# Patient Record
Sex: MALE | Race: WHITE | Employment: UNEMPLOYED | URBAN - METROPOLITAN AREA
[De-identification: names, ages, dates, MRNs, and addresses within clinical notes are randomized per-mention and may not be internally consistent; named-entity substitution may affect disease eponyms.]

---

## 2017-04-24 ENCOUNTER — HOSPITAL ENCOUNTER (EMERGENCY)
Age: 42
Discharge: HOME | End: 2017-04-24
Attending: EMERGENCY MEDICINE
Payer: SELF-PAY

## 2017-04-24 ENCOUNTER — APPOINTMENT (OUTPATIENT)
Dept: CT IMAGING | Age: 42
End: 2017-04-24
Attending: EMERGENCY MEDICINE
Payer: SELF-PAY

## 2017-04-24 VITALS
SYSTOLIC BLOOD PRESSURE: 173 MMHG | OXYGEN SATURATION: 97 % | TEMPERATURE: 97.3 F | HEART RATE: 93 BPM | DIASTOLIC BLOOD PRESSURE: 97 MMHG | RESPIRATION RATE: 22 BRPM | WEIGHT: 315 LBS | HEIGHT: 75 IN | BODY MASS INDEX: 39.17 KG/M2

## 2017-04-24 DIAGNOSIS — N13.2 HYDRONEPHROSIS WITH URINARY OBSTRUCTION DUE TO URETERAL CALCULUS: ICD-10-CM

## 2017-04-24 DIAGNOSIS — N20.1 URETEROLITHIASIS: Primary | ICD-10-CM

## 2017-04-24 LAB
ANION GAP BLD CALC-SCNC: 13 MMOL/L (ref 5–15)
APPEARANCE UR: CLEAR
BACTERIA URNS QL MICRO: NEGATIVE /HPF
BASOPHILS # BLD AUTO: 0 K/UL (ref 0–0.1)
BASOPHILS # BLD: 0 % (ref 0–1)
BILIRUB UR QL: NEGATIVE
BUN SERPL-MCNC: 15 MG/DL (ref 6–20)
BUN/CREAT SERPL: 14 (ref 12–20)
CALCIUM SERPL-MCNC: 8.9 MG/DL (ref 8.5–10.1)
CHLORIDE SERPL-SCNC: 102 MMOL/L (ref 97–108)
CO2 SERPL-SCNC: 24 MMOL/L (ref 21–32)
COLOR UR: ABNORMAL
CREAT SERPL-MCNC: 1.07 MG/DL (ref 0.7–1.3)
EOSINOPHIL # BLD: 0.1 K/UL (ref 0–0.4)
EOSINOPHIL NFR BLD: 1 % (ref 0–7)
EPITH CASTS URNS QL MICRO: ABNORMAL /LPF
ERYTHROCYTE [DISTWIDTH] IN BLOOD BY AUTOMATED COUNT: 13.4 % (ref 11.5–14.5)
GLUCOSE SERPL-MCNC: 224 MG/DL (ref 65–100)
GLUCOSE UR STRIP.AUTO-MCNC: >1000 MG/DL
HCT VFR BLD AUTO: 41.5 % (ref 36.6–50.3)
HGB BLD-MCNC: 13.5 G/DL (ref 12.1–17)
HGB UR QL STRIP: ABNORMAL
HYALINE CASTS URNS QL MICRO: ABNORMAL /LPF (ref 0–5)
KETONES UR QL STRIP.AUTO: NEGATIVE MG/DL
LEUKOCYTE ESTERASE UR QL STRIP.AUTO: NEGATIVE
LYMPHOCYTES # BLD AUTO: 38 % (ref 12–49)
LYMPHOCYTES # BLD: 3.7 K/UL (ref 0.8–3.5)
MCH RBC QN AUTO: 28.9 PG (ref 26–34)
MCHC RBC AUTO-ENTMCNC: 32.5 G/DL (ref 30–36.5)
MCV RBC AUTO: 88.9 FL (ref 80–99)
MONOCYTES # BLD: 0.9 K/UL (ref 0–1)
MONOCYTES NFR BLD AUTO: 9 % (ref 5–13)
NEUTS SEG # BLD: 5 K/UL (ref 1.8–8)
NEUTS SEG NFR BLD AUTO: 52 % (ref 32–75)
NITRITE UR QL STRIP.AUTO: NEGATIVE
PH UR STRIP: 6 [PH] (ref 5–8)
PLATELET # BLD AUTO: 150 K/UL (ref 150–400)
POTASSIUM SERPL-SCNC: 4 MMOL/L (ref 3.5–5.1)
PROT UR STRIP-MCNC: ABNORMAL MG/DL
RBC # BLD AUTO: 4.67 M/UL (ref 4.1–5.7)
RBC #/AREA URNS HPF: ABNORMAL /HPF (ref 0–5)
SODIUM SERPL-SCNC: 139 MMOL/L (ref 136–145)
SP GR UR REFRACTOMETRY: 1.03 (ref 1–1.03)
UA: UC IF INDICATED,UAUC: ABNORMAL
UROBILINOGEN UR QL STRIP.AUTO: 0.2 EU/DL (ref 0.2–1)
WBC # BLD AUTO: 9.7 K/UL (ref 4.1–11.1)
WBC URNS QL MICRO: ABNORMAL /HPF (ref 0–4)

## 2017-04-24 PROCEDURE — 80048 BASIC METABOLIC PNL TOTAL CA: CPT | Performed by: EMERGENCY MEDICINE

## 2017-04-24 PROCEDURE — 36415 COLL VENOUS BLD VENIPUNCTURE: CPT | Performed by: EMERGENCY MEDICINE

## 2017-04-24 PROCEDURE — 85025 COMPLETE CBC W/AUTO DIFF WBC: CPT | Performed by: EMERGENCY MEDICINE

## 2017-04-24 PROCEDURE — 96361 HYDRATE IV INFUSION ADD-ON: CPT

## 2017-04-24 PROCEDURE — 74011250636 HC RX REV CODE- 250/636: Performed by: EMERGENCY MEDICINE

## 2017-04-24 PROCEDURE — 96375 TX/PRO/DX INJ NEW DRUG ADDON: CPT

## 2017-04-24 PROCEDURE — 81001 URINALYSIS AUTO W/SCOPE: CPT | Performed by: EMERGENCY MEDICINE

## 2017-04-24 PROCEDURE — 96376 TX/PRO/DX INJ SAME DRUG ADON: CPT

## 2017-04-24 PROCEDURE — 96374 THER/PROPH/DIAG INJ IV PUSH: CPT

## 2017-04-24 PROCEDURE — 74176 CT ABD & PELVIS W/O CONTRAST: CPT

## 2017-04-24 PROCEDURE — 99284 EMERGENCY DEPT VISIT MOD MDM: CPT

## 2017-04-24 RX ORDER — ONDANSETRON 2 MG/ML
4 INJECTION INTRAMUSCULAR; INTRAVENOUS
Status: COMPLETED | OUTPATIENT
Start: 2017-04-24 | End: 2017-04-24

## 2017-04-24 RX ORDER — ONDANSETRON 4 MG/1
4 TABLET, ORALLY DISINTEGRATING ORAL
Qty: 10 TAB | Refills: 0 | Status: ON HOLD | OUTPATIENT
Start: 2017-04-24 | End: 2017-04-27

## 2017-04-24 RX ORDER — SODIUM CHLORIDE 0.9 % (FLUSH) 0.9 %
5-10 SYRINGE (ML) INJECTION AS NEEDED
Status: DISCONTINUED | OUTPATIENT
Start: 2017-04-24 | End: 2017-04-24 | Stop reason: HOSPADM

## 2017-04-24 RX ORDER — KETOROLAC TROMETHAMINE 30 MG/ML
30 INJECTION, SOLUTION INTRAMUSCULAR; INTRAVENOUS
Status: COMPLETED | OUTPATIENT
Start: 2017-04-24 | End: 2017-04-24

## 2017-04-24 RX ORDER — HYDROMORPHONE HYDROCHLORIDE 1 MG/ML
1 INJECTION, SOLUTION INTRAMUSCULAR; INTRAVENOUS; SUBCUTANEOUS ONCE
Status: COMPLETED | OUTPATIENT
Start: 2017-04-24 | End: 2017-04-24

## 2017-04-24 RX ORDER — SODIUM CHLORIDE 0.9 % (FLUSH) 0.9 %
5-10 SYRINGE (ML) INJECTION EVERY 8 HOURS
Status: DISCONTINUED | OUTPATIENT
Start: 2017-04-24 | End: 2017-04-24 | Stop reason: HOSPADM

## 2017-04-24 RX ORDER — OXYCODONE AND ACETAMINOPHEN 5; 325 MG/1; MG/1
1 TABLET ORAL
Qty: 20 TAB | Refills: 0 | Status: ON HOLD | OUTPATIENT
Start: 2017-04-24 | End: 2017-04-27

## 2017-04-24 RX ADMIN — SODIUM CHLORIDE 1000 ML: 900 INJECTION, SOLUTION INTRAVENOUS at 04:27

## 2017-04-24 RX ADMIN — ONDANSETRON 4 MG: 2 INJECTION INTRAMUSCULAR; INTRAVENOUS at 04:27

## 2017-04-24 RX ADMIN — HYDROMORPHONE HYDROCHLORIDE 1 MG: 1 INJECTION, SOLUTION INTRAMUSCULAR; INTRAVENOUS; SUBCUTANEOUS at 08:52

## 2017-04-24 RX ADMIN — HYDROMORPHONE HYDROCHLORIDE 1 MG: 1 INJECTION, SOLUTION INTRAMUSCULAR; INTRAVENOUS; SUBCUTANEOUS at 04:29

## 2017-04-24 RX ADMIN — KETOROLAC TROMETHAMINE 30 MG: 30 INJECTION, SOLUTION INTRAMUSCULAR at 04:28

## 2017-04-24 NOTE — ED PROVIDER NOTES
HPI Comments: Ambar Villarreal is a 35 y.o. male who presents to the ED with a c/o left flank pain. Patient states that the pain started approximately 35 minutes prior to coming to the emergency room. Patient states the pain is in the left side of his back it radiates to his left groin. Patient's is sharp and stabbing in nature. Patient states she's had some nausea but no emesis. Patient denies any fever. Patient states he has a history of kidney stones and this feels similar to previous episodes. PCP: Reyes Conrad  PMHx significant for: DM, HTN, kidney stones  PSHx significant for: ankle surgery  Social Hx: Tobacco: no EtOH: rarely Illicit drug use: no    There are no further complaints or symptoms at this time. Patient is a 35 y.o. male presenting with flank pain. The history is provided by the patient. No  was used. Flank Pain    This is a new problem. The current episode started less than 1 hour ago. The problem has been rapidly worsening. Patient reports not work related injury. The pain is associated with no known injury. The pain is present in the middle back and left side. The quality of the pain is described as stabbing, shooting, similar to previous episodes and sharp. The pain radiates to the left groin. The pain is at a severity of 10/10. The pain is severe. Pertinent negatives include no chest pain, no fever, no numbness, no abdominal pain, no bowel incontinence, no perianal numbness, no bladder incontinence, no dysuria, no paresthesias, no paresis and no weakness. He has tried nothing for the symptoms. Risk factors include history of kidney stones. The patient's surgical history non-contributory        Past Medical History:   Diagnosis Date    Diabetes ([facility] [address]     Hypertension        Past Surgical History:   Procedure Laterality Date    HX ORTHOPAEDIC           No family history on file.     Social History     Social History    Marital status: LEGALLY  Spouse name: N/A    Number of children: N/A    Years of education: N/A     Occupational History    Not on file. Social History Main Topics    Smoking status: Never Smoker    Smokeless tobacco: Not on file    Alcohol use Yes      Comment: 1 x year    Drug use: Not on file    Sexual activity: Not on file     Other Topics Concern    Not on file     Social History Narrative     ALLERGIES: Other food and Cucumber fruit extract    Review of Systems   Constitutional: Negative for appetite change, chills, fever and unexpected weight change. HENT: Negative for ear pain, hearing loss, rhinorrhea and trouble swallowing. Eyes: Negative for pain and visual disturbance. Respiratory: Negative for cough, chest tightness and shortness of breath. Cardiovascular: Negative for chest pain and palpitations. Gastrointestinal: Negative for abdominal distention, abdominal pain, blood in stool, bowel incontinence and vomiting. Genitourinary: Positive for flank pain. Negative for bladder incontinence, dysuria, hematuria and urgency. Musculoskeletal: Negative for back pain and myalgias. Skin: Negative for rash. Neurological: Negative for dizziness, syncope, weakness, numbness and paresthesias. Psychiatric/Behavioral: Negative for confusion and suicidal ideas. All other systems reviewed and are negative. Vitals:    04/24/17 0356   BP: (!) 203/105   Pulse: 93   Resp: 22   Temp: 97.3 °F (36.3 °C)   SpO2: 97%   Weight: (!) 188 kg (414 lb 7.4 oz)   Height: 6' 3\" (1.905 m)            Physical Exam   Constitutional: He is oriented to person, place, and time. He appears well-developed and well-nourished. He appears distressed (secondary to pain). HENT:   Head: Normocephalic and atraumatic. Right Ear: External ear normal.   Left Ear: External ear normal.   Nose: Nose normal.   Mouth/Throat: Oropharynx is clear and moist. No oropharyngeal exudate.    Eyes: Conjunctivae and EOM are normal. Pupils are equal, round, and reactive to light. Right eye exhibits no discharge. Left eye exhibits no discharge. No scleral icterus. Neck: Normal range of motion. Neck supple. No JVD present. No tracheal deviation present. Cardiovascular: Normal rate, regular rhythm, normal heart sounds and intact distal pulses. Exam reveals no gallop and no friction rub. No murmur heard. Pulmonary/Chest: Effort normal and breath sounds normal. No stridor. No respiratory distress. He has no decreased breath sounds. He has no wheezes. He has no rhonchi. He has no rales. He exhibits no tenderness. Abdominal: Soft. Bowel sounds are normal. He exhibits no distension. There is no tenderness. There is no rebound and no guarding. Musculoskeletal: Normal range of motion. He exhibits no edema or tenderness. Neurological: He is alert and oriented to person, place, and time. He has normal strength and normal reflexes. No cranial nerve deficit or sensory deficit. He exhibits normal muscle tone. Coordination normal. GCS eye subscore is 4. GCS verbal subscore is 5. GCS motor subscore is 6. Skin: Skin is warm and dry. No rash noted. He is not diaphoretic. No erythema. No pallor. Psychiatric: He has a normal mood and affect. His behavior is normal. Judgment and thought content normal.   Nursing note and vitals reviewed.        MDM  Number of Diagnoses or Management Options  Hydronephrosis with urinary obstruction due to ureteral calculus:   Ureterolithiasis:      Amount and/or Complexity of Data Reviewed  Clinical lab tests: ordered and reviewed  Tests in the radiology section of CPT®: ordered and reviewed  Discuss the patient with other providers: yes (Urology)    Risk of Complications, Morbidity, and/or Mortality  Presenting problems: moderate  Diagnostic procedures: moderate  Management options: moderate    Patient Progress  Patient progress: improved    ED Course       Procedures  Chief Complaint   Patient presents with    Flank Pain 6:50 AM  The patients presenting problems have been discussed, and they are in agreement with the care plan formulated and outlined with them. I have encouraged them to ask questions as they arise throughout their visit. MEDICATIONS GIVEN:  Medications   sodium chloride (NS) flush 5-10 mL (not administered)   sodium chloride (NS) flush 5-10 mL (not administered)   HYDROmorphone (PF) (DILAUDID) injection 1 mg (1 mg IntraVENous Given 4/24/17 0429)   sodium chloride 0.9 % bolus infusion 1,000 mL (1,000 mL IntraVENous New Bag 4/24/17 0427)   ketorolac (TORADOL) injection 30 mg (30 mg IntraVENous Given 4/24/17 0428)   ondansetron (ZOFRAN) injection 4 mg (4 mg IntraVENous Given 4/24/17 0427)       LABS REVIEWED:  Recent Results (from the past 24 hour(s))   METABOLIC PANEL, BASIC    Collection Time: 04/24/17  4:31 AM   Result Value Ref Range    Sodium 139 136 - 145 mmol/L    Potassium 4.0 3.5 - 5.1 mmol/L    Chloride 102 97 - 108 mmol/L    CO2 24 21 - 32 mmol/L    Anion gap 13 5 - 15 mmol/L    Glucose 224 (H) 65 - 100 mg/dL    BUN 15 6 - 20 MG/DL    Creatinine 1.07 0.70 - 1.30 MG/DL    BUN/Creatinine ratio 14 12 - 20      GFR est AA >60 >60 ml/min/1.73m2    GFR est non-AA >60 >60 ml/min/1.73m2    Calcium 8.9 8.5 - 10.1 MG/DL   CBC WITH AUTOMATED DIFF    Collection Time: 04/24/17  4:31 AM   Result Value Ref Range    WBC 9.7 4.1 - 11.1 K/uL    RBC 4.67 4.10 - 5.70 M/uL    HGB 13.5 12.1 - 17.0 g/dL    HCT 41.5 36.6 - 50.3 %    MCV 88.9 80.0 - 99.0 FL    MCH 28.9 26.0 - 34.0 PG    MCHC 32.5 30.0 - 36.5 g/dL    RDW 13.4 11.5 - 14.5 %    PLATELET 073 485 - 911 K/uL    NEUTROPHILS 52 32 - 75 %    LYMPHOCYTES 38 12 - 49 %    MONOCYTES 9 5 - 13 %    EOSINOPHILS 1 0 - 7 %    BASOPHILS 0 0 - 1 %    ABS. NEUTROPHILS 5.0 1.8 - 8.0 K/UL    ABS. LYMPHOCYTES 3.7 (H) 0.8 - 3.5 K/UL    ABS. MONOCYTES 0.9 0.0 - 1.0 K/UL    ABS. EOSINOPHILS 0.1 0.0 - 0.4 K/UL    ABS.  BASOPHILS 0.0 0.0 - 0.1 K/UL   URINALYSIS W/ REFLEX CULTURE Collection Time: 04/24/17  6:00 AM   Result Value Ref Range    Color YELLOW/STRAW      Appearance CLEAR CLEAR      Specific gravity 1.030 1.003 - 1.030      pH (UA) 6.0 5.0 - 8.0      Protein TRACE (A) NEG mg/dL    Glucose >1000 (A) NEG mg/dL    Ketone NEGATIVE  NEG mg/dL    Bilirubin NEGATIVE  NEG      Blood LARGE (A) NEG      Urobilinogen 0.2 0.2 - 1.0 EU/dL    Nitrites NEGATIVE  NEG      Leukocyte Esterase NEGATIVE  NEG      UA:UC IF INDICATED CULTURE NOT INDICATED BY UA RESULT CNI      WBC 0-4 0 - 4 /hpf    RBC  0 - 5 /hpf    Epithelial cells FEW FEW /lpf    Bacteria NEGATIVE  NEG /hpf    Hyaline cast 0-2 0 - 5 /lpf       VITAL SIGNS:  Patient Vitals for the past 12 hrs:   Temp Pulse Resp BP SpO2   04/24/17 0356 97.3 °F (36.3 °C) 93 22 (!) 203/105 97 %       RADIOLOGY RESULTS:  The following have been ordered and reviewed:  CT ABD PELV WO CONT   Final Result        Study Result      INDICATION: flank pain left flank pain     COMPARISON: None     TECHNIQUE:   Thin axial images were obtained through the abdomen and pelvis. Coronal and  sagittal reconstructions were generated. Oral contrast was not administered. CT  dose reduction was achieved through use of a standardized protocol tailored for  this examination and automatic exposure control for dose modulation.      The absence of intravenous contrast material reduces the sensitivity for  evaluation of the solid parenchymal organs of the abdomen.      FINDINGS:   LUNG BASES: Clear. INCIDENTALLY IMAGED HEART AND MEDIASTINUM: Unremarkable. LIVER: Hepatic steatosis. GALLBLADDER: Unremarkable. SPLEEN: No mass. PANCREAS: No mass or ductal dilatation. ADRENALS: Unremarkable. KIDNEYS/URETERS: Left hydroureteronephrosis with a 10 mm calculus in the distal  left ureter. STOMACH: Unremarkable. SMALL BOWEL: No dilatation or wall thickening. COLON: No dilatation or wall thickening. APPENDIX: Unremarkable.   PERITONEUM: No ascites or pneumoperitoneum. RETROPERITONEUM: No lymphadenopathy or aortic aneurysm. REPRODUCTIVE ORGANS: None  URINARY BLADDER: No mass or calculus. BONES: No destructive bone lesion. ADDITIONAL COMMENTS: N/A     IMPRESSION  IMPRESSION:     1. Left hydroureteronephrosis with a 10 mm calculus in the distal left ureter. 2. Hepatic steatosis. CONSULTATIONS:   Urology     PROGRESS NOTES:  Due to pt being DM, spoke with urology. Pt is to follow up with urology today. Discussed results and plan with patient. Patient will be discharged home with urology and PCP followup. Patient instructed to return to the emergency room for any worsening symptoms or any other concerns. DIAGNOSIS:    1. Ureterolithiasis    2. Hydronephrosis with urinary obstruction due to ureteral calculus        PLAN:  Follow-up Information     Follow up With Details Comments Contact Info    Benson Essex, MD Schedule an appointment as soon as possible for a visit  [address] jose Hoyt MD In 1 week As needed [address] [facility]    [address] [id number] [phone]      [facility] EMERGENCY DEPT  If symptoms worsen [address] [facility]  [phone]        Current Discharge Medication List      START taking these medications    Details   oxyCODONE-acetaminophen (PERCOCET) 5-325 mg per tablet Take 1 Tab by mouth every four (4) hours as needed for Pain. Max Daily Amount: 6 Tabs. Qty: 20 Tab, Refills: 0      ondansetron (ZOFRAN ODT) 4 mg disintegrating tablet Take 1 Tab by mouth every eight (8) hours as needed for Nausea. Qty: 10 Tab, Refills: 0         CONTINUE these medications which have NOT CHANGED    Details   lisinopril (PRINIVIL, ZESTRIL) 10 mg tablet Take 10 mg by mouth daily. OTHER statin      cyclobenzaprine (FLEXERIL) 10 mg tablet Take 1 Tab by mouth three (3) times daily as needed for Muscle Spasm(s) for up to 12 doses.   Qty: 12 Tab, Refills: 0      esomeprazole (NEXIUM) 40 mg capsule Take 40 mg by mouth daily. metFORMIN (GLUCOPHAGE) 500 mg tablet Take 500 mg by mouth two (2) times daily (with meals). ED COURSE: The patients hospital course has been uncomplicated.

## 2017-04-24 NOTE — DISCHARGE INSTRUCTIONS
We hope that we have addressed all of your medical concerns. The examination and treatment you received in the Emergency Department were for an emergent problem and were not intended as complete care. It is important that you follow up with your healthcare provider(s) for ongoing care. If your symptoms worsen or do not improve as expected, and you are unable to reach your usual health care provider(s), you should return to the Emergency Department. Today's healthcare is undergoing tremendous change, and patient satisfaction surveys are one of the many tools to assess the quality of medical care. You may receive a survey from the [employer] organization regarding your experience in the Emergency Department. I hope that your experience has been completely positive, particularly the medical care that I provided. As such, please participate in the survey; anything less than excellent does not meet my expectations or intentions. [address] [facility] and [facility] participate in nationally recognized quality of care measures. If your blood pressure is greater than 120/80, as reported below, we urge that you seek medical care to address the potential of high blood pressure, commonly known as hypertension. Hypertension can be hereditary or can be caused by certain medical conditions, pain, stress, or \"white coat syndrome. \"       Please make an appointment with your health care provider(s) for follow up of your Emergency Department visit. VITALS:   Patient Vitals for the past 8 hrs:   Temp Pulse Resp BP SpO2   04/24/17 0356 97.3 °F (36.3 °C) 93 22 (!) 203/105 97 %          Thank you for allowing us to provide you with medical care today. We realize that you have many choices for your emergency care needs. Please choose us in the future for any continued health care needs. Aury Barragan, [address] [place] [address] [address] Office: [phone]            Recent Results (from the past 24 hour(s))   METABOLIC PANEL, BASIC    Collection Time: 04/24/17  4:31 AM   Result Value Ref Range    Sodium 139 136 - 145 mmol/L    Potassium 4.0 3.5 - 5.1 mmol/L    Chloride 102 97 - 108 mmol/L    CO2 24 21 - 32 mmol/L    Anion gap 13 5 - 15 mmol/L    Glucose 224 (H) 65 - 100 mg/dL    BUN 15 6 - 20 MG/DL    Creatinine 1.07 0.70 - 1.30 MG/DL    BUN/Creatinine ratio 14 12 - 20      GFR est AA >60 >60 ml/min/1.73m2    GFR est non-AA >60 >60 ml/min/1.73m2    Calcium 8.9 8.5 - 10.1 MG/DL   CBC WITH AUTOMATED DIFF    Collection Time: 04/24/17  4:31 AM   Result Value Ref Range    WBC 9.7 4.1 - 11.1 K/uL    RBC 4.67 4.10 - 5.70 M/uL    HGB 13.5 12.1 - 17.0 g/dL    HCT 41.5 36.6 - 50.3 %    MCV 88.9 80.0 - 99.0 FL    MCH 28.9 26.0 - 34.0 PG    MCHC 32.5 30.0 - 36.5 g/dL    RDW 13.4 11.5 - 14.5 %    PLATELET 858 124 - 261 K/uL    NEUTROPHILS 52 32 - 75 %    LYMPHOCYTES 38 12 - 49 %    MONOCYTES 9 5 - 13 %    EOSINOPHILS 1 0 - 7 %    BASOPHILS 0 0 - 1 %    ABS. NEUTROPHILS 5.0 1.8 - 8.0 K/UL    ABS. LYMPHOCYTES 3.7 (H) 0.8 - 3.5 K/UL    ABS. MONOCYTES 0.9 0.0 - 1.0 K/UL    ABS. EOSINOPHILS 0.1 0.0 - 0.4 K/UL    ABS.  BASOPHILS 0.0 0.0 - 0.1 K/UL   URINALYSIS W/ REFLEX CULTURE    Collection Time: 04/24/17  6:00 AM   Result Value Ref Range    Color YELLOW/STRAW      Appearance CLEAR CLEAR      Specific gravity 1.030 1.003 - 1.030      pH (UA) 6.0 5.0 - 8.0      Protein TRACE (A) NEG mg/dL    Glucose >1000 (A) NEG mg/dL    Ketone NEGATIVE  NEG mg/dL    Bilirubin NEGATIVE  NEG      Blood LARGE (A) NEG      Urobilinogen 0.2 0.2 - 1.0 EU/dL    Nitrites NEGATIVE  NEG      Leukocyte Esterase NEGATIVE  NEG      UA:UC IF INDICATED CULTURE NOT INDICATED BY UA RESULT CNI      WBC 0-4 0 - 4 /hpf    RBC  0 - 5 /hpf    Epithelial cells FEW FEW /lpf    Bacteria NEGATIVE  NEG /hpf    Hyaline cast 0-2 0 - 5 /lpf       Ct Abd Pelv Wo Cont    Result Date: 4/24/2017  INDICATION: flank pain  left flank pain COMPARISON: None TECHNIQUE: Thin axial images were obtained through the abdomen and pelvis. Coronal and sagittal reconstructions were generated. Oral contrast was not administered. CT dose reduction was achieved through use of a standardized protocol tailored for this examination and automatic exposure control for dose modulation. The absence of intravenous contrast material reduces the sensitivity for evaluation of the solid parenchymal organs of the abdomen. FINDINGS: LUNG BASES: Clear. INCIDENTALLY IMAGED HEART AND MEDIASTINUM: Unremarkable. LIVER: Hepatic steatosis. GALLBLADDER: Unremarkable. SPLEEN: No mass. PANCREAS: No mass or ductal dilatation. ADRENALS: Unremarkable. KIDNEYS/URETERS: Left hydroureteronephrosis with a 10 mm calculus in the distal left ureter. STOMACH: Unremarkable. SMALL BOWEL: No dilatation or wall thickening. COLON: No dilatation or wall thickening. APPENDIX: Unremarkable. PERITONEUM: No ascites or pneumoperitoneum. RETROPERITONEUM: No lymphadenopathy or aortic aneurysm. REPRODUCTIVE ORGANS: None URINARY BLADDER: No mass or calculus. BONES: No destructive bone lesion. ADDITIONAL COMMENTS: N/A     IMPRESSION: 1. Left hydroureteronephrosis with a 10 mm calculus in the distal left ureter. 2. Hepatic steatosis. Kidney Stone: Care Instructions  Your Care Instructions    Kidney stones are formed when salts, minerals, and other substances normally found in the urine clump together. They can be as small as grains of sand or, rarely, as large as golf balls. While the stone is traveling through the ureter, which is the tube that carries urine from the kidney to the bladder, you will probably feel pain. The pain may be mild or very severe. You may also have some blood in your urine. As soon as the stone reaches the bladder, any intense pain should go away.   If a stone is too large to pass on its own, you may need a medical procedure to help you pass the stone. The doctor has checked you carefully, but problems can develop later. If you notice any problems or new symptoms, get medical treatment right away. Follow-up care is a key part of your treatment and safety. Be sure to make and go to all appointments, and call your doctor if you are having problems. It's also a good idea to know your test results and keep a list of the medicines you take. How can you care for yourself at home? · Drink plenty of fluids, enough so that your urine is light yellow or clear like water. If you have kidney, heart, or liver disease and have to limit fluids, talk with your doctor before you increase the amount of fluids you drink. · Take pain medicines exactly as directed. Call your doctor if you think you are having a problem with your medicine. ¨ If the doctor gave you a prescription medicine for pain, take it as prescribed. ¨ If you are not taking a prescription pain medicine, ask your doctor if you can take an over-the-counter medicine. Read and follow all instructions on the label. · Your doctor may ask you to strain your urine so that you can collect your kidney stone when it passes. You can use a kitchen strainer or a tea strainer to catch the stone. Store it in a plastic bag until you see your doctor again. Preventing future kidney stones  Some changes in your diet may help prevent kidney stones. Depending on the cause of your stones, your doctor may recommend that you:  · Drink plenty of fluids, enough so that your urine is light yellow or clear like water. If you have kidney, heart, or liver disease and have to limit fluids, talk with your doctor before you increase the amount of fluids you drink. · Limit coffee, tea, and alcohol. Also avoid grapefruit juice. · Do not take more than the recommended daily dose of vitamins C and D.  · Avoid antacids such as Gaviscon, Maalox, Mylanta, or Tums. · Limit the amount of salt (sodium) in your diet.   · Eat a balanced diet that is not too high in protein. · Limit foods that are high in a substance called oxalate, which can cause kidney stones. These foods include dark green vegetables, rhubarb, chocolate, wheat bran, nuts, cranberries, and beans. When should you call for help? Call your doctor now or seek immediate medical care if:  · You cannot keep down fluids. · Your pain gets worse. · You have a fever or chills. · You have new or worse pain in your back just below your rib cage (the flank area). · You have new or more blood in your urine. Watch closely for changes in your health, and be sure to contact your doctor if:  · You do not get better as expected. Where can you learn more? Go to http://leobardo-lan.info/. Enter I326 in the search box to learn more about \"Kidney Stone: Care Instructions. \"  Current as of: November 20, 2015  Content Version: 11.2  © 2573-3056 [employer]. Care instructions adapted under license by [employer] (which disclaims liability or warranty for this information). If you have questions about a medical condition or this instruction, always ask your healthcare professional. [place] [address] any warranty or liability for your use of this information. Learning About Diet for Kidney Stone Prevention  What are kidney stones? Kidney stones are made of salts and minerals in the urine that form small \"jairo. \" Stones can form in the kidneys and the ureters (the tubes that lead from the kidneys to the bladder). They can also form in the bladder. Stones may not cause a problem as long as they stay in the kidneys. But they can cause sudden, severe pain. Pain is most likely when the stones travel from the kidneys to the bladder. Kidney stones can cause bloody urine. Kidney stones often run in families. You are more likely to get them if you don't drink enough fluids, mainly water.  Certain foods and drinks and some dietary supplements may also increase your risk for kidney stones if you consume too much of them. What can you do to prevent kidney stones? Changing what you eat may not prevent all types of kidney stones. But for people who have a history of certain kinds of kidney stones, some changes in diet may help. A dietitian can help you set up a meal plan that includes healthy, low-oxalate choices. Here are some general guidelines to get you started. Plan your meals and snacks around foods that are low in oxalate. These foods include:  · Corn, kale, parsnips, and squash,. · Beef, chicken, pork, turkey, and fish. · Milk, butter, cheese, and yogurt. You can eat certain foods that are medium-high in oxalate, but eat them only once in a while. These foods include:  · Bread. · Brown rice. · English muffins. · Figs. · Popcorn. · String beans. · Tomatoes. Limit very high-oxalate foods, including:  · Black tea. · Coffee. · Chocolate. · Dark green vegetables. · Nuts. Here are some other things you can do to help prevent kidney stones. · Drink plenty of fluids. If you have kidney, heart, or liver disease and have to limit fluids, talk with your doctor before you increase the amount of fluids you drink. · Do not take more than the recommended daily dose of vitamins C and D.  · Limit the salt in your diet. · Eat a balanced diet that is not too high in protein. Follow-up care is a key part of your treatment and safety. Be sure to make and go to all appointments, and call your doctor if you are having problems. It's also a good idea to know your test results and keep a list of the medicines you take. Where can you learn more? Go to http://leobardo-lan.info/. Enter C138 in the search box to learn more about \"Learning About Diet for Kidney Stone Prevention. \"  Current as of: July 26, 2016  Content Version: 11.2  © 5718-6479 [employer].  Care instructions adapted under license by [employer] (which disclaims liability or warranty for this information). If you have questions about a medical condition or this instruction, always ask your healthcare professional. [address] any warranty or liability for your use of this information.

## 2017-04-24 NOTE — ED NOTES
Bedside and Verbal shift change report given to [address] [place] [address] [address] (oncoming nurse) by Anton Goodwin RN (offgoing nurse). Report included the following information SBAR, ED Summary, MAR and Recent Results.

## 2017-04-26 ENCOUNTER — ANESTHESIA EVENT (OUTPATIENT)
Dept: SURGERY | Age: 42
End: 2017-04-26
Payer: SELF-PAY

## 2017-04-27 ENCOUNTER — ANESTHESIA (OUTPATIENT)
Dept: SURGERY | Age: 42
End: 2017-04-27
Payer: SELF-PAY

## 2017-04-27 ENCOUNTER — HOSPITAL ENCOUNTER (OUTPATIENT)
Age: 42
Setting detail: OUTPATIENT SURGERY
Discharge: HOME | End: 2017-04-27
Attending: UROLOGY | Admitting: UROLOGY
Payer: SELF-PAY

## 2017-04-27 ENCOUNTER — APPOINTMENT (OUTPATIENT)
Dept: GENERAL RADIOLOGY | Age: 42
End: 2017-04-27
Attending: UROLOGY
Payer: SELF-PAY

## 2017-04-27 VITALS
HEART RATE: 76 BPM | RESPIRATION RATE: 12 BRPM | OXYGEN SATURATION: 96 % | DIASTOLIC BLOOD PRESSURE: 65 MMHG | WEIGHT: 315 LBS | TEMPERATURE: 97.7 F | BODY MASS INDEX: 39.17 KG/M2 | SYSTOLIC BLOOD PRESSURE: 139 MMHG | HEIGHT: 75 IN

## 2017-04-27 LAB
ATRIAL RATE: 75 BPM
CALCULATED P AXIS, ECG09: 15 DEGREES
CALCULATED R AXIS, ECG10: -15 DEGREES
CALCULATED T AXIS, ECG11: 15 DEGREES
DIAGNOSIS, 93000: NORMAL
GLUCOSE BLD STRIP.AUTO-MCNC: 138 MG/DL (ref 65–100)
GLUCOSE BLD STRIP.AUTO-MCNC: 164 MG/DL (ref 65–100)
P-R INTERVAL, ECG05: 214 MS
Q-T INTERVAL, ECG07: 364 MS
QRS DURATION, ECG06: 120 MS
QTC CALCULATION (BEZET), ECG08: 406 MS
SERVICE CMNT-IMP: ABNORMAL
SERVICE CMNT-IMP: ABNORMAL
VENTRICULAR RATE, ECG03: 75 BPM

## 2017-04-27 PROCEDURE — 77030018836 HC SOL IRR NACL ICUM -A: Performed by: UROLOGY

## 2017-04-27 PROCEDURE — 76030000020 HC AMB SURG 1.5 TO 2 HR INTENSV-TIER 1: Performed by: UROLOGY

## 2017-04-27 PROCEDURE — 74011250636 HC RX REV CODE- 250/636

## 2017-04-27 PROCEDURE — 76001 XR FLUOROSCOPY OVER 60 MINUTES: CPT

## 2017-04-27 PROCEDURE — C2617 STENT, NON-COR, TEM W/O DEL: HCPCS | Performed by: UROLOGY

## 2017-04-27 PROCEDURE — 76210000050 HC AMBSU PH II REC 0.5 TO 1 HR: Performed by: UROLOGY

## 2017-04-27 PROCEDURE — 74011250637 HC RX REV CODE- 250/637: Performed by: UROLOGY

## 2017-04-27 PROCEDURE — 76210000034 HC AMBSU PH I REC 0.5 TO 1 HR: Performed by: UROLOGY

## 2017-04-27 PROCEDURE — 77030033269 HC SLV COMPR SCD KNE2 CARD -B: Performed by: UROLOGY

## 2017-04-27 PROCEDURE — 74011636320 HC RX REV CODE- 636/320: Performed by: UROLOGY

## 2017-04-27 PROCEDURE — 74011250636 HC RX REV CODE- 250/636: Performed by: ANESTHESIOLOGY

## 2017-04-27 PROCEDURE — C1758 CATHETER, URETERAL: HCPCS | Performed by: UROLOGY

## 2017-04-27 PROCEDURE — 74011000250 HC RX REV CODE- 250: Performed by: UROLOGY

## 2017-04-27 PROCEDURE — 77030019927 HC TBNG IRR CYSTO BAXT -A: Performed by: UROLOGY

## 2017-04-27 PROCEDURE — 93005 ELECTROCARDIOGRAM TRACING: CPT

## 2017-04-27 PROCEDURE — 77030006974 HC BSKT URET RTVR BSC -C: Performed by: UROLOGY

## 2017-04-27 PROCEDURE — 76060000063 HC AMB SURG ANES 1.5 TO 2 HR: Performed by: UROLOGY

## 2017-04-27 PROCEDURE — 77030018832 HC SOL IRR H20 ICUM -A: Performed by: UROLOGY

## 2017-04-27 PROCEDURE — 77030020143 HC AIRWY LARYN INTUB CGAS -A: Performed by: ANESTHESIOLOGY

## 2017-04-27 PROCEDURE — 74011250636 HC RX REV CODE- 250/636: Performed by: UROLOGY

## 2017-04-27 PROCEDURE — 77030037193 HC FBR LSR HOLM 365 MIC DISP LENI -C: Performed by: UROLOGY

## 2017-04-27 PROCEDURE — 82962 GLUCOSE BLOOD TEST: CPT

## 2017-04-27 PROCEDURE — C1769 GUIDE WIRE: HCPCS | Performed by: UROLOGY

## 2017-04-27 DEVICE — URETERAL STENT
Type: IMPLANTABLE DEVICE | Site: URETER | Status: FUNCTIONAL
Brand: POLARIS™ ULTRA

## 2017-04-27 RX ORDER — LIDOCAINE HYDROCHLORIDE 10 MG/ML
0.1 INJECTION, SOLUTION EPIDURAL; INFILTRATION; INTRACAUDAL; PERINEURAL AS NEEDED
Status: DISCONTINUED | OUTPATIENT
Start: 2017-04-27 | End: 2017-04-27 | Stop reason: HOSPADM

## 2017-04-27 RX ORDER — ONDANSETRON 2 MG/ML
4 INJECTION INTRAMUSCULAR; INTRAVENOUS AS NEEDED
Status: DISCONTINUED | OUTPATIENT
Start: 2017-04-27 | End: 2017-04-27 | Stop reason: HOSPADM

## 2017-04-27 RX ORDER — OXYCODONE AND ACETAMINOPHEN 5; 325 MG/1; MG/1
1-2 TABLET ORAL
Qty: 20 TAB | Refills: 0 | Status: SHIPPED | OUTPATIENT
Start: 2017-04-27 | End: 2017-09-17

## 2017-04-27 RX ORDER — LIDOCAINE HYDROCHLORIDE 20 MG/ML
JELLY TOPICAL AS NEEDED
Status: DISCONTINUED | OUTPATIENT
Start: 2017-04-27 | End: 2017-04-27 | Stop reason: HOSPADM

## 2017-04-27 RX ORDER — SODIUM CHLORIDE, SODIUM LACTATE, POTASSIUM CHLORIDE, CALCIUM CHLORIDE 600; 310; 30; 20 MG/100ML; MG/100ML; MG/100ML; MG/100ML
125 INJECTION, SOLUTION INTRAVENOUS CONTINUOUS
Status: DISCONTINUED | OUTPATIENT
Start: 2017-04-27 | End: 2017-04-27 | Stop reason: HOSPADM

## 2017-04-27 RX ORDER — PHENAZOPYRIDINE HYDROCHLORIDE 100 MG/1
100 TABLET, FILM COATED ORAL
Qty: 9 TAB | Refills: 3 | Status: SHIPPED | OUTPATIENT
Start: 2017-04-27 | End: 2017-04-30

## 2017-04-27 RX ORDER — MIDAZOLAM HYDROCHLORIDE 1 MG/ML
INJECTION, SOLUTION INTRAMUSCULAR; INTRAVENOUS AS NEEDED
Status: DISCONTINUED | OUTPATIENT
Start: 2017-04-27 | End: 2017-04-27 | Stop reason: HOSPADM

## 2017-04-27 RX ORDER — CEPHALEXIN 500 MG/1
500 CAPSULE ORAL 4 TIMES DAILY
Qty: 12 CAP | Refills: 0 | Status: SHIPPED | OUTPATIENT
Start: 2017-04-27 | End: 2017-04-30

## 2017-04-27 RX ORDER — LIDOCAINE HYDROCHLORIDE 20 MG/ML
INJECTION, SOLUTION EPIDURAL; INFILTRATION; INTRACAUDAL; PERINEURAL AS NEEDED
Status: DISCONTINUED | OUTPATIENT
Start: 2017-04-27 | End: 2017-04-27 | Stop reason: HOSPADM

## 2017-04-27 RX ORDER — TAMSULOSIN HYDROCHLORIDE 0.4 MG/1
0.4 CAPSULE ORAL DAILY
Qty: 30 CAP | Refills: 1 | Status: SHIPPED | OUTPATIENT
Start: 2017-04-27 | End: 2017-09-17

## 2017-04-27 RX ORDER — AMLODIPINE BESYLATE 10 MG/1
10 TABLET ORAL DAILY
COMMUNITY
End: 2020-12-15

## 2017-04-27 RX ORDER — SODIUM CHLORIDE 0.9 % (FLUSH) 0.9 %
5-10 SYRINGE (ML) INJECTION EVERY 8 HOURS
Status: DISCONTINUED | OUTPATIENT
Start: 2017-04-27 | End: 2017-04-27 | Stop reason: HOSPADM

## 2017-04-27 RX ORDER — KETOROLAC TROMETHAMINE 30 MG/ML
INJECTION, SOLUTION INTRAMUSCULAR; INTRAVENOUS AS NEEDED
Status: DISCONTINUED | OUTPATIENT
Start: 2017-04-27 | End: 2017-04-27 | Stop reason: HOSPADM

## 2017-04-27 RX ORDER — PROPOFOL 10 MG/ML
INJECTION, EMULSION INTRAVENOUS AS NEEDED
Status: DISCONTINUED | OUTPATIENT
Start: 2017-04-27 | End: 2017-04-27 | Stop reason: HOSPADM

## 2017-04-27 RX ORDER — SODIUM CHLORIDE 0.9 % (FLUSH) 0.9 %
5-10 SYRINGE (ML) INJECTION AS NEEDED
Status: DISCONTINUED | OUTPATIENT
Start: 2017-04-27 | End: 2017-04-27 | Stop reason: HOSPADM

## 2017-04-27 RX ORDER — PHENAZOPYRIDINE HYDROCHLORIDE 100 MG/1
100 TABLET, FILM COATED ORAL ONCE
Status: COMPLETED | OUTPATIENT
Start: 2017-04-27 | End: 2017-04-27

## 2017-04-27 RX ORDER — HYDROMORPHONE HYDROCHLORIDE 1 MG/ML
.5-1 INJECTION, SOLUTION INTRAMUSCULAR; INTRAVENOUS; SUBCUTANEOUS
Status: DISCONTINUED | OUTPATIENT
Start: 2017-04-27 | End: 2017-04-27 | Stop reason: HOSPADM

## 2017-04-27 RX ORDER — FENTANYL CITRATE 50 UG/ML
INJECTION, SOLUTION INTRAMUSCULAR; INTRAVENOUS AS NEEDED
Status: DISCONTINUED | OUTPATIENT
Start: 2017-04-27 | End: 2017-04-27 | Stop reason: HOSPADM

## 2017-04-27 RX ADMIN — SODIUM CHLORIDE, SODIUM LACTATE, POTASSIUM CHLORIDE, AND CALCIUM CHLORIDE 125 ML/HR: 600; 310; 30; 20 INJECTION, SOLUTION INTRAVENOUS at 07:28

## 2017-04-27 RX ADMIN — MIDAZOLAM HYDROCHLORIDE 1 MG: 1 INJECTION, SOLUTION INTRAMUSCULAR; INTRAVENOUS at 08:30

## 2017-04-27 RX ADMIN — MIDAZOLAM HYDROCHLORIDE 1 MG: 1 INJECTION, SOLUTION INTRAMUSCULAR; INTRAVENOUS at 09:02

## 2017-04-27 RX ADMIN — PROPOFOL 200 MG: 10 INJECTION, EMULSION INTRAVENOUS at 08:15

## 2017-04-27 RX ADMIN — CEFAZOLIN 3 G: 1 INJECTION, POWDER, FOR SOLUTION INTRAMUSCULAR; INTRAVENOUS; PARENTERAL at 08:06

## 2017-04-27 RX ADMIN — LIDOCAINE HYDROCHLORIDE 60 MG: 20 INJECTION, SOLUTION EPIDURAL; INFILTRATION; INTRACAUDAL; PERINEURAL at 08:15

## 2017-04-27 RX ADMIN — FENTANYL CITRATE 50 MCG: 50 INJECTION, SOLUTION INTRAMUSCULAR; INTRAVENOUS at 09:30

## 2017-04-27 RX ADMIN — FENTANYL CITRATE 50 MCG: 50 INJECTION, SOLUTION INTRAMUSCULAR; INTRAVENOUS at 09:00

## 2017-04-27 RX ADMIN — FENTANYL CITRATE 50 MCG: 50 INJECTION, SOLUTION INTRAMUSCULAR; INTRAVENOUS at 09:19

## 2017-04-27 RX ADMIN — PHENAZOPYRIDINE HYDROCHLORIDE 100 MG: 100 TABLET ORAL at 11:02

## 2017-04-27 RX ADMIN — FENTANYL CITRATE 50 MCG: 50 INJECTION, SOLUTION INTRAMUSCULAR; INTRAVENOUS at 08:06

## 2017-04-27 RX ADMIN — FENTANYL CITRATE 50 MCG: 50 INJECTION, SOLUTION INTRAMUSCULAR; INTRAVENOUS at 08:18

## 2017-04-27 RX ADMIN — KETOROLAC TROMETHAMINE 15 MG: 30 INJECTION, SOLUTION INTRAMUSCULAR; INTRAVENOUS at 09:15

## 2017-04-27 RX ADMIN — MIDAZOLAM HYDROCHLORIDE 3 MG: 1 INJECTION, SOLUTION INTRAMUSCULAR; INTRAVENOUS at 08:05

## 2017-04-27 RX ADMIN — SODIUM CHLORIDE, SODIUM LACTATE, POTASSIUM CHLORIDE, AND CALCIUM CHLORIDE: 600; 310; 30; 20 INJECTION, SOLUTION INTRAVENOUS at 09:15

## 2017-04-27 NOTE — IP AVS SNAPSHOT
Ellie Esposito 
 
 
 [address] [place] [address] 
[phone] Patient: Petra Sarkar MRN: [id number] QCN:[id number] You are allergic to the following Allergen Reactions Other Food Angioedema  
 pickle Cucumber Fruit Extract Angioedema Recent Documentation Height Weight BMI Smoking Status 1.905 m (!) 187 kg 51.53 kg/m2 Never Smoker Emergency Contacts Name Discharge Info Relation Home Work Mobile Lyndsay Kruse  Friend [5] [phone] About your hospitalization You were admitted on:  April 27, 2017 You last received care in the:  [facility] You were discharged on:  April 27, 2017 Unit phone number:  [phone] Why you were hospitalized Your primary diagnosis was:  Not on File Providers Seen During Your Hospitalizations Provider Role Specialty Primary office phone Nate Bhandari MD Attending Provider Urology [phone] Your Primary Care Physician (PCP) Primary Care Physician Office Phone Office Fax Chrissy Section [phone] Follow-up Information Follow up With Details Comments Contact Info Yarelis Haskins MD   [address] [facility] [address] [facility] 
[phone] Current Discharge Medication List  
  
START taking these medications Dose & Instructions Dispensing Information Comments Morning Noon Evening Bedtime  
 cephALEXin 500 mg capsule Commonly known as:  Jennifer Patel Your last dose was: Your next dose is:    
   
   
 Dose:  500 mg Take 1 Cap by mouth four (4) times daily for 3 days. Indications: PREVENTION OF BACTERIAL URINARY TRACT INFECTION Quantity:  12 Cap Refills:  0  
     
   
   
   
  
 oxyCODONE-acetaminophen 5-325 mg per tablet Commonly known as:  PERCOCET Your last dose was: Your next dose is: Dose:  1-2 Tab Take 1-2 Tabs by mouth every four (4) hours as needed for Pain. Max Daily Amount: 12 Tabs. Quantity:  20 Tab Refills:  0 phenazopyridine 100 mg tablet Commonly known as:  PYRIDIUM Your last dose was: Your next dose is:    
   
   
 Dose:  100 mg Take 1 Tab by mouth three (3) times daily as needed for Pain (dysuria) for up to 3 days. Quantity:  9 Tab Refills:  3  
     
   
   
   
  
 tamsulosin 0.4 mg capsule Commonly known as:  FLOMAX Your last dose was: Your next dose is:    
   
   
 Dose:  0.4 mg Take 1 Cap by mouth daily. Quantity:  30 Cap Refills:  1 CONTINUE these medications which have NOT CHANGED Dose & Instructions Dispensing Information Comments Morning Noon Evening Bedtime  
 amLODIPine 10 mg tablet Commonly known as:  Sina Wendover Your last dose was: Your next dose is:    
   
   
 Dose:  10 mg Take 10 mg by mouth daily. Refills:  0  
     
   
   
   
  
 metFORMIN 500 mg tablet Commonly known as:  GLUCOPHAGE Your last dose was: Your next dose is:    
   
   
 Dose:  500 mg Take 500 mg by mouth two (2) times daily (with meals). Refills:  0 NexIUM 40 mg capsule Generic drug:  esomeprazole Your last dose was: Your next dose is:    
   
   
 Dose:  40 mg Take 40 mg by mouth daily. Refills:  0  
     
   
   
   
  
 OTHER Your last dose was: Your next dose is: Take  by mouth. Indications: \"cholesterol medication\" Refills:  0 Where to Get Your Medications Information on where to get these meds will be given to you by the nurse or doctor. ! Ask your nurse or doctor about these medications  
  cephALEXin 500 mg capsule  
 oxyCODONE-acetaminophen 5-325 mg per tablet  
 phenazopyridine 100 mg tablet  
 tamsulosin 0.4 mg capsule Discharge Instructions Instructions per Dr. Keith Burton. DISCHARGE SUMMARY from your Nurse The following personal items collected during your admission are returned to you:  
Dental Appliance: Dental Appliances: None Vision: Visual Aid: Glasses Hearing Aid:   
Jewelry: Jewelry: None Clothing: Clothing: Footwear, Undergarments, Shirt Other Valuables: Other Valuables: None Valuables sent to safe:   
 
PATIENT INSTRUCTIONS: 
 
After general anesthesia or intravenous sedation, for 24 hours or while taking prescription Narcotics: · Limit your activities · Do not drive and operate hazardous machinery · Do not make important personal or business decisions · Do  not drink alcoholic beverages · If you have not urinated within 8 hours after discharge, please contact your surgeon on call. Report the following to your surgeon: 
· Excessive pain, swelling, redness or odor of or around the surgical area · Temperature over 100.5 · Nausea and vomiting lasting longer than 4 hours or if unable to take medications · Any signs of decreased circulation or nerve impairment to extremity: change in color, persistent  numbness, tingling, coldness or increase pain · Any questions [address] Breathing deep and coughing are very important exercises to do after surgery. Deep breathing and coughing open the little air tubes and air sacks in your lungs. You take deep breaths every day. You may not even notice - it is just something you do when you sigh or yawn. It is a natural exercise you do to keep these air passages open. After surgery, take deep breaths and cough, on purpose. Coughing and deep breathing help prevent bronchitis and pneumonia after surgery. If you had chest or belly surgery, use a pillow as a \"hug buddy\" and hold it tightly to your chest or belly when you cough. DIRECTIONS: 
6. Take 10 to 15 slow deep breaths every hour while awake. 7. Breathe in deeply, and hold it for 2 seconds. 8. Exhale slowly through puckered lips, like blowing up a balloon. 9. After every 4th or 5th deep breath, hug your pillow to your chest or belly and give a hard, deep cough. Yes, it will probably hurt. But doing this exercise is very important part of healing after surgery. Take your pain medicine to help you do this exercise without too much pain. IF YOU HAVE BEEN DIAGNOSED WITH SLEEP APNEA, PLEASE USE YOUR SLEEIFP APNEA DEVICE OR CPAP MACHINE WHEN YOU INTEND TO NAP AFTER TAKING PAIN MEDICATION. Ankle Pumps Ankle pumps increase the circulation of oxygenated blood to your lower extremities and decrease your risk for circulation problems such as blood clots. They also stretch the muscles, tendons and ligaments in your foot and ankle, and prevent joint contracture in the ankle and foot, especially after surgeries on the legs. It is important to do ankle pump exercises regularly after surgery because immobility increases your risk for developing a blood clot. Your doctor may also have you take an Aspirin for the next few days as well. If your doctor did not ask you to take an Aspirin, consult with him before starting Aspirin therapy on your own. Slowly point your foot forward, feeling the muscles on the top of your lower leg stretch, and hold this position for 5 seconds. Next, pull your foot back toward you as far as possible, stretching the calf muscles, and hold that position for 5 seconds. Repeat with the other foot. Perform 10 repetitions every hour while awake for both ankles if possible (down and then up with the foot once is one repetition). You should feel gentle stretching of the muscles in your lower leg when doing this exercise. If you feel pain, or your range of motion is limited, don't  Push too hard.   Only go the limit your joint and muscles will let you go. If you have increasing pain, progressively worsening leg warmth or swelling, STOP the exercise and call your doctor. Below is information about the medications your doctor is prescribing after your visit: 
 
Other information in your discharge envelope: 
[]     PRESCRIPTIONS []     PHYSICAL THERAPY PRESCRIPTION 
[]     APPOINTMENT CARDS []     Regional Anesthesia Pamphlet for block or block with On-Q Catheter from Anesthesia Service []     Medical device information sheets/pamphlets from their   
[]     School/work excuse note. []     /parent work excuse note. These are general instructions for a healthy lifestyle: *  Please give a list of your current medications to your Primary Care Provider. *  Please update this list whenever your medications are discontinued, doses are 
    changed, or new medications (including over-the-counter products) are added. *  Please carry medication information at all times in case of emergency situations. About Smoking No smoking / No tobacco products / Avoid exposure to second hand smoke Surgeon General's Warning:  Quitting smoking now greatly reduces serious risk to your health. Obesity, smoking, and sedentary lifestyle greatly increases your risk for illness and disease. A healthy diet, regular physical exercise & weight monitoring are important for maintaining a healthy lifestyle. Congestive Heart Failure You may be retaining fluid if you have a history of heart failure or if you experience any of the following symptoms:  Weight gain of 3 pounds or more overnight or 5 pounds in a week, increased swelling in our hands or feet or shortness of breath while lying flat in bed. Please call your doctor as soon as you notice any of these symptoms; do not wait until your next office visit. Recognize signs and symptoms of STROKE: 
F - face looks uneven A - arms unable to move or move even S - speech slurred or non-existent T - time-call 911 as soon as signs and symptoms begin-DO NOT go Back to bed or wait to see if you get better-TIME IS BRAIN. Warning signs of HEART ATTACK Call 911 if you have these symptoms · Chest discomfort. Most heart attacks involve discomfort in the center of the chest that lasts more than a few minutes, or that goes away and comes back. It can feel like uncomfortable pressure, squeezing, fullness, or pain. · Discomfort in other areas of the upper body. Symptoms can include pain or discomfort in one or both Arms, the back, neck, jaw, or stomach. ·  Shortness of breath with or without chest discomfort. · Other signs may include breaking out in a cold sweat, nausea, or lightheadedness Don't wait more than five minutes to call 911 - MINUTES MATTER! Fast action can save your life. Calling 911 is almost always the fastest way to get lifesaving treatment. Emergency Medical Services staff can begin treatment when they arrive - up to an hour sooner than if someone gets to the hospital by car. KRISTEN HEARD MEDICATION AND SIDE EFFECT GUIDE The [address] [facility] EFFECT GUIDE was provided to the PATIENT AND CARE PROVIDER. Information provided includes instruction about drug purpose and common side effects for the following medications: 
 
· Percocet · Keflex · Pyridium · flomax Discharge Instructions Attachments/References URETERAL STENT PLACEMENT : POST-OP (ENGLISH) URETEROSCOPY : POST-OP (ENGLISH) Discharge Orders None Introducing [facility] SERVICES! Marcio Ivan introduces [employer] patient portal. Now you can access parts of your medical record, email your doctor's office, and request medication refills online. 1. In your internet browser, go to [email or web address] 2. Click on the First Time User? Click Here link in the Sign In box.  You will see the New Member Sign Up page. 3. Enter your [employer] Access Code exactly as it appears below. You will not need to use this code after youve completed the sign-up process. If you do not sign up before the expiration date, you must request a new code. · [employer] Access Code: [email or web address] Expires: 7/23/2017  6:49 AM 
 
4. Enter the last four digits of your Social Security Number (xxxx) and Date of Birth (mm/dd/yyyy) as indicated and click Submit. You will be taken to the next sign-up page. 5. Create a [employer] ID. This will be your [employer] login ID and cannot be changed, so think of one that is secure and easy to remember. 6. Create a [employer] password. You can change your password at any time. 7. Enter your Password Reset Question and Answer. This can be used at a later time if you forget your password. 8. Enter your e-mail address. You will receive e-mail notification when new information is available in [address] 9. Click Sign Up. You can now view and download portions of your medical record. 10. Click the Download Summary menu link to download a portable copy of your medical information. If you have questions, please visit the Frequently Asked Questions section of the [employer] website. Remember, [employer] is NOT to be used for urgent needs. For medical emergencies, dial 911. Now available from your iPhone and Android! General Information Please provide this summary of care documentation to your next provider. Patient Signature:  ____________________________________________________________ Date:  ____________________________________________________________  
  
Layla Cruz Provider Signature:  ____________________________________________________________ Date:  ____________________________________________________________ More Information Ureteral Stent Placement: What to Expect at Home Your Recovery A ureteral (say \"you-REE-ter-ul\") stent is a thin, hollow tube that is placed in the ureter to help urine pass from the kidney into the bladder. Ureters are the tubes that connect the kidneys to the bladder. You may have a small amount of blood in your urine for 1 to 3 days after the procedure. While the stent is in place, you may have to urinate more often, feel a sudden need to urinate, or feel like you cannot completely empty your bladder. You may feel some pain when you urinate or do strenuous activity. You also may notice a small amount of blood in your urine after strenuous activities. These side effects usually do not prevent people from doing their normal daily activities. You may have a thin string coming out of your urethra. Your urethra is the tube that carries urine from your bladder to outside your body. This string is attached to the stent. Try not to pull on the string. The doctor will use the string to pull out the stent when you no longer need it. After the procedure, urine may flow better from your kidneys to your bladder. A ureteral stent may be left in place for several days or for as long as several months. Your doctor will take it out when you no longer need it. This care sheet gives you a general idea about how long it will take for you to recover. But each person recovers at a different pace. Follow the steps below to get better as quickly as possible. How can you care for yourself at home? Activity · Rest when you feel tired. Getting enough sleep will help you recover. · Avoid strenuous activities, such as bicycle riding, jogging, weight lifting, or aerobic exercise, until your doctor says it is okay. · Ask your doctor when you can drive again. · Most people are able to return to work the day after the procedure. If your work requires intense activity, you may feel pain in your kidney area or get tired easily.  If this happens, you may need to do less strenuous activities while the stent is in. · Ask your doctor when it is okay for you to have sex. Diet · You can eat your normal diet. If your stomach is upset, try bland, low-fat foods like plain rice, broiled chicken, toast, and yogurt. · Drink plenty of fluids (unless your doctor tells you not to). Medicines · Your doctor will tell you if and when you can restart your medicines. He or she will also give you instructions about taking any new medicines. · If you take blood thinners, such as warfarin (Coumadin), clopidogrel (Plavix), or aspirin, be sure to talk to your doctor. He or she will tell you if and when to start taking those medicines again. Make sure that you understand exactly what your doctor wants you to do. · Be safe with medicines. Take pain medicines exactly as directed. ¨ If the doctor gave you a prescription medicine for pain, take it as prescribed. ¨ If you are not taking a prescription pain medicine, ask your doctor if you can take an over-the-counter medicine. · If you think your pain medicine is making you sick to your stomach: 
¨ Take your medicine after meals (unless your doctor has told you not to). ¨ Ask your doctor for a different pain medicine. · If your doctor prescribed antibiotics, take them as directed. Do not stop taking them just because you feel better. You need to take the full course of antibiotics. Follow-up care is a key part of your treatment and safety. Be sure to make and go to all appointments, and call your doctor if you are having problems. It's also a good idea to know your test results and keep a list of the medicines you take. When should you call for help? Call 911 anytime you think you may need emergency care. For example, call if: 
· You passed out (lost consciousness). · You have severe trouble breathing. · You have sudden chest pain and shortness of breath, or you cough up blood. · You have severe belly pain. Call your doctor now or seek immediate medical care if: · Part or all of the stent comes out of your urethra. · You have pain that does not get better after you take pain medicine. · You have symptoms of a urinary infection. For example: ¨ You have blood or pus in your urine. ¨ You have pain in your back just below your rib cage. This is called flank pain. ¨ You have a fever, chills, or body aches. ¨ It hurts to urinate. ¨ You have groin or belly pain. · You cannot control when you urinate, or you leak urine. Watch closely for changes in your health, and be sure to contact your doctor if you have any problems. Where can you learn more? Go to http://leobardo-lan.info/. Enter N518 in the search box to learn more about \"Ureteral Stent Placement: What to Expect at Home. \" Current as of: August 12, 2016 Content Version: 11.2 © 8188-9865 [employer] Care instructions adapted under license by [employer] (which disclaims liability or warranty for this information). If you have questions about a medical condition or this instruction, always ask your healthcare professional. [address] any warranty or liability for your use of this information. Ureteroscopy: What to Expect at [facility] Your Recovery For several hours after the procedure you may have a burning feeling when you urinate. This feeling should go away within a day. Drinking a lot of water can help. Your doctor also may advise you to take medicine that numbs the burning. This medicine is called phenazopyridine. It is available by prescription and over the counter. Brand names include Pyridium and Uristat. You may have some blood in your urine for 2 or 3 days. Your doctor may prescribe an antibiotic for a day or two. This will help prevent an infection.  
This care sheet gives you a general idea about how long it will take for you to recover. But each person recovers at a different pace. Follow the steps below to feel better as quickly as possible. How can you care for yourself at home? Activity · You can go back to work and other activities the next day. Diet · Try to drink two 8-ounce glasses of water each hour for 2 hours after the procedure. This may help ease the burning when you urinate. Medicines · Your doctor will tell you if and when you can restart your medicines. He or she will also give you instructions about taking any new medicines. · If you take blood thinners, such as warfarin (Coumadin), clopidogrel (Plavix), or aspirin, be sure to talk to your doctor. He or she will tell you if and when to start taking those medicines again. Make sure that you understand exactly what your doctor wants you to do. · If you take medicine to stop the burning when you urinate, take it exactly as recommended. Be safe with medicines. Call your doctor if you think you are having a problem with your medicine. You will get more details on the specific medicine your doctor recommends. · If your doctor prescribed antibiotics, take them as directed. Do not stop taking them just because you feel better. You need to take the full course of antibiotics. · Ask your doctor if you can take an over-the-counter pain medicine, such as acetaminophen (Tylenol), ibuprofen (Advil, Motrin), or naproxen (Aleve). Read and follow all instructions on the label. Do not take two or more pain medicines at the same time unless the doctor told you to. Many pain medicines have acetaminophen, which is Tylenol. Too much acetaminophen (Tylenol) can be harmful. Heat · Take a warm bath. This may soothe the burning. · You also can hold a warm washcloth over your urethra for comfort. (The urethra is where your urine comes out.) Follow-up care is a key part of your treatment and safety.  Be sure to make and go to all appointments, and call your doctor if you are having problems. It's also a good idea to know your test results and keep a list of the medicines you take. When should you call for help? Call your doctor now or seek immediate medical care if: 
· Your urine is still red or you see blood clots after you have urinated several times. · You can't pass urine 8 hours after the test. 
· You get a fever or chills. · You have pain in your belly or your back just below your rib cage. This is also called flank pain. Watch closely for changes in your health, and be sure to contact your doctor if: 
· You have pain or burning when you urinate. A burning feeling is normal for a day or two after the test, but call if it does not get better. · You have a frequent urge to urinate but can pass only small amounts of urine. · Your urine is pink, cloudy or smells bad. It is normal for the urine to have a pinkish color for 2 or 3 days after the test. But call if it does not get better. · You do not get better as expected. Where can you learn more? Go to http://leobardo-lan.info/. Enter J572 in the search box to learn more about \"Ureteroscopy: What to Expect at Home. \" Current as of: August 12, 2016 Content Version: 11.2 © 3676-4047 [employer]. Care instructions adapted under license by [employer] (which disclaims liability or warranty for this information). If you have questions about a medical condition or this instruction, always ask your healthcare professional. [place] [address] any warranty or liability for your use of this information.

## 2017-04-27 NOTE — BRIEF OP NOTE
BRIEF OPERATIVE NOTE    Date of Procedure: 4/27/2017   Preoperative Diagnosis: KIDNEY STONE   Postoperative Diagnosis: KIDNEY STONE     Procedure(s):  CYSTOSCOPY LEFT URETEROSCOPY W/ HOLMIUM LASER STENT PLACEMENT   Surgeon(s) and Role:     * Jenifer Vargas MD - Primary         Assistant Staff:       Surgical Staff:  Circ-1: Shawna Contreras RN  Circ-Intern: Macey Henderson RN  Scrub Tech-1: Aliza Salcido  Event Time In   Incision Start 0830   Incision Close 0933     Anesthesia: General   Estimated Blood Loss: 5  Specimens: * No specimens in log *   Findings: impacted stone at vessels  Complications: 0  Implants:   Implant Name Type Inv.  Item Serial No.  Lot No. LRB No. Used Action   STENT URET POLARIS ULTRA [id number] -- PERCUFLEX - SN/A   STENT URET POLARIS ULTRA [id number] -- PERCUFLEX N/A [employer] [facility] [id number] Left 1 Implanted

## 2017-04-27 NOTE — ANESTHESIA POSTPROCEDURE EVALUATION
Post-Anesthesia Evaluation and Assessment    Patient: Maira Hidalgo MRN: [id number]  SSN: [id number]    YOB: 1975  Age: 35 y.o. Sex: male       Cardiovascular Function/Vital Signs  Visit Vitals    /65    Pulse 77    Temp 36.5 °C (97.7 °F)    Resp 14    Ht 6' 3\" (1.905 m)    Wt (!) 187 kg (412 lb 4.2 oz)    SpO2 94%    BMI 51.53 kg/m2       Patient is status post general anesthesia for Procedure(s):  CYSTOSCOPY LEFT URETEROSCOPY W/ HOLMIUM LASER STENT PLACEMENT . Nausea/Vomiting: None    Postoperative hydration reviewed and adequate. Pain:  Pain Scale 1: Numeric (0 - 10) (04/27/17 1033)  Pain Intensity 1: 0 (04/27/17 1033)   Managed    Neurological Status:   Neuro (WDL): Within Defined Limits (04/27/17 1015)  Neuro  Neurologic State: Alert (04/27/17 1015)  LUE Motor Response: Purposeful (04/27/17 1015)  LLE Motor Response: Purposeful (04/27/17 1015)  RUE Motor Response: Purposeful (04/27/17 1015)  RLE Motor Response: Purposeful (04/27/17 1015)   At baseline    Mental Status and Level of Consciousness: Arousable    Pulmonary Status:   O2 Device: Room air (04/27/17 1053)   Adequate oxygenation and airway patent    Complications related to anesthesia: None    Post-anesthesia assessment completed.  No concerns    Signed By: Femi Hernadez MD     April 27, 2017

## 2017-04-27 NOTE — PERIOP NOTES
1100  Pt awake, VSS. \"I am ready to get dressed. \"  First dose pyridium given as ordered. See MAR for documentation. Pt dressed. IV discontinued. All belongings returned. Written discharge instructions and prescriptions given to Catrina Alegria. Pt/Lyndsay informed redness to skin abdominal fold, OTC powder, apply as directed. Pt transfer to wheelchair. To BR with assistance. 1122  Pt discharge to car accompanied by friend.

## 2017-04-27 NOTE — ANESTHESIA PREPROCEDURE EVALUATION
Anesthetic History   No history of anesthetic complications            Review of Systems / Medical History  Patient summary reviewed and pertinent labs reviewed    Pulmonary        Sleep apnea: BiPAP           Neuro/Psych   Within defined limits           Cardiovascular    Hypertension              Exercise tolerance: >4 METS     GI/Hepatic/Renal     GERD: well controlled           Endo/Other    Diabetes: well controlled, type 2    Morbid obesity     Other Findings              Physical Exam    Airway  Mallampati: I  TM Distance: 4 - 6 cm  Neck ROM: normal range of motion   Mouth opening: Normal     Cardiovascular    Rhythm: regular  Rate: normal         Dental  No notable dental hx       Pulmonary  Breath sounds clear to auscultation               Abdominal         Other Findings            Anesthetic Plan    ASA: 3  Anesthesia type: general            Anesthetic plan and risks discussed with: Patient

## 2017-04-27 NOTE — DISCHARGE INSTRUCTIONS
Instructions per Dr. Rita Browning. DISCHARGE SUMMARY from your Nurse    The following personal items collected during your admission are returned to you:   Dental Appliance: Dental Appliances: None  Vision: Visual Aid: Glasses  Hearing Aid:    Jewelry: Jewelry: None  Clothing: Clothing: Footwear, Undergarments, Shirt  Other Valuables: Other Valuables: None  Valuables sent to safe:      PATIENT INSTRUCTIONS:    After general anesthesia or intravenous sedation, for 24 hours or while taking prescription Narcotics:  · Limit your activities  · Do not drive and operate hazardous machinery  · Do not make important personal or business decisions  · Do  not drink alcoholic beverages  · If you have not urinated within 8 hours after discharge, please contact your surgeon on call. Report the following to your surgeon:  · Excessive pain, swelling, redness or odor of or around the surgical area  · Temperature over 100.5  · Nausea and vomiting lasting longer than 4 hours or if unable to take medications  · Any signs of decreased circulation or nerve impairment to extremity: change in color, persistent  numbness, tingling, coldness or increase pain  · Any questions    COUGH AND DEEP BREATHE    Breathing deep and coughing are very important exercises to do after surgery. Deep breathing and coughing open the little air tubes and air sacks in your lungs. You take deep breaths every day. You may not even notice - it is just something you do when you sigh or yawn. It is a natural exercise you do to keep these air passages open. After surgery, take deep breaths and cough, on purpose. Coughing and deep breathing help prevent bronchitis and pneumonia after surgery. If you had chest or belly surgery, use a pillow as a \"hug buddy\" and hold it tightly to your chest or belly when you cough. DIRECTIONS:  6. Take 10 to 15 slow deep breaths every hour while awake.   7. Breathe in deeply, and hold it for 2 seconds. 8. Exhale slowly through puckered lips, like blowing up a balloon. 9. After every 4th or 5th deep breath, hug your pillow to your chest or belly and give a hard, deep cough. Yes, it will probably hurt. But doing this exercise is very important part of healing after surgery. Take your pain medicine to help you do this exercise without too much pain. IF YOU HAVE BEEN DIAGNOSED WITH SLEEP APNEA, PLEASE USE YOUR SLEEIFP APNEA DEVICE OR CPAP MACHINE WHEN YOU INTEND TO NAP AFTER TAKING PAIN MEDICATION. Ankle Pumps    Ankle pumps increase the circulation of oxygenated blood to your lower extremities and decrease your risk for circulation problems such as blood clots. They also stretch the muscles, tendons and ligaments in your foot and ankle, and prevent joint contracture in the ankle and foot, especially after surgeries on the legs. It is important to do ankle pump exercises regularly after surgery because immobility increases your risk for developing a blood clot. Your doctor may also have you take an Aspirin for the next few days as well. If your doctor did not ask you to take an Aspirin, consult with him before starting Aspirin therapy on your own. Slowly point your foot forward, feeling the muscles on the top of your lower leg stretch, and hold this position for 5 seconds. Next, pull your foot back toward you as far as possible, stretching the calf muscles, and hold that position for 5 seconds. Repeat with the other foot. Perform 10 repetitions every hour while awake for both ankles if possible (down and then up with the foot once is one repetition). You should feel gentle stretching of the muscles in your lower leg when doing this exercise. If you feel pain, or your range of motion is limited, don't  Push too hard. Only go the limit your joint and muscles will let you go.   If you have increasing pain, progressively worsening leg warmth or swelling, STOP the exercise and call your doctor. Below is information about the medications your doctor is prescribing after your visit:    Other information in your discharge envelope:  []     PRESCRIPTIONS  []     PHYSICAL THERAPY PRESCRIPTION  []     APPOINTMENT CARDS  []     Regional Anesthesia Pamphlet for block or block with On-Q Catheter from Anesthesia Service  []     Medical device information sheets/pamphlets from their    []     School/work excuse note. []     /parent work excuse note. These are general instructions for a healthy lifestyle:    *  Please give a list of your current medications to your Primary Care Provider. *  Please update this list whenever your medications are discontinued, doses are      changed, or new medications (including over-the-counter products) are added. *  Please carry medication information at all times in case of emergency situations. About Smoking  No smoking / No tobacco products / Avoid exposure to second hand smoke    Surgeon General's Warning:  Quitting smoking now greatly reduces serious risk to your health. Obesity, smoking, and sedentary lifestyle greatly increases your risk for illness and disease. A healthy diet, regular physical exercise & weight monitoring are important for maintaining a healthy lifestyle. Congestive Heart Failure  You may be retaining fluid if you have a history of heart failure or if you experience any of the following symptoms:  Weight gain of 3 pounds or more overnight or 5 pounds in a week, increased swelling in our hands or feet or shortness of breath while lying flat in bed. Please call your doctor as soon as you notice any of these symptoms; do not wait until your next office visit.     Recognize signs and symptoms of STROKE:  F - face looks uneven  A - arms unable to move or move even  S - speech slurred or non-existent  T - time-call 911 as soon as signs and symptoms begin-DO NOT go         Back to bed or wait to see if you get better-TIME IS BRAIN. Warning signs of HEART ATTACK  Call 911 if you have these symptoms    · Chest discomfort. Most heart attacks involve discomfort in the center of the chest that lasts more than a few minutes, or that goes away and comes back. It can feel like uncomfortable pressure, squeezing, fullness, or pain. · Discomfort in other areas of the upper body. Symptoms can include pain or discomfort in one or both        Arms, the back, neck, jaw, or stomach. ·  Shortness of breath with or without chest discomfort. · Other signs may include breaking out in a cold sweat, nausea, or lightheadedness    Don't wait more than five minutes to call 911 - MINUTES MATTER! Fast action can save your life. Calling 911 is almost always the fastest way to get lifesaving treatment. Emergency Medical Services staff can begin treatment when they arrive - up to an hour sooner than if someone gets to the hospital by car. KRISTEN HEARD MEDICATION AND SIDE EFFECT GUIDE    The [facility] MEDICATION AND SIDE EFFECT GUIDE was provided to the PATIENT AND CARE PROVIDER.   Information provided includes instruction about drug purpose and common side effects for the following medications:    · Percocet  · Keflex  · Pyridium  · flomax

## 2017-04-27 NOTE — OP NOTES
[facility]   [address] [facility], [place], operated by [facility], [address]   OP NOTE       Name:  Collette Murray   MR#:  [id number]   :  1975   Account #:  [de-identified]    Surgery Date:  2017   Date of Adm:  2017       PREOPERATIVE DIAGNOSIS: Left ureteral stone, 8-10 mm. POSTOPERATIVE DIAGNOSIS: Left ureteral stone, 8-10 mm. PROCEDURES PERFORMED    1. Cystoscopy. 2. Left retrograde pyelogram.   3. Left ureteroscopy. 4. Laser lithotripsy of stone. 5. Basket of stone fragments. 6. Left double-J stent placement with a 5 x 28 stent. SURGEON: Chadwick Bishop MD    ANESTHESIA: General.    ESTIMATED BLOOD LOSS: 5    SPECIMENS REMOVED: stone    INDICATIONS: This gentleman had a stone 8 years ago, he developed   flank pain on the left and a CT scan showed an 8-10 mm stone in the   region of the left distal ureter. I reviewed his CT scan. It seems to be   stuck at the level of the vessels. He has no other stones. Risks and   benefits of the procedure were discussed and accepted. He presents   for attempted ureteroscopy. FINDINGS AT TIME OF PROCEDURE: Impacted stone at the level of   vessels, fragmented with the laser and all pieces larger than a   millimeter were removed with the basket. Adequate stent placement. DESCRIPTION OF PROCEDURE: After consent was obtained, the   patient was taken to the operating room. After adequate anesthesia   was obtained, he was prepped and draped in lithotomy position. The   rigid cystoscope was inserted in the bladder. His prostate was not   obstructing at all. The bladder was viewed in its entirety with 30- and   70-degree lenses and no lesions noted. I then engaged the left ureter   with an opening catheter and injected contrast retrograde. There was a   filling defect and some dilation at the level of the vessels, but the stone   was not obvious.  I then passed a Bentson wire up and got some   resistance at the level of the stone. I then passed the opening catheter   up over the Bentson wire and then was able to manipulate the wire up   into the kidney under fluoroscopic guidance. The bladder was then   drained and the scope withdrawn, leaving the wire in place as a safety   wire. I then used the mini rigid ureteroscope alongside the wire. I had   to use an additional Bentson wire and ride up over that to get beyond   the tight ureteral orifice. I was able to get up to the level of the stone. There were some significant angulation difficulties, but I was able to   eventually engage the stone with, first, a 500 laser fiber, which did not   do a good job at fragmenting. We then switched to a 365 fiber which   did a better job at fragmenting these both high-power and low power   dusting settings to fragment the stone. Care was taken to avoid injury   to the ureteral mucosa. Once the stone had been fragmented about   80%, I tried to basket the remaining large fragment as it was being   pushed up into the dilated upper ureter. I was able to bring that   fragment down to the distal ureter, but the ureteral orifice region, again,   was tight. I could not get that fragment to come out. I disengaged the   basket and with the stone loose was able to pull it up against the scope   a couple of times and fragmented a little bit more. It then became   completely disengaged from the basket. I used the laser fiber, again, to   break it up into smaller pieces. I then went back in with the basket and   retrieved all sizable pieces. Only dust remained. I then shot a gentle   retrograde. There was no extravasation noted. I outlined the collecting   system, which was not very dilated. I then withdrew the ureteroscope. I   reinserted the cystoscope over the wire. I passed a 5 x 28 stent,   proximal end coiled at the UPJ and distal end coiled within the bladder. String was left attached within the bladder.  The stone fragments were   then irrigated out the bladder. The bladder was reinspected and no   injury noted. The scope was then withdrawn. Lidocaine gel instilled   within the urethra. He was awakened from anesthesia and taken to the   recovery room in stable condition. He will be discharged later   today should he recover well. We will keep him on antibiotics with   Keflex for a few days. Will keep him on Flomax to aid with stent   symptoms and Pyridium and refilled with a 20 Percocet should he have   discomfort. I have asked him also  to take anti-inflammatories. He was   given a dose of Toradol today.         Luis Manuel Guerra MD MM / RAYO   D:  04/27/2017   09:46   T:  04/27/2017   13:04   Job #:  [id number]

## 2017-09-17 ENCOUNTER — HOSPITAL ENCOUNTER (EMERGENCY)
Age: 42
Discharge: HOME | End: 2017-09-17
Attending: EMERGENCY MEDICINE
Payer: SELF-PAY

## 2017-09-17 ENCOUNTER — APPOINTMENT (OUTPATIENT)
Dept: CT IMAGING | Age: 42
End: 2017-09-17
Attending: EMERGENCY MEDICINE
Payer: SELF-PAY

## 2017-09-17 VITALS
HEIGHT: 74 IN | RESPIRATION RATE: 16 BRPM | HEART RATE: 80 BPM | DIASTOLIC BLOOD PRESSURE: 88 MMHG | SYSTOLIC BLOOD PRESSURE: 157 MMHG | TEMPERATURE: 97.8 F | OXYGEN SATURATION: 96 % | BODY MASS INDEX: 40.43 KG/M2 | WEIGHT: 315 LBS

## 2017-09-17 DIAGNOSIS — R07.9 LEFT SIDED CHEST PAIN: Primary | ICD-10-CM

## 2017-09-17 LAB
ALBUMIN SERPL-MCNC: 4 G/DL (ref 3.5–5)
ALBUMIN/GLOB SERPL: 0.9 {RATIO} (ref 1.1–2.2)
ALP SERPL-CCNC: 102 U/L (ref 45–117)
ALT SERPL-CCNC: 46 U/L (ref 12–78)
ANION GAP SERPL CALC-SCNC: 9 MMOL/L (ref 5–15)
AST SERPL-CCNC: 23 U/L (ref 15–37)
BASOPHILS # BLD: 0 K/UL (ref 0–0.1)
BASOPHILS NFR BLD: 0 % (ref 0–1)
BILIRUB SERPL-MCNC: 0.6 MG/DL (ref 0.2–1)
BUN SERPL-MCNC: 15 MG/DL (ref 6–20)
BUN/CREAT SERPL: 11 (ref 12–20)
CALCIUM SERPL-MCNC: 9.4 MG/DL (ref 8.5–10.1)
CHLORIDE SERPL-SCNC: 104 MMOL/L (ref 97–108)
CO2 SERPL-SCNC: 28 MMOL/L (ref 21–32)
CREAT SERPL-MCNC: 1.34 MG/DL (ref 0.7–1.3)
EOSINOPHIL # BLD: 0.1 K/UL (ref 0–0.4)
EOSINOPHIL NFR BLD: 1 % (ref 0–7)
ERYTHROCYTE [DISTWIDTH] IN BLOOD BY AUTOMATED COUNT: 13.4 % (ref 11.5–14.5)
GLOBULIN SER CALC-MCNC: 4.4 G/DL (ref 2–4)
GLUCOSE SERPL-MCNC: 108 MG/DL (ref 65–100)
HCT VFR BLD AUTO: 43.8 % (ref 36.6–50.3)
HGB BLD-MCNC: 14.1 G/DL (ref 12.1–17)
LIPASE SERPL-CCNC: 88 U/L (ref 73–393)
LYMPHOCYTES # BLD: 3.3 K/UL (ref 0.8–3.5)
LYMPHOCYTES NFR BLD: 29 % (ref 12–49)
MCH RBC QN AUTO: 28.5 PG (ref 26–34)
MCHC RBC AUTO-ENTMCNC: 32.2 G/DL (ref 30–36.5)
MCV RBC AUTO: 88.5 FL (ref 80–99)
MONOCYTES # BLD: 1.1 K/UL (ref 0–1)
MONOCYTES NFR BLD: 10 % (ref 5–13)
NEUTS SEG # BLD: 6.8 K/UL (ref 1.8–8)
NEUTS SEG NFR BLD: 60 % (ref 32–75)
PLATELET # BLD AUTO: 177 K/UL (ref 150–400)
POTASSIUM SERPL-SCNC: 3.8 MMOL/L (ref 3.5–5.1)
PROT SERPL-MCNC: 8.4 G/DL (ref 6.4–8.2)
RBC # BLD AUTO: 4.95 M/UL (ref 4.1–5.7)
SODIUM SERPL-SCNC: 141 MMOL/L (ref 136–145)
TROPONIN I SERPL-MCNC: <0.04 NG/ML
TROPONIN I SERPL-MCNC: <0.04 NG/ML
WBC # BLD AUTO: 11.3 K/UL (ref 4.1–11.1)

## 2017-09-17 PROCEDURE — 36415 COLL VENOUS BLD VENIPUNCTURE: CPT | Performed by: EMERGENCY MEDICINE

## 2017-09-17 PROCEDURE — 96361 HYDRATE IV INFUSION ADD-ON: CPT

## 2017-09-17 PROCEDURE — 74011250636 HC RX REV CODE- 250/636: Performed by: EMERGENCY MEDICINE

## 2017-09-17 PROCEDURE — 96374 THER/PROPH/DIAG INJ IV PUSH: CPT

## 2017-09-17 PROCEDURE — 85025 COMPLETE CBC W/AUTO DIFF WBC: CPT | Performed by: EMERGENCY MEDICINE

## 2017-09-17 PROCEDURE — 99285 EMERGENCY DEPT VISIT HI MDM: CPT

## 2017-09-17 PROCEDURE — 93005 ELECTROCARDIOGRAM TRACING: CPT

## 2017-09-17 PROCEDURE — 74011636320 HC RX REV CODE- 636/320: Performed by: RADIOLOGY

## 2017-09-17 PROCEDURE — 84484 ASSAY OF TROPONIN QUANT: CPT | Performed by: EMERGENCY MEDICINE

## 2017-09-17 PROCEDURE — 74011636320 HC RX REV CODE- 636/320: Performed by: EMERGENCY MEDICINE

## 2017-09-17 PROCEDURE — 71275 CT ANGIOGRAPHY CHEST: CPT

## 2017-09-17 PROCEDURE — 83690 ASSAY OF LIPASE: CPT | Performed by: EMERGENCY MEDICINE

## 2017-09-17 PROCEDURE — 80053 COMPREHEN METABOLIC PANEL: CPT | Performed by: EMERGENCY MEDICINE

## 2017-09-17 RX ORDER — SODIUM CHLORIDE 0.9 % (FLUSH) 0.9 %
5-10 SYRINGE (ML) INJECTION AS NEEDED
Status: DISCONTINUED | OUTPATIENT
Start: 2017-09-17 | End: 2017-09-18 | Stop reason: HOSPADM

## 2017-09-17 RX ORDER — DICLOFENAC SODIUM 75 MG/1
75 TABLET, DELAYED RELEASE ORAL 2 TIMES DAILY
Qty: 30 TAB | Refills: 0 | Status: SHIPPED | OUTPATIENT
Start: 2017-09-17 | End: 2018-05-20

## 2017-09-17 RX ORDER — KETOROLAC TROMETHAMINE 30 MG/ML
30 INJECTION, SOLUTION INTRAMUSCULAR; INTRAVENOUS
Status: COMPLETED | OUTPATIENT
Start: 2017-09-17 | End: 2017-09-17

## 2017-09-17 RX ORDER — SODIUM CHLORIDE 0.9 % (FLUSH) 0.9 %
5-10 SYRINGE (ML) INJECTION EVERY 8 HOURS
Status: DISCONTINUED | OUTPATIENT
Start: 2017-09-17 | End: 2017-09-18 | Stop reason: HOSPADM

## 2017-09-17 RX ADMIN — IOPAMIDOL 94 ML: 755 INJECTION, SOLUTION INTRAVENOUS at 21:46

## 2017-09-17 RX ADMIN — KETOROLAC TROMETHAMINE 30 MG: 30 INJECTION, SOLUTION INTRAMUSCULAR at 21:00

## 2017-09-17 RX ADMIN — IOPAMIDOL 96 ML: 755 INJECTION, SOLUTION INTRAVENOUS at 21:37

## 2017-09-17 RX ADMIN — SODIUM CHLORIDE 1000 ML: 900 INJECTION, SOLUTION INTRAVENOUS at 21:01

## 2017-09-17 RX ADMIN — Medication 10 ML: at 21:01

## 2017-09-18 LAB
ATRIAL RATE: 92 BPM
CALCULATED P AXIS, ECG09: 31 DEGREES
CALCULATED R AXIS, ECG10: -28 DEGREES
CALCULATED T AXIS, ECG11: 29 DEGREES
DIAGNOSIS, 93000: NORMAL
P-R INTERVAL, ECG05: 216 MS
Q-T INTERVAL, ECG07: 342 MS
QRS DURATION, ECG06: 120 MS
QTC CALCULATION (BEZET), ECG08: 422 MS
VENTRICULAR RATE, ECG03: 92 BPM

## 2017-09-18 NOTE — ED TRIAGE NOTES
Pt arrives with c/o of chest pain that has been going on for the last week, pt states pain is located underneath the right breast area and radiates to the back. Pt also has high blood pressure.

## 2017-09-18 NOTE — DISCHARGE INSTRUCTIONS
We hope that we have addressed all of your medical concerns. The examination and treatment you received in the Emergency Department were for an emergent problem and were not intended as complete care. It is important that you follow up with your healthcare provider(s) for ongoing care. If your symptoms worsen or do not improve as expected, and you are unable to reach your usual health care provider(s), you should return to the Emergency Department. Today's healthcare is undergoing tremendous change, and patient satisfaction surveys are one of the many tools to assess the quality of medical care. You may receive a survey from the [employer] regarding your experience in the Emergency Department. I hope that your experience has been completely positive, particularly the medical care that I provided. As such, please participate in the survey; anything less than excellent does not meet my expectations or intentions. [facility] and 8 [facility] participate in nationally recognized quality of care measures. If your blood pressure is greater than 120/80, as reported below, we urge that you seek medical care to address the potential of high blood pressure, commonly known as hypertension. Hypertension can be hereditary or can be caused by certain medical conditions, pain, stress, or \"white coat syndrome. \"       Please make an appointment with your health care provider(s) for follow up of your Emergency Department visit. VITALS:   Patient Vitals for the past 8 hrs:   Temp Pulse Resp BP SpO2   09/17/17 2315 - 79 20 (!) 163/91 95 %   09/17/17 2230 - 78 12 157/74 -   09/17/17 2215 - 77 12 155/76 -   09/17/17 2200 - 79 11 157/86 93 %   09/17/17 2045 - 91 17 163/90 96 %   09/17/17 2034 97.8 °F (36.6 °C) 89 20 (!) 182/103 94 %          Thank you for allowing us to provide you with medical care today.   We realize that you have many choices for your emergency care needs. Please choose us in the future for any continued health care needs. Lenard Moffett [address]   Office: [phone]            Recent Results (from the past 24 hour(s))   EKG, 12 LEAD, INITIAL    Collection Time: 09/17/17  8:32 PM   Result Value Ref Range    Ventricular Rate 92 BPM    Atrial Rate 92 BPM    P-R Interval 216 ms    QRS Duration 120 ms    Q-T Interval 342 ms    QTC Calculation (Bezet) 422 ms    Calculated P Axis 31 degrees    Calculated R Axis -28 degrees    Calculated T Axis 29 degrees    Diagnosis       ** Poor data quality, interpretation may be adversely affected  Sinus rhythm with 1st degree AV block  Nonspecific intraventricular conduction delay  Borderline ECG  When compared with ECG of 27-APR-2017 07:02,  No significant change was found     CBC WITH AUTOMATED DIFF    Collection Time: 09/17/17  8:42 PM   Result Value Ref Range    WBC 11.3 (H) 4.1 - 11.1 K/uL    RBC 4.95 4.10 - 5.70 M/uL    HGB 14.1 12.1 - 17.0 g/dL    HCT 43.8 36.6 - 50.3 %    MCV 88.5 80.0 - 99.0 FL    MCH 28.5 26.0 - 34.0 PG    MCHC 32.2 30.0 - 36.5 g/dL    RDW 13.4 11.5 - 14.5 %    PLATELET 251 838 - 257 K/uL    NEUTROPHILS 60 32 - 75 %    LYMPHOCYTES 29 12 - 49 %    MONOCYTES 10 5 - 13 %    EOSINOPHILS 1 0 - 7 %    BASOPHILS 0 0 - 1 %    ABS. NEUTROPHILS 6.8 1.8 - 8.0 K/UL    ABS. LYMPHOCYTES 3.3 0.8 - 3.5 K/UL    ABS. MONOCYTES 1.1 (H) 0.0 - 1.0 K/UL    ABS. EOSINOPHILS 0.1 0.0 - 0.4 K/UL    ABS.  BASOPHILS 0.0 0.0 - 0.1 K/UL   METABOLIC PANEL, COMPREHENSIVE    Collection Time: 09/17/17  8:42 PM   Result Value Ref Range    Sodium 141 136 - 145 mmol/L    Potassium 3.8 3.5 - 5.1 mmol/L    Chloride 104 97 - 108 mmol/L    CO2 28 21 - 32 mmol/L    Anion gap 9 5 - 15 mmol/L    Glucose 108 (H) 65 - 100 mg/dL    BUN 15 6 - 20 MG/DL    Creatinine 1.34 (H) 0.70 - 1.30 MG/DL    BUN/Creatinine ratio 11 (L) 12 - 20      GFR est AA >60 >60 ml/min/1.73m2 GFR est non-AA 58 (L) >60 ml/min/1.73m2    Calcium 9.4 8.5 - 10.1 MG/DL    Bilirubin, total 0.6 0.2 - 1.0 MG/DL    ALT (SGPT) 46 12 - 78 U/L    AST (SGOT) 23 15 - 37 U/L    Alk. phosphatase 102 45 - 117 U/L    Protein, total 8.4 (H) 6.4 - 8.2 g/dL    Albumin 4.0 3.5 - 5.0 g/dL    Globulin 4.4 (H) 2.0 - 4.0 g/dL    A-G Ratio 0.9 (L) 1.1 - 2.2     LIPASE    Collection Time: 09/17/17  8:42 PM   Result Value Ref Range    Lipase 88 73 - 393 U/L   TROPONIN I    Collection Time: 09/17/17  8:42 PM   Result Value Ref Range    Troponin-I, Qt. <0.04 <0.05 ng/mL   TROPONIN I    Collection Time: 09/17/17 10:53 PM   Result Value Ref Range    Troponin-I, Qt. <0.04 <0.05 ng/mL       Cta Chest W Or W Wo Cont    Result Date: 9/17/2017  EXAM: CT ANGIOGRAPHY CHEST INDICATION: Left-sided chest pain. COMPARISON: None. CONTRAST: 100 mL of Isovue-370. The patient was instructed to discontinue metformin for 48 hours and check with his physician before resuming the medication. TECHNIQUE: Precontrast  images were obtained to localize the volume for acquisition. Multislice helical CT arteriography was performed from the diaphragm to the thoracic inlet during uneventful rapid bolus intravenous contrast administration. Lung and soft tissue windows were generated. Coronal and sagittal  reformatted images were also generated and 3D post processing consisting of coronal maximum intensity projection images was performed. CT dose reduction was achieved through use of a standardized protocol tailored for this examination and automatic exposure control for dose modulation. The examination was repeated because of suboptimal opacification of the pulmonary arteries on the initial acquisition to timing. The opacification on the second acquisition is also less than optimal due to the patient's large size. Dori Loose FINDINGS: The patient is very large.  LOWER NECK: The visualized portions of the thyroid and structures of the lower neck are within normal limits. LUNGS: The lungs are clear of mass, nodule, airspace disease or edema. PLEURA: There is no pleural effusion or pneumothorax. PULMONARY ARTERIES: The pulmonary arteries are well enhanced and no pulmonary emboli are identified. AORTA: The aorta enhances normally without evidence of aneurysm or dissection. MEDIASTINUM: There is no mediastinal or hilar adenopathy or mass. BONES AND SOFT TISSUES: The bones and soft tissues of the chest wall are within normal limits. UPPER ABDOMEN: The visualized portions of the upper abdominal organs are normal.     IMPRESSION: Normal CT arteriogram of the chest. No pulmonary embolus. Chest Pain: Care Instructions  Your Care Instructions  There are many things that can cause chest pain. Some are not serious and will get better on their own in a few days. But some kinds of chest pain need more testing and treatment. Your doctor may have recommended a follow-up visit in the next 8 to 12 hours. If you are not getting better, you may need more tests or treatment. Even though your doctor has released you, you still need to watch for any problems. The doctor carefully checked you, but sometimes problems can develop later. If you have new symptoms or if your symptoms do not get better, get medical care right away. If you have worse or different chest pain or pressure that lasts more than 5 minutes or you passed out (lost consciousness), call 911 or seek other emergency help right away. A medical visit is only one step in your treatment. Even if you feel better, you still need to do what your doctor recommends, such as going to all suggested follow-up appointments and taking medicines exactly as directed. This will help you recover and help prevent future problems. How can you care for yourself at home? · Rest until you feel better. · Take your medicine exactly as prescribed. Call your doctor if you think you are having a problem with your medicine.   · Do not drive after taking a prescription pain medicine. When should you call for help? Call 911 if:  · You passed out (lost consciousness). · You have severe difficulty breathing. · You have symptoms of a heart attack. These may include:  ¨ Chest pain or pressure, or a strange feeling in your chest.  ¨ Sweating. ¨ Shortness of breath. ¨ Nausea or vomiting. ¨ Pain, pressure, or a strange feeling in your back, neck, jaw, or upper belly or in one or both shoulders or arms. ¨ Lightheadedness or sudden weakness. ¨ A fast or irregular heartbeat. After you call 911, the  may tell you to chew 1 adult-strength or 2 to 4 low-dose aspirin. Wait for an ambulance. Do not try to drive yourself. Call your doctor today if:  · You have any trouble breathing. · Your chest pain gets worse. · You are dizzy or lightheaded, or you feel like you may faint. · You are not getting better as expected. · You are having new or different chest pain. Where can you learn more? Go to http://leobardo-lan.info/. Enter A120 in the search box to learn more about \"Chest Pain: Care Instructions. \"  Current as of: March 20, 2017  Content Version: 11.3  © 3311-8063 [employer]. Care instructions adapted under license by [employer] (which disclaims liability or warranty for this information). If you have questions about a medical condition or this instruction, always ask your healthcare professional. [place] [address] any warranty or liability for your use of this information.

## 2017-09-18 NOTE — ED PROVIDER NOTES
HPI Comments: 38 y.o. male with past medical history significant for DM, HTN, sleep apnea, GERD, heart murmur, who presents from home with chief complaint of progressively worsening left sided CP that radiates into the left upper back x \"a couple of days\". Pt notes that the pain is constant, but it is exacerbated when moving a certain way, and also somewhat exacerbated when taking a deep breath. He denies any fall, injury, or trauma associated with the onset of his pain. Pt ranks his current level of discomfort as a 10/10 in severity, and \"sharp and stabbing\" in quality. Mother in law notes that she gave the patient 2x  mg without significant pain relief. Pt denies any h/o MI, but states that his last stress test was performed \"a long time ago\" (in the mid-1990s). He denies any recent long travel, leg swelling, leg pain, cough, fevers, SOB, nausea, vomiting, and diaphoresis. There are no other acute medical concerns at this time. Social hx: - Tobacco, - EtOH, - Illicit Drug Abuse   Significant FMHx: Pt is unsure of his family h/o because he was adopted. PCP: Edgar Torres MD     Note written by Tenny Lout Alphia Leyden, as dictated by Sander Rodas, DO 8:43 PM     The history is provided by the patient and a relative (mother in law). No  was used. Past Medical History:   Diagnosis Date    Arrhythmia     heart murmur    Diabetes ([address]     GERD (gastroesophageal reflux disease)     Hypertension     Sleep apnea        Past Surgical History:   Procedure Laterality Date    HX HEENT      tonsils    HX ORTHOPAEDIC      Plant[facility] L foot         History reviewed. No pertinent family history. Social History     Social History    Marital status: LEGALLY      Spouse name: N/A    Number of children: N/A    Years of education: N/A     Occupational History    Not on file.      Social History Main Topics    Smoking status: Never Smoker    Smokeless tobacco: Never Used    Alcohol use No      Comment:      Drug use: No    Sexual activity: Not on file     Other Topics Concern    Not on file     Social History Narrative     ALLERGIES: Other food and Cucumber fruit extract    Review of Systems   Constitutional: Negative for appetite change, chills, diaphoresis, fever and unexpected weight change. HENT: Negative for ear pain, hearing loss, rhinorrhea and trouble swallowing. Eyes: Negative for pain and visual disturbance. Respiratory: Negative for cough, chest tightness and shortness of breath. Cardiovascular: Positive for chest pain. Negative for palpitations and leg swelling. Gastrointestinal: Negative for abdominal distention, abdominal pain, blood in stool, nausea and vomiting. Genitourinary: Negative for dysuria, hematuria and urgency. Musculoskeletal: Positive for back pain. Negative for myalgias. Skin: Negative for rash. Neurological: Negative for dizziness, syncope, weakness and numbness. Psychiatric/Behavioral: Negative for confusion and suicidal ideas. All other systems reviewed and are negative. Vitals:    09/17/17 2034   BP: (!) 182/103   Pulse: 89   Resp: 20   Temp: 97.8 °F (36.6 °C)   SpO2: 94%   Weight: (!) 181.4 kg (400 lb)   Height: 6' 2\" (1.88 m)            Physical Exam   Constitutional: He is oriented to person, place, and time. He appears well-developed. No distress. Patient is overweight   HENT:   Head: Normocephalic and atraumatic. Right Ear: External ear normal.   Left Ear: External ear normal.   Nose: Nose normal.   Mouth/Throat: Oropharynx is clear and moist. No oropharyngeal exudate. Eyes: Conjunctivae and EOM are normal. Pupils are equal, round, and reactive to light. Right eye exhibits no discharge. Left eye exhibits no discharge. No scleral icterus. Neck: Normal range of motion. Neck supple. No JVD present. No tracheal deviation present.    Cardiovascular: Normal rate, regular rhythm, normal heart sounds and intact distal pulses. Exam reveals no gallop and no friction rub. No murmur heard. Pulmonary/Chest: Effort normal and breath sounds normal. No stridor. No respiratory distress. He has no decreased breath sounds. He has no wheezes. He has no rhonchi. He has no rales. He exhibits tenderness (left lateral chest wall tenderness). Abdominal: Soft. Bowel sounds are normal. He exhibits no distension. There is no tenderness. There is no rebound and no guarding. Musculoskeletal: Normal range of motion. He exhibits no edema or tenderness. Neurological: He is alert and oriented to person, place, and time. He has normal strength and normal reflexes. No cranial nerve deficit or sensory deficit. He exhibits normal muscle tone. Coordination normal. GCS eye subscore is 4. GCS verbal subscore is 5. GCS motor subscore is 6. Skin: Skin is warm and dry. No rash noted. He is not diaphoretic. No erythema. No pallor. Psychiatric: He has a normal mood and affect. His behavior is normal. Judgment and thought content normal.   Nursing note and vitals reviewed. Note written by Tessy Mott. Tahmina Caicedo, as dictated by Craig Dee, DO 8:43 PM      MDM  Number of Diagnoses or Management Options  Left sided chest pain:      Amount and/or Complexity of Data Reviewed  Clinical lab tests: ordered and reviewed  Tests in the radiology section of CPT®: ordered and reviewed  Tests in the medicine section of CPT®: ordered and reviewed  Review and summarize past medical records: yes  Independent visualization of images, tracings, or specimens: yes (ekg)    Risk of Complications, Morbidity, and/or Mortality  Presenting problems: moderate  Diagnostic procedures: moderate  Management options: moderate    Patient Progress  Patient progress: stable    ED Course       Procedures  Chief Complaint   Patient presents with    Chest Pain       3:09 AM  The patients presenting problems have been discussed, and they are in agreement with the care plan formulated and outlined with them. I have encouraged them to ask questions as they arise throughout their visit. MEDICATIONS GIVEN:  Medications   ketorolac (TORADOL) injection 30 mg (30 mg IntraVENous Given 9/17/17 2100)   sodium chloride 0.9 % bolus infusion 1,000 mL (0 mL IntraVENous IV Completed 9/17/17 2256)   iopamidol (ISOVUE-370) 76 % injection 100 mL (96 mL IntraVENous Given 9/17/17 2137)   iopamidol (ISOVUE-370) 76 % injection 100 mL (94 mL IntraVENous Given 9/17/17 2146)       LABS REVIEWED:  Recent Results (from the past 24 hour(s))   EKG, 12 LEAD, INITIAL    Collection Time: 09/17/17  8:32 PM   Result Value Ref Range    Ventricular Rate 92 BPM    Atrial Rate 92 BPM    P-R Interval 216 ms    QRS Duration 120 ms    Q-T Interval 342 ms    QTC Calculation (Bezet) 422 ms    Calculated P Axis 31 degrees    Calculated R Axis -28 degrees    Calculated T Axis 29 degrees    Diagnosis       ** Poor data quality, interpretation may be adversely affected  Sinus rhythm with 1st degree AV block  Nonspecific intraventricular conduction delay  Borderline ECG  When compared with ECG of 27-APR-2017 07:02,  No significant change was found     CBC WITH AUTOMATED DIFF    Collection Time: 09/17/17  8:42 PM   Result Value Ref Range    WBC 11.3 (H) 4.1 - 11.1 K/uL    RBC 4.95 4.10 - 5.70 M/uL    HGB 14.1 12.1 - 17.0 g/dL    HCT 43.8 36.6 - 50.3 %    MCV 88.5 80.0 - 99.0 FL    MCH 28.5 26.0 - 34.0 PG    MCHC 32.2 30.0 - 36.5 g/dL    RDW 13.4 11.5 - 14.5 %    PLATELET 264 307 - 822 K/uL    NEUTROPHILS 60 32 - 75 %    LYMPHOCYTES 29 12 - 49 %    MONOCYTES 10 5 - 13 %    EOSINOPHILS 1 0 - 7 %    BASOPHILS 0 0 - 1 %    ABS. NEUTROPHILS 6.8 1.8 - 8.0 K/UL    ABS. LYMPHOCYTES 3.3 0.8 - 3.5 K/UL    ABS. MONOCYTES 1.1 (H) 0.0 - 1.0 K/UL    ABS. EOSINOPHILS 0.1 0.0 - 0.4 K/UL    ABS.  BASOPHILS 0.0 0.0 - 0.1 K/UL   METABOLIC PANEL, COMPREHENSIVE    Collection Time: 09/17/17  8:42 PM   Result Value Ref Range    Sodium 141 136 - 145 mmol/L    Potassium 3.8 3.5 - 5.1 mmol/L    Chloride 104 97 - 108 mmol/L    CO2 28 21 - 32 mmol/L    Anion gap 9 5 - 15 mmol/L    Glucose 108 (H) 65 - 100 mg/dL    BUN 15 6 - 20 MG/DL    Creatinine 1.34 (H) 0.70 - 1.30 MG/DL    BUN/Creatinine ratio 11 (L) 12 - 20      GFR est AA >60 >60 ml/min/1.73m2    GFR est non-AA 58 (L) >60 ml/min/1.73m2    Calcium 9.4 8.5 - 10.1 MG/DL    Bilirubin, total 0.6 0.2 - 1.0 MG/DL    ALT (SGPT) 46 12 - 78 U/L    AST (SGOT) 23 15 - 37 U/L    Alk. phosphatase 102 45 - 117 U/L    Protein, total 8.4 (H) 6.4 - 8.2 g/dL    Albumin 4.0 3.5 - 5.0 g/dL    Globulin 4.4 (H) 2.0 - 4.0 g/dL    A-G Ratio 0.9 (L) 1.1 - 2.2     LIPASE    Collection Time: 09/17/17  8:42 PM   Result Value Ref Range    Lipase 88 73 - 393 U/L   TROPONIN I    Collection Time: 09/17/17  8:42 PM   Result Value Ref Range    Troponin-I, Qt. <0.04 <0.05 ng/mL   TROPONIN I    Collection Time: 09/17/17 10:53 PM   Result Value Ref Range    Troponin-I, Qt. <0.04 <0.05 ng/mL       VITAL SIGNS:  Patient Vitals for the past 12 hrs:   Temp Pulse Resp BP SpO2   09/17/17 2346 - 80 16 157/88 96 %   09/17/17 2315 - 79 20 (!) 163/91 95 %   09/17/17 2230 - 78 12 157/74 -   09/17/17 2215 - 77 12 155/76 -   09/17/17 2200 - 79 11 157/86 93 %   09/17/17 2045 - 91 17 163/90 96 %   09/17/17 2034 97.8 °F (36.6 °C) 89 20 (!) 182/103 94 %       RADIOLOGY RESULTS:  The following have been ordered and reviewed:  CTA CHEST W OR W WO CONT   Final Result        Study Result      EXAM: CT ANGIOGRAPHY CHEST   INDICATION: Left-sided chest pain. COMPARISON: None. CONTRAST: 100 mL of Isovue-370. The patient was instructed to discontinue  metformin for 48 hours and check with his physician before resuming the  medication.     TECHNIQUE:   Precontrast  images were obtained to localize the volume for acquisition.   Multislice helical CT arteriography was performed from the diaphragm to the  thoracic inlet during uneventful rapid bolus intravenous contrast  administration. Lung and soft tissue windows were generated. Coronal and  sagittal  reformatted images were also generated and 3D post processing  consisting of coronal maximum intensity projection images was performed. CT dose  reduction was achieved through use of a standardized protocol tailored for this  examination and automatic exposure control for dose modulation. The examination  was repeated because of suboptimal opacification of the pulmonary arteries on  the initial acquisition to timing. The opacification on the second acquisition  is also less than optimal due to the patient's large size. .     FINDINGS:  The patient is very large. LOWER NECK: The visualized portions of the thyroid and structures of the lower  neck are within normal limits. LUNGS: The lungs are clear of mass, nodule, airspace disease or edema. PLEURA: There is no pleural effusion or pneumothorax. PULMONARY ARTERIES: The pulmonary arteries are well enhanced and no pulmonary  emboli are identified. AORTA: The aorta enhances normally without evidence of aneurysm or dissection. MEDIASTINUM: There is no mediastinal or hilar adenopathy or mass. BONES AND SOFT TISSUES: The bones and soft tissues of the chest wall are within  normal limits. UPPER ABDOMEN: The visualized portions of the upper abdominal organs are normal.     IMPRESSION  IMPRESSION: Normal CT arteriogram of the chest. No pulmonary embolus. ED EKG interpretation:  Rhythm: normal sinus rhythm and 1st degree block; and regular . Rate (approx.): 92; Axis: normal; P wave: normal; QRS interval: nonspecific intraventricular conduction delay; ST/T wave: normal; Other findings: unchanged from previous ekg, borderline ekg. This EKG was interpreted by Osvaldo Ribeiro DO,ED Provider. PROGRESS NOTES:  Pt feeling better. Discussed results and plan with patient.  Patient will be discharged home with PCP and cardiology followup. Patient instructed to return to the emergency room for any worsening symptoms or any other concerns. DIAGNOSIS:    1. Left sided chest pain        PLAN:  Follow-up Information     Follow up With Details Comments [address] [facility] Schedule an appointment as soon as possible for a visit  Howard Chan MD Schedule an appointment as soon as possible for a visit  [address] [place] [address] [phone]      [facility] EMERGENCY DEPT  If symptoms worsen [address] [facility]  [phone]        Discharge Medication List as of 9/17/2017 11:43 PM      START taking these medications    Details   diclofenac EC (VOLTAREN) 75 mg EC tablet Take 1 Tab by mouth two (2) times a day., Print, Disp-30 Tab, R-0         CONTINUE these medications which have NOT CHANGED    Details   esomeprazole (NEXIUM) 40 mg capsule Take 40 mg by mouth daily. , Historical Med      OTHER Take  by mouth. Indications: \"cholesterol medication\", Historical Med      amLODIPine (NORVASC) 10 mg tablet Take 10 mg by mouth daily. , Historical Med      metFORMIN (GLUCOPHAGE) 500 mg tablet Take 500 mg by mouth two (2) times daily (with meals). , Historical Med               ED COURSE: The patients hospital course has been uncomplicated.

## 2017-11-02 ENCOUNTER — HOSPITAL ENCOUNTER (EMERGENCY)
Age: 42
Discharge: HOME | End: 2017-11-02
Attending: EMERGENCY MEDICINE
Payer: SELF-PAY

## 2017-11-02 VITALS
WEIGHT: 315 LBS | SYSTOLIC BLOOD PRESSURE: 165 MMHG | TEMPERATURE: 98.5 F | DIASTOLIC BLOOD PRESSURE: 93 MMHG | BODY MASS INDEX: 39.17 KG/M2 | OXYGEN SATURATION: 97 % | HEART RATE: 87 BPM | RESPIRATION RATE: 18 BRPM | HEIGHT: 75 IN

## 2017-11-02 DIAGNOSIS — L03.116 CELLULITIS OF LEFT LOWER EXTREMITY: Primary | ICD-10-CM

## 2017-11-02 PROCEDURE — 99282 EMERGENCY DEPT VISIT SF MDM: CPT

## 2017-11-02 RX ORDER — CEPHALEXIN 500 MG/1
500 CAPSULE ORAL 4 TIMES DAILY
Qty: 28 CAP | Refills: 0 | Status: SHIPPED | OUTPATIENT
Start: 2017-11-02 | End: 2017-11-09

## 2017-11-03 NOTE — ED PROVIDER NOTES
HPI Comments: 38 y.o. male with past medical history significant for DM and HTN presents with complaints of redness and pain over his left inner thigh x 3 days. 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 \" He denies any fever, chills, nausea, or vomiting. Pt denies pleuritic chest pain, hemoptysis, unilateral leg swelling, hx of blood clot, recent trauma/surgery, or hx of CA. There are no other acute medical complaints at this time. PCP: Alfredo Ahumada, MD Haig Colonel, PA-C    Patient is a 38 y.o. male presenting with skin problem. Skin Problem           Past Medical History:   Diagnosis Date    Arrhythmia     heart murmur    Diabetes ([address]     GERD (gastroesophageal reflux disease)     Hypertension     Sleep apnea        Past Surgical History:   Procedure Laterality Date    HX HEENT      tonsils    HX ORTHOPAEDIC      Plantar Facitits L foot         No family history on file. Social History     Social History    Marital status: LEGALLY      Spouse name: N/A    Number of children: N/A    Years of education: N/A     Occupational History    Not on file. Social History Main Topics    Smoking status: Never Smoker    Smokeless tobacco: Never Used    Alcohol use No      Comment:      Drug use: No    Sexual activity: Not on file     Other Topics Concern    Not on file     Social History Narrative         ALLERGIES: Other food and Cucumber fruit extract    Review of Systems   Constitutional: Negative for activity change, appetite change, diaphoresis and fever. HENT: Negative for ear discharge, ear pain, facial swelling, rhinorrhea, sore throat, tinnitus, trouble swallowing and voice change. Eyes: Negative for photophobia, pain, discharge, redness and visual disturbance. Respiratory: Negative for cough, chest tightness, shortness of breath, wheezing and stridor. Cardiovascular: Negative for chest pain and palpitations. Gastrointestinal: Negative for abdominal pain, constipation, diarrhea, nausea and vomiting. Endocrine: Negative for polydipsia and polyuria. Genitourinary: Negative for dysuria, flank pain and hematuria. Musculoskeletal: Negative for arthralgias, back pain and myalgias. Skin: Positive for rash. Negative for color change. Neurological: Negative for dizziness, syncope, speech difficulty, light-headedness and numbness. Psychiatric/Behavioral: Negative for behavioral problems. Vitals:    11/02/17 2103   BP: (!) 165/93   Pulse: 87   Resp: 18   Temp: 98.5 °F (36.9 °C)   SpO2: 97%   Weight: (!) 181.4 kg (400 lb)   Height: 6' 3\" (1.905 m)            Physical Exam   Constitutional: He is oriented to person, place, and time. He appears well-developed and well-nourished. No distress. HENT:   Head: Normocephalic and atraumatic. Eyes: Conjunctivae are normal. Pupils are equal, round, and reactive to light. Neck: Normal range of motion. Neck supple. Cardiovascular: Normal rate, regular rhythm and normal heart sounds. Pulmonary/Chest: Effort normal and breath sounds normal. No respiratory distress. He has no wheezes. Abdominal: Soft. Bowel sounds are normal. He exhibits no distension. There is no tenderness. Musculoskeletal: Normal range of motion. Neurological: He is alert and oriented to person, place, and time. Skin: Skin is warm. Rash noted. He is not diaphoretic. 2x3 annular rash with minimal warmth to palpation. Please see caption below for further description. MDM  Number of Diagnoses or Management Options  Cellulitis of left lower extremity:   Diagnosis management comments: No bullseye lesion or hx of tic bites according to pt. Area is consistent with cellulitis. Started abx and advise close follow up with family doctor for further evaluation of symptoms. Reviewed treatment plan with attending and they agree. Nubia Santillan PA-C     ED Course       Procedures

## 2017-11-03 NOTE — DISCHARGE INSTRUCTIONS
Cellulitis: Care Instructions  Your Care Instructions    Cellulitis is a skin infection. It often occurs after a break in the skin from a scrape, cut, bite, or puncture, or after a rash. The doctor has checked you carefully, but problems can develop later. If you notice any problems or new symptoms, get medical treatment right away. Follow-up care is a key part of your treatment and safety. Be sure to make and go to all appointments, and call your doctor if you are having problems. It's also a good idea to know your test results and keep a list of the medicines you take. How can you care for yourself at home? · Take your antibiotics as directed. Do not stop taking them just because you feel better. You need to take the full course of antibiotics. · Prop up the infected area on pillows to reduce pain and swelling. Try to keep the area above the level of your heart as often as you can. · If your doctor told you how to care for your wound, follow your doctor's instructions. If you did not get instructions, follow this general advice:  ¨ Wash the wound with clean water 2 times a day. Don't use hydrogen peroxide or alcohol, which can slow healing. ¨ You may cover the wound with a thin layer of petroleum jelly, such as Vaseline, and a nonstick bandage. ¨ Apply more petroleum jelly and replace the bandage as needed. · Be safe with medicines. Take pain medicines exactly as directed. ¨ If the doctor gave you a prescription medicine for pain, take it as prescribed. ¨ If you are not taking a prescription pain medicine, ask your doctor if you can take an over-the-counter medicine. To prevent cellulitis in the future  · Try to prevent cuts, scrapes, or other injuries to your skin. Cellulitis most often occurs where there is a break in the skin. · If you get a scrape, cut, mild burn, or bite, wash the wound with clean water as soon as you can to help avoid infection.  Don't use hydrogen peroxide or alcohol, which can slow healing. · If you have swelling in your legs (edema), support stockings and good skin care may help prevent leg sores and cellulitis. · Take care of your feet, especially if you have diabetes or other conditions that increase the risk of infection. Wear shoes and socks. Do not go barefoot. If you have athlete's foot or other skin problems on your feet, talk to your doctor about how to treat them. When should you call for help? Call your doctor now or seek immediate medical care if:  ? · You have signs that your infection is getting worse, such as:  ¨ Increased pain, swelling, warmth, or redness. ¨ Red streaks leading from the area. ¨ Pus draining from the area. ¨ A fever. ? · You get a rash. ? Watch closely for changes in your health, and be sure to contact your doctor if:  ? · You are not getting better after 1 day (24 hours). ? · You do not get better as expected. Where can you learn more? Go to http://leobardo-lan.info/. Roni Padilla in the search box to learn more about \"Cellulitis: Care Instructions. \"  Current as of: October 13, 2016  Content Version: 11.4  © 8929-0615 [employer]. Care instructions adapted under license by [employer] (which disclaims liability or warranty for this information). If you have questions about a medical condition or this instruction, always ask your healthcare professional. [place] [address] any warranty or liability for your use of this information. We hope that we have addressed all of your medical concerns. The examination and treatment you received in the Emergency Department were for an emergent problem and were not intended as complete care. It is important that you follow up with your healthcare provider(s) for ongoing care.  If your symptoms worsen or do not improve as expected, and you are unable to reach your usual health care provider(s), you should return to the Emergency Department. Today's healthcare is undergoing tremendous change, and patient satisfaction surveys are one of the many tools to assess the quality of medical care. You may receive a survey from the [employer]) regarding your experience in the Emergency Department. I hope that your experience has been completely positive, particularly the medical care that I provided. As such, please participate in the survey; anything less than excellent does not meet my expectations or intentions. [facility] and [facility] participate in nationally recognized quality of care measures. If your blood pressure is greater than 120/80, as reported below, we urge that you seek medical care to address the potential of high blood pressure, commonly known as hypertension. Hypertension can be hereditary or can be caused by certain medical conditions, pain, stress, or \"white coat syndrome. \"       Please make an appointment with your health care provider(s) for follow up of your Emergency Department visit. VITALS:   Patient Vitals for the past 8 hrs:   Temp Pulse Resp BP SpO2   11/02/17 2103 98.5 °F (36.9 °C) 87 18 (!) 165/93 97 %          Thank you for allowing us to provide you with medical care today. We realize that you have many choices for your emergency care needs. Please choose us in the future for any continued health care needs. Kasey Taveras, [address]: [phone]            No results found for this or any previous visit (from the past 24 hour(s)). No results found.

## 2017-11-03 NOTE — ED TRIAGE NOTES
Patient with what he thinks is a bug bite on his inner left leg. Appeared on Monday. Has gotten bigger since then.

## 2018-05-20 ENCOUNTER — APPOINTMENT (OUTPATIENT)
Dept: GENERAL RADIOLOGY | Age: 43
End: 2018-05-20
Attending: EMERGENCY MEDICINE
Payer: SELF-PAY

## 2018-05-20 ENCOUNTER — HOSPITAL ENCOUNTER (EMERGENCY)
Age: 43
Discharge: HOME | End: 2018-05-20
Attending: EMERGENCY MEDICINE
Payer: SELF-PAY

## 2018-05-20 ENCOUNTER — APPOINTMENT (OUTPATIENT)
Dept: CT IMAGING | Age: 43
End: 2018-05-20
Attending: EMERGENCY MEDICINE
Payer: SELF-PAY

## 2018-05-20 VITALS
HEIGHT: 75 IN | TEMPERATURE: 97.8 F | HEART RATE: 78 BPM | WEIGHT: 315 LBS | SYSTOLIC BLOOD PRESSURE: 194 MMHG | DIASTOLIC BLOOD PRESSURE: 114 MMHG | OXYGEN SATURATION: 97 % | BODY MASS INDEX: 39.17 KG/M2 | RESPIRATION RATE: 15 BRPM

## 2018-05-20 DIAGNOSIS — S46.912A LEFT SHOULDER STRAIN, INITIAL ENCOUNTER: ICD-10-CM

## 2018-05-20 DIAGNOSIS — M47.812 DJD (DEGENERATIVE JOINT DISEASE), CERVICAL: Primary | ICD-10-CM

## 2018-05-20 LAB
ATRIAL RATE: 75 BPM
CALCULATED P AXIS, ECG09: 53 DEGREES
CALCULATED R AXIS, ECG10: -20 DEGREES
CALCULATED T AXIS, ECG11: 19 DEGREES
DIAGNOSIS, 93000: NORMAL
GLUCOSE BLD STRIP.AUTO-MCNC: 142 MG/DL (ref 65–100)
P-R INTERVAL, ECG05: 238 MS
Q-T INTERVAL, ECG07: 374 MS
QRS DURATION, ECG06: 90 MS
QTC CALCULATION (BEZET), ECG08: 417 MS
SERVICE CMNT-IMP: ABNORMAL
VENTRICULAR RATE, ECG03: 75 BPM

## 2018-05-20 PROCEDURE — 72125 CT NECK SPINE W/O DYE: CPT

## 2018-05-20 PROCEDURE — 93005 ELECTROCARDIOGRAM TRACING: CPT

## 2018-05-20 PROCEDURE — A4565 SLINGS: HCPCS

## 2018-05-20 PROCEDURE — 73030 X-RAY EXAM OF SHOULDER: CPT

## 2018-05-20 PROCEDURE — 74011250637 HC RX REV CODE- 250/637: Performed by: EMERGENCY MEDICINE

## 2018-05-20 PROCEDURE — 96372 THER/PROPH/DIAG INJ SC/IM: CPT

## 2018-05-20 PROCEDURE — 82962 GLUCOSE BLOOD TEST: CPT

## 2018-05-20 PROCEDURE — 74011250636 HC RX REV CODE- 250/636: Performed by: EMERGENCY MEDICINE

## 2018-05-20 PROCEDURE — 99284 EMERGENCY DEPT VISIT MOD MDM: CPT

## 2018-05-20 RX ORDER — HYDROCODONE BITARTRATE AND ACETAMINOPHEN 5; 325 MG/1; MG/1
1 TABLET ORAL
Qty: 10 TAB | Refills: 0 | Status: SHIPPED | OUTPATIENT
Start: 2018-05-20 | End: 2020-12-15

## 2018-05-20 RX ORDER — CYCLOBENZAPRINE HCL 10 MG
10 TABLET ORAL
Qty: 12 TAB | Refills: 0 | Status: SHIPPED | OUTPATIENT
Start: 2018-05-20 | End: 2020-12-15

## 2018-05-20 RX ORDER — CYCLOBENZAPRINE HCL 10 MG
10 TABLET ORAL
Status: COMPLETED | OUTPATIENT
Start: 2018-05-20 | End: 2018-05-20

## 2018-05-20 RX ORDER — DEXAMETHASONE SODIUM PHOSPHATE 10 MG/ML
10 INJECTION INTRAMUSCULAR; INTRAVENOUS
Status: COMPLETED | OUTPATIENT
Start: 2018-05-20 | End: 2018-05-20

## 2018-05-20 RX ORDER — METHYLPREDNISOLONE 4 MG/1
TABLET ORAL
Qty: 1 DOSE PACK | Refills: 0 | Status: SHIPPED | OUTPATIENT
Start: 2018-05-20 | End: 2020-12-15

## 2018-05-20 RX ADMIN — DEXAMETHASONE SODIUM PHOSPHATE 10 MG: 10 INJECTION, SOLUTION INTRAMUSCULAR; INTRAVENOUS at 08:44

## 2018-05-20 RX ADMIN — CYCLOBENZAPRINE HYDROCHLORIDE 10 MG: 10 TABLET, FILM COATED ORAL at 07:46

## 2018-05-20 NOTE — ED TRIAGE NOTES
Patient started 1-2 months ago with right shoulder pain, now with left shoulder and arm pain. Denies known injury.

## 2018-05-20 NOTE — ED PROVIDER NOTES
HPI Comments: 37 y.o. male with past medical history significant for diabetes, hypertension, sleep apnea, GERD, and arrythmia who presents ambulatory from home with chief complaint of shoulder pain. Patient reports onset of right shoulder pain 2 months ago that resolved approximately 3 weeks ago. Patient now reports onset of left shoulder pain 3 weeks ago that starts in his shoulder blade and radiates down to his elbow. He notes exacerbation of shoulder pain when driving on bumps in his truck and when moving his neck and states that \"his shoulder jerks out before coming back in.\" Patient reports that he does heavy lifting and pulling for his work. Patient notes he takes Naproxen sometimes for pain. Of note, patient notes his hypertension is controlled with Amlodipine and that his diabetes is controlled with Metformin. Patient does not know family cardiac history because he was adopted. Pertinent negatives include numbness, chest pressure, and chest pain. There are no other acute medical concerns at this time. Social hx: Denies tobacco use; denies alcohol use; denies illicit drug use  PCP: Ronnald Merlin, MD    Note written by Blanca Paris, as dictated by Flako Peacock MD 7:30 AM      The history is provided by the patient. No  was used. Past Medical History:   Diagnosis Date    Arrhythmia     heart murmur    Diabetes ([address]     GERD (gastroesophageal reflux disease)     Hypertension     Sleep apnea        Past Surgical History:   Procedure Laterality Date    HX HEENT      tonsils    HX ORTHOPAEDIC      [facility] L foot         No family history on file. Social History     Social History    Marital status: LEGALLY      Spouse name: N/A    Number of children: N/A    Years of education: N/A     Occupational History    Not on file.      Social History Main Topics    Smoking status: Never Smoker    Smokeless tobacco: Never Used    Alcohol use No      Comment:      Drug use: No    Sexual activity: Not on file     Other Topics Concern    Not on file     Social History Narrative         ALLERGIES: Other food and Cucumber fruit extract    Review of Systems   Cardiovascular: Negative for chest pain. Musculoskeletal: Positive for arthralgias (left shoulder) and myalgias (left arm). Neurological: Negative for numbness. All other systems reviewed and are negative. Vitals:    05/20/18 0726 05/20/18 0727   BP:  (!) 194/114   Pulse:  78   Resp:  15   Temp:  97.8 °F (36.6 °C)   SpO2:  97%   Weight: (!) 191 kg (421 lb 1.3 oz)    Height:  6' 3\" (1.905 m)            Physical Exam   Constitutional: He is oriented to person, place, and time. He appears well-developed and well-nourished. No distress. HENT:   Head: Normocephalic and atraumatic. Eyes: Conjunctivae and EOM are normal. Pupils are equal, round, and reactive to light. Neck: Normal range of motion. No midline ttp or step off; pain reproduced with turning head to left   Cardiovascular: Normal rate, regular rhythm, normal heart sounds and intact distal pulses. Exam reveals no friction rub. No murmur heard. Pulmonary/Chest: Effort normal and breath sounds normal. No respiratory distress. He has no wheezes. He has no rales. He exhibits no tenderness. Abdominal: Soft. Bowel sounds are normal. He exhibits no distension. There is no tenderness. There is no rebound and no guarding. Musculoskeletal: Normal range of motion. He exhibits no edema or tenderness. Left shoulder with FROm but pain- no warmth or erythema; from left elbow and wrist - nvi   Neurological: He is alert and oriented to person, place, and time. Coordination normal.   Skin: Skin is warm and dry. He is not diaphoretic. No pallor. Psychiatric: He has a normal mood and affect. His behavior is normal.   Nursing note and vitals reviewed.        MDM  Number of Diagnoses or Management Options  DJD (degenerative joint disease), cervical:   Left shoulder strain, initial encounter:   Diagnosis management comments: ? pain from shoulder joint itself- ligament issue vs neck       Amount and/or Complexity of Data Reviewed  Tests in the radiology section of CPT®: ordered and reviewed  Obtain history from someone other than the patient: yes (wife)  Independent visualization of images, tracings, or specimens: yes (ekg)    Patient Progress  Patient progress: stable        ED Course       Procedures  ED EKG interpretation:  Rhythm: normal sinus rhythm; Rate (approx.): 75; Axis: normal; Intervals: normal; ST/T wave: non-specific changes  Note written by Blanca Robin, as dictated by Brian Posada MD 7:42 AM    Spoke with pt and wife about likelihood this is cervical radiculopathy however could be shoulder as well. Follow up ortho. Steroids as nsaids arent helping. Muscle relaxer as well. Discussed not taking with the narcotic.  Take one or the other

## 2018-05-20 NOTE — DISCHARGE INSTRUCTIONS
We hope that we have addressed all of your medical concerns. The examination and treatment you received in the Emergency Department were for an emergent problem and were not intended as complete care. It is important that you follow up with your healthcare provider(s) for ongoing care. If your symptoms worsen or do not improve as expected, and you are unable to reach your usual health care provider(s), you should return to the Emergency Department. Today's healthcare is undergoing tremendous change, and patient satisfaction surveys are one of the many tools to assess the quality of medical care. You may receive a survey from the [employer] regarding your experience in the Emergency Department. I hope that your experience has been completely positive, particularly the medical care that I provided. As such, please participate in the survey; anything less than excellent does not meet my expectations or intentions. Thank you for allowing us to provide you with medical care today. We realize that you have many choices for your emergency care needs. Please choose us in the future for any continued health care needs.       Regards,           Fritz Palafox MD    [facility], [facility].   Office: [phone]            Recent Results (from the past 24 hour(s))   EKG, 12 LEAD, INITIAL    Collection Time: 05/20/18  7:40 AM   Result Value Ref Range    Ventricular Rate 75 BPM    Atrial Rate 75 BPM    P-R Interval 238 ms    QRS Duration 90 ms    Q-T Interval 374 ms    QTC Calculation (Bezet) 417 ms    Calculated P Axis 53 degrees    Calculated R Axis -20 degrees    Calculated T Axis 19 degrees    Diagnosis       Sinus rhythm with 1st degree AV block  Otherwise normal ECG  When compared with ECG of 17-SEP-2017 20:32,  QRS duration has decreased     GLUCOSE, POC    Collection Time: 05/20/18  7:49 AM   Result Value Ref Range    Glucose (POC) 142 (H) 65 - 100 mg/dL    Performed by Negro Bolaños        Xr Shoulder Lt Ap/lat Min 2 V    Result Date: 5/20/2018  EXAM:  XR SHOULDER LT AP/LAT MIN 2 V INDICATION:   pain with movement. Left shoulder pain COMPARISON: None. FINDINGS: Three views of the left shoulder demonstrate no fracture, dislocation or other acute abnormality. IMPRESSION:  No acute abnormality. Ct Spine Cerv Wo Cont    Result Date: 5/20/2018  EXAM:  CT CERVICAL SPINE WITHOUT CONTRAST INDICATION:   pain radiating into both arms. COMPARISON: March 31, 2016 CONTRAST:  None. TECHNIQUE: Multislice helical CT of the cervical spine was performed without intravenous contrast administration. Sagittal and coronal reconstructions were generated. CT dose reduction was achieved through use of a standardized protocol tailored for this examination and automatic exposure control for dose modulation. FINDINGS: Alignment is within normal limits. There are endplate degenerative changes. There is no fracture or bone destruction. Paraspinous soft tissues are within normal limits. C2-C3:  No stenosis. C3-C4:  Minimal calcification of the posterior longitudinal ligament without disc bulge or stenosis. C4-C5:  Small left paracentral disc protrusion. This results in mild to moderate narrowing of the canal without significant foraminal narrowing. C5-C6:  Irregular disc osteophytic bulge resulting in mild to moderate narrowing of the canal. No significant foraminal narrowing. C6-C7:  Diffuse disc bulge asymmetric to the right with at least moderate narrowing of the canal. Due to streak artifact evaluation is difficult. Mild narrowing of the foramen. C7-T1:  There are facet degenerative changes with at least mild narrowing of the canal. Due to streak artifact evaluation of the canal is difficult. IMPRESSION: Degenerative spine C6-C7.  This has not significantly progressed in the interval          Cervical Disc Disease: Care Instructions  Your Care Instructions    Cervical disc disease results from damage, disease, or wear and tear to the discs between the bones (vertebra) in your neck. The discs act as shock absorbers for the spine and keep the spine flexible. When a disc is damaged, it can bulge out and press against the nerve roots or spinal cord. This is sometimes called a herniated or \"slipped disc. \" This pressure can cause pain and numbness or tingling in your arms and hands. It can also cause weakness in your legs. An accident can damage a disc and cause it to break open (rupture). Aging and hard physical work can also cause damage to cervical discs. The first treatments for cervical disc disease include physical therapy, special neck exercises, heat, and pain medicine. If these fail, your doctor may inject steroids and pain medicine into your neck. Surgery is usually done only if other treatments have not worked. Follow-up care is a key part of your treatment and safety. Be sure to make and go to all appointments, and call your doctor if you are having problems. It's also a good idea to know your test results and keep a list of the medicines you take. How can you care for yourself at home? · Take pain medicines exactly as directed. ¨ If the doctor gave you a prescription medicine for pain, take it as prescribed. ¨ If you are not taking a prescription pain medicine, ask your doctor if you can take an over-the-counter medicine. · Don't spend too long in one position. Take short breaks to move around and change positions. · Wear a seat belt and shoulder harness when you are in a car. · Sleep with a pillow under your head and neck that keeps your neck straight. · Follow your doctor's instructions for gentle neck-stretching exercises. · Do not smoke. Smoking can slow healing of your discs. If you need help quitting, talk to your doctor about stop-smoking programs and medicines. These can increase your chances of quitting for good.   · Avoid strenuous work or exercise until your doctor says it is okay. When should you call for help? Call 911 anytime you think you may need emergency care. For example, call if:  ? · You are unable to move an arm or a leg at all. ?Call your doctor now or seek immediate medical care if:  ? · You have new or worse symptoms in your arms, legs, belly, or buttocks. Symptoms may include:  ¨ Numbness or tingling. ¨ Weakness. ¨ Pain. ? · You lose bladder or bowel control. ? Watch closely for changes in your health, and be sure to contact your doctor if:  ? · You do not get better as expected. Where can you learn more? Go to http://leobardo-lan.info/. Enter N118 in the search box to learn more about \"Cervical Disc Disease: Care Instructions. \"  Current as of: March 21, 2017  Content Version: 11.4  © 0102-6369 [employer]. Care instructions adapted under license by [employer] (which disclaims liability or warranty for this information). If you have questions about a medical condition or this instruction, always ask your healthcare professional. [address] any warranty or liability for your use of this information.

## 2019-07-15 ENCOUNTER — APPOINTMENT (OUTPATIENT)
Dept: GENERAL RADIOLOGY | Age: 44
End: 2019-07-15
Attending: STUDENT IN AN ORGANIZED HEALTH CARE EDUCATION/TRAINING PROGRAM
Payer: MEDICAID

## 2019-07-15 ENCOUNTER — HOSPITAL ENCOUNTER (EMERGENCY)
Age: 44
Discharge: HOME | End: 2019-07-15
Attending: STUDENT IN AN ORGANIZED HEALTH CARE EDUCATION/TRAINING PROGRAM
Payer: MEDICAID

## 2019-07-15 VITALS
DIASTOLIC BLOOD PRESSURE: 82 MMHG | WEIGHT: 315 LBS | HEART RATE: 81 BPM | SYSTOLIC BLOOD PRESSURE: 123 MMHG | HEIGHT: 75 IN | BODY MASS INDEX: 39.17 KG/M2 | TEMPERATURE: 98.2 F | RESPIRATION RATE: 19 BRPM | OXYGEN SATURATION: 95 %

## 2019-07-15 DIAGNOSIS — J41.0 SIMPLE CHRONIC BRONCHITIS (HCC): Primary | ICD-10-CM

## 2019-07-15 LAB
ALBUMIN SERPL-MCNC: 3.6 G/DL (ref 3.5–5)
ALBUMIN/GLOB SERPL: 0.8 {RATIO} (ref 1.1–2.2)
ALP SERPL-CCNC: 98 U/L (ref 45–117)
ALT SERPL-CCNC: 34 U/L (ref 12–78)
ANION GAP SERPL CALC-SCNC: 6 MMOL/L (ref 5–15)
AST SERPL-CCNC: 22 U/L (ref 15–37)
BASOPHILS # BLD: 0.1 K/UL (ref 0–0.1)
BASOPHILS NFR BLD: 1 % (ref 0–1)
BILIRUB SERPL-MCNC: 1 MG/DL (ref 0.2–1)
BUN SERPL-MCNC: 12 MG/DL (ref 6–20)
BUN/CREAT SERPL: 11 (ref 12–20)
CALCIUM SERPL-MCNC: 8.6 MG/DL (ref 8.5–10.1)
CHLORIDE SERPL-SCNC: 107 MMOL/L (ref 97–108)
CO2 SERPL-SCNC: 28 MMOL/L (ref 21–32)
COMMENT, HOLDF: NORMAL
CREAT SERPL-MCNC: 1.05 MG/DL (ref 0.7–1.3)
DIFFERENTIAL METHOD BLD: NORMAL
EOSINOPHIL # BLD: 0.1 K/UL (ref 0–0.4)
EOSINOPHIL NFR BLD: 1 % (ref 0–7)
ERYTHROCYTE [DISTWIDTH] IN BLOOD BY AUTOMATED COUNT: 13.5 % (ref 11.5–14.5)
GLOBULIN SER CALC-MCNC: 4.4 G/DL (ref 2–4)
GLUCOSE SERPL-MCNC: 89 MG/DL (ref 65–100)
HCT VFR BLD AUTO: 40.5 % (ref 36.6–50.3)
HGB BLD-MCNC: 13.2 G/DL (ref 12.1–17)
IMM GRANULOCYTES # BLD AUTO: 0 K/UL (ref 0–0.04)
IMM GRANULOCYTES NFR BLD AUTO: 0 % (ref 0–0.5)
LYMPHOCYTES # BLD: 3 K/UL (ref 0.8–3.5)
LYMPHOCYTES NFR BLD: 29 % (ref 12–49)
MCH RBC QN AUTO: 28.9 PG (ref 26–34)
MCHC RBC AUTO-ENTMCNC: 32.6 G/DL (ref 30–36.5)
MCV RBC AUTO: 88.8 FL (ref 80–99)
MONOCYTES # BLD: 1 K/UL (ref 0–1)
MONOCYTES NFR BLD: 10 % (ref 5–13)
NEUTS SEG # BLD: 6 K/UL (ref 1.8–8)
NEUTS SEG NFR BLD: 59 % (ref 32–75)
NRBC # BLD: 0 K/UL (ref 0–0.01)
NRBC BLD-RTO: 0 PER 100 WBC
PLATELET # BLD AUTO: 156 K/UL (ref 150–400)
PMV BLD AUTO: 12.5 FL (ref 8.9–12.9)
POTASSIUM SERPL-SCNC: 3.9 MMOL/L (ref 3.5–5.1)
PROT SERPL-MCNC: 8 G/DL (ref 6.4–8.2)
RBC # BLD AUTO: 4.56 M/UL (ref 4.1–5.7)
SAMPLES BEING HELD,HOLD: NORMAL
SODIUM SERPL-SCNC: 141 MMOL/L (ref 136–145)
TROPONIN I SERPL-MCNC: <0.05 NG/ML
WBC # BLD AUTO: 10.2 K/UL (ref 4.1–11.1)

## 2019-07-15 PROCEDURE — 99283 EMERGENCY DEPT VISIT LOW MDM: CPT

## 2019-07-15 PROCEDURE — 93005 ELECTROCARDIOGRAM TRACING: CPT

## 2019-07-15 PROCEDURE — 71046 X-RAY EXAM CHEST 2 VIEWS: CPT

## 2019-07-15 PROCEDURE — 36415 COLL VENOUS BLD VENIPUNCTURE: CPT

## 2019-07-15 PROCEDURE — 80053 COMPREHEN METABOLIC PANEL: CPT

## 2019-07-15 PROCEDURE — 84484 ASSAY OF TROPONIN QUANT: CPT

## 2019-07-15 PROCEDURE — 85025 COMPLETE CBC W/AUTO DIFF WBC: CPT

## 2019-07-15 RX ORDER — LOSARTAN POTASSIUM 25 MG/1
TABLET ORAL
Refills: 0 | COMMUNITY
Start: 2019-04-16 | End: 2020-11-25 | Stop reason: SDUPTHER

## 2019-07-15 RX ORDER — DOXAZOSIN 1 MG/1
TABLET ORAL
Refills: 3 | COMMUNITY
Start: 2019-06-10 | End: 2020-11-25 | Stop reason: SDUPTHER

## 2019-07-15 RX ORDER — PREDNISONE 50 MG/1
50 TABLET ORAL DAILY
Qty: 3 TAB | Refills: 0 | Status: SHIPPED | OUTPATIENT
Start: 2019-07-15 | End: 2019-07-18

## 2019-07-15 NOTE — ED PROVIDER NOTES
I have evaluated the patient as the Provider in Triage. I have reviewed His vital signs and the triage nurse assessment. I have talked with the patient and any available family and advised that I am the provider in triage and have ordered the appropriate study to initiate their work up based on the clinical presentation during my assessment. I have advised that the patient will be accommodated in the Main ED as soon as possible. I have also requested to contact the triage nurse or myself immediately if the patient experiences any changes in their condition during this brief waiting period. 53 y.o. male with past medical history significant for DM, HTN, sleep apnea, GERD, and heart murmur who presents from home with chief complaint of cough. Pt complains of a cough with associated sore throat,and shortness of breath for 3-4 days. Pt states he feels like \"an elephant is sitting on his chest\". Pt states symptoms are worse with laying down. Pt states he has a hx of bronchitis. Pt notes he is non-complaint with CPAP for \"years\". Pt denies fever. There are no other acute medical concerns at this time. Social hx: Never Smoker. Denies EtOH Use. PCP: Margo Petit MD    Note written by Jl Casillas, as dictated by Heydi Quijano MD 4:11 PM      The history is provided by the patient. Past Medical History:   Diagnosis Date    Arrhythmia     heart murmur    Diabetes ([address]     GERD (gastroesophageal reflux disease)     Hypertension     Sleep apnea        Past Surgical History:   Procedure Laterality Date    HX HEENT      tonsils    HX ORTHOPAEDIC      Pl[facility] L foot         No family history on file.     Social History     Socioeconomic History    Marital status: LEGALLY      Spouse name: Not on file    Number of children: Not on file    Years of education: Not on file    Highest education level: Not on file   Occupational History    Not on file   Social Needs    Financial resource strain: Not on file    Food insecurity:     Worry: Not on file     Inability: Not on file    Transportation needs:     Medical: Not on file     Non-medical: Not on file   Tobacco Use    Smoking status: Never Smoker    Smokeless tobacco: Never Used   Substance and Sexual Activity    Alcohol use: No     Comment:      Drug use: No    Sexual activity: Not on file   Lifestyle    Physical activity:     Days per week: Not on file     Minutes per session: Not on file    Stress: Not on file   Relationships    Social connections:     Talks on phone: Not on file     Gets together: Not on file     Attends [employer] service: Not on file     Active member of club or organization: Not on file     Attends meetings of clubs or organizations: Not on file     Relationship status: Not on file    Intimate partner violence:     Fear of current or ex partner: Not on file     Emotionally abused: Not on file     Physically abused: Not on file     Forced sexual activity: Not on file   Other Topics Concern    Not on file   Social History Narrative    Not on file         ALLERGIES: Other food and Cucumber fruit extract    Review of Systems   Constitutional: Negative for chills, diaphoresis, fatigue and fever. HENT: Positive for sore throat. Negative for congestion, rhinorrhea, sinus pressure, trouble swallowing and voice change. Eyes: Negative for photophobia and visual disturbance. Respiratory: Positive for cough and chest tightness. Negative for shortness of breath. Cardiovascular: Negative for chest pain, palpitations and leg swelling. Gastrointestinal: Negative for abdominal pain, blood in stool, constipation, diarrhea, nausea and vomiting. Musculoskeletal: Negative for arthralgias, myalgias and neck pain. Neurological: Negative for dizziness, weakness, light-headedness, numbness and headaches. All other systems reviewed and are negative.       Vitals:    07/15/19 1610   BP: 143/86 Pulse: 80   Resp: 24   Temp: 98.2 °F (36.8 °C)   SpO2: 95%   Weight: (!) 186 kg (410 lb)   Height: 6' 3\" (1.905 m)            Physical Exam   Constitutional: He is oriented to person, place, and time. He appears well-developed and well-nourished. No distress. Morbidly obese. HENT:   Head: Normocephalic and atraumatic. Nose: Nose normal.   Mouth/Throat: Oropharynx is clear and moist. No oropharyngeal exudate. Eyes: Conjunctivae and EOM are normal. Right eye exhibits no discharge. Left eye exhibits no discharge. No scleral icterus. Neck: Normal range of motion. Neck supple. No JVD present. No tracheal deviation present. No thyromegaly present. Cardiovascular: Normal rate, regular rhythm, normal heart sounds and intact distal pulses. Exam reveals no gallop and no friction rub. No murmur heard. Pulmonary/Chest: Effort normal and breath sounds normal. No stridor. No respiratory distress. He has no wheezes. He has no rales. He exhibits no tenderness. Dry non-productive cough. Abdominal: Bowel sounds are normal. He exhibits no distension and no mass. There is no tenderness. There is no rebound. Musculoskeletal: Normal range of motion. He exhibits no edema or tenderness. Lymphadenopathy:     He has no cervical adenopathy. Neurological: He is alert and oriented to person, place, and time. No cranial nerve deficit. Coordination normal.   Skin: Skin is warm and dry. No rash noted. He is not diaphoretic. No erythema. No pallor. Psychiatric: He has a normal mood and affect. His behavior is normal. Judgment and thought content normal.   Nursing note and vitals reviewed. Note written by Joel Ulloa.  Hanny Maalve, as dictated by Janes Olemdo MD 4:13 PM      MDM  Number of Diagnoses or Management Options  Simple chronic bronchitis [facility]):      Amount and/or Complexity of Data Reviewed  Clinical lab tests: reviewed and ordered  Tests in the radiology section of CPT®: reviewed and ordered  Independent visualization of images, tracings, or specimens: yes    Risk of Complications, Morbidity, and/or Mortality  Presenting problems: moderate  Diagnostic procedures: moderate  Management options: moderate    Patient Progress  Patient progress: stable         Procedures    ED EKG interpretation:  Rhythm: normal sinus rhythm; and regular . Rate (approx.): 75; Axis: normal; ST/T wave: normal.  Note written by Margaret De La Fuente, as dictated by Simeon Brewster MD 4:57 PM    5:42 PM  Patient's results have been reviewed with them. Patient and/or family have verbally conveyed their understanding and agreement of the patient's signs, symptoms, diagnosis, treatment and prognosis and additionally agree to follow up as recommended or return to the Emergency Room should their condition change prior to follow-up. Discharge instructions have also been provided to the patient with some educational information regarding their diagnosis as well a list of reasons why they would want to return to the ER prior to their follow-up appointment should their condition change.

## 2019-07-15 NOTE — DISCHARGE INSTRUCTIONS
Patient Education        Learning About Chronic Bronchitis  What is chronic bronchitis? Chronic bronchitis is long-term swelling and the buildup of mucus in the airways of your lungs. The airways (bronchial tubes) get inflamed and make a lot of mucus. This can narrow or block the airways, making it hard for you to breathe. It is a form of COPD (chronic obstructive pulmonary disease). Chronic bronchitis is usually caused by smoking. But chemical fumes, dust, or air pollution also can cause it over time. What can you expect when you have chronic bronchitis? Chronic bronchitis gets worse over time. You cannot undo the damage to your lungs. Over time, you may find that:  · You get short of breath even when you do simple things like get dressed or fix a meal.  · It is hard to eat or exercise. · You lose weight and feel weaker. Over many years, the swelling and mucus from chronic bronchitis make it more likely that you will get lung infections. But there are things you can do to prevent more damage and feel better. What are the symptoms? The main symptoms of chronic bronchitis are:  · A cough that will not go away. · Mucus that comes up when you cough. · Shortness of breath that gets worse when you exercise. At times, your symptoms may suddenly flare up and get much worse. This is a called an exacerbation (say \"egg-SUSANNE-er-BAY-denis\"). When this happens, your usual symptoms quickly get worse and stay bad. This can be dangerous. You may have to go to the hospital.  How can you keep chronic bronchitis from getting worse? Don't smoke. That is the best way to keep chronic bronchitis from getting worse. If you already smoke, it is never too late to stop. If you need help quitting, talk to your doctor about stop-smoking programs and medicines. These can increase your chances of quitting for good. You can do other things to keep chronic bronchitis from getting worse:  · Avoid bad air.  Air pollution, chemical fumes, and dust also can make chronic bronchitis worse. · Get a flu shot every year. A shot may keep the flu from turning into something more serious, like pneumonia. A flu shot also may lower your chances of having a flare-up. · Get a pneumococcal shot. A shot can prevent some of the serious complications of pneumonia. Ask your doctor how often you should get this shot. How is chronic bronchitis treated? Chronic bronchitis is treated with medicines and oxygen. You also can take steps at home to stay healthy and keep your condition from getting worse. Medicines and oxygen therapy  · You may be taking medicines such as:  ? Bronchodilators. These help open your airways and make breathing easier. Bronchodilators are either short-acting (work for 6 to 9 hours) or long-acting (work for 24 hours). You inhale most bronchodilators, so they start to act quickly. Always carry your quick-relief inhaler with you in case you need it while you are away from home. ? Corticosteroids. These reduce airway inflammation. They come in pill or inhaled form. You must take these medicines every day for them to work well. ? Antibiotics. These medicines are used when you have a bacterial lung infection. · Take your medicines exactly as prescribed. Call your doctor if you think you are having a problem with your medicine. · Oxygen therapy boosts the amount of oxygen in your blood and helps you breathe easier. Use the flow rate your doctor has recommended, and do not change it without talking to your doctor first.  Other care at home  · If your doctor recommends it, get more exercise. Walking is a good choice. Bit by bit, increase the amount you walk every day. Try for at least 30 minutes on most days of the week. · Learn breathing methods--such as breathing through pursed lips--to help you become less short of breath.   · If your doctor has not set you up with a pulmonary rehabilitation program, talk to him or her about whether rehab is right for you. Rehab includes exercise programs, education about your disease and how to manage it, help with diet and other changes, and emotional support. · Eat regular, healthy meals. Use bronchodilators about 1 hour before you eat to make it easier to eat. Eat several small meals instead of three large ones. Drink beverages at the end of the meal. Avoid foods that are hard to chew. Follow-up care is a key part of your treatment and safety. Be sure to make and go to all appointments, and call your doctor if you are having problems. It's also a good idea to know your test results and keep a list of the medicines you take. Where can you learn more? Go to http://leobardo-lan.info/. Enter Q550 in the search box to learn more about \"Learning About Chronic Bronchitis. \"  Current as of: September 5, 2018  Content Version: 11.9  © 5083-8291 [employer]. Care instructions adapted under license by [employer] (which disclaims liability or warranty for this information). If you have questions about a medical condition or this instruction, always ask your healthcare professional. [address] any warranty or liability for your use of this information.

## 2019-07-15 NOTE — ED TRIAGE NOTES
\"I'm prone to bronchitis and I think I have it again. I have sore throat and I'm coughing. When I lay down to bed it gets worse. It's been going on for 3 or 4 days. \" Patient denies fever.

## 2019-07-16 ENCOUNTER — HOSPITAL ENCOUNTER (EMERGENCY)
Age: 44
Discharge: HOME | End: 2019-07-16
Attending: EMERGENCY MEDICINE
Payer: MEDICAID

## 2019-07-16 VITALS
RESPIRATION RATE: 16 BRPM | DIASTOLIC BLOOD PRESSURE: 90 MMHG | HEART RATE: 99 BPM | SYSTOLIC BLOOD PRESSURE: 134 MMHG | OXYGEN SATURATION: 96 % | TEMPERATURE: 98.5 F

## 2019-07-16 DIAGNOSIS — H57.89 IRRITATION OF BOTH EYES: Primary | ICD-10-CM

## 2019-07-16 LAB
ATRIAL RATE: 75 BPM
CALCULATED P AXIS, ECG09: 62 DEGREES
CALCULATED R AXIS, ECG10: -22 DEGREES
CALCULATED T AXIS, ECG11: 14 DEGREES
DIAGNOSIS, 93000: NORMAL
GLUCOSE BLD STRIP.AUTO-MCNC: 116 MG/DL (ref 65–100)
P-R INTERVAL, ECG05: 208 MS
Q-T INTERVAL, ECG07: 380 MS
QRS DURATION, ECG06: 116 MS
QTC CALCULATION (BEZET), ECG08: 424 MS
SERVICE CMNT-IMP: ABNORMAL
VENTRICULAR RATE, ECG03: 75 BPM

## 2019-07-16 PROCEDURE — 74011250637 HC RX REV CODE- 250/637: Performed by: EMERGENCY MEDICINE

## 2019-07-16 PROCEDURE — 74011000250 HC RX REV CODE- 250: Performed by: EMERGENCY MEDICINE

## 2019-07-16 PROCEDURE — 82962 GLUCOSE BLOOD TEST: CPT

## 2019-07-16 PROCEDURE — 99283 EMERGENCY DEPT VISIT LOW MDM: CPT

## 2019-07-16 RX ORDER — ERYTHROMYCIN 5 MG/G
OINTMENT OPHTHALMIC
Status: COMPLETED | OUTPATIENT
Start: 2019-07-16 | End: 2019-07-16

## 2019-07-16 RX ORDER — TETRACAINE HYDROCHLORIDE 5 MG/ML
1 SOLUTION OPHTHALMIC
Status: COMPLETED | OUTPATIENT
Start: 2019-07-16 | End: 2019-07-16

## 2019-07-16 RX ORDER — ERYTHROMYCIN 5 MG/G
OINTMENT OPHTHALMIC
Qty: 1 TUBE | Refills: 0 | Status: SHIPPED | OUTPATIENT
Start: 2019-07-16 | End: 2020-12-15

## 2019-07-16 RX ADMIN — ERYTHROMYCIN: 5 OINTMENT OPHTHALMIC at 03:33

## 2019-07-16 RX ADMIN — TETRACAINE HYDROCHLORIDE 1 DROP: 5 SOLUTION OPHTHALMIC at 03:20

## 2019-07-16 RX ADMIN — FLUORESCEIN SODIUM 2 STRIP: 1 STRIP OPHTHALMIC at 03:21

## 2019-07-16 NOTE — ED TRIAGE NOTES
States at about 430/5pm today he woke up today for dinner and his eyes were cloudy and burning. States \"when I woke up I had to pry my eyes open\".

## 2019-07-16 NOTE — ED PROVIDER NOTES
53 y.o. male with past medical history significant for DM, HTN, sleep apnea, GERD who presents from home with chief complaint of blurred vision. Pt states at [id number] yesterday afternoon he noticed the onset of blurred vision from both eyes. He describes it as \"cloudy\" vision, noting he has a difficult time focusing. He also reports associated itching and burning of both his eyes. He was able to fall asleep last night, but awoke this morning and had to \"pry his eyes open. \" Of note, the pt was seen in the Ed earlier yesterday prior to the onset of his blurred vision for a cough. Pt specifically denies any fever, chills, headache, cough, congestion, shortness of breath, chest pain, abdominal pain, nausea, vomiting, diarrhea, dysuria, or urinary frequency. There are no other acute medical concerns at this time. Social Hx: negative tobacco use, negative EtOH use, negative Illicit Drug use    PCP: None    Note written by Blanca Amezquita, as dictated by Kiana Brewer MD 3:00 AM    The history is provided by the patient. No  was used. Past Medical History:   Diagnosis Date    Arrhythmia     heart murmur    Diabetes ([address]     GERD (gastroesophageal reflux disease)     Hypertension     Sleep apnea        Past Surgical History:   Procedure Laterality Date    HX HEENT      tonsils    HX ORTHOPAEDIC      Plantar Facitits L foot         No family history on file.     Social History     Socioeconomic History    Marital status: LEGALLY      Spouse name: Not on file    Number of children: Not on file    Years of education: Not on file    Highest education level: Not on file   Occupational History    Not on file   Social Needs    Financial resource strain: Not on file    Food insecurity:     Worry: Not on file     Inability: Not on file    Transportation needs:     Medical: Not on file     Non-medical: Not on file   Tobacco Use    Smoking status: Never Smoker    Smokeless tobacco: Never Used   Substance and Sexual Activity    Alcohol use: No     Comment:      Drug use: No    Sexual activity: Not on file   Lifestyle    Physical activity:     Days per week: Not on file     Minutes per session: Not on file    Stress: Not on file   Relationships    Social connections:     Talks on phone: Not on file     Gets together: Not on file     Attends Ep[employer] service: Not on file     Active member of club or organization: Not on file     Attends meetings of clubs or organizations: Not on file     Relationship status: Not on file    Intimate partner violence:     Fear of current or ex partner: Not on file     Emotionally abused: Not on file     Physically abused: Not on file     Forced sexual activity: Not on file   Other Topics Concern    Not on file   Social History Narrative    Not on file         ALLERGIES: Other food and Cucumber fruit extract    Review of Systems   Constitutional: Negative for chills and fever. Eyes: Positive for pain, itching and visual disturbance. Respiratory: Negative for cough and shortness of breath. Cardiovascular: Negative for chest pain and leg swelling. Gastrointestinal: Negative for abdominal pain, diarrhea, nausea and vomiting. Genitourinary: Negative for dysuria and frequency. Neurological: Negative for headaches. All other systems reviewed and are negative. Vitals:    07/16/19 0303   BP: 134/90   Pulse: 99   Resp: 16   Temp: 98.5 °F (36.9 °C)   SpO2: 96%            Physical Exam   Nursing note and vitals reviewed. CONSTITUTIONAL: Well-appearing; well-nourished; in no apparent distress  HEAD: Normocephalic; atraumatic  EYES: PERRL; EOM intact; pale and injected conjunctiva and sclera bilaterally. Cornea stained with fluorescein dye and there was no uptake bilaterally. ENT: No rhinorrhea; normal pharynx with no tonsillar hypertrophy; mucous membranes pink/moist, no erythema, no exudate.   NECK: Supple; non-tender; no cervical lymphadenopathy  CARD: Normal S1, S2; no murmurs, rubs, or gallops. Regular rate and rhythm. RESP: Normal respiratory effort; breath sounds clear and equal bilaterally; no wheezes, rhonchi, or rales. ABD: Normal bowel sounds; non-distended; non-tender; no palpable organomegaly, no masses, no bruits. Back Exam: Normal inspection; no vertebral point tenderness, no CVA tenderness. Normal range of motion. EXT: Normal ROM in all four extremities; non-tender to palpation; no swelling or deformity; distal pulses are normal, no edema. SKIN: Warm; dry; no rash. NEURO:Alert and oriented x 3, coherent, ALBINO-XII grossly intact, sensory and motor are non-focal.        MDM  Number of Diagnoses or Management Options  Irritation of both eyes:   Diagnosis management comments: Assessment: Eyes irritation bilaterally without an foreign body/allergic reaction; visual acuity noted bilaterally. Plan: Education, reassurance, symptomatic treatment, erythromycin ointment/ Monitor and Reevaluate. Amount and/or Complexity of Data Reviewed  Clinical lab tests: ordered and reviewed  Tests in the radiology section of CPT®: ordered and reviewed  Tests in the medicine section of CPT®: reviewed and ordered  Discussion of test results with the performing providers: yes  Decide to obtain previous medical records or to obtain history from someone other than the patient: yes  Obtain history from someone other than the patient: yes  Review and summarize past medical records: yes  Discuss the patient with other providers: yes  Independent visualization of images, tracings, or specimens: yes    Risk of Complications, Morbidity, and/or Mortality  Presenting problems: low  Diagnostic procedures: low  Management options: low           Procedures    Progress Note:   Pt has been reexamined by Jean-Claude Murphy MD. Pt is feeling much better. Symptoms have improved. All available results have been reviewed with pt and any available family. Pt understands sx, dx, and tx in ED. Care plan has been outlined and questions have been answered. Pt is ready to go home. Will send home on eye irritation instruction. Prescription erythromycin ointment. outpatient referral with PCP/ophthalmology as needed. Written by Lou Gutierrez MD,3:38 AM    .   .

## 2019-07-16 NOTE — DISCHARGE INSTRUCTIONS
Patient Education        Eye Irritation: Care Instructions  Your Care Instructions    Many children have minor eye problems. For example, your child's eyes may itch or feel irritated. Or your child's eyes may get tired from working too hard. This is called eyestrain. From time to time, irritated eyes may cause your child to have blurry vision. But they do not usually cause lasting problems with vision. Many things can cause these kinds of eye problems. If your child watches TV, plays video games, or uses the computer a lot, he or she may blink less than normal. This can cause dry, red, irritated eyes. Sometimes dry weather, smoke, or pollution can bother the eyes. Other times, allergies or contact lenses irritate the eyes. You can work with your doctor to find ways to help your child's eyes feel better. Home treatment often helps. Follow-up care is a key part of your child's treatment and safety. Be sure to make and go to all appointments, and call your doctor if your child is having problems. It's also a good idea to know your child's test results and keep a list of the medicines your child takes. How can you care for you at home? · Have your child take breaks often when he or she reads, watches TV, or uses a computer. Tell your child to close his or her eyes and not to rub them. You may want to try artificial tears when your child does these activities. You can buy these without a prescription. · Avoid smoke and other things that irritate the eyes. · Have your child wear sunglasses that wrap around the sides of the head. These can protect the eyes from sun, wind, dust, and dirt. · Place a humidifier by your child's bed or close to your child. Follow the directions for cleaning the machine. · Do not use fans while your child sleeps. · If your child usually wears contact lenses, have him or her use rewetting drops or wear glasses until the eyes feel better. · Be safe with medicines.  Have your child take medicines exactly as prescribed. Call your doctor if you think your child is having a problem with his or her medicine. · Have your child use artificial tears at least 4 times a day. · If your child needs drops more than 4 times a day, use artificial tears without preservatives. They may irritate the eyes less. · Have your child use a lubricating eye ointment or eye gel at bedtime. These are thicker and last longer, so your child may have less burning, dryness, and itching when he or she wakes up. Be aware that they may blur vision for a short time. · To put in eyedrops or ointment:  ? Tilt your child's head back, and pull the lower eyelid down with one finger. ? Drop or squirt the medicine inside the lower lid. ? Close your child's eye for 30 to 60 seconds to let the drops or ointment move around. ? Do not touch the ointment or dropper tip to your eyelashes or any other surface. · Put a warm, moist cloth on your child's eyelids every morning for about 5 minutes. Then massage the eyelids lightly. This helps increase the natural wetness of the eyes. When should you call for help? Call your doctor now or seek immediate medical care if:    · Your child has eye pain.     · Your child has new blurred vision.    Watch closely for changes in your child's health, and be sure to contact your doctor if:    · Your child's eye has new redness.     · Your child's eye has a new discharge.     · Your child does not get better as expected. Where can you learn more? Go to http://leobardo-lan.info/. Enter [phone] in the search box to learn more about \"Eye Irritation in Children: Care Instructions. \"  Current as of: September 23, 2018  Content Version: 11.9  © 1144-4646 [employer]. Care instructions adapted under license by [employer] (which disclaims liability or warranty for this information).  If you have questions about a medical condition or this instruction, always ask your healthcare professional. [address] any warranty or liability for your use of this information.

## 2019-07-16 NOTE — ED NOTES
Patient discharged from ED by provider. Discharge instructions reviewed with patient. Patient ambulatory from ED in [facility].

## 2019-08-27 ENCOUNTER — OFFICE VISIT (OUTPATIENT)
Dept: SLEEP MEDICINE | Age: 44
End: 2019-08-27

## 2019-08-27 VITALS
SYSTOLIC BLOOD PRESSURE: 135 MMHG | HEIGHT: 75 IN | DIASTOLIC BLOOD PRESSURE: 90 MMHG | BODY MASS INDEX: 39.17 KG/M2 | OXYGEN SATURATION: 97 % | WEIGHT: 315 LBS | HEART RATE: 84 BPM

## 2019-08-27 DIAGNOSIS — G47.33 OSA (OBSTRUCTIVE SLEEP APNEA): Primary | ICD-10-CM

## 2019-08-27 DIAGNOSIS — I10 ESSENTIAL HYPERTENSION: ICD-10-CM

## 2019-08-27 RX ORDER — AMLODIPINE BESYLATE AND ATORVASTATIN CALCIUM 10; 20 MG/1; MG/1
1 TABLET, FILM COATED ORAL DAILY
COMMUNITY
End: 2020-11-25 | Stop reason: SDUPTHER

## 2019-08-27 NOTE — PATIENT INSTRUCTIONS
[address] [facility], [address]  Tel.  [phone]  Fax. [address]  [place], [address] [facility]  Tel.  [phone]  Fax. [phone] [address]  Tel.  [phone]  Fax. [phone]     Sleep Apnea: After Your Visit  Your Care Instructions  Sleep apnea occurs when you frequently stop breathing for 10 seconds or longer during sleep. It can be mild to severe, based on the number of times per hour that you stop breathing or have slowed breathing. Blocked or narrowed airways in your nose, mouth, or throat can cause sleep apnea. Your airway can become blocked when your throat muscles and tongue relax during sleep. Sleep apnea is common, occurring in 1 out of 20 individuals. Individuals having any of the following characteristics should be evaluated and treated right away due to high risk and detrimental consequences from untreated sleep apnea:  1. Obesity  2. Congestive Heart failure  3. Atrial Fibrillation  4. Uncontrolled Hypertension  5. Type II Diabetes  6. Night-time Arrhythmias  7. Stroke  8. Pulmonary Hypertension  9. High-risk Driving Populations (pilots, truck drivers, etc.)  10. Patients Considering Weight-loss Surgery    How do you know you have sleep apnea? You probably have sleep apnea if you answer 'yes' to 3 or more of the following questions:  S - Have you been told that you Snore? T - Are you often Tired during the day? O - Has anyone Observed you stop breathing while sleeping? P- Do you have (or are being treated for) high blood Pressure? B - Are you obese (Body Mass Index > 35)? A - Is your Age 48years old or older? N - Is your Neck size greater than 16 inches? G - Are you male Gender? A sleep physician can prescribe a breathing device that prevents tissues in the throat from blocking your airway.  Or your doctor may recommend using a dental device (oral breathing device) to help keep your airway open. In some cases, surgery may be needed to remove enlarged tissues in the throat. Follow-up care is a key part of your treatment and safety. Be sure to make and go to all appointments, and call your doctor if you are having problems. It's also a good idea to know your test results and keep a list of the medicines you take. How can you care for yourself at home? · Lose weight, if needed. It may reduce the number of times you stop breathing or have slowed breathing. · Go to bed at the same time every night. · Sleep on your side. It may stop mild apnea. If you tend to roll onto your back, sew a pocket in the back of your pajama top. Put a tennis ball into the pocket, and stitch the pocket shut. This will help keep you from sleeping on your back. · Avoid alcohol and medicines such as sleeping pills and sedatives before bed. · Do not smoke. Smoking can make sleep apnea worse. If you need help quitting, talk to your doctor about stop-smoking programs and medicines. These can increase your chances of quitting for good. · Prop up the head of your bed 4 to 6 inches by putting bricks under the legs of the bed. · Treat breathing problems, such as a stuffy nose, caused by a cold or allergies. · Use a continuous positive airway pressure (CPAP) breathing machine if lifestyle changes do not help your apnea and your doctor recommends it. The machine keeps your airway from closing when you sleep. · If CPAP does not help you, ask your doctor whether you should try other breathing machines. A bilevel positive airway pressure machine has two types of air pressureâone for breathing in and one for breathing out. Another device raises or lowers air pressure as needed while you breathe. · If your nose feels dry or bleeds when using one of these machines, talk with your doctor about increasing moisture in the air. A humidifier may help.   · If your nose is runny or stuffy from using a breathing machine, talk with your doctor about using decongestants or a corticosteroid nasal spray. When should you call for help? Watch closely for changes in your health, and be sure to contact your doctor if:  · You still have sleep apnea even though you have made lifestyle changes. · You are thinking of trying a device such as CPAP. · You are having problems using a CPAP or similar machine. Where can you learn more? Go to [employer]. Enter U359 in the search box to learn more about \"Sleep Apnea: After Your Visit. \"   © 8334-8984 Healthwise[employer]. Care instructions adapted under license by [employer] (which disclaims liability or warranty for this information). This care instruction is for use with your licensed healthcare professional. If you have questions about a medical condition or this instruction, always ask your healthcare professional. Ivan Hamy any warranty or liability for your use of this information. PROPER SLEEP HYGIENE    What to avoid  · Do not have drinks with caffeine, such as coffee or black tea, for 8 hours before bed. · Do not smoke or use other types of tobacco near bedtime. Nicotine is a stimulant and can keep you awake. · Avoid drinking alcohol late in the evening, because it can cause you to wake in the middle of the night. · Do not eat a big meal close to bedtime. If you are hungry, eat a light snack. · Do not drink a lot of water close to bedtime, because the need to urinate may wake you up during the night. · Do not read or watch TV in bed. Use the bed only for sleeping and sexual activity. What to try  · Go to bed at the same time every night, and wake up at the same time every morning. Do not take naps during the day. · Keep your bedroom quiet, dark, and cool. · Get regular exercise, but not within 3 to 4 hours of your bedtime. .  · Sleep on a comfortable pillow and mattress.   · If watching the clock makes you anxious, turn it facing away from you so you cannot see the time. · If you worry when you lie down, start a worry book. Well before bedtime, write down your worries, and then set the book and your concerns aside. · Try meditation or other relaxation techniques before you go to bed. · If you cannot fall asleep, get up and go to another room until you feel sleepy. Do something relaxing. Repeat your bedtime routine before you go to bed again. · Make your house quiet and calm about an hour before bedtime. Turn down the lights, turn off the TV, log off the computer, and turn down the volume on music. This can help you relax after a busy day. Drowsy Driving  The [address] [place] [address] Road Traffic Safety Administration cites drowsiness as a causing factor in more than 344,597 police reported crashes annually, resulting in 76,000 injuries and 1,500 deaths. Other surveys suggest 55% of people polled have driven while drowsy in the past year, 23% had fallen asleep but not crashed, 3% crashed, and 2% had and accident due to drowsy driving. Who is at risk? Young Drivers: One study of drowsy driving accidents states that 55% of the drivers were under 25 years. Of those, 75% were male. Shift Workers and Travelers: People who work overnight or travel across time zones frequently are at higher risk of experiencing Circadian Rhythm Disorders. They are trying to work and function when their body is programed to sleep. Sleep Deprived: Lack of sleep has a serious impact on your ability to pay attention or focus on a task. Consistently getting less than the average of 8 hours your body needs creates partial or cumulative sleep deprivation. Untreated Sleep Disorders: Sleep Apnea, Narcolepsy, R.L.S., and other sleep disorders (untreated) prevent a person from getting enough restful sleep. This leads to excessive daytime sleepiness and increases the risk for drowsy driving accidents by up to 7 times.   Medications / Alcohol: Even over the counter medications can cause drowsiness. Medications that impair a drivers attention should have a warning label. Alcohol naturally makes you sleepy and on its own can cause accidents. Combined with excessive drowsiness its effects are amplified. Signs of Drowsy Driving:   * You don't remember driving the last few miles   * You may drift out of your sergio   * You are unable to focus and your thoughts wander   * You may yawn more often than normal   * You have difficulty keeping your eyes open / nodding off   * Missing traffic signs, speeding, or tailgating  Prevention-   Good sleep hygiene, lifestyle and behavioral choices have the most impact on drowsy driving. There is no substitute for sleep and the average person requires 8 hours nightly. If you find yourself driving drowsy, stop and sleep. Consider the sleep hygiene tips provided during your visit as well. Medication Refill Policy: Refills for all medications require 1 week advance notice. Please have your pharmacy fax a refill request. We are unable to fax, or call in \"controled substance\" medications and you will need to pick these prescriptions up from our office. ReTargeter Activation    Thank you for requesting access to ReTargeter. Please follow the instructions below to securely access and download your online medical record. ReTargeter allows you to send messages to your doctor, view your test results, renew your prescriptions, schedule appointments, and more. How Do I Sign Up? 1. In your internet browser, go to [email or web address]. 2. Click on the First Time User? Click Here link in the Sign In box. You will see the New Member Sign Up page. 3. Enter your Re[employer]er Access Code exactly as it appears below. You will not need to use this code after youve completed the sign-up process. If you do not sign up before the expiration date, you must request a new code.     ReTargeter Access Code: [email or web address]  Expires: 8/29/2019  4:12 PM (This is the date your [employer] access code will )    4. Enter the last four digits of your Social Security Number (xxxx) and Date of Birth (mm/dd/yyyy) as indicated and click Submit. You will be taken to the next sign-up page. 5. Create a [employer] Fairy Talest ID. This will be your [employer] login ID and cannot be changed, so think of one that is secure and easy to remember. 6. Create a [employer] password. You can change your password at any time. 7. Enter your Password Reset Question and Answer. This can be used at a later time if you forget your password. 8. Enter your e-mail address. You will receive e-mail notification when new information is available in [address] 9. Click Sign Up. You can now view and download portions of your medical record. 10. Click the Download Summary menu link to download a portable copy of your medical information. Additional Information    If you have questions, please call [phone]. Remember, [employer] is NOT to be used for urgent needs. For medical emergencies, dial 911.

## 2019-08-27 NOTE — PROGRESS NOTES
[address] [facility] [place]. [place], [address]  Tel.  [phone]  Fax. 100 [facility] 60  [place], [address] [facility]  Tel.  [phone]  Fax. [phone] [address]  Tel.  [phone]  Fax. [phone]         Subjective:      Delia Nageotte is an 53 y.o. male referred for evaluation for a sleep disorder. He complains of snoring associated with snorting, periods of not breathing, excessive daytime sleepiness. Symptoms began several years ago, he was diagnosed with ROBERTO and treated with Bi-Level until about 3 years previously when therapy was discontinued due to device malfunction. He has not been on therapy since that time. He is here for evaluation due to poor sleep and daytime evaluation. He is have open heart surgery on 9-9-19. He usually can fall asleep in 5 - 30 minutes. Family or house members note snoring, snorting, periods of not breathing. He denies completely or partially paralyzed while falling asleep or waking up. Delia Nageotte does wake up frequently at night. He   bothered by waking up too early and left unable to get back to sleep. He actually sleeps about 6 hours at night and wakes up about 6 times during the night. He does not work shifts:  .   Roaslva Atkins indicates he does get too little sleep at night. His bedtime is 0000. He awakens at 0600. He does take naps. He takes 7 naps a week lasting 15 to 30. He has the following observed behaviors: Loud snoring, Sleep talking, Pauses in breathing; No other observations. Other remarks: Waking with gasp or snort    [place] Sleepiness Score: 14 which reflect moderate daytime drowsiness. Allergies   Allergen Reactions    Other Food Angioedema     pickle    Cucumber Fruit Extract Angioedema         Current Outpatient Medications:     dulaglutide (TRULICITY) 1.5 AS/7.5 mL sub-q pen, 1.5 mg by SubCUTAneous route every seven (7) days. , Disp: , Rfl:     amLODIPine-atorvastatin (CADUET) 10-20 mg per tablet, Take 1 Tab by mouth daily. , Disp: , Rfl:     doxazosin (CARDURA) 1 mg tablet, TAKE ONE TABLET BY MOUTH ONE TIME DAILY, Disp: , Rfl: 3    losartan (COZAAR) 25 mg tablet, TAKE ONE TABLET BY MOUTH ONE TIME DAILY, Disp: , Rfl: 0    esomeprazole (NEXIUM) 40 mg capsule, Take 40 mg by mouth daily. , Disp: , Rfl:     metFORMIN (GLUCOPHAGE) 500 mg tablet, Take 500 mg by mouth two (2) times daily (with meals). , Disp: , Rfl:     erythromycin (ILOTYCIN) ophthalmic ointment, Apply 1 inch to both eyes every 4 hours for a week, Disp: 1 Tube, Rfl: 0    methylPREDNISolone (MEDROL, NIEVES,) 4 mg tablet, As directed, Disp: 1 Dose Pack, Rfl: 0    cyclobenzaprine (FLEXERIL) 10 mg tablet, Take 1 Tab by mouth three (3) times daily as needed for Muscle Spasm(s). , Disp: 12 Tab, Rfl: 0    HYDROcodone-acetaminophen (NORCO) 5-325 mg per tablet, Take 1 Tab by mouth every four (4) hours as needed for Pain. Max Daily Amount: 6 Tabs., Disp: 10 Tab, Rfl: 0    amLODIPine (NORVASC) 10 mg tablet, Take 10 mg by mouth daily. , Disp: , Rfl:      He  has a past medical history of Arrhythmia, Diabetes ([address] GERD (gastroesophageal reflux disease), Hypertension, and Sleep apnea. He  has a past surgical history that includes hx orthopaedic and hx heent. He family history is not on file. He  reports that he has never smoked. He has never used smokeless tobacco. He reports that he does not drink alcohol or use drugs.      Review of Systems:  Constitutional:  No significant weight loss or weight gain  Eyes:  No blurred vision  CVS: significant chest pain  Pulm: significant shortness of breath  GI:  No significant nausea or vomiting  :  significant nocturia  Musculoskeletal:  No significant joint pain at night  Skin:  No significant rashes  Neuro:  significant dizziness   Psych:  No active mood issues    Sleep Review of Systems: notable for difficulty falling asleep; frequent awakenings at night;  regular dreaming noted; no nightmares ; no early morning headaches; no memory problems; no concentration issues; no history of any automobile or occupational accidents due to daytime drowsiness. Objective:     Visit Vitals  /90 (BP 1 Location: Left arm, BP Patient Position: Sitting)   Pulse 84   Ht 6' 3\" (1.905 m)   Wt (!) 415 lb (188.2 kg)   SpO2 97%   BMI 51.87 kg/m²         General:   Not in acute distress   Eyes:  Anicteric sclerae, no obvious strabismus   Nose:  No obvious nasal septum deviation    Oropharynx:   Class 4 oropharyngeal outlet, thick tongue base, uvula could not be seen due to low-lying soft palate, narrow tonsilo-pharyngeal pilars   Tonsils:   tonsils are not seen due to low-lying soft palate   Neck:   Neck circ. in \"inches\": 19.25; midline trachea   Chest/Lungs:  Equal lung expansion, clear on auscultation    CVS:  Normal rate, regular rhythm; no JVD   Skin:  Warm to touch; no obvious rashes   Neuro:  No focal deficits ; no obvious tremor    Psych:  Normal affect,  normal countenance;          Assessment:       ICD-10-CM ICD-9-CM    1. ROBERTO (obstructive sleep apnea) G47.33 327.23 POLYSOMNOGRAPHY 1 NIGHT   2. Sleep-related hypoventilation G47.36 327.29 POLYSOMNOGRAPHY 1 NIGHT   3. BMI 50.0-59.9, adult ([facility] Z68.43 V85.43    4. Essential hypertension I10 401.9          Plan:     * The patient currently has a High Risk for having sleep apnea. STOP-BANG score 7.  * Sleep testing was ordered for initial evaluation. Orders Placed This Encounter    POLYSOMNOGRAPHY 1 NIGHT     Standing Status:   Future     Standing Expiration Date:   2/27/2020     Scheduling Instructions:      Perform ETCO2 monitoring during Polysomnography     Order Specific Question:   Reason for Exam     Answer:   ROBERTO / OHS     * He was provided information on sleep apnea including coresponding risk factors and the importance of proper treatment. * Treatment options if indicated were reviewed today.  Patient agrees to a trial of PAP therapy if indicated. * Counseling was provided regarding proper sleep hygiene (including effect of light on sleep), paradoxical intention, stimulus control, sleep environment safety and safe driving. * Effect of sleep disturbance on weight was reviewed. We have recommended a dedicated weight loss through appropriate diet and an exercise regiment as significant weight reduction has been shown to reduce severity of obstructive sleep apnea. * Patient agrees to telephone [phone]  follow-up by myself or lead sleep technologist shortly after sleep study to review results and plan final management.     (patient has given permission for a message to be left regarding test results and further management if patient cannot be cannot be reached directly). Thank you for allowing us to participate in your patient's medical care. We'll keep you updated on these investigations. Kia Mark MD, FAASM  Electronically signed.  08/27/19

## 2020-11-25 ENCOUNTER — TELEPHONE (OUTPATIENT)
Dept: PRIMARY CARE CLINIC | Age: 45
End: 2020-11-25

## 2020-11-25 DIAGNOSIS — E11.9 CONTROLLED TYPE 2 DIABETES MELLITUS WITHOUT COMPLICATION, WITHOUT LONG-TERM CURRENT USE OF INSULIN (HCC): ICD-10-CM

## 2020-11-25 DIAGNOSIS — I10 ESSENTIAL HYPERTENSION: Primary | ICD-10-CM

## 2020-11-25 RX ORDER — METFORMIN HYDROCHLORIDE 500 MG/1
500 TABLET ORAL 2 TIMES DAILY WITH MEALS
Qty: 30 TAB | Refills: 0 | Status: ON HOLD | OUTPATIENT
Start: 2020-11-25 | End: 2021-08-22

## 2020-11-25 RX ORDER — LOSARTAN POTASSIUM 25 MG/1
TABLET ORAL
Qty: 30 TAB | Refills: 0 | Status: SHIPPED | OUTPATIENT
Start: 2020-11-25 | End: 2020-12-15 | Stop reason: SDUPTHER

## 2020-11-25 RX ORDER — AMLODIPINE BESYLATE AND ATORVASTATIN CALCIUM 10; 20 MG/1; MG/1
1 TABLET, FILM COATED ORAL DAILY
Qty: 30 TAB | Refills: 0 | Status: SHIPPED | OUTPATIENT
Start: 2020-11-25 | End: 2020-12-15 | Stop reason: SDUPTHER

## 2020-11-25 RX ORDER — DOXAZOSIN 1 MG/1
TABLET ORAL
Qty: 30 TAB | Refills: 3 | Status: SHIPPED | OUTPATIENT
Start: 2020-11-25 | End: 2020-12-15 | Stop reason: SDUPTHER

## 2020-12-15 ENCOUNTER — VIRTUAL VISIT (OUTPATIENT)
Dept: PRIMARY CARE CLINIC | Age: 45
End: 2020-12-15
Payer: COMMERCIAL

## 2020-12-15 DIAGNOSIS — E11.9 CONTROLLED TYPE 2 DIABETES MELLITUS WITHOUT COMPLICATION, WITHOUT LONG-TERM CURRENT USE OF INSULIN (HCC): Primary | ICD-10-CM

## 2020-12-15 DIAGNOSIS — R39.15 URINARY URGENCY: ICD-10-CM

## 2020-12-15 DIAGNOSIS — I10 ESSENTIAL HYPERTENSION: ICD-10-CM

## 2020-12-15 DIAGNOSIS — K21.9 GASTROESOPHAGEAL REFLUX DISEASE WITHOUT ESOPHAGITIS: ICD-10-CM

## 2020-12-15 PROCEDURE — 99214 OFFICE O/P EST MOD 30 MIN: CPT | Performed by: NURSE PRACTITIONER

## 2020-12-15 RX ORDER — ACETAMINOPHEN 500 MG
1 TABLET ORAL
Qty: 1 KIT | Refills: 0 | Status: ON HOLD | OUTPATIENT
Start: 2020-12-18 | End: 2021-08-22

## 2020-12-15 RX ORDER — AMLODIPINE BESYLATE AND ATORVASTATIN CALCIUM 10; 20 MG/1; MG/1
1 TABLET, FILM COATED ORAL DAILY
Qty: 90 TAB | Refills: 1 | Status: SHIPPED | OUTPATIENT
Start: 2020-12-15 | End: 2021-08-30

## 2020-12-15 RX ORDER — DULAGLUTIDE 0.75 MG/.5ML
0.75 INJECTION, SOLUTION SUBCUTANEOUS
Qty: 13 PEN | Refills: 1 | Status: ON HOLD | OUTPATIENT
Start: 2020-12-15 | End: 2021-08-22

## 2020-12-15 RX ORDER — DOXAZOSIN 1 MG/1
TABLET ORAL
Qty: 90 TAB | Refills: 1 | Status: SHIPPED | OUTPATIENT
Start: 2020-12-15 | End: 2021-05-05 | Stop reason: SDUPTHER

## 2020-12-15 RX ORDER — LOSARTAN POTASSIUM 25 MG/1
TABLET ORAL
Qty: 90 TAB | Refills: 1 | Status: SHIPPED | OUTPATIENT
Start: 2020-12-15 | End: 2021-05-05 | Stop reason: SDUPTHER

## 2020-12-15 RX ORDER — ESOMEPRAZOLE MAGNESIUM 40 MG/1
40 CAPSULE, DELAYED RELEASE ORAL DAILY
Qty: 90 CAP | Refills: 1 | Status: SHIPPED | OUTPATIENT
Start: 2020-12-15 | End: 2021-05-05 | Stop reason: SDUPTHER

## 2020-12-15 RX ORDER — METFORMIN HYDROCHLORIDE 500 MG/1
1000 TABLET, EXTENDED RELEASE ORAL 2 TIMES DAILY
Qty: 360 TAB | Refills: 1 | Status: SHIPPED | OUTPATIENT
Start: 2020-12-15 | End: 2021-05-05 | Stop reason: SDUPTHER

## 2020-12-15 NOTE — PROGRESS NOTES
HISTORY OF PRESENT ILLNESS  Errol Chakraborty is a 50 y.o. male presents via telemedicine for  Refill and follow up on hypertension  When he doesn't take his pill he gets headaches  Has no way to check his BP at home. Feeling better after getting bp controlled     Follow up on diabetes last time seen by dr Glo Walker in march, was supposed to get trulicity put through but it has not been. Last a1c was good (6.0 in January)    Is out of work due to [employer]" aortic stenosis per pt. And seeing cardiologist but awaiting dental work prior to surgery. Follow up on urinary urgency, usually happened when urinating and thinks he is finished but then is not (incontinence at times) well treated with doxazosin)    There were no vitals filed for this visit. There is no problem list on file for this patient. There are no active problems to display for this patient. Current Outpatient Medications   Medication Sig Dispense Refill    amLODIPine-atorvastatin (Caduet) 10-20 mg per tablet Take 1 Tab by mouth daily. 30 Tab 0    losartan (COZAAR) 25 mg tablet TAKE ONE TABLET BY MOUTH ONE TIME DAILY 30 Tab 0    metFORMIN (GLUCOPHAGE) 500 mg tablet Take 1 Tab by mouth two (2) times daily (with meals). 30 Tab 0    doxazosin (CARDURA) 1 mg tablet TAKE ONE TABLET BY MOUTH ONE TIME DAILY 30 Tab 3    esomeprazole (NEXIUM) 40 mg capsule Take 40 mg by mouth daily.        Allergies   Allergen Reactions    Other Food Angioedema     pickle    Cucumber Fruit Extract Angioedema     Past Medical History:   Diagnosis Date    Arrhythmia     heart murmur    Diabetes ([address]     GERD (gastroesophageal reflux disease)     Hypertension     Sleep apnea      Past Surgical History:   Procedure Laterality Date    HX HEENT      tonsils    HX ORTHOPAEDIC      Plantar Facitits L foot     Family History   Adopted: Yes     Social History     Tobacco Use    Smoking status: Never Smoker    Smokeless tobacco: Never Used   Substance Use Topics  Alcohol use: No     Comment:             Review of Systems   Constitutional: Negative for fever. Respiratory: Negative for cough and shortness of breath. NICHOLAS (states due to Aortic Stenosis)   Cardiovascular: Negative for chest pain and palpitations. Gastrointestinal: Negative for constipation and diarrhea. Neurological: Negative for dizziness. Psychiatric/Behavioral: Negative for depression. The patient is not nervous/anxious and does not have insomnia. Physical Exam  Constitutional:       Appearance: Normal appearance. He is obese. HENT:      Head: Normocephalic. Eyes:      Extraocular Movements: Extraocular movements intact. Neck:      Comments: Normal neck movement as seen on video chat  Pulmonary:      Effort: Pulmonary effort is normal.   Neurological:      General: No focal deficit present. Mental Status: He is alert and oriented to person, place, and time. Psychiatric:         Mood and Affect: Mood normal.         Behavior: Behavior normal.         Thought Content: Thought content normal.         Judgment: Judgment normal.           ASSESSMENT and PLAN  Diagnoses and all orders for this visit:    1. Controlled type 2 diabetes mellitus without complication, without long-term current use of insulin ([facility])  -     Blood Pressure Monitor (Blood Pressure Kit) kit; 1 Kit by Does Not Apply route every Monday and Friday. -     losartan (COZAAR) 25 mg tablet; TAKE ONE TABLET BY MOUTH ONE TIME DAILY  -     metFORMIN ER (GLUCOPHAGE XR) 500 mg tablet; Take 2 Tabs by mouth two (2) times a day. -     CBC WITH AUTOMATED DIFF  -     METABOLIC PANEL, COMPREHENSIVE  -     HEMOGLOBIN A1C WITH EAG  -     MICROALBUMIN, UR, RAND W/ MICROALB/CREAT RATIO  -     LIPID PANEL AND CHOL/HDL RATIO  -     dulaglutide (Trulicity) 8.73 HR/7.4 mL sub-q pen; 0.5 mL by SubCUTAneous route every seven (7) days. Adding back on trulicity  2.  Essential hypertension  -     amLODIPine-atorvastatin (Caduet) 10-20 mg per tablet; Take 1 Tab by mouth daily. -     doxazosin (CARDURA) 1 mg tablet; TAKE ONE TABLET BY MOUTH ONE TIME DAILY  -     losartan (COZAAR) 25 mg tablet; TAKE ONE TABLET BY MOUTH ONE TIME DAILY  Labs and meds/ ordered BP kit  3. Urinary urgency  -     doxazosin (CARDURA) 1 mg tablet; TAKE ONE TABLET BY MOUTH ONE TIME DAILY  Labs and meds  4. Gastroesophageal reflux disease without esophagitis  -     esomeprazole (NexIUM) 40 mg capsule; Take 1 Cap by mouth daily. Labs and meds       Brigido Chris who was evaluated through a synchronous (real-time) audio-video encounter, and/or his healthcare decision maker, is aware that it is a billable service, with coverage as determined by his insurance carrier. He provided verbal consent to proceed: Yes, and patient identification was verified. It was conducted pursuant to the emergency declaration under the [address] [place] [address], [address] [place] [address] authority and the [employer] and [employer] Act. A caregiver was present when appropriate. Ability to conduct physical exam was limited. I was at home. The patient was at home.           Vivian Quintana NP

## 2020-12-15 NOTE — PATIENT INSTRUCTIONS
Learning About Tests When You Have Diabetes Why do you need regular tests? Diabetes can lead to other health problems if it's not well controlled. You'll need tests to monitor how well your diabetes is controlled and to check for other things like high cholesterol or kidney problems. Having tests on a regular schedule can help your doctor find problems early, when it's easier to manage them. What tests do you need? These are the tests you may need and how often you should have them. A1c blood test.  
This test shows the average level of blood sugar over the past 2 to 3 months. It helps your doctor see whether blood sugar levels have been staying within your target range. How often: Every 3 to 6 months Goal: A blood sugar level in your target range Blood pressure test.  
This test measures the pressure of blood flow in the arteries. Controlling blood pressure can help prevent damage to nerves and blood vessels. How often: Every 3 to 6 months Goal: A blood pressure level in your target range Cholesterol test.  
This test measures the amount of a type of fat in the blood. It is common for people with diabetes to also have high cholesterol. Too much cholesterol in the blood can build up inside the blood vessels and raise the risk for heart attack and stroke. How often: At the time of your diabetes diagnosis, and as often as your doctor recommends after that Goal: A cholesterol level in your target range Albumin-creatinine ratio test.  
This test checks for kidney damage by looking for the protein albumin (say \"al-BYOO-kalia\") in the urine. Albumin is normally found in the blood. Kidney damage can let small amounts of it (microalbumin) leak into the urine. How often: Once a year Goal: No protein in the urine Blood creatinine test/estimated glomerular filtration (eGFR).   
The blood creatinine (say \"vont-VW-ct-neen\") level shows how well your kidneys are working. Creatinine is a waste product that muscles release into the blood. Blood creatinine is used to estimate the glomerular filtration rate. A high level of creatinine and/or a low eGFR may mean your kidneys are not working as well as they should. How often: Once a year Goal: Normal level of creatinine in the blood. The eGFR goal is greater than 60 mL/min/1.73 m². Complete foot exam.  
The doctor checks for foot sores and whether any sensation has been lost. 
How often: Once a year Goal: Healthy feet with no foot ulcers or loss of feeling Dental exam and cleaning. The dentist checks for gum disease and tooth decay. People with high blood sugar are more likely to have these problems. How often: Every 6 months Goal: Healthy teeth and gums Complete eye exam.  
High blood sugar levels can damage the eyes. This exam is done by an ophthalmologist or optometrist. It includes a dilated eye exam. The exam shows whether there's damage to the back of the eye (diabetic retinopathy). How often: Once a year. If you don't have any signs of diabetic retinopathy, your doctor may recommend an exam every 2 years. Goal: No damage to the back of the eye Thyroid-stimulating hormone (TSH) blood test.  
This test checks for thyroid disease. Too little thyroid hormone can cause some medicines (like insulin) to stay in the body longer. This can cause low blood sugar. You may be tested if you have high cholesterol or are a woman over 48years old. How often: As part of your diabetes diagnosis, and as often as your doctor recommends after that Goal: Normal level of TSH in the blood Follow-up care is a key part of your treatment and safety. Be sure to make and go to all appointments, and call your doctor if you are having problems. It's also a good idea to know your test results and keep a list of the medicines you take. Where can you learn more? Go to http://www.Tuniu.com/ Enter [id number] in the search box to learn more about \"Learning About Tests When You Have Diabetes. \" Current as of: December 20, 2019               Content Version: 12.6 © 4128-0578 [email or web address]. Care instructions adapted under license by [employer] (which disclaims liability or warranty for this information). If you have questions about a medical condition or this instruction, always ask your healthcare professional. [address] any warranty or liability for your use of this information. Diabetes Foot Health: Care Instructions Your Care Instructions When you have diabetes, your feet need extra care and attention. Diabetes can damage the nerve endings and blood vessels in your feet, making you less likely to notice when your feet are injured. Diabetes also limits your body's ability to fight infection and get blood to areas that need it. If you get a minor foot injury, it could become an ulcer or a serious infection. With good foot care, you can prevent most of these problems. Caring for your feet can be quick and easy. Most of the care can be done when you are bathing or getting ready for bed. Follow-up care is a key part of your treatment and safety. Be sure to make and go to all appointments, and call your doctor if you are having problems. It's also a good idea to know your test results and keep a list of the medicines you take. How can you care for yourself at home? · Keep your blood sugar close to normal by watching what and how much you eat, monitoring blood sugar, taking medicines if prescribed, and getting regular exercise. · Do not smoke. Smoking affects blood flow and can make foot problems worse. If you need help quitting, talk to your doctor about stop-smoking programs and medicines. These can increase your chances of quitting for good. · Eat a diet that is low in fats.  High fat intake can cause fat to build up in your blood vessels and decrease blood flow. · Inspect your feet daily for blisters, cuts, cracks, or sores. If you cannot see well, use a mirror or have someone help you. · Take care of your feet: 
? Wash your feet every day. Use warm (not hot) water. Check the water temperature with your wrists or other part of your body, not your feet. ? Dry your feet well. Pat them dry. Do not rub the skin on your feet too hard. Dry well between your toes. If the skin on your feet stays moist, bacteria or a fungus can grow, which can lead to infection. ? Keep your skin soft. Use moisturizing skin cream to keep the skin on your feet soft and prevent calluses and cracks. But do not put the cream between your toes, and stop using any cream that causes a rash. ? Clean underneath your toenails carefully. Do not use a sharp object to clean underneath your toenails. Use the blunt end of a nail file or other rounded tool. ? Trim and file your toenails straight across to prevent ingrown toenails. Use a nail clipper, not scissors. Use an emery board to smooth the edges. · Change socks daily. Socks without seams are best, because seams often rub the feet. You can find socks for people with diabetes from specialty catalogs. · Look inside your shoes every day for things like gravel or torn linings, which could cause blisters or sores. · Buy shoes that fit well: 
? Look for shoes that have plenty of space around the toes. This helps prevent bunions and blisters. ? Try on shoes while wearing the kind of socks you will usually wear with the shoes. ? Avoid plastic shoes. They may rub your feet and cause blisters. Good shoes should be made of materials that are flexible and breathable, such as leather or cloth. ? Break in new shoes slowly by wearing them for no more than an hour a day for several days. Take extra time to check your feet for red areas, blisters, or other problems after you wear new shoes. · Do not go barefoot. Do not wear sandals, and do not wear shoes with very thin soles. Thin soles are easy to puncture. They also do not protect your feet from hot pavement or cold weather. · Have your doctor check your feet during each visit. If you have a foot problem, see your doctor. Do not try to treat an early foot problem at home. Home remedies or treatments that you can buy without a prescription (such as corn removers) can be harmful. · Always get early treatment for foot problems. A minor irritation can lead to a major problem if not properly cared for early. When should you call for help? Call your doctor now or seek immediate medical care if: 
  · You have a foot sore, an ulcer or break in the skin that is not healing after 4 days, bleeding corns or calluses, or an ingrown toenail.  
  · You have blue or black areas, which can mean bruising or blood flow problems.  
  · You have peeling skin or tiny blisters between your toes or cracking or oozing of the skin.  
  · You have a fever for more than 24 hours and a foot sore.  
  · You have new numbness or tingling in your feet that does not go away after you move your feet or change positions.  
  · You have unexplained or unusual swelling of the foot or ankle. Watch closely for changes in your health, and be sure to contact your doctor if: 
  · You cannot do proper foot care. Where can you learn more? Go to http://www.gray.com/ Enter A739 in the search box to learn more about \"Diabetes Foot Health: Care Instructions. \" Current as of: December 20, 2019               Content Version: 12.6 © 5855-7547 [employer]. Care instructions adapted under license by [employer] (which disclaims liability or warranty for this information).  If you have questions about a medical condition or this instruction, always ask your healthcare professional. Kandi Carmichael disclaims any warranty or liability for your use of this information. High Blood Pressure: Care Instructions Overview It's normal for blood pressure to go up and down throughout the day. But if it stays up, you have high blood pressure. Another name for high blood pressure is hypertension. Despite what a lot of people think, high blood pressure usually doesn't cause headaches or make you feel dizzy or lightheaded. It usually has no symptoms. But it does increase your risk of stroke, heart attack, and other problems. You and your doctor will talk about your risks of these problems based on your blood pressure. Your doctor will give you a goal for your blood pressure. Your goal will be based on your health and your age. Lifestyle changes, such as eating healthy and being active, are always important to help lower blood pressure. You might also take medicine to reach your blood pressure goal. 
Follow-up care is a key part of your treatment and safety. Be sure to make and go to all appointments, and call your doctor if you are having problems. It's also a good idea to know your test results and keep a list of the medicines you take. How can you care for yourself at home? Medical treatment · If you stop taking your medicine, your blood pressure will go back up. You may take one or more types of medicine to lower your blood pressure. Be safe with medicines. Take your medicine exactly as prescribed. Call your doctor if you think you are having a problem with your medicine. · Talk to your doctor before you start taking aspirin every day. Aspirin can help certain people lower their risk of a heart attack or stroke. But taking aspirin isn't right for everyone, because it can cause serious bleeding. · See your doctor regularly. You may need to see the doctor more often at first or until your blood pressure comes down.  
· If you are taking blood pressure medicine, talk to your doctor before you take decongestants or anti-inflammatory medicine, such as ibuprofen. Some of these medicines can raise blood pressure. · Learn how to check your blood pressure at home. Lifestyle changes · Stay at a healthy weight. This is especially important if you put on weight around the waist. Losing even 10 pounds can help you lower your blood pressure. · If your doctor recommends it, get more exercise. Walking is a good choice. Bit by bit, increase the amount you walk every day. Try for at least 30 minutes on most days of the week. You also may want to swim, bike, or do other activities. · Avoid or limit alcohol. Talk to your doctor about whether you can drink any alcohol. · Try to limit how much sodium you eat to less than 2,300 milligrams (mg) a day. Your doctor may ask you to try to eat less than 1,500 mg a day. · Eat plenty of fruits (such as bananas and oranges), vegetables, legumes, whole grains, and low-fat dairy products. · Lower the amount of saturated fat in your diet. Saturated fat is found in animal products such as milk, cheese, and meat. Limiting these foods may help you lose weight and also lower your risk for heart disease. · Do not smoke. Smoking increases your risk for heart attack and stroke. If you need help quitting, talk to your doctor about stop-smoking programs and medicines. These can increase your chances of quitting for good. When should you call for help? Call  911 anytime you think you may need emergency care. This may mean having symptoms that suggest that your blood pressure is causing a serious heart or blood vessel problem. Your blood pressure may be over 180/120. For example, call 911 if: 
  · You have symptoms of a heart attack. These may include: 
? Chest pain or pressure, or a strange feeling in the chest. 
? Sweating. ? Shortness of breath. ? Nausea or vomiting.  
? Pain, pressure, or a strange feeling in the back, neck, jaw, or upper belly or in one or both shoulders or arms. ? Lightheadedness or sudden weakness. ? A fast or irregular heartbeat.  
  · You have symptoms of a stroke. These may include: 
? Sudden numbness, tingling, weakness, or loss of movement in your face, arm, or leg, especially on only one side of your body. ? Sudden vision changes. ? Sudden trouble speaking. ? Sudden confusion or trouble understanding simple statements. ? Sudden problems with walking or balance. ? A sudden, severe headache that is different from past headaches.  
  · You have severe back or belly pain. Do not wait until your blood pressure comes down on its own. Get help right away. Call your doctor now or seek immediate care if: 
  · Your blood pressure is much higher than normal (such as 180/120 or higher), but you don't have symptoms.  
  · You think high blood pressure is causing symptoms, such as: 
? Severe headache. 
? Blurry vision. Watch closely for changes in your health, and be sure to contact your doctor if: 
  · Your blood pressure measures higher than your doctor recommends at least 2 times. That means the top number is higher or the bottom number is higher, or both.  
  · You think you may be having side effects from your blood pressure medicine. Where can you learn more? Go to http://leobardo-lan.info/ Enter A297 in the search box to learn more about \"High Blood Pressure: Care Instructions. \" Current as of: December 16, 2019               Content Version: 12.6 © 8583-4909 [employer]. Care instructions adapted under license by [employer] (which disclaims liability or warranty for this information). If you have questions about a medical condition or this instruction, always ask your healthcare professional. [place] [address] any warranty or liability for your use of this information.

## 2020-12-24 LAB
PSA SERPL-MCNC: 0.3 NG/ML (ref 0–4)
REFLEX CRITERIA: NORMAL

## 2021-02-16 LAB
ALBUMIN SERPL-MCNC: 4.2 G/DL (ref 4–5)
ALBUMIN/CREAT UR: 10 MG/G CREAT (ref 0–29)
ALBUMIN/GLOB SERPL: 1.2 {RATIO} (ref 1.2–2.2)
ALP SERPL-CCNC: 99 IU/L (ref 39–117)
ALT SERPL-CCNC: 38 IU/L (ref 0–44)
AST SERPL-CCNC: 26 IU/L (ref 0–40)
BASOPHILS # BLD AUTO: 0 X10E3/UL (ref 0–0.2)
BASOPHILS NFR BLD AUTO: 1 %
BILIRUB SERPL-MCNC: 0.6 MG/DL (ref 0–1.2)
BUN SERPL-MCNC: 12 MG/DL (ref 6–24)
BUN/CREAT SERPL: 13 (ref 9–20)
CALCIUM SERPL-MCNC: 9.3 MG/DL (ref 8.7–10.2)
CHLORIDE SERPL-SCNC: 102 MMOL/L (ref 96–106)
CHOLEST SERPL-MCNC: 144 MG/DL (ref 100–199)
CHOLEST/HDLC SERPL: 3.2 RATIO (ref 0–5)
CO2 SERPL-SCNC: 26 MMOL/L (ref 20–29)
CREAT SERPL-MCNC: 0.9 MG/DL (ref 0.76–1.27)
CREAT UR-MCNC: 192.2 MG/DL
EOSINOPHIL # BLD AUTO: 0.1 X10E3/UL (ref 0–0.4)
EOSINOPHIL NFR BLD AUTO: 1 %
ERYTHROCYTE [DISTWIDTH] IN BLOOD BY AUTOMATED COUNT: 13.8 % (ref 11.6–15.4)
EST. AVERAGE GLUCOSE BLD GHB EST-MCNC: 194 MG/DL
GLOBULIN SER CALC-MCNC: 3.5 G/DL (ref 1.5–4.5)
GLUCOSE SERPL-MCNC: 179 MG/DL (ref 65–99)
HBA1C MFR BLD: 8.4 % (ref 4.8–5.6)
HCT VFR BLD AUTO: 42.1 % (ref 37.5–51)
HDLC SERPL-MCNC: 45 MG/DL
HGB BLD-MCNC: 13.6 G/DL (ref 13–17.7)
IMM GRANULOCYTES # BLD AUTO: 0 X10E3/UL (ref 0–0.1)
IMM GRANULOCYTES NFR BLD AUTO: 0 %
LDLC SERPL CALC-MCNC: 83 MG/DL (ref 0–99)
LYMPHOCYTES # BLD AUTO: 2.8 X10E3/UL (ref 0.7–3.1)
LYMPHOCYTES NFR BLD AUTO: 33 %
MCH RBC QN AUTO: 27.5 PG (ref 26.6–33)
MCHC RBC AUTO-ENTMCNC: 32.3 G/DL (ref 31.5–35.7)
MCV RBC AUTO: 85 FL (ref 79–97)
MICROALBUMIN UR-MCNC: 18.7 UG/ML
MONOCYTES # BLD AUTO: 0.7 X10E3/UL (ref 0.1–0.9)
MONOCYTES NFR BLD AUTO: 9 %
NEUTROPHILS # BLD AUTO: 4.7 X10E3/UL (ref 1.4–7)
NEUTROPHILS NFR BLD AUTO: 56 %
PLATELET # BLD AUTO: 138 X10E3/UL (ref 150–450)
POTASSIUM SERPL-SCNC: 4 MMOL/L (ref 3.5–5.2)
PROT SERPL-MCNC: 7.7 G/DL (ref 6–8.5)
RBC # BLD AUTO: 4.94 X10E6/UL (ref 4.14–5.8)
SODIUM SERPL-SCNC: 140 MMOL/L (ref 134–144)
TRIGL SERPL-MCNC: 82 MG/DL (ref 0–149)
VLDLC SERPL CALC-MCNC: 16 MG/DL (ref 5–40)
WBC # BLD AUTO: 8.4 X10E3/UL (ref 3.4–10.8)

## 2021-02-16 NOTE — PROGRESS NOTES
Please call patient about labs. Besides the A1c the labs were not half bad, but the A1c was not good.  At 8.4

## 2021-04-01 ENCOUNTER — TELEPHONE (OUTPATIENT)
Dept: PRIMARY CARE CLINIC | Age: 46
End: 2021-04-01

## 2021-04-01 DIAGNOSIS — I10 ESSENTIAL HYPERTENSION: ICD-10-CM

## 2021-04-01 NOTE — TELEPHONE ENCOUNTER
Patient called and stated that he needs a refill on his amlodipine sent into the pharmacy because he is out.

## 2021-04-02 ENCOUNTER — TELEPHONE (OUTPATIENT)
Dept: PRIMARY CARE CLINIC | Age: 46
End: 2021-04-02

## 2021-04-02 NOTE — TELEPHONE ENCOUNTER
Patient called in reference to needing a prior auth for his medication and wants to talk to Tomas at [phone].

## 2021-04-20 ENCOUNTER — TELEPHONE (OUTPATIENT)
Dept: PRIMARY CARE CLINIC | Age: 46
End: 2021-04-20

## 2021-05-05 ENCOUNTER — OFFICE VISIT (OUTPATIENT)
Dept: PRIMARY CARE CLINIC | Age: 46
End: 2021-05-05
Payer: COMMERCIAL

## 2021-05-05 VITALS
TEMPERATURE: 98.4 F | SYSTOLIC BLOOD PRESSURE: 113 MMHG | WEIGHT: 315 LBS | DIASTOLIC BLOOD PRESSURE: 66 MMHG | HEIGHT: 75 IN | RESPIRATION RATE: 16 BRPM | OXYGEN SATURATION: 99 % | HEART RATE: 104 BPM | BODY MASS INDEX: 39.17 KG/M2

## 2021-05-05 DIAGNOSIS — Z91.89 AT RISK FOR DENTAL PROBLEMS: ICD-10-CM

## 2021-05-05 DIAGNOSIS — E11.9 CONTROLLED TYPE 2 DIABETES MELLITUS WITHOUT COMPLICATION, WITHOUT LONG-TERM CURRENT USE OF INSULIN (HCC): ICD-10-CM

## 2021-05-05 DIAGNOSIS — R39.15 URINARY URGENCY: Chronic | ICD-10-CM

## 2021-05-05 DIAGNOSIS — I10 ESSENTIAL HYPERTENSION: Primary | ICD-10-CM

## 2021-05-05 DIAGNOSIS — E66.01 MORBIDLY OBESE (HCC): ICD-10-CM

## 2021-05-05 DIAGNOSIS — K21.9 GASTROESOPHAGEAL REFLUX DISEASE WITHOUT ESOPHAGITIS: ICD-10-CM

## 2021-05-05 DIAGNOSIS — Z11.59 NEED FOR HEPATITIS C SCREENING TEST: ICD-10-CM

## 2021-05-05 PROCEDURE — 3052F HG A1C>EQUAL 8.0%<EQUAL 9.0%: CPT | Performed by: NURSE PRACTITIONER

## 2021-05-05 PROCEDURE — 99214 OFFICE O/P EST MOD 30 MIN: CPT | Performed by: NURSE PRACTITIONER

## 2021-05-05 RX ORDER — ESOMEPRAZOLE MAGNESIUM 40 MG/1
40 CAPSULE, DELAYED RELEASE ORAL DAILY
Qty: 90 CAP | Refills: 1 | Status: ON HOLD | OUTPATIENT
Start: 2021-05-05 | End: 2021-08-22

## 2021-05-05 RX ORDER — METFORMIN HYDROCHLORIDE 500 MG/1
1000 TABLET, EXTENDED RELEASE ORAL 2 TIMES DAILY
Qty: 360 TAB | Refills: 1 | Status: SHIPPED | OUTPATIENT
Start: 2021-05-05 | End: 2021-08-31 | Stop reason: SDUPTHER

## 2021-05-05 RX ORDER — LOSARTAN POTASSIUM 25 MG/1
TABLET ORAL
Qty: 90 TAB | Refills: 1 | Status: SHIPPED | OUTPATIENT
Start: 2021-05-05 | End: 2021-08-26

## 2021-05-05 RX ORDER — DOXAZOSIN 1 MG/1
TABLET ORAL
Qty: 90 TAB | Refills: 1 | Status: SHIPPED | OUTPATIENT
Start: 2021-05-05 | End: 2021-08-26

## 2021-05-05 NOTE — PROGRESS NOTES
HISTORY OF PRESENT ILLNESS  Josie Robbins is a 50 y.o. male presents for   Chief Complaint   Patient presents with    Follow-up    Diabetes      Patient is here post hospital for follow up. Was in the hospital in [place] for infection in leg. Received IV antibiotics and ointment. Patient was supposed to have aortic stenosis surgery a year ago. Wasn't able to do because of dental clearance. Patient is saving money for it. Having issues with getting up in stairs. Patient reports of sweating profusely and short of breath with activity. Wants to loose some weight too. States that he lost 16 lbs recently. Patient isn't checking blood sugar or blood pressure at home. Denies any symptoms of hypo or hyperglycemia. Denies blurred vision, dizziness or any headaches. Haven't received covid vaccine nor pneumonia vaccine. Received tetanus vaccine but it has been a while. Vitals:    05/05/21 0939   BP: 113/66   BP 1 Location: Right arm   BP Patient Position: Sitting   Pulse: (!) 104   Resp: 16   Temp: 98.4 °F (36.9 °C)   TempSrc: Oral   SpO2: 99%   Weight: (!) 426 lb (193.2 kg)   Height: 6' 3\" (1.905 m)      Patient Active Problem List   Diagnosis Code    Controlled type 2 diabetes mellitus without complication, without long-term current use of insulin ([facility]) E11.9    Essential hypertension I10    Urinary urgency R39.15    Gastroesophageal reflux disease without esophagitis K21.9     Patient Active Problem List    Diagnosis Date Noted    Controlled type 2 diabetes mellitus without complication, without long-term current use of insulin ([facility] [address] 12/15/2020    Essential hypertension 12/15/2020    Urinary urgency 12/15/2020    Gastroesophageal reflux disease without esophagitis 12/15/2020     Current Outpatient Medications   Medication Sig Dispense Refill    amLODIPine-atorvastatin (Caduet) 10-20 mg per tablet Take 1 Tab by mouth daily.  90 Tab 1    doxazosin (CARDURA) 1 mg tablet TAKE ONE TABLET BY MOUTH ONE TIME DAILY 90 Tab 1    esomeprazole (NexIUM) 40 mg capsule Take 1 Cap by mouth daily. 90 Cap 1    losartan (COZAAR) 25 mg tablet TAKE ONE TABLET BY MOUTH ONE TIME DAILY 90 Tab 1    metFORMIN ER (GLUCOPHAGE XR) 500 mg tablet Take 2 Tabs by mouth two (2) times a day. 360 Tab 1    Blood Pressure Monitor (Blood Pressure Kit) kit 1 Kit by Does Not Apply route every Monday and Friday. 1 Kit 0    dulaglutide (Trulicity) 1.09 EQ/9.6 mL sub-q pen 0.5 mL by SubCUTAneous route every seven (7) days. 13 Pen 1    metFORMIN (GLUCOPHAGE) 500 mg tablet Take 1 Tab by mouth two (2) times daily (with meals). 30 Tab 0     Allergies   Allergen Reactions    Other Food Angioedema     pickle    Cucumber Fruit Extract Angioedema     Past Medical History:   Diagnosis Date    Arrhythmia     heart murmur    CAD (coronary artery disease)     Diabetes (HCC)     GERD (gastroesophageal reflux disease)     Hypertension     Sleep apnea      Past Surgical History:   Procedure Laterality Date    HX HEENT      tonsils    HX ORTHOPAEDIC      Plantar Facitits L foot     Family History   Adopted: Yes     Social History     Tobacco Use    Smoking status: Never Smoker    Smokeless tobacco: Never Used   Substance Use Topics    Alcohol use: No     Comment:             Review of Systems   Constitutional: Negative for chills, diaphoresis and fever. HENT: Negative for congestion, hearing loss, sinus pain, sore throat and tinnitus. Eyes: Negative for blurred vision, double vision and pain. Respiratory: Positive for shortness of breath (on exertion). Negative for cough and wheezing. Cardiovascular: Positive for palpitations and leg swelling. Negative for chest pain. Gastrointestinal: Negative for abdominal pain, constipation, diarrhea, heartburn and nausea. Genitourinary: Negative for dysuria, frequency and urgency. Musculoskeletal: Negative for joint pain and myalgias. Skin: Negative for itching and rash. Neurological: Negative for dizziness, loss of consciousness and weakness. Endo/Heme/Allergies: Negative for environmental allergies and polydipsia. Psychiatric/Behavioral: Negative for depression. The patient is not nervous/anxious and does not have insomnia (snores ). Physical Exam  Constitutional:       Appearance: Normal appearance. He is obese. HENT:      Head: Normocephalic and atraumatic. Neck:      Musculoskeletal: Normal range of motion. Cardiovascular:      Rate and Rhythm: Regular rhythm. Tachycardia present. Pulses: Normal pulses. Heart sounds: Murmur present. Pulmonary:      Effort: Pulmonary effort is normal.      Breath sounds: Normal breath sounds. Abdominal:      General: Bowel sounds are normal. There is distension. Palpations: Abdomen is soft. Musculoskeletal: Normal range of motion. Right lower leg: Edema present. Left lower leg: Edema present. Feet:      Right foot:      Protective Sensation: 10 sites tested. 9 sites sensed. Skin integrity: Callus (heels) and dry skin present. Toenail Condition: Right toenails are abnormally thick. Left foot:      Protective Sensation: 10 sites tested. 9 sites sensed. Skin integrity: Callus (heels) and dry skin present. Toenail Condition: Left toenails are abnormally thick. Skin:     General: Skin is warm. Capillary Refill: Capillary refill takes less than 2 seconds. Findings: Rash present. No erythema. Neurological:      General: No focal deficit present. Mental Status: He is alert and oriented to person, place, and time. Mental status is at baseline. Psychiatric:         Mood and Affect: Mood normal.         Behavior: Behavior normal.         Thought Content: Thought content normal.         Judgment: Judgment normal.           ASSESSMENT and PLAN  Diagnoses and all orders for this visit:    1. Essential hypertension  Comments:   Well controlled in current regimen. Continue taking meds the same way as before. Orders:  -     CBC WITH AUTOMATED DIFF  -     METABOLIC PANEL, COMPREHENSIVE  -     doxazosin (CARDURA) 1 mg tablet; TAKE ONE TABLET BY MOUTH ONE TIME DAILY  -     losartan (COZAAR) 25 mg tablet; TAKE ONE TABLET BY MOUTH ONE TIME DAILY    2. Need for hepatitis C screening test  Comments:  Per USPSTF guidelines. Orders:  -     HEPATITIS C AB    3. Urinary urgency  Comments: Well controlled with current regimen. Orders:  -     doxazosin (CARDURA) 1 mg tablet; TAKE ONE TABLET BY MOUTH ONE TIME DAILY    4. Gastroesophageal reflux disease without esophagitis  Comments: Well controlled with current regimen. Orders:  -     esomeprazole (NexIUM) 40 mg capsule; Take 1 Cap by mouth daily. 5. Controlled type 2 diabetes mellitus without complication, without long-term current use of insulin ([facility])  Comments:  Not checking blood glucose at home, labs ordered and advised to come after May 20th fasting for lab draws. Adding Victoza for A1C control as well as for weight loss. Orders:  -     CBC WITH AUTOMATED DIFF  -     METABOLIC PANEL, COMPREHENSIVE  -     LIPID PANEL  -      DIABETES FOOT EXAM  -     HEMOGLOBIN A1C WITH EAG  -     losartan (COZAAR) 25 mg tablet; TAKE ONE TABLET BY MOUTH ONE TIME DAILY  -     metFORMIN ER (GLUCOPHAGE XR) 500 mg tablet; Take 2 Tabs by mouth two (2) times a day. -     liraglutide (VICTOZA) 0.6 mg/0.1 mL (18 mg/3 mL) pnij; 0.6 mg by SubCUTAneous route daily. 6. Morbidly obese (HCC)  Comments:  Starting on Victoza 0.6 mg daily injection as covered by injection. Educated on side effects. Follow up in 3 months. Orders:  -     liraglutide (VICTOZA) 0.6 mg/0.1 mL (18 mg/3 mL) pnij; 0.6 mg by SubCUTAneous route daily. 7. At risk for dental problems  Comments:  Referral at [facility] dental for surgery clearance. Phone number provided if patient do not receive call.    Orders:  -     REFERRAL TO DENTISTRY          Gokul Lynn Signed By: Anup Ro, SANJIV     May 5, 2021

## 2021-05-05 NOTE — PROGRESS NOTES
1. Have you been to the ER, urgent care clinic since your last visit? Hospitalized since your last visit? Yes When: In [facility] for sepsis in right leg    2. Have you seen or consulted any other health care providers outside of the [address] [place] [address] since your last visit? Include any pap smears or colon screening.  Yes When: [place]

## 2021-05-12 ENCOUNTER — TELEPHONE (OUTPATIENT)
Dept: PRIMARY CARE CLINIC | Age: 46
End: 2021-05-12

## 2021-05-12 DIAGNOSIS — E11.9 CONTROLLED TYPE 2 DIABETES MELLITUS WITHOUT COMPLICATION, WITHOUT LONG-TERM CURRENT USE OF INSULIN (HCC): Primary | ICD-10-CM

## 2021-05-12 NOTE — TELEPHONE ENCOUNTER
Pt needs the needles to go on the syringes for the once a day diabetes injection he says the pharmacy did not receive them

## 2021-05-13 RX ORDER — PEN NEEDLE, DIABETIC 32 GX 1/6"
1 NEEDLE, DISPOSABLE MISCELLANEOUS DAILY
Qty: 90 PEN NEEDLE | Refills: 3 | Status: ON HOLD | OUTPATIENT
Start: 2021-05-13 | End: 2021-08-22

## 2021-05-21 LAB
ALBUMIN SERPL-MCNC: 4.2 G/DL (ref 4–5)
ALBUMIN/GLOB SERPL: 1.2 {RATIO} (ref 1.2–2.2)
ALP SERPL-CCNC: 103 IU/L (ref 48–121)
ALT SERPL-CCNC: 29 IU/L (ref 0–44)
AST SERPL-CCNC: 23 IU/L (ref 0–40)
BASOPHILS # BLD AUTO: 0.1 X10E3/UL (ref 0–0.2)
BASOPHILS NFR BLD AUTO: 1 %
BILIRUB SERPL-MCNC: 0.6 MG/DL (ref 0–1.2)
BUN SERPL-MCNC: 11 MG/DL (ref 6–24)
BUN/CREAT SERPL: 12 (ref 9–20)
CALCIUM SERPL-MCNC: 9.2 MG/DL (ref 8.7–10.2)
CHLORIDE SERPL-SCNC: 101 MMOL/L (ref 96–106)
CHOLEST SERPL-MCNC: 125 MG/DL (ref 100–199)
CO2 SERPL-SCNC: 24 MMOL/L (ref 20–29)
CREAT SERPL-MCNC: 0.92 MG/DL (ref 0.76–1.27)
EOSINOPHIL # BLD AUTO: 0.1 X10E3/UL (ref 0–0.4)
EOSINOPHIL NFR BLD AUTO: 1 %
ERYTHROCYTE [DISTWIDTH] IN BLOOD BY AUTOMATED COUNT: 14 % (ref 11.6–15.4)
EST. AVERAGE GLUCOSE BLD GHB EST-MCNC: 189 MG/DL
GLOBULIN SER CALC-MCNC: 3.5 G/DL (ref 1.5–4.5)
GLUCOSE SERPL-MCNC: 166 MG/DL (ref 65–99)
HBA1C MFR BLD: 8.2 % (ref 4.8–5.6)
HCT VFR BLD AUTO: 40.5 % (ref 37.5–51)
HCV AB S/CO SERPL IA: <0.1 S/CO RATIO (ref 0–0.9)
HDLC SERPL-MCNC: 41 MG/DL
HGB BLD-MCNC: 12.5 G/DL (ref 13–17.7)
IMM GRANULOCYTES # BLD AUTO: 0 X10E3/UL (ref 0–0.1)
IMM GRANULOCYTES NFR BLD AUTO: 0 %
LDLC SERPL CALC-MCNC: 72 MG/DL (ref 0–99)
LYMPHOCYTES # BLD AUTO: 2.5 X10E3/UL (ref 0.7–3.1)
LYMPHOCYTES NFR BLD AUTO: 35 %
MCH RBC QN AUTO: 27.1 PG (ref 26.6–33)
MCHC RBC AUTO-ENTMCNC: 30.9 G/DL (ref 31.5–35.7)
MCV RBC AUTO: 88 FL (ref 79–97)
MONOCYTES # BLD AUTO: 0.7 X10E3/UL (ref 0.1–0.9)
MONOCYTES NFR BLD AUTO: 10 %
NEUTROPHILS # BLD AUTO: 3.8 X10E3/UL (ref 1.4–7)
NEUTROPHILS NFR BLD AUTO: 53 %
PLATELET # BLD AUTO: 166 X10E3/UL (ref 150–450)
POTASSIUM SERPL-SCNC: 4.3 MMOL/L (ref 3.5–5.2)
PROT SERPL-MCNC: 7.7 G/DL (ref 6–8.5)
RBC # BLD AUTO: 4.61 X10E6/UL (ref 4.14–5.8)
SODIUM SERPL-SCNC: 138 MMOL/L (ref 134–144)
TRIGL SERPL-MCNC: 52 MG/DL (ref 0–149)
VLDLC SERPL CALC-MCNC: 12 MG/DL (ref 5–40)
WBC # BLD AUTO: 7.1 X10E3/UL (ref 3.4–10.8)

## 2021-05-24 ENCOUNTER — TELEPHONE (OUTPATIENT)
Dept: PRIMARY CARE CLINIC | Age: 46
End: 2021-05-24

## 2021-05-24 DIAGNOSIS — E11.9 CONTROLLED TYPE 2 DIABETES MELLITUS WITHOUT COMPLICATION, WITHOUT LONG-TERM CURRENT USE OF INSULIN (HCC): Primary | ICD-10-CM

## 2021-05-24 RX ORDER — SEMAGLUTIDE 1.34 MG/ML
1 INJECTION, SOLUTION SUBCUTANEOUS
Qty: 1 BOX | Refills: 1 | Status: SHIPPED | OUTPATIENT
Start: 2021-05-24 | End: 2021-08-31 | Stop reason: SDUPTHER

## 2021-05-24 NOTE — PROGRESS NOTES
Please call patient about labs. Did your rx for trulicity not get approved? I would rather you on that.  Also we need another med for your diabgetes you are at 8.2 a1c I am sending in 101 Dates

## 2021-06-02 ENCOUNTER — TELEPHONE (OUTPATIENT)
Dept: PRIMARY CARE CLINIC | Age: 46
End: 2021-06-02

## 2021-06-02 NOTE — TELEPHONE ENCOUNTER
Pharmacist at [address] [facility] called and asked to speak to a nurse about a medication clarification. Best call back number is [phone]. Pharmacist said to ask to speak to the pharmacist and they will be ready for it.

## 2021-08-21 ENCOUNTER — APPOINTMENT (OUTPATIENT)
Dept: CT IMAGING | Age: 46
DRG: 240 | End: 2021-08-21
Attending: EMERGENCY MEDICINE
Payer: COMMERCIAL

## 2021-08-21 ENCOUNTER — APPOINTMENT (OUTPATIENT)
Dept: GENERAL RADIOLOGY | Age: 46
DRG: 240 | End: 2021-08-21
Attending: STUDENT IN AN ORGANIZED HEALTH CARE EDUCATION/TRAINING PROGRAM
Payer: COMMERCIAL

## 2021-08-21 ENCOUNTER — HOSPITAL ENCOUNTER (INPATIENT)
Age: 46
LOS: 5 days | Discharge: HOME | DRG: 240 | End: 2021-08-26
Attending: STUDENT IN AN ORGANIZED HEALTH CARE EDUCATION/TRAINING PROGRAM | Admitting: INTERNAL MEDICINE
Payer: COMMERCIAL

## 2021-08-21 DIAGNOSIS — I10 ESSENTIAL HYPERTENSION: ICD-10-CM

## 2021-08-21 DIAGNOSIS — R10.31 ABDOMINAL PAIN, RIGHT LOWER QUADRANT: ICD-10-CM

## 2021-08-21 DIAGNOSIS — R19.00 RETROPERITONEAL MASS: Primary | ICD-10-CM

## 2021-08-21 DIAGNOSIS — C48.0: ICD-10-CM

## 2021-08-21 DIAGNOSIS — I35.0 SEVERE AORTIC STENOSIS: ICD-10-CM

## 2021-08-21 DIAGNOSIS — I35.0 AORTIC VALVE STENOSIS, ETIOLOGY OF CARDIAC VALVE DISEASE UNSPECIFIED: ICD-10-CM

## 2021-08-21 DIAGNOSIS — E11.9 CONTROLLED TYPE 2 DIABETES MELLITUS WITHOUT COMPLICATION, WITHOUT LONG-TERM CURRENT USE OF INSULIN (HCC): ICD-10-CM

## 2021-08-21 LAB
ALBUMIN SERPL-MCNC: 3.3 G/DL (ref 3.5–5)
ALBUMIN/GLOB SERPL: 0.7 {RATIO} (ref 1.1–2.2)
ALP SERPL-CCNC: 85 U/L (ref 45–117)
ALT SERPL-CCNC: 26 U/L (ref 12–78)
ANION GAP SERPL CALC-SCNC: 9 MMOL/L (ref 5–15)
AST SERPL-CCNC: 17 U/L (ref 15–37)
BASOPHILS # BLD: 0 K/UL (ref 0–0.1)
BASOPHILS NFR BLD: 0 % (ref 0–1)
BILIRUB SERPL-MCNC: 0.9 MG/DL (ref 0.2–1)
BNP SERPL-MCNC: 87 PG/ML
BUN SERPL-MCNC: 13 MG/DL (ref 6–20)
BUN/CREAT SERPL: 14 (ref 12–20)
CALCIUM SERPL-MCNC: 8.6 MG/DL (ref 8.5–10.1)
CHLORIDE SERPL-SCNC: 106 MMOL/L (ref 97–108)
CO2 SERPL-SCNC: 24 MMOL/L (ref 21–32)
CREAT SERPL-MCNC: 0.96 MG/DL (ref 0.7–1.3)
DIFFERENTIAL METHOD BLD: ABNORMAL
EOSINOPHIL # BLD: 0.1 K/UL (ref 0–0.4)
EOSINOPHIL NFR BLD: 1 % (ref 0–7)
ERYTHROCYTE [DISTWIDTH] IN BLOOD BY AUTOMATED COUNT: 14.7 % (ref 11.5–14.5)
GLOBULIN SER CALC-MCNC: 5 G/DL (ref 2–4)
GLUCOSE SERPL-MCNC: 107 MG/DL (ref 65–100)
HCT VFR BLD AUTO: 37.5 % (ref 36.6–50.3)
HGB BLD-MCNC: 11.2 G/DL (ref 12.1–17)
IMM GRANULOCYTES # BLD AUTO: 0 K/UL (ref 0–0.04)
IMM GRANULOCYTES NFR BLD AUTO: 0 % (ref 0–0.5)
LYMPHOCYTES # BLD: 2.2 K/UL (ref 0.8–3.5)
LYMPHOCYTES NFR BLD: 22 % (ref 12–49)
MCH RBC QN AUTO: 25.2 PG (ref 26–34)
MCHC RBC AUTO-ENTMCNC: 29.9 G/DL (ref 30–36.5)
MCV RBC AUTO: 84.3 FL (ref 80–99)
MONOCYTES # BLD: 1.1 K/UL (ref 0–1)
MONOCYTES NFR BLD: 12 % (ref 5–13)
NEUTS SEG # BLD: 6.3 K/UL (ref 1.8–8)
NEUTS SEG NFR BLD: 65 % (ref 32–75)
NRBC # BLD: 0 K/UL (ref 0–0.01)
NRBC BLD-RTO: 0 PER 100 WBC
PLATELET # BLD AUTO: 206 K/UL (ref 150–400)
PMV BLD AUTO: 11.9 FL (ref 8.9–12.9)
POTASSIUM SERPL-SCNC: 3.8 MMOL/L (ref 3.5–5.1)
PROT SERPL-MCNC: 8.3 G/DL (ref 6.4–8.2)
RBC # BLD AUTO: 4.45 M/UL (ref 4.1–5.7)
SODIUM SERPL-SCNC: 139 MMOL/L (ref 136–145)
TROPONIN I SERPL-MCNC: <0.05 NG/ML
WBC # BLD AUTO: 9.8 K/UL (ref 4.1–11.1)

## 2021-08-21 PROCEDURE — 71046 X-RAY EXAM CHEST 2 VIEWS: CPT

## 2021-08-21 PROCEDURE — 80053 COMPREHEN METABOLIC PANEL: CPT

## 2021-08-21 PROCEDURE — 85025 COMPLETE CBC W/AUTO DIFF WBC: CPT

## 2021-08-21 PROCEDURE — 84484 ASSAY OF TROPONIN QUANT: CPT

## 2021-08-21 PROCEDURE — 83880 ASSAY OF NATRIURETIC PEPTIDE: CPT

## 2021-08-21 PROCEDURE — 65270000029 HC RM PRIVATE

## 2021-08-21 PROCEDURE — 36415 COLL VENOUS BLD VENIPUNCTURE: CPT

## 2021-08-21 PROCEDURE — 99282 EMERGENCY DEPT VISIT SF MDM: CPT

## 2021-08-21 PROCEDURE — 74176 CT ABD & PELVIS W/O CONTRAST: CPT

## 2021-08-21 RX ORDER — ACETAMINOPHEN 650 MG/1
650 SUPPOSITORY RECTAL
Status: DISCONTINUED | OUTPATIENT
Start: 2021-08-21 | End: 2021-08-26 | Stop reason: HOSPADM

## 2021-08-21 RX ORDER — ENOXAPARIN SODIUM 100 MG/ML
40 INJECTION SUBCUTANEOUS EVERY 12 HOURS
Status: DISCONTINUED | OUTPATIENT
Start: 2021-08-22 | End: 2021-08-25

## 2021-08-21 RX ORDER — ONDANSETRON 2 MG/ML
4 INJECTION INTRAMUSCULAR; INTRAVENOUS
Status: DISCONTINUED | OUTPATIENT
Start: 2021-08-21 | End: 2021-08-26 | Stop reason: HOSPADM

## 2021-08-21 RX ORDER — MORPHINE SULFATE 4 MG/ML
8 INJECTION INTRAVENOUS
Status: COMPLETED | OUTPATIENT
Start: 2021-08-21 | End: 2021-08-22

## 2021-08-21 RX ORDER — ACETAMINOPHEN 325 MG/1
650 TABLET ORAL
Status: DISCONTINUED | OUTPATIENT
Start: 2021-08-21 | End: 2021-08-26 | Stop reason: HOSPADM

## 2021-08-21 RX ORDER — POLYETHYLENE GLYCOL 3350 17 G/17G
17 POWDER, FOR SOLUTION ORAL DAILY PRN
Status: DISCONTINUED | OUTPATIENT
Start: 2021-08-21 | End: 2021-08-26 | Stop reason: HOSPADM

## 2021-08-21 RX ORDER — PROMETHAZINE HYDROCHLORIDE 25 MG/1
12.5 TABLET ORAL
Status: DISCONTINUED | OUTPATIENT
Start: 2021-08-21 | End: 2021-08-26 | Stop reason: HOSPADM

## 2021-08-21 RX ORDER — ONDANSETRON 2 MG/ML
4 INJECTION INTRAMUSCULAR; INTRAVENOUS
Status: COMPLETED | OUTPATIENT
Start: 2021-08-21 | End: 2021-08-22

## 2021-08-21 RX ORDER — SODIUM CHLORIDE 0.9 % (FLUSH) 0.9 %
5-40 SYRINGE (ML) INJECTION EVERY 8 HOURS
Status: DISCONTINUED | OUTPATIENT
Start: 2021-08-21 | End: 2021-08-26 | Stop reason: HOSPADM

## 2021-08-21 RX ORDER — SODIUM CHLORIDE 0.9 % (FLUSH) 0.9 %
5-40 SYRINGE (ML) INJECTION AS NEEDED
Status: DISCONTINUED | OUTPATIENT
Start: 2021-08-21 | End: 2021-08-26 | Stop reason: HOSPADM

## 2021-08-22 ENCOUNTER — APPOINTMENT (OUTPATIENT)
Dept: CT IMAGING | Age: 46
DRG: 240 | End: 2021-08-22
Attending: INTERNAL MEDICINE
Payer: COMMERCIAL

## 2021-08-22 ENCOUNTER — APPOINTMENT (OUTPATIENT)
Dept: NON INVASIVE DIAGNOSTICS | Age: 46
DRG: 240 | End: 2021-08-22
Attending: INTERNAL MEDICINE
Payer: COMMERCIAL

## 2021-08-22 PROBLEM — R19.00 RETROPERITONEAL MASS: Status: ACTIVE | Noted: 2021-08-22

## 2021-08-22 LAB
ALBUMIN SERPL-MCNC: 3.1 G/DL (ref 3.5–5)
ALBUMIN/GLOB SERPL: 0.6 {RATIO} (ref 1.1–2.2)
ALP SERPL-CCNC: 81 U/L (ref 45–117)
ALT SERPL-CCNC: 23 U/L (ref 12–78)
ANION GAP SERPL CALC-SCNC: 8 MMOL/L (ref 5–15)
APPEARANCE UR: ABNORMAL
AST SERPL-CCNC: 15 U/L (ref 15–37)
BACTERIA URNS QL MICRO: NEGATIVE /HPF
BASOPHILS # BLD: 0 K/UL (ref 0–0.1)
BASOPHILS NFR BLD: 0 % (ref 0–1)
BILIRUB SERPL-MCNC: 0.8 MG/DL (ref 0.2–1)
BILIRUB UR QL: NEGATIVE
BUN SERPL-MCNC: 13 MG/DL (ref 6–20)
BUN/CREAT SERPL: 14 (ref 12–20)
CALCIUM SERPL-MCNC: 8.3 MG/DL (ref 8.5–10.1)
CHLORIDE SERPL-SCNC: 106 MMOL/L (ref 97–108)
CO2 SERPL-SCNC: 23 MMOL/L (ref 21–32)
COLOR UR: ABNORMAL
COVID-19 RAPID TEST, COVR: NOT DETECTED
CREAT SERPL-MCNC: 0.91 MG/DL (ref 0.7–1.3)
DIFFERENTIAL METHOD BLD: ABNORMAL
ECHO AO ASC DIAM: 3.54 CM
ECHO AO ROOT DIAM: 4.1 CM
ECHO AV AREA PEAK VELOCITY: 0.81 CM2
ECHO AV AREA VTI: 0.84 CM2
ECHO AV AREA/BSA PEAK VELOCITY: 0.3 CM2/M2
ECHO AV AREA/BSA VTI: 0.3 CM2/M2
ECHO AV MEAN GRADIENT: 35.03 MMHG
ECHO AV PEAK GRADIENT: 64.44 MMHG
ECHO AV PEAK VELOCITY: 401.38 CM/S
ECHO AV VTI: 75.7 CM
ECHO LA AREA 4C: 15.5 CM2
ECHO LA MAJOR AXIS: 3.63 CM
ECHO LA MINOR AXIS: 1.21 CM
ECHO LA VOL 2C: 39.17 ML (ref 18–58)
ECHO LA VOL 4C: 32.03 ML (ref 18–58)
ECHO LA VOL BP: 43.75 ML (ref 18–58)
ECHO LA VOL/BSA BIPLANE: 14.63 ML/M2 (ref 16–28)
ECHO LA VOLUME INDEX A2C: 13.1 ML/M2 (ref 16–28)
ECHO LA VOLUME INDEX A4C: 10.71 ML/M2 (ref 16–28)
ECHO LV E' LATERAL VELOCITY: 9.1 CENTIMETER/SECOND
ECHO LV E' SEPTAL VELOCITY: 7.4 CENTIMETER/SECOND
ECHO LV INTERNAL DIMENSION DIASTOLIC: 5.79 CM (ref 4.2–5.9)
ECHO LV INTERNAL DIMENSION SYSTOLIC: 4.04 CM
ECHO LV IVSD: 1.24 CM (ref 0.6–1)
ECHO LV MASS 2D: 321.7 G (ref 88–224)
ECHO LV MASS INDEX 2D: 107.6 G/M2 (ref 49–115)
ECHO LV POSTERIOR WALL DIASTOLIC: 1.31 CM (ref 0.6–1)
ECHO LVOT DIAM: 2.42 CM
ECHO LVOT PEAK GRADIENT: 2.01 MMHG
ECHO LVOT PEAK VELOCITY: 70.95 CM/S
ECHO LVOT SV: 63.4 ML
ECHO LVOT VTI: 13.77 CM
ECHO MV A VELOCITY: 75.6 CENTIMETER/SECOND
ECHO MV AREA PHT: 5.06 CM2
ECHO MV E DECELERATION TIME (DT): 149.97 MS
ECHO MV E VELOCITY: 59.73 CENTIMETER/SECOND
ECHO MV PRESSURE HALF TIME (PHT): 43.49 MS
ECHO PV MAX VELOCITY: 98.93 CM/S
ECHO PV PEAK INSTANTANEOUS GRADIENT SYSTOLIC: 4.09 MMHG
ECHO RV INTERNAL DIMENSION: 4.12 CM
ECHO RV TAPSE: 2.94 CM (ref 1.5–2)
EOSINOPHIL # BLD: 0.1 K/UL (ref 0–0.4)
EOSINOPHIL NFR BLD: 1 % (ref 0–7)
EPITH CASTS URNS QL MICRO: ABNORMAL /LPF
ERYTHROCYTE [DISTWIDTH] IN BLOOD BY AUTOMATED COUNT: 14.8 % (ref 11.5–14.5)
GLOBULIN SER CALC-MCNC: 4.8 G/DL (ref 2–4)
GLUCOSE BLD STRIP.AUTO-MCNC: 121 MG/DL (ref 65–117)
GLUCOSE BLD STRIP.AUTO-MCNC: 97 MG/DL (ref 65–117)
GLUCOSE BLD STRIP.AUTO-MCNC: 99 MG/DL (ref 65–117)
GLUCOSE SERPL-MCNC: 98 MG/DL (ref 65–100)
GLUCOSE UR STRIP.AUTO-MCNC: >1000 MG/DL
HCT VFR BLD AUTO: 36.9 % (ref 36.6–50.3)
HGB BLD-MCNC: 11 G/DL (ref 12.1–17)
HGB UR QL STRIP: NEGATIVE
HYALINE CASTS URNS QL MICRO: ABNORMAL /LPF (ref 0–5)
IMM GRANULOCYTES # BLD AUTO: 0 K/UL (ref 0–0.04)
IMM GRANULOCYTES NFR BLD AUTO: 0 % (ref 0–0.5)
KETONES UR QL STRIP.AUTO: 15 MG/DL
LA VOL DISK BP: 37.81 ML (ref 18–58)
LEUKOCYTE ESTERASE UR QL STRIP.AUTO: NEGATIVE
LVOT MG: 0.98 MMHG
LYMPHOCYTES # BLD: 2.3 K/UL (ref 0.8–3.5)
LYMPHOCYTES NFR BLD: 25 % (ref 12–49)
MAGNESIUM SERPL-MCNC: 2.3 MG/DL (ref 1.6–2.4)
MCH RBC QN AUTO: 25.2 PG (ref 26–34)
MCHC RBC AUTO-ENTMCNC: 29.8 G/DL (ref 30–36.5)
MCV RBC AUTO: 84.6 FL (ref 80–99)
MONOCYTES # BLD: 1.3 K/UL (ref 0–1)
MONOCYTES NFR BLD: 14 % (ref 5–13)
NEUTS SEG # BLD: 5.5 K/UL (ref 1.8–8)
NEUTS SEG NFR BLD: 60 % (ref 32–75)
NITRITE UR QL STRIP.AUTO: NEGATIVE
NRBC # BLD: 0 K/UL (ref 0–0.01)
NRBC BLD-RTO: 0 PER 100 WBC
PH UR STRIP: 5.5 [PH] (ref 5–8)
PLATELET # BLD AUTO: 201 K/UL (ref 150–400)
PMV BLD AUTO: 12.3 FL (ref 8.9–12.9)
POTASSIUM SERPL-SCNC: 3.8 MMOL/L (ref 3.5–5.1)
PROT SERPL-MCNC: 7.9 G/DL (ref 6.4–8.2)
PROT UR STRIP-MCNC: NEGATIVE MG/DL
RBC # BLD AUTO: 4.36 M/UL (ref 4.1–5.7)
RBC #/AREA URNS HPF: ABNORMAL /HPF (ref 0–5)
SERVICE CMNT-IMP: ABNORMAL
SERVICE CMNT-IMP: NORMAL
SERVICE CMNT-IMP: NORMAL
SODIUM SERPL-SCNC: 137 MMOL/L (ref 136–145)
SOURCE, COVRS: NORMAL
SP GR UR REFRACTOMETRY: 1.02 (ref 1–1.03)
UR CULT HOLD, URHOLD: NORMAL
UROBILINOGEN UR QL STRIP.AUTO: 0.2 EU/DL (ref 0.2–1)
WBC # BLD AUTO: 9.2 K/UL (ref 4.1–11.1)
WBC URNS QL MICRO: ABNORMAL /HPF (ref 0–4)

## 2021-08-22 PROCEDURE — 99218 HC RM OBSERVATION: CPT

## 2021-08-22 PROCEDURE — 74011250636 HC RX REV CODE- 250/636: Performed by: INTERNAL MEDICINE

## 2021-08-22 PROCEDURE — 96376 TX/PRO/DX INJ SAME DRUG ADON: CPT

## 2021-08-22 PROCEDURE — 65270000029 HC RM PRIVATE

## 2021-08-22 PROCEDURE — 74177 CT ABD & PELVIS W/CONTRAST: CPT

## 2021-08-22 PROCEDURE — 96374 THER/PROPH/DIAG INJ IV PUSH: CPT

## 2021-08-22 PROCEDURE — 93306 TTE W/DOPPLER COMPLETE: CPT

## 2021-08-22 PROCEDURE — 80053 COMPREHEN METABOLIC PANEL: CPT

## 2021-08-22 PROCEDURE — 96372 THER/PROPH/DIAG INJ SC/IM: CPT

## 2021-08-22 PROCEDURE — 82962 GLUCOSE BLOOD TEST: CPT

## 2021-08-22 PROCEDURE — 96375 TX/PRO/DX INJ NEW DRUG ADDON: CPT

## 2021-08-22 PROCEDURE — 36415 COLL VENOUS BLD VENIPUNCTURE: CPT

## 2021-08-22 PROCEDURE — 83735 ASSAY OF MAGNESIUM: CPT

## 2021-08-22 PROCEDURE — 87635 SARS-COV-2 COVID-19 AMP PRB: CPT

## 2021-08-22 PROCEDURE — 93306 TTE W/DOPPLER COMPLETE: CPT | Performed by: SPECIALIST

## 2021-08-22 PROCEDURE — 85025 COMPLETE CBC W/AUTO DIFF WBC: CPT

## 2021-08-22 PROCEDURE — 74011000636 HC RX REV CODE- 636: Performed by: INTERNAL MEDICINE

## 2021-08-22 PROCEDURE — 99223 1ST HOSP IP/OBS HIGH 75: CPT | Performed by: STUDENT IN AN ORGANIZED HEALTH CARE EDUCATION/TRAINING PROGRAM

## 2021-08-22 PROCEDURE — 74011250636 HC RX REV CODE- 250/636: Performed by: STUDENT IN AN ORGANIZED HEALTH CARE EDUCATION/TRAINING PROGRAM

## 2021-08-22 PROCEDURE — 81001 URINALYSIS AUTO W/SCOPE: CPT

## 2021-08-22 RX ORDER — MORPHINE SULFATE 4 MG/ML
4 INJECTION INTRAVENOUS
Status: DISCONTINUED | OUTPATIENT
Start: 2021-08-22 | End: 2021-08-22

## 2021-08-22 RX ORDER — DEXTROSE 50 % IN WATER (D50W) INTRAVENOUS SYRINGE
12.5-25 AS NEEDED
Status: DISCONTINUED | OUTPATIENT
Start: 2021-08-22 | End: 2021-08-26 | Stop reason: HOSPADM

## 2021-08-22 RX ORDER — OXYCODONE HYDROCHLORIDE 5 MG/1
5 TABLET ORAL
Status: DISCONTINUED | OUTPATIENT
Start: 2021-08-22 | End: 2021-08-24

## 2021-08-22 RX ORDER — HYDROMORPHONE HYDROCHLORIDE 1 MG/ML
1 INJECTION, SOLUTION INTRAMUSCULAR; INTRAVENOUS; SUBCUTANEOUS
Status: DISCONTINUED | OUTPATIENT
Start: 2021-08-22 | End: 2021-08-26 | Stop reason: HOSPADM

## 2021-08-22 RX ORDER — INSULIN LISPRO 100 [IU]/ML
INJECTION, SOLUTION INTRAVENOUS; SUBCUTANEOUS
Status: DISCONTINUED | OUTPATIENT
Start: 2021-08-22 | End: 2021-08-26 | Stop reason: HOSPADM

## 2021-08-22 RX ORDER — MAGNESIUM SULFATE 100 %
4 CRYSTALS MISCELLANEOUS AS NEEDED
Status: DISCONTINUED | OUTPATIENT
Start: 2021-08-22 | End: 2021-08-26 | Stop reason: HOSPADM

## 2021-08-22 RX ADMIN — Medication 10 ML: at 01:02

## 2021-08-22 RX ADMIN — ENOXAPARIN SODIUM 40 MG: 40 INJECTION SUBCUTANEOUS at 10:50

## 2021-08-22 RX ADMIN — Medication 10 ML: at 22:40

## 2021-08-22 RX ADMIN — Medication 10 ML: at 17:00

## 2021-08-22 RX ADMIN — ONDANSETRON 4 MG: 2 INJECTION INTRAMUSCULAR; INTRAVENOUS at 00:59

## 2021-08-22 RX ADMIN — HYDROMORPHONE HYDROCHLORIDE 1 MG: 1 INJECTION, SOLUTION INTRAMUSCULAR; INTRAVENOUS; SUBCUTANEOUS at 12:44

## 2021-08-22 RX ADMIN — ENOXAPARIN SODIUM 40 MG: 40 INJECTION SUBCUTANEOUS at 22:39

## 2021-08-22 RX ADMIN — MORPHINE SULFATE 8 MG: 4 INJECTION, SOLUTION INTRAMUSCULAR; INTRAVENOUS at 01:00

## 2021-08-22 RX ADMIN — HYDROMORPHONE HYDROCHLORIDE 1 MG: 1 INJECTION, SOLUTION INTRAMUSCULAR; INTRAVENOUS; SUBCUTANEOUS at 17:00

## 2021-08-22 RX ADMIN — MORPHINE SULFATE 4 MG: 4 INJECTION, SOLUTION INTRAMUSCULAR; INTRAVENOUS at 06:03

## 2021-08-22 RX ADMIN — IOPAMIDOL 100 ML: 755 INJECTION, SOLUTION INTRAVENOUS at 00:25

## 2021-08-22 NOTE — ED PROVIDER NOTES
Patient is a 75-year-old male with history of coronary disease, severe aortic stenosis presenting to the ED with 3 weeks of intermittent right flank pain that radiates to his back. Patient endorses intermittent fevers. Denies any cough, chest pain. Patient has shortness of breath at baseline from a severe aortic stenosis. He is scheduled to have surgery in 4 weeks for valve replacement. The history is provided by the patient and medical records. Abdominal Pain   This is a new problem. The current episode started more than 1 week ago. The problem occurs constantly. The problem has been gradually worsening. The pain is associated with an unknown factor. The pain is located in the RLQ. The pain is at a severity of 10/10. The pain is severe. Associated symptoms include a fever. Pertinent negatives include no dysuria, no frequency, no chest pain and no testicular pain. The pain is worsened by palpation and certain positions. The pain is relieved by nothing.         Past Medical History:   Diagnosis Date    Aortic stenosis, severe     Arrhythmia     heart murmur    CAD (coronary artery disease)     Diabetes (HCC)     GERD (gastroesophageal reflux disease)     Hypertension     Sleep apnea        Past Surgical History:   Procedure Laterality Date    HX HEENT      tonsils    HX ORTHOPAEDIC      Plantar Facitits L foot         Family History:   Adopted: Yes       Social History     Socioeconomic History    Marital status: SINGLE     Spouse name: Not on file    Number of children: Not on file    Years of education: Not on file    Highest education level: Not on file   Occupational History    Not on file   Tobacco Use    Smoking status: Never Smoker    Smokeless tobacco: Never Used   Substance and Sexual Activity    Alcohol use: No     Comment:      Drug use: No    Sexual activity: Not on file   Other Topics Concern    Not on file   Social History Narrative    Not on file     Social Determinants of Health     Financial Resource Strain:     Difficulty of Paying Living Expenses:    Food Insecurity:     Worried About Running Out of Food in the Last Year:     [address] in the Last Year:    Transportation Needs:     Lack of Transportation (Medical):  Lack of Transportation (Non-Medical):    Physical Activity:     Days of Exercise per Week:     Minutes of Exercise per Session:    Stress:     Feeling of Stress :    Social Connections:     Frequency of Communication with Friends and Family:     Frequency of Social Gatherings with Friends and Family:     Attends [employer] Services:     Active Member of Clubs or Organizations:     Attends Club or Organization Meetings:     Marital Status:    Intimate Partner Violence:     Fear of Current or Ex-Partner:     Emotionally Abused:     Physically Abused:     Sexually Abused: ALLERGIES: Other food and Cucumber fruit extract    Review of Systems   Constitutional: Positive for fever. HENT: Negative. Eyes: Negative. Respiratory: Negative. Cardiovascular: Negative. Negative for chest pain. Gastrointestinal: Positive for abdominal pain. Endocrine: Negative. Genitourinary: Negative. Negative for dysuria, frequency and testicular pain. Musculoskeletal: Negative. Skin: Negative. Allergic/Immunologic: Negative. Neurological: Negative. Hematological: Negative. Psychiatric/Behavioral: Negative. Vitals:    08/21/21 2029   BP: 129/82   Pulse: (!) 101   Resp: 18   Temp: 99.4 °F (37.4 °C)   SpO2: 94%   Weight: (!) 188.2 kg (415 lb)   Height: 6' 3\" (1.905 m)            Physical Exam  Vitals and nursing note reviewed. Constitutional:       General: He is in acute distress. Appearance: Normal appearance. He is obese. He is ill-appearing. HENT:      Head: Normocephalic and atraumatic.       Right Ear: External ear normal.      Left Ear: External ear normal.      Nose: Nose normal.      Mouth/Throat: Mouth: Mucous membranes are moist.      Pharynx: Oropharynx is clear. No posterior oropharyngeal erythema. Eyes:      Extraocular Movements: Extraocular movements intact. Conjunctiva/sclera: Conjunctivae normal.   Cardiovascular:      Rate and Rhythm: Normal rate. Pulses: Normal pulses. Heart sounds: Normal heart sounds. Pulmonary:      Effort: Pulmonary effort is normal.      Breath sounds: Normal breath sounds. Chest:      Chest wall: No deformity or tenderness. Abdominal:      General: Abdomen is protuberant. There is no distension. Palpations: Abdomen is soft. Tenderness: There is abdominal tenderness in the right lower quadrant. There is right CVA tenderness. Musculoskeletal:         General: No deformity or signs of injury. Normal range of motion. Cervical back: Normal range of motion and neck supple. No tenderness. Skin:     General: Skin is warm and dry. Capillary Refill: Capillary refill takes less than 2 seconds. Neurological:      General: No focal deficit present. Mental Status: He is alert and oriented to person, place, and time. Psychiatric:         Mood and Affect: Mood normal.         Behavior: Behavior normal.          MDM       LABORATORY RESULTS:  Labs Reviewed   CBC WITH AUTOMATED DIFF - Abnormal; Notable for the following components:       Result Value    HGB 11.2 (*)     MCH 25.2 (*)     MCHC 29.9 (*)     RDW 14.7 (*)     ABS. MONOCYTES 1.1 (*)     All other components within normal limits   METABOLIC PANEL, COMPREHENSIVE - Abnormal; Notable for the following components:    Glucose 107 (*)     Protein, total 8.3 (*)     Albumin 3.3 (*)     Globulin 5.0 (*)     A-G Ratio 0.7 (*)     All other components within normal limits   URINE CULTURE HOLD SAMPLE   TROPONIN I   NT-PRO BNP   SAMPLES BEING HELD   URINALYSIS W/MICROSCOPIC   SAMPLES BEING HELD       IMAGING RESULTS:  XR CHEST PA LAT   Final Result   No acute cardiopulmonary process. CT ABD PELV WO CONT   Final Result   1. Right retroperitoneal brain mass measuring 7.5 x 5.2 x 5.8 cm as above, with   narrowing of the IVC and likely encasement of the bilateral renal veins, though   not well evaluated without the use of IV contrast. Findings are highly   suspicious for either lymphoma or brain metastasis. 2. Few borderline enlarged inguinal lymph nodes are indeterminant. 3. Hepatic steatosis. MEDICATIONS GIVEN:  Medications   morphine injection 8 mg (has no administration in time range)   ondansetron (ZOFRAN) injection 4 mg (has no administration in time range)       Differential diagnosis: Kidney stone, abdominal pain, intractable pain, new cancer diagnosis/retroperitoneal mass, worsening aortic stenosis, severe dyspnea    ED physician interpretation of imaging: CT Noncon with large retroperitoneal mass concerning for new neoplasm  ED physician interpretation of EKG: Pending  ED physician interpretation of laboratory results: Mild anemia consistent with patient's significant aortic stenosis. BNP within normal limits, troponin unmeasurable. MDM: Patient is a 47-year-old male with a complex medical history who presented to the ED with severe right flank pain found to have a retroperitoneal mass concerning for neoplasm with intractable pain requiring hospital admission for pain management and further evaluation. Patient's vital signs are stable, mild tachycardia on initial vital signs. Patient afebrile in the ED the patient endorses several weeks of intermittent fever. Patient has severe aortic stenosis leading to significant dyspnea. Patient is unable to ambulate more than 10 feet without severe tachypnea, tachycardia. Try CT scan concerning for a large mass does not there in 2017. Based on patient's severe symptoms, comorbidities and severe pain hospitalization is indicated. Due to severe ED overcrowding patient was triaged and out of the waiting room.   Patient's pain medications were delayed. Pain management started with IV morphine as well as Zofran. Patient states he had a 1 week hospitalization where morphine worked in the past.    Patient's mass and likely cancer diagnosis was discussed. Patient stated understanding. Patient's son was also in the room and had no questions. Patient in the hospital for further treatment evaluation. DISPOSITION:     [facility] Consult for Admission  6221    ED Room Number: V01/V01  Patient Name and age:  Aricbedee Modest 48 y.o.  male  Working Diagnosis:   1. Retroperitoneal mass    2. Abdominal pain, right lower quadrant        COVID-19 Suspicion:  no  Sepsis present:  no  Reassessment needed: no  Code Status:  Full Code  Readmission: no  Isolation Requirements:  no  Recommended Level of Care:  telemetry  Department:  [address] [place] [address] ED - [phone]    Other: Patient with a history of coronary disease, severe aortic stenosis with surgery planned for 1 month presenting with 3 weeks of right-sided abdominal pain/flank pain who received a CT Noncon in the waiting room for kidney stone with a large retroperitoneal mass encompassing the bilateral renal veins as well as putting pressure on the IVC. Patient has severe intractable pain. Zohreh Goss.  Wayne Hartman MD        Procedures

## 2021-08-22 NOTE — H&P
[address] Adult  Hospitalist Group  History and Physical    Primary Care Provider: Gisel Addison NP  Date of Service:  8/21/2021    Chief Complaint: Abdominal Pain    Subjective:     Dafne Ladd is a 48 y.o. male past medical history of aortic stenosis, nephrolithiasis, hypertension, GERD, diabetes, obesity, who presented to the emergency room thinking he had kidney stones. Patient states he has had multiple kidney stones in the past.  He states the pain comes and goes, and it felt just like it did when that happened. In the past he had CT confirmed stones. He states over the past few weeks he has had multiple episodes where he has flank pain, that felt similar. He states he went almost to entire weeks with no pain at all, then he woke up this morning with severe flank pain again. He states this morning it was slightly different that he had a fever. He also states that he has been having heavy night sweats for the past couple of years which she presumed was due to severe obstructive sleep apnea. Patient was just diagnosed with aortic stenosis several years ago and has needed surgery for it since. He needed to have dental clearance, but due to lack of dental insurance he was unable to get that until just recently, and his aortic stenosis surgery is scheduled for 20 September at [facility]. Patient denies lymphadenopathy, fevers prior to today, weight loss, or other complaints. Review of Systems:    A comprehensive review of systems was negative except for that written in the History of Present Illness.      Past Medical History:   Diagnosis Date    Aortic stenosis, severe     Arrhythmia     heart murmur    CAD (coronary artery disease)     Diabetes (HCC)     GERD (gastroesophageal reflux disease)     Hypertension     Sleep apnea       Past Surgical History:   Procedure Laterality Date    HX HEENT      tonsils    HX ORTHOPAEDIC      Plantar Facitits L foot     Prior to Admission medications    Medication Sig Start Date End Date Taking? Authorizing Provider   dapagliflozin Ab Mayo) 10 mg tab tablet Take 1 Tablet by mouth daily. 5/24/21   Zohreh Ramirez NP   semaglutide (Ozempic) 1 mg/dose (2 mg/1.5 mL) sub-q pen 1 mg by SubCUTAneous route every seven (7) days. 0.75 mg SC every 7 days x 4 weeks then 1 mg per week 5/24/21   Sheila VENTURA NP   pen needle, diabetic (NovoFine Plus) 32 gauge x 1/6\" ndle 1 Each by Does Not Apply route daily. 5/13/21   Sheila VENTURA NP   doxazosin (CARDURA) 1 mg tablet TAKE ONE TABLET BY MOUTH ONE TIME DAILY 5/5/21   Sheila VENTURA NP   esomeprazole (NexIUM) 40 mg capsule Take 1 Cap by mouth daily. 5/5/21   Zohreh Ramirez NP   losartan (COZAAR) 25 mg tablet TAKE ONE TABLET BY MOUTH ONE TIME DAILY 5/5/21   Sheila VENTURA NP   metFORMIN ER (GLUCOPHAGE XR) 500 mg tablet Take 2 Tabs by mouth two (2) times a day. 5/5/21   Sheila VENTURA NP   liraglutide (VICTOZA) 0.6 mg/0.1 mL (18 mg/3 mL) pnij 0.6 mg by SubCUTAneous route daily. 5/5/21   Zohreh Ramirez NP   Blood Pressure Monitor (Blood Pressure Kit) kit 1 Kit by Does Not Apply route every Monday and Friday. 12/18/20   Zohreh Ramirez NP   amLODIPine-atorvastatin (Caduet) 10-20 mg per tablet Take 1 Tab by mouth daily. 12/15/20   Sheila VENTURA NP   dulaglutide (Trulicity) 7.29 KI/5.0 mL sub-q pen 0.5 mL by SubCUTAneous route every seven (7) days. 12/15/20   Sheila VENTURA NP   metFORMIN (GLUCOPHAGE) 500 mg tablet Take 1 Tab by mouth two (2) times daily (with meals). 11/25/20   Zohreh Ramirez NP     Allergies   Allergen Reactions    Other Food Angioedema     pickle    Cucumber Fruit Extract Angioedema      Family History   Adopted: Yes    No known family Hx    SOCIAL HISTORY:  Patient resides at Home. Patient ambulates with Independance.    Smoking history: None  Alcohol history: None        Objective:       Physical Exam:   Visit Vitals  /82 (BP 1 Location: Right upper arm, BP Patient Position: Sitting)   Pulse (!) 101   Temp 99.4 °F (37.4 °C)   Resp 18   Ht 6' 3\" (1.905 m)   Wt (!) 188.2 kg (415 lb)   SpO2 94%   BMI 51.87 kg/m²     General appearance: Obese, alert, cooperative, no distress, appears stated age  Head: Normocephalic, without obvious abnormality, atraumatic  Eyes: negative  Nose: Nares normal. Septum midline. Mucosa normal. No drainage or sinus tenderness. Neck: supple, symmetrical, trachea midline, no adenopathy, thyroid: not enlarged, symmetric, no tenderness/mass/nodules, no carotid bruit and no JVD  Back: symmetric, no curvature. ROM normal. No CVA tenderness. Lungs: clear to auscultation bilaterally  Chest wall: no tenderness  Heart: regular rate and rhythm, S1, S2 normal, ++ Crescendo decrescendo murmur, no click, rub or gallop  Abdomen: soft, non-tender.  Bowel sounds normal. No masses,  no organomegaly  Extremities: extremities normal, atraumatic, no cyanosis or edema  Pulses: 2+ and symmetric  Skin: Skin color, texture, turgor normal. No rashes or lesions  Neurologic: Grossly normal  Cap refill: Brisk, less than 3 seconds  Pulses: 2+, symmetric in all extremities  Skin: Warm, dry, without rashes or lesions    ECG:  normal EKG, normal sinus rhythm, unchanged from previous tracings     Data Review:   Recent Results (from the past 12 hour(s))   CBC WITH AUTOMATED DIFF    Collection Time: 08/21/21 10:48 PM   Result Value Ref Range    WBC 9.8 4.1 - 11.1 K/uL    RBC 4.45 4.10 - 5.70 M/uL    HGB 11.2 (L) 12.1 - 17.0 g/dL    HCT 37.5 36.6 - 50.3 %    MCV 84.3 80.0 - 99.0 FL    MCH 25.2 (L) 26.0 - 34.0 PG    MCHC 29.9 (L) 30.0 - 36.5 g/dL    RDW 14.7 (H) 11.5 - 14.5 %    PLATELET 344 062 - 816 K/uL    MPV 11.9 8.9 - 12.9 FL    NRBC 0.0 0  WBC    ABSOLUTE NRBC 0.00 0.00 - 0.01 K/uL    NEUTROPHILS 65 32 - 75 %    LYMPHOCYTES 22 12 - 49 %    MONOCYTES 12 5 - 13 %    EOSINOPHILS 1 0 - 7 %    BASOPHILS 0 0 - 1 %    IMMATURE GRANULOCYTES 0 0.0 - 0.5 %    ABS. NEUTROPHILS 6.3 1.8 - 8.0 K/UL    ABS. LYMPHOCYTES 2.2 0.8 - 3.5 K/UL    ABS. MONOCYTES 1.1 (H) 0.0 - 1.0 K/UL    ABS. EOSINOPHILS 0.1 0.0 - 0.4 K/UL    ABS. BASOPHILS 0.0 0.0 - 0.1 K/UL    ABS. IMM. GRANS. 0.0 0.00 - 0.04 K/UL    DF AUTOMATED     METABOLIC PANEL, COMPREHENSIVE    Collection Time: 08/21/21 10:48 PM   Result Value Ref Range    Sodium 139 136 - 145 mmol/L    Potassium 3.8 3.5 - 5.1 mmol/L    Chloride 106 97 - 108 mmol/L    CO2 24 21 - 32 mmol/L    Anion gap 9 5 - 15 mmol/L    Glucose 107 (H) 65 - 100 mg/dL    BUN 13 6 - 20 MG/DL    Creatinine 0.96 0.70 - 1.30 MG/DL    BUN/Creatinine ratio 14 12 - 20      GFR est AA >60 >60 ml/min/1.73m2    GFR est non-AA >60 >60 ml/min/1.73m2    Calcium 8.6 8.5 - 10.1 MG/DL    Bilirubin, total 0.9 0.2 - 1.0 MG/DL    ALT (SGPT) 26 12 - 78 U/L    AST (SGOT) 17 15 - 37 U/L    Alk. phosphatase 85 45 - 117 U/L    Protein, total 8.3 (H) 6.4 - 8.2 g/dL    Albumin 3.3 (L) 3.5 - 5.0 g/dL    Globulin 5.0 (H) 2.0 - 4.0 g/dL    A-G Ratio 0.7 (L) 1.1 - 2.2     TROPONIN I    Collection Time: 08/21/21 10:48 PM   Result Value Ref Range    Troponin-I, Qt. <0.05 <0.05 ng/mL   NT-PRO BNP    Collection Time: 08/21/21 10:48 PM   Result Value Ref Range    NT pro-BNP 87 <125 PG/ML       XR CHEST PA LAT    Result Date: 8/21/2021  No acute cardiopulmonary process. CT ABD PELV WO CONT    Result Date: 8/21/2021  1. Right retroperitoneal brain mass measuring 7.5 x 5.2 x 5.8 cm as above, with narrowing of the IVC and likely encasement of the bilateral renal veins, though not well evaluated without the use of IV contrast. Findings are highly suspicious for either lymphoma or brain metastasis. 2. Few borderline enlarged inguinal lymph nodes are indeterminant. 3. Hepatic steatosis. Assessment:     Active Problems:    * No active hospital problems.  *      Plan:     Dafne Ladd is a 48 y.o. male past medical history of aortic stenosis, nephrolithiasis, hypertension, GERD, diabetes, obesity, who presented to the emergency room thinking he had kidney stones, being admitted for retroperitoneal mass      1. Retroperitoneal mass, POA  --Intractable pain secondary to mass  --Has had night sweats and recent fever  --Imaging obtained was Noncon, will get chest abdomen pelvis with contrast  --Consult general surgery/interventional radiology to determine best route for biopsy  --Once we have biopsy results will consult oncology  --Control pain with morphine 4 mg every 6 as needed or more frequently as needed  --Tylenol for baseline pain control  --Narcan for overdose  --No evidence of renal dysfunction from the mass at this time-is encasing the bilateral renal veins  --Case management, PT OT consulted    2. Severe aortic stenosis, POA  --Is scheduled for surgery at [facility] on 20 September  --May affect treatment plan going forward, will consult cardiology  --Obtain echo    3. Obesity with severe obstructive sleep apnea  --Was previously on BiPAP but due to insurance issues has not worn in several years  --May use CPAP as inpatient as needed    4. Diabetes  --Noninsulin-dependent  --We will use insulin as an inpatient for control    5. Hypertension  --Continue home antihypertensives    6.  GERD  --Continue famotidine    Code: Full  DVT: Lovenox  Dispo: Medical floor  Diet: N.p.o. in case of procedure      FUNCTIONAL STATUS PRIOR TO HOSPITALIZATION Ambulates Independently (including history of recent falls):None       Signed By: Barry Wang MD     August 21, 2021

## 2021-08-22 NOTE — PROGRESS NOTES
Reviewed chart and spoke with patient. He reports no difficulty with gait or his balance, he does not need an assistive device to walk and has had no recent falls. He states he does not understand why PT was ordered. Reviewed the purpose of PT assessment, he denies any needs, will complete the order.

## 2021-08-22 NOTE — PROGRESS NOTES
[facility]andy No [facility] 79  [address] [facility], [address] [place] [address]  [phone]      Medical Progress Note      NAME: Sandro Irving   :  1975  MRM:  [id number]    Date/Time of service: 2021  9:14 AM       Subjective:     Chief Complaint:  Patient was personally seen and examined by me during this time period. Chart reviewed. Still with mild abd pain       Objective:       Vitals:       Last 24hrs VS reviewed since prior progress note.  Most recent are:    Visit Vitals  /64   Pulse 91   Temp 98.3 °F (36.8 °C)   Resp 16   Ht 6' 3\" (1.905 m)   Wt (!) 188.2 kg (415 lb)   SpO2 93%   BMI 51.87 kg/m²     SpO2 Readings from Last 6 Encounters:   21 93%   21 99%   19 97%   19 96%   07/15/19 95%   18 97%    O2 Flow Rate (L/min): 3 l/min   No intake or output data in the 24 hours ending 21 0914     Exam:     Physical Exam:    Gen:  Well-developed, well-nourished, morbidly obese, in no acute distress  HEENT:  Pink conjunctivae, PERRL, hearing intact to voice, moist mucous membranes  Neck:  Supple, without masses, thyroid non-tender  Resp:  No accessory muscle use, clear breath sounds without wheezes rales or rhonchi  Card:  3/6 murmurs, normal S1, S2 without thrills, +edema  Abd:  Soft, non-tender, non-distended, normoactive bowel sounds are present  Musc:  No cyanosis or clubbing  Skin:  No rashes  Neuro:  Cranial nerves 3-12 are grossly intact, follows commands appropriately  Psych:  Good insight, oriented to person, place and time, alert    Medications Reviewed: (see below)    Lab Data Reviewed: (see below)    ______________________________________________________________________    Medications:     Current Facility-Administered Medications   Medication Dose Route Frequency    HYDROmorphone (DILAUDID) injection 1 mg  1 mg IntraVENous Q4H PRN    oxyCODONE IR (ROXICODONE) tablet 5 mg  5 mg Oral Q4H PRN    insulin lispro (HUMALOG) injection SubCUTAneous AC&HS    glucose chewable tablet 16 g  4 Tablet Oral PRN    dextrose (D50W) injection syrg 12.5-25 g  12.5-25 g IntraVENous PRN    glucagon (GLUCAGEN) injection 1 mg  1 mg IntraMUSCular PRN    sodium chloride (NS) flush 5-40 mL  5-40 mL IntraVENous Q8H    sodium chloride (NS) flush 5-40 mL  5-40 mL IntraVENous PRN    acetaminophen (TYLENOL) tablet 650 mg  650 mg Oral Q6H PRN    Or    acetaminophen (TYLENOL) suppository 650 mg  650 mg Rectal Q6H PRN    polyethylene glycol (MIRALAX) packet 17 g  17 g Oral DAILY PRN    promethazine (PHENERGAN) tablet 12.5 mg  12.5 mg Oral Q6H PRN    Or    ondansetron (ZOFRAN) injection 4 mg  4 mg IntraVENous Q6H PRN    enoxaparin (LOVENOX) injection 40 mg  40 mg SubCUTAneous Q12H     Current Outpatient Medications   Medication Sig    dapagliflozin (Farxiga) 10 mg tab tablet Take 1 Tablet by mouth daily.  semaglutide (Ozempic) 1 mg/dose (2 mg/1.5 mL) sub-q pen 1 mg by SubCUTAneous route every seven (7) days. 0.75 mg SC every 7 days x 4 weeks then 1 mg per week    pen needle, diabetic (NovoFine Plus) 32 gauge x 1/6\" ndle 1 Each by Does Not Apply route daily.  doxazosin (CARDURA) 1 mg tablet TAKE ONE TABLET BY MOUTH ONE TIME DAILY    esomeprazole (NexIUM) 40 mg capsule Take 1 Cap by mouth daily.  losartan (COZAAR) 25 mg tablet TAKE ONE TABLET BY MOUTH ONE TIME DAILY    metFORMIN ER (GLUCOPHAGE XR) 500 mg tablet Take 2 Tabs by mouth two (2) times a day.  liraglutide (VICTOZA) 0.6 mg/0.1 mL (18 mg/3 mL) pnij 0.6 mg by SubCUTAneous route daily.  Blood Pressure Monitor (Blood Pressure Kit) kit 1 Kit by Does Not Apply route every Monday and Friday.  amLODIPine-atorvastatin (Caduet) 10-20 mg per tablet Take 1 Tab by mouth daily.  dulaglutide (Trulicity) 4.56 FO/1.0 mL sub-q pen 0.5 mL by SubCUTAneous route every seven (7) days.  metFORMIN (GLUCOPHAGE) 500 mg tablet Take 1 Tab by mouth two (2) times daily (with meals).           Lab Review: Recent Labs     08/22/21  0414 08/21/21  2248   WBC 9.2 9.8   HGB 11.0* 11.2*   HCT 36.9 37.5    206     Recent Labs     08/22/21  0414 08/21/21  2248    139   K 3.8 3.8    106   CO2 23 24   GLU 98 107*   BUN 13 13   CREA 0.91 0.96   CA 8.3* 8.6   MG 2.3  --    ALB 3.1* 3.3*   TBILI 0.8 0.9   ALT 23 26     Lab Results   Component Value Date/Time    Glucose (POC) 116 (H) 07/16/2019 03:06 AM    Glucose (POC) 142 (H) 05/20/2018 07:49 AM    Glucose (POC) 138 (H) 04/27/2017 10:23 AM    Glucose (POC) 164 (H) 04/27/2017 06:58 AM    Glucose (POC) 158 (H) 10/22/2009 01:29 AM          Assessment / Plan:     47 yo hx of HTN, DM, severe AS, presented w/ abd pain, new retroperitoneal mass     1) Retroperitoneal mass: has B-type symptoms. Suspect lymphoma. Will need biopsy. Will consult Oncology for further management    2) Acute abd pain: due to above. Use IV dilaudid prn severe pain    3) Severe aortic stenosis: will obtain echo. Cards to eval    4) HTN: BP stable. Awaiting med rec    5) DM type 2: check A1C. Hold oral meds.   Start SSI    Total time spent with patient: 35 min **I personally saw and examined the patient during this time period**                 Care Plan discussed with: Patient, nursing     Discussed:  Care Plan    Prophylaxis:  Lovenox    Disposition:  Home w/Family           ___________________________________________________    Attending Physician: Lindsey Moon MD

## 2021-08-22 NOTE — PROGRESS NOTES
[facility] Dosing Services:      Automatic dosing adjustment of Lovenox for BMI over 40 (46)  Consult made for this 48 y.o. male, for prophylaxis of  dvt. Wt Readings from Last 1 Encounters:   08/21/21 (!) 188.2 kg (415 lb)       Ht Readings from Last 1 Encounters:   08/21/21 190.5 cm (75\")           Previous Dose 40 mg sc daily   Creatinine Clearance Estimated Creatinine Clearance: 167.7 mL/min (based on SCr of 0.96 mg/dL). Creatinine Lab Results   Component Value Date/Time    Creatinine 0.96 08/21/2021 10:48 PM       Platelet Lab Results   Component Value Date/Time    PLATELET 241 90/20/9625 10:48 PM      H/H Lab Results   Component Value Date/Time    HGB 11.2 (L) 08/21/2021 10:48 PM           Pharmacist made change to enoxaparin therapy based on:     [ x ] BMI: dose changed to: 40 mg sc every 12 hours    - Pharmacy to automatically make dose adjustment for renal dysfunction (creatinine clearance less than 30 mL/min)  - Pharmacy to automatically make dose adjustment for obesity (BMI greater than 40)  - Pharmacy to make dose rounding adjustments per [facility] dose adjustment scale. Pharmacy to monitor patients progress. Will make dose adjustment as needed per changing renal function. Will communicate further recommendations regarding patients anticoagulation therapy with prescriber. Signed Morelia Torres .  Contact information: [phone]

## 2021-08-22 NOTE — PROGRESS NOTES
Admission Medication Reconciliation:     Information obtained from:    Patient via interview in [address]:  YES    Comments/Recommendations:   Patient able to confirm name, , allergies, and preferred pharmacy  Updated PTA medication list  The patient reports taking both liraglutide (Victoza) and semaglutide (Ozempic). Semaglutide (Ozempic) and liraglutide (Victoza) belong to the same drug class, called glucagon-like peptide-1 (GLP-1) agonists. It is unclear why the patient is taking both medications. He intended to take dulaglutide (Trulicity) but his insurance would not cover this medication. The patient [facility] which he [facility]. A prescription was sent in  for Semaglutide [facility]) which he filled but his provider did not speak directly with the patient about this medication so he is unsure why it was prescribed. Recommend stopping one the the GLP agonists. The patient prefers semaglutide (Ozempic). I have asked the patient to discuss this with his PCP. I have called [facility] and asked that a note be put on his profile questioning further use of this combination. ¹RxQuery pharmacy benefit data reflects medications filled and processed through the patient's insurance, however   this data does NOT capture whether the medication was picked up or is currently being taken by the patient. Prior to Admission Medications   Prescriptions Last Dose Informant Taking? amLODIPine-atorvastatin (Caduet) 10-20 mg per tablet 2021 at am Self Yes   Sig: Take 1 Tab by mouth daily. dapagliflozin (Farxiga) 10 mg tab tablet 2021 at am Self Yes   Sig: Take 1 Tablet by mouth daily. doxazosin (CARDURA) 1 mg tablet 2021 at am Self Yes   Sig: TAKE ONE TABLET BY MOUTH ONE TIME DAILY   liraglutide (VICTOZA) 0.6 mg/0.1 mL (18 mg/3 mL) pnij 2021 at am Self Yes   Si.6 mg by SubCUTAneous route daily.    losartan (COZAAR) 25 mg tablet 2021 at am Self Yes   Sig: TAKE ONE TABLET BY MOUTH ONE TIME DAILY   metFORMIN ER (GLUCOPHAGE XR) 500 mg tablet 2021 at am Self Yes   Sig: Take 2 Tabs by mouth two (2) times a day. semaglutide (Ozempic) 1 mg/dose (2 mg/1.5 mL) sub-q pen wed Self Yes   Si mg by SubCUTAneous route every seven (7) days. 0.75 mg SC every 7 days x 4 weeks then 1 mg per week      Facility-Administered Medications: None         Please contact the main inpatient pharmacy with any questions or concerns at [phone] and we will direct you to the clinical pharmacist covering this patient's care while in-house. Cyrus Brandon, PharmD, BCPS     Liraglutide  Liraglutide is a long acting analog of human glucagon-like peptide-1 (GLP-1) (an incretin hormone) which increases glucose-dependent insulin secretion, decreases inappropriate glucagon secretion, increases B-cell growth/replication, slows gastric emptying, and decreases food intake. Semaglutide  Semaglutide is a selective glucagon-like peptide-1 (GLP-1) receptor agonist    Semaglutide (Ozempic) and liraglutide (Victoza) belong to the same drug class, called glucagon-like peptide-1 (GLP-1) agonists.

## 2021-08-22 NOTE — ED TRIAGE NOTES
Pt ambulatory to triage with mask c/o RLQ pain, right flank pain,, and fever off and on x 3 week. Pain worsening today and not going away since this morning.  Denies CP, SOB, N/V.

## 2021-08-22 NOTE — ED NOTES
Bedside and Verbal shift change report given to Tanner Martines (oncoming nurse) by Lindsey Theodore RN (offgoing nurse). Report included the following information SBAR, Kardex, MAR and Recent Results.

## 2021-08-22 NOTE — PROGRESS NOTES
Bedside shift change report given to Parveen Oquendo (oncoming nurse) by Sam Narvaez (offgoing nurse). Report included the following information SBAR, Kardex, MAR and Recent Results.

## 2021-08-22 NOTE — CONSULTS
[facility] at [address] [place], [address]  W: [phone] F: [phone]      Reason for Visit:   Wilder Davies is a 48 y.o. male who is seen in consultation at the request of Dr. Atul Sellers for evaluation of retroperitoneal lymphdenopathy, concerning for malignancy. Hematology / Oncology Treatment History:     Hematological/Oncological Diagnosis: Retroperitoneal lymphadenopathy/mass    Date of Diagnosis: 8/22/21    Treatment course: pending      History of Present Illness:     Very pleasant 48year old male with relevant medical history of severe aortic stenosis with plan for valve repair next month, CAD, DM2, HTN, GERD, ROBERTO, who presented with symptoms of abdominal/right flank pain similar to prior hx of nephrolithiasis. Workup included CT abd/pelvis without contrast that showed large amount of retroperitoneal lymphadenopathy. Further workup included Chest/abd/pelvis CT with contrast that showed the following: \"IMPRESSION  Chest:  1. No evidence of primary malignancy or metastatic disease in the chest.  2. Severe aortic valvular calcifications. Correlate for aortic valvular  stenosis.     Abdomen/pelvis:   1. Redemonstrated right retroperitoneal mass measuring 7.4 x 4.9 x 6.7 cm as  above, including encasement and narrowing of the IVC and central aspects of the  bilateral renal veins. This favored to represent malignancy, with lymphoma  favored over brain metastasis. 2. No other pathologically enlarged lymph nodes within the abdomen or pelvis. Few prominent inguinal lymph nodes are favored to be reactive. 3. Hepatic steatosis. \"    The patient denies severe fatigue but does report about 40 lbs weight loss in last 2 months and some decreased appetite. He denies any recent infections or fatigue. Otherwise clinically without complaints today. .     The patient is adopted and has no knowledge of family history    Social history notable for distant history of alcohol use, but no recent ETOH, no other positive findings. Positive ROS findings include: weight loss, very mild night sweats, decreased appetite. Review of Systems: A complete review of systems was obtained, negative except as described above.     Past Medical History:   Diagnosis Date    Aortic stenosis, severe     Arrhythmia     heart murmur    CAD (coronary artery disease)     Diabetes (HCC)     GERD (gastroesophageal reflux disease)     Hypertension     Sleep apnea       Past Surgical History:   Procedure Laterality Date    HX HEENT      tonsils    HX ORTHOPAEDIC      Plantar Facitits L foot      Social History     Tobacco Use    Smoking status: Never Smoker    Smokeless tobacco: Never Used   Substance Use Topics    Alcohol use: No     Comment:        Family History   Adopted: Yes     Current Facility-Administered Medications   Medication Dose Route Frequency    HYDROmorphone (DILAUDID) injection 1 mg  1 mg IntraVENous Q4H PRN    oxyCODONE IR (ROXICODONE) tablet 5 mg  5 mg Oral Q4H PRN    insulin lispro (HUMALOG) injection   SubCUTAneous AC&HS    glucose chewable tablet 16 g  4 Tablet Oral PRN    dextrose (D50W) injection syrg 12.5-25 g  12.5-25 g IntraVENous PRN    glucagon (GLUCAGEN) injection 1 mg  1 mg IntraMUSCular PRN    sodium chloride (NS) flush 5-40 mL  5-40 mL IntraVENous Q8H    sodium chloride (NS) flush 5-40 mL  5-40 mL IntraVENous PRN    acetaminophen (TYLENOL) tablet 650 mg  650 mg Oral Q6H PRN    Or    acetaminophen (TYLENOL) suppository 650 mg  650 mg Rectal Q6H PRN    polyethylene glycol (MIRALAX) packet 17 g  17 g Oral DAILY PRN    promethazine (PHENERGAN) tablet 12.5 mg  12.5 mg Oral Q6H PRN    Or    ondansetron (ZOFRAN) injection 4 mg  4 mg IntraVENous Q6H PRN    enoxaparin (LOVENOX) injection 40 mg  40 mg SubCUTAneous Q12H      Allergies   Allergen Reactions    Other Food Angioedema     bryce  Cucumber Fruit Extract Angioedema            Physical Exam:     Visit Vitals  BP 97/67   Pulse 91   Temp 98.3 °F (36.8 °C)   Resp 16   Ht 6' 3\" (1.905 m)   Wt (!) 415 lb (188.2 kg)   SpO2 94%   BMI 51.87 kg/m²     ECOG PS: 1  General: No distress  Eyes: PERRLA, anicteric sclerae  HENT: Atraumatic with normal appearance of ears and nose; OP clear  Neck: Supple; no visualized JVD  Lymphatic: No cervical, or supraclavicular lymphadenopathy. Respiratory: CTAB, normal respiratory effort  CV: Normal rate, regular rhythm, no murmurs, no peripheral edema  GI: Soft, nontender, nondistended, no palpable masses, no hepatomegaly, no splenomegaly  MS: Normal gait and station. Digits without clubbing or cyanosis. Skin: No rashes, ecchymoses, or petechiae. Normal temperature, turgor, and texture. Neuro/Psych: Alert, oriented, appropriate affect, normal judgment/insight      Results:     Lab Results   Component Value Date/Time    WBC 9.2 08/22/2021 04:14 AM    HGB 11.0 (L) 08/22/2021 04:14 AM    HCT 36.9 08/22/2021 04:14 AM    PLATELET 040 32/09/2659 04:14 AM    MCV 84.6 08/22/2021 04:14 AM    ABS. NEUTROPHILS 5.5 08/22/2021 04:14 AM     Lab Results   Component Value Date/Time    Sodium 137 08/22/2021 04:14 AM    Potassium 3.8 08/22/2021 04:14 AM    Chloride 106 08/22/2021 04:14 AM    CO2 23 08/22/2021 04:14 AM    Glucose 98 08/22/2021 04:14 AM    BUN 13 08/22/2021 04:14 AM    Creatinine 0.91 08/22/2021 04:14 AM    GFR est AA >60 08/22/2021 04:14 AM    GFR est non-AA >60 08/22/2021 04:14 AM    Calcium 8.3 (L) 08/22/2021 04:14 AM    Glucose (POC) 97 08/22/2021 12:41 PM     Lab Results   Component Value Date/Time    Bilirubin, total 0.8 08/22/2021 04:14 AM    ALT (SGPT) 23 08/22/2021 04:14 AM    Alk.  phosphatase 81 08/22/2021 04:14 AM    Protein, total 7.9 08/22/2021 04:14 AM    Albumin 3.1 (L) 08/22/2021 04:14 AM    Globulin 4.8 (H) 08/22/2021 04:14 AM         Assessment and Recommendations:     # Retroperitoneal lesion of unclear malignant potential    - Clinically suspicious for lymphoma, though sarcoma can not be ruled out by imaging  - Will also check testicular tumor markers, as well as LD, B2 microglobulin. - Patient will need at least core biopsy of retroperitoneal node to determine malignant potential.   - will also check inflammatory markers to inform possibility of reactive lymphadenopathy. - oncology will continue to follow. Please call with questions or concerns.      Signed By: Lucy Jiang MD      Attending Medical Oncologist   [facility]

## 2021-08-23 ENCOUNTER — HOSPITAL ENCOUNTER (OUTPATIENT)
Dept: CT IMAGING | Age: 46
Discharge: HOME | DRG: 240 | End: 2021-08-23
Attending: INTERNAL MEDICINE
Payer: COMMERCIAL

## 2021-08-23 LAB
ALBUMIN SERPL-MCNC: 3.1 G/DL (ref 3.5–5)
ALBUMIN/GLOB SERPL: 0.6 {RATIO} (ref 1.1–2.2)
ALP SERPL-CCNC: 82 U/L (ref 45–117)
ALT SERPL-CCNC: 28 U/L (ref 12–78)
ANION GAP SERPL CALC-SCNC: 3 MMOL/L (ref 5–15)
AST SERPL-CCNC: 21 U/L (ref 15–37)
BASOPHILS # BLD: 0 K/UL (ref 0–0.1)
BASOPHILS NFR BLD: 1 % (ref 0–1)
BILIRUB SERPL-MCNC: 0.8 MG/DL (ref 0.2–1)
BUN SERPL-MCNC: 14 MG/DL (ref 6–20)
BUN/CREAT SERPL: 15 (ref 12–20)
CALCIUM SERPL-MCNC: 8.8 MG/DL (ref 8.5–10.1)
CHLORIDE SERPL-SCNC: 103 MMOL/L (ref 97–108)
CO2 SERPL-SCNC: 29 MMOL/L (ref 21–32)
CREAT SERPL-MCNC: 0.93 MG/DL (ref 0.7–1.3)
CRP SERPL-MCNC: 12.1 MG/DL (ref 0–0.6)
DIFFERENTIAL METHOD BLD: ABNORMAL
EOSINOPHIL # BLD: 0.1 K/UL (ref 0–0.4)
EOSINOPHIL NFR BLD: 2 % (ref 0–7)
ERYTHROCYTE [DISTWIDTH] IN BLOOD BY AUTOMATED COUNT: 15.2 % (ref 11.5–14.5)
EST. AVERAGE GLUCOSE BLD GHB EST-MCNC: 123 MG/DL
GLOBULIN SER CALC-MCNC: 5.1 G/DL (ref 2–4)
GLUCOSE BLD STRIP.AUTO-MCNC: 101 MG/DL (ref 65–117)
GLUCOSE BLD STRIP.AUTO-MCNC: 102 MG/DL (ref 65–117)
GLUCOSE BLD STRIP.AUTO-MCNC: 116 MG/DL (ref 65–117)
GLUCOSE BLD STRIP.AUTO-MCNC: 91 MG/DL (ref 65–117)
GLUCOSE SERPL-MCNC: 87 MG/DL (ref 65–100)
HBA1C MFR BLD: 5.9 % (ref 4–5.6)
HCT VFR BLD AUTO: 38.4 % (ref 36.6–50.3)
HGB BLD-MCNC: 11.4 G/DL (ref 12.1–17)
IMM GRANULOCYTES # BLD AUTO: 0 K/UL (ref 0–0.04)
IMM GRANULOCYTES NFR BLD AUTO: 0 % (ref 0–0.5)
LDH SERPL L TO P-CCNC: 373 U/L (ref 85–241)
LYMPHOCYTES # BLD: 1.8 K/UL (ref 0.8–3.5)
LYMPHOCYTES NFR BLD: 23 % (ref 12–49)
MAGNESIUM SERPL-MCNC: 2.6 MG/DL (ref 1.6–2.4)
MCH RBC QN AUTO: 25.2 PG (ref 26–34)
MCHC RBC AUTO-ENTMCNC: 29.7 G/DL (ref 30–36.5)
MCV RBC AUTO: 85 FL (ref 80–99)
MONOCYTES # BLD: 1.1 K/UL (ref 0–1)
MONOCYTES NFR BLD: 13 % (ref 5–13)
NEUTS SEG # BLD: 5 K/UL (ref 1.8–8)
NEUTS SEG NFR BLD: 61 % (ref 32–75)
NRBC # BLD: 0 K/UL (ref 0–0.01)
NRBC BLD-RTO: 0 PER 100 WBC
PHOSPHATE SERPL-MCNC: 4.3 MG/DL (ref 2.6–4.7)
PLATELET # BLD AUTO: 193 K/UL (ref 150–400)
PMV BLD AUTO: 12.8 FL (ref 8.9–12.9)
POTASSIUM SERPL-SCNC: 4.2 MMOL/L (ref 3.5–5.1)
PROT SERPL-MCNC: 8.2 G/DL (ref 6.4–8.2)
RBC # BLD AUTO: 4.52 M/UL (ref 4.1–5.7)
SERVICE CMNT-IMP: NORMAL
SODIUM SERPL-SCNC: 135 MMOL/L (ref 136–145)
WBC # BLD AUTO: 8.1 K/UL (ref 4.1–11.1)

## 2021-08-23 PROCEDURE — 83036 HEMOGLOBIN GLYCOSYLATED A1C: CPT

## 2021-08-23 PROCEDURE — 84100 ASSAY OF PHOSPHORUS: CPT

## 2021-08-23 PROCEDURE — 77030014115

## 2021-08-23 PROCEDURE — 74011250636 HC RX REV CODE- 250/636: Performed by: RADIOLOGY

## 2021-08-23 PROCEDURE — 74011250637 HC RX REV CODE- 250/637: Performed by: INTERNAL MEDICINE

## 2021-08-23 PROCEDURE — 49180 BIOPSY ABDOMINAL MASS: CPT

## 2021-08-23 PROCEDURE — 65270000029 HC RM PRIVATE

## 2021-08-23 PROCEDURE — 82232 ASSAY OF BETA-2 PROTEIN: CPT

## 2021-08-23 PROCEDURE — 82105 ALPHA-FETOPROTEIN SERUM: CPT

## 2021-08-23 PROCEDURE — 88342 IMHCHEM/IMCYTCHM 1ST ANTB: CPT

## 2021-08-23 PROCEDURE — 82962 GLUCOSE BLOOD TEST: CPT

## 2021-08-23 PROCEDURE — 0WBH3ZX EXCISION OF RETROPERITONEUM, PERCUTANEOUS APPROACH, DIAGNOSTIC: ICD-10-PCS | Performed by: RADIOLOGY

## 2021-08-23 PROCEDURE — 99152 MOD SED SAME PHYS/QHP 5/>YRS: CPT

## 2021-08-23 PROCEDURE — 80053 COMPREHEN METABOLIC PANEL: CPT

## 2021-08-23 PROCEDURE — 88333 PATH CONSLTJ SURG CYTO XM 1: CPT

## 2021-08-23 PROCEDURE — 85025 COMPLETE CBC W/AUTO DIFF WBC: CPT

## 2021-08-23 PROCEDURE — 77030040395 HC NDL BIOP COAX INTRO MRTM -B

## 2021-08-23 PROCEDURE — 74011250636 HC RX REV CODE- 250/636: Performed by: INTERNAL MEDICINE

## 2021-08-23 PROCEDURE — 99232 SBSQ HOSP IP/OBS MODERATE 35: CPT | Performed by: INTERNAL MEDICINE

## 2021-08-23 PROCEDURE — 36415 COLL VENOUS BLD VENIPUNCTURE: CPT

## 2021-08-23 PROCEDURE — 99223 1ST HOSP IP/OBS HIGH 75: CPT | Performed by: INTERNAL MEDICINE

## 2021-08-23 PROCEDURE — 83735 ASSAY OF MAGNESIUM: CPT

## 2021-08-23 PROCEDURE — 86140 C-REACTIVE PROTEIN: CPT

## 2021-08-23 PROCEDURE — 88341 IMHCHEM/IMCYTCHM EA ADD ANTB: CPT

## 2021-08-23 PROCEDURE — 88305 TISSUE EXAM BY PATHOLOGIST: CPT

## 2021-08-23 PROCEDURE — 83615 LACTATE (LD) (LDH) ENZYME: CPT

## 2021-08-23 PROCEDURE — 84702 CHORIONIC GONADOTROPIN TEST: CPT

## 2021-08-23 RX ORDER — FENTANYL CITRATE 50 UG/ML
100 INJECTION, SOLUTION INTRAMUSCULAR; INTRAVENOUS
Status: DISCONTINUED | OUTPATIENT
Start: 2021-08-23 | End: 2021-08-23

## 2021-08-23 RX ORDER — MIDAZOLAM HYDROCHLORIDE 1 MG/ML
5 INJECTION, SOLUTION INTRAMUSCULAR; INTRAVENOUS
Status: DISCONTINUED | OUTPATIENT
Start: 2021-08-23 | End: 2021-08-23

## 2021-08-23 RX ADMIN — FENTANYL CITRATE 25 MCG: 50 INJECTION, SOLUTION INTRAMUSCULAR; INTRAVENOUS at 15:04

## 2021-08-23 RX ADMIN — MIDAZOLAM 1 MG: 1 INJECTION INTRAMUSCULAR; INTRAVENOUS at 15:18

## 2021-08-23 RX ADMIN — FENTANYL CITRATE 25 MCG: 50 INJECTION, SOLUTION INTRAMUSCULAR; INTRAVENOUS at 15:08

## 2021-08-23 RX ADMIN — FENTANYL CITRATE 25 MCG: 50 INJECTION, SOLUTION INTRAMUSCULAR; INTRAVENOUS at 15:18

## 2021-08-23 RX ADMIN — Medication 10 ML: at 04:14

## 2021-08-23 RX ADMIN — MIDAZOLAM 1 MG: 1 INJECTION INTRAMUSCULAR; INTRAVENOUS at 15:11

## 2021-08-23 RX ADMIN — Medication 10 ML: at 08:34

## 2021-08-23 RX ADMIN — HYDROMORPHONE HYDROCHLORIDE 1 MG: 1 INJECTION, SOLUTION INTRAMUSCULAR; INTRAVENOUS; SUBCUTANEOUS at 12:48

## 2021-08-23 RX ADMIN — OXYCODONE 5 MG: 5 TABLET ORAL at 20:01

## 2021-08-23 RX ADMIN — HYDROMORPHONE HYDROCHLORIDE 1 MG: 1 INJECTION, SOLUTION INTRAMUSCULAR; INTRAVENOUS; SUBCUTANEOUS at 04:13

## 2021-08-23 RX ADMIN — MIDAZOLAM 1 MG: 1 INJECTION INTRAMUSCULAR; INTRAVENOUS at 15:04

## 2021-08-23 RX ADMIN — HYDROMORPHONE HYDROCHLORIDE 1 MG: 1 INJECTION, SOLUTION INTRAMUSCULAR; INTRAVENOUS; SUBCUTANEOUS at 08:34

## 2021-08-23 RX ADMIN — HYDROMORPHONE HYDROCHLORIDE 1 MG: 1 INJECTION, SOLUTION INTRAMUSCULAR; INTRAVENOUS; SUBCUTANEOUS at 17:34

## 2021-08-23 RX ADMIN — FENTANYL CITRATE 25 MCG: 50 INJECTION, SOLUTION INTRAMUSCULAR; INTRAVENOUS at 15:11

## 2021-08-23 RX ADMIN — MIDAZOLAM 1 MG: 1 INJECTION INTRAMUSCULAR; INTRAVENOUS at 15:08

## 2021-08-23 NOTE — PROGRESS NOTES
46 Pt brought down to [facility] for scheduled CT guided  Retroperitoneal biopsy. IV flushed. Mayelin Novoa.  1434  Dr Mirna Luna in [facility] to discuss procedure and sedation plan with pt and RN. Allergies reviewed. Consent signed. 1500 Pt brought to CT for procedure. Sedation started at 1504. Procedure started at [phone]  and ended at 1542. Pt received a total of  5mg of versed and 125mcg of fentanyl over the course of procedure. Pt tolerated procedure well. Denies pain. Dressing to procedure site CDI. VS monitored throughout procedure. 1550  Pt returned to [facility].

## 2021-08-23 NOTE — CONSULTS
Otis Cooley MD., [facility]    [address] [place], [address] [facility]    Office [phone],N [phone]           8/23/2021     Admit Date: 8/21/2021      Kaleb Lazo is a 48 y.o. male admitted for Mass in the abdomen [R19.00]; Retroperitoneal mass [R19.00]. Consult requested by Sarah Curtis MD       Assessment/Plan:    Retroperitoneal mass-needs work-up/biopsy  History of severe aortic stenosis-followed at [address] W/scheduled to have procedure  Diabetes mellitus  Hypertension  Hyperlipidemia  Morbid obesity    Plan:  Echocardiogram-EF normal; moderate to severe aortic stenosis  Blood pressure controlled. On Cozaar/Caduet (amlodipine/atorvastatin). Continue  Records for [facility] - has had cardiac cath approx 2 weeks ago ( nml per pt) and scheduled for Surgical AVR in a few weeks ( Dr Melvin/Dr Hailey Brantley)   Cardiac evaluation prior to biopsy-moderate risk from a cardiac standpoint for hemodynamic compromise. Discussed with patient. Please do not hesitate to contact us with questions or concerns. See note below for details. Otis Cooley MD      Cardiac Testing/Procedures: A. Cardiac Cath/PCI:    B.ECHO/ABIGAIL: 8/21/21 - Image quality for this study was technically difficult. LV: Estimated LVEF is 55 - 60%. Normal cavity size and systolic function (ejection fraction normal). Mildly increased wall thickness. Wall motion: unable to assess due to limited image quality. Aortic Valve: Aortic valve not well visualized. There is leaflet calcification with reduced excursion. Aortic valve mean gradient is 35 mmHg. Aortic valve area is 0.8 cm2. There is severe aortic stenosis. AO: Mild sinuses of Valsalva dilatation. C.StressNuclear/Stress ECHO/Stress test:    D.Vascular:    E. EP:    F. Miscellaneous:    History:     Patient  is a 48 y.o. male admitted for abdominal pain and diagnosed with a new retroperitoneal mass.   He initially thought he had kidney stones because he has history of nephrolithiasis. PMHx -history of severe aortic stenosis-scheduled to have procedure at [facility].  History of hypertension/diabetes mellitus/morbid obesity    Patient needs further work-up of her retroperitoneal mass. Cardiology consulted for help with management with patient's history of severe aortic stenosis    No chest pain. Mild chronic dyspnea on exertion. Chronic swelling LE      PMH/PSH/FH/Soc Hx:     Past Medical History:   Diagnosis Date    Aortic stenosis, severe     Arrhythmia     heart murmur    CAD (coronary artery disease)     Diabetes (HCC)     GERD (gastroesophageal reflux disease)     Hypertension     Sleep apnea       Past Surgical History:   Procedure Laterality Date    HX HEENT      tonsils    HX ORTHOPAEDIC      Plantar Facitits L foot     Allergies   Allergen Reactions    Other Food Angioedema     pickle    Cucumber Fruit Extract Angioedema     Family History   Adopted: Yes      Social History     Tobacco Use    Smoking status: Never Smoker    Smokeless tobacco: Never Used   Substance Use Topics    Alcohol use: No     Comment:      Drug use: No           Medications:       Current Facility-Administered Medications   Medication Dose Route Frequency    HYDROmorphone (DILAUDID) injection 1 mg  1 mg IntraVENous Q4H PRN    oxyCODONE IR (ROXICODONE) tablet 5 mg  5 mg Oral Q4H PRN    insulin lispro (HUMALOG) injection   SubCUTAneous AC&HS    glucose chewable tablet 16 g  4 Tablet Oral PRN    dextrose (D50W) injection syrg 12.5-25 g  12.5-25 g IntraVENous PRN    glucagon (GLUCAGEN) injection 1 mg  1 mg IntraMUSCular PRN    sodium chloride (NS) flush 5-40 mL  5-40 mL IntraVENous Q8H    sodium chloride (NS) flush 5-40 mL  5-40 mL IntraVENous PRN    acetaminophen (TYLENOL) tablet 650 mg  650 mg Oral Q6H PRN    Or    acetaminophen (TYLENOL) suppository 650 mg  650 mg Rectal Q6H PRN    polyethylene glycol (MIRALAX) packet 17 g  17 g Oral DAILY PRN    promethazine (PHENERGAN) tablet 12.5 mg  12.5 mg Oral Q6H PRN    Or    ondansetron (ZOFRAN) injection 4 mg  4 mg IntraVENous Q6H PRN    [Held by provider] enoxaparin (LOVENOX) injection 40 mg  40 mg SubCUTAneous Q12H       Review of Systems:     As in HPI - all other ROS negative    Physical Exam:       Visit Vitals  /77   Pulse 85   Temp 97.6 °F (36.4 °C)   Resp 17   Ht 6' 3\" (1.905 m)   Wt (!) 415 lb (188.2 kg)   SpO2 97%   BMI 51.87 kg/m²         Telemetry:    Gen: Well-developed, well-nourished, in no acute distress, obese  Neck: Supple,No JVD, No Carotid Bruit,   Resp: No accessory muscle use, Clear breath sounds, No rales or rhonchi  Card: Regular Rate,Rythm,Normal S1, S2, 2/6 ESM+, No rubs or gallop. No thrills.    Abd:  Soft,BS+,   MSK: No cyanosis  Skin: No rashes    Neuro: moving all four extremities , follows commands appropriately  Psych:  Good insight, oriented to person, place , alert, Nml Affect  LE: 1+ edema    EKG:        Cxray: Reviewed     LABS: Reviewed      Care Plan discussed with: Patient and Nursing Staff      Total time:      mins     Reji Knight MD

## 2021-08-23 NOTE — H&P
Interventional and Vascular Radiology History and Physical    Patient: Polly Sauer 48 y.o. male       Chief Complaint: Abdominal Pain (RLQ), Flank Pain (Right Lower), and Fever      History of Present Illness: right retroperitoneal mass     History:    Past Medical History:   Diagnosis Date    Aortic stenosis, severe     Arrhythmia     heart murmur    CAD (coronary artery disease)     Diabetes ([address]     GERD (gastroesophageal reflux disease)     Hypertension     Sleep apnea      Family History   Adopted: Yes     Social History     Socioeconomic History    Marital status: SINGLE     Spouse name: Not on file    Number of children: Not on file    Years of education: Not on file    Highest education level: Not on file   Occupational History    Not on file   Tobacco Use    Smoking status: Never Smoker    Smokeless tobacco: Never Used   Substance and Sexual Activity    Alcohol use: No     Comment:      Drug use: No    Sexual activity: Not on file   Other Topics Concern    Not on file   Social History Narrative    Not on file     Social Determinants of Health     Financial Resource Strain:     Difficulty of Paying Living Expenses:    Food Insecurity:     Worried About Running Out of Food in the Last Year:     Jani of Food in the Last Year:    Transportation Needs:     Lack of Transportation (Medical):      Lack of Transportation (Non-Medical):    Physical Activity:     Days of Exercise per Week:     Minutes of Exercise per Session:    Stress:     Feeling of Stress :    Social Connections:     Frequency of Communication with Friends and Family:     Frequency of Social Gatherings with Friends and Family:     Attends Cheondoism Services:     Active Member of Clubs or Organizations:     Attends Club or Organization Meetings:     Marital Status:    Intimate Partner Violence:     Fear of Current or Ex-Partner:     Emotionally Abused:     Physically Abused:     Sexually Abused: Allergies: Allergies   Allergen Reactions    Other Food Angioedema     pickle    Cucumber Fruit Extract Angioedema       Current Medications:  Current Facility-Administered Medications   Medication Dose Route Frequency    HYDROmorphone (DILAUDID) injection 1 mg  1 mg IntraVENous Q4H PRN    oxyCODONE IR (ROXICODONE) tablet 5 mg  5 mg Oral Q4H PRN    insulin lispro (HUMALOG) injection   SubCUTAneous AC&HS    glucose chewable tablet 16 g  4 Tablet Oral PRN    dextrose (D50W) injection syrg 12.5-25 g  12.5-25 g IntraVENous PRN    glucagon (GLUCAGEN) injection 1 mg  1 mg IntraMUSCular PRN    sodium chloride (NS) flush 5-40 mL  5-40 mL IntraVENous Q8H    sodium chloride (NS) flush 5-40 mL  5-40 mL IntraVENous PRN    acetaminophen (TYLENOL) tablet 650 mg  650 mg Oral Q6H PRN    Or    acetaminophen (TYLENOL) suppository 650 mg  650 mg Rectal Q6H PRN    polyethylene glycol (MIRALAX) packet 17 g  17 g Oral DAILY PRN    promethazine (PHENERGAN) tablet 12.5 mg  12.5 mg Oral Q6H PRN    Or    ondansetron (ZOFRAN) injection 4 mg  4 mg IntraVENous Q6H PRN    [Held by provider] enoxaparin (LOVENOX) injection 40 mg  40 mg SubCUTAneous Q12H        Physical Exam:  Blood pressure 114/84, pulse 88, temperature 97.6 °F (36.4 °C), resp. rate 16, height 6' 3\" (1.905 m), weight (!) 188.2 kg (415 lb), SpO2 94 %.   LUNG: clear to auscultation bilaterally, HEART: regular rate and rhythm, S1, S2 normal, no murmur, click, rub or gallop      Alerts:    Hospital Problems  Date Reviewed: 5/5/2021        Codes Class Noted POA    Retroperitoneal mass ICD-10-CM: R19.00  ICD-9-CM: 789.30  8/22/2021 Unknown        Mass in the abdomen ICD-10-CM: R19.00  ICD-9-CM: 789.30  8/21/2021 Unknown              Laboratory:      Recent Labs     08/23/21  0400   HGB 11.4*   HCT 38.4   WBC 8.1      BUN 14   CREA 0.93   K 4.2         Plan of Care/Planned Procedure:  Risks, benefits, and alternatives reviewed with patient and he agrees to proceed with the procedure. Conscious sedation will be performed with IV fentanyl and versed.  Plan is for right retroperitoneal mass biopsy       Paulette Davila MD

## 2021-08-23 NOTE — PROGRESS NOTES
[facility] at [address] [facility], [place], [address]  W: [phone] F: [phone]      Reason for Visit:   Ellis Acosta is a 48 y.o. male who is seen in consultation at the request of Dr. Mamie Crenshaw for evaluation of retroperitoneal lymphdenopathy, concerning for malignancy. Hematology / Oncology Treatment History:   Diagnosis: pending      Stage: pending    Pathology: 8/23/21 bx of right retroperitoneal node      Prior Treatment: none      Current Treatment: none      History of Present Illness:     Very pleasant 48year old male with relevant medical history of severe aortic stenosis with plan for valve repair next month, CAD, DM2, HTN, GERD, ROBERTO, who presented with symptoms of abdominal/right flank pain similar to prior hx of nephrolithiasis. Workup included CT abd/pelvis without contrast that showed large amount of retroperitoneal lymphadenopathy. The patient denies severe fatigue but does report about 40 lbs weight loss in last 2 months and some decreased appetite. He denies any recent infections or fatigue. Otherwise clinically without complaints today. .   The patient is adopted and has no knowledge of family history    Social history notable for distant history of alcohol use, but no recent ETOH, no other positive findings. Positive ROS findings include: weight loss, very mild night sweats, decreased appetite. Interval History:   Pain is currently controlled; just received pain medication for pain to RLQ; rated pain 7/10 and now 4/10. Confirms weight loss of approx 40# initially was intentionally but then started to come off more quickly. Has night sweats but not new for him. No family at bedside. Further workup included Chest/abd/pelvis CT with contrast that showed the following: \"IMPRESSION  Chest:  1.  No evidence of primary malignancy or metastatic disease in the chest.  2. Severe aortic valvular calcifications. Correlate for aortic valvular  stenosis.     Abdomen/pelvis:   1. Redemonstrated right retroperitoneal mass measuring 7.4 x 4.9 x 6.7 cm as  above, including encasement and narrowing of the IVC and central aspects of the  bilateral renal veins. This favored to represent malignancy, with lymphoma  favored over brain metastasis. 2. No other pathologically enlarged lymph nodes within the abdomen or pelvis. Few prominent inguinal lymph nodes are favored to be reactive. 3. Hepatic steatosis. \"    Review of Systems: A complete review of systems was obtained, negative except as described above.     Past Medical History:   Diagnosis Date    Aortic stenosis, severe     Arrhythmia     heart murmur    CAD (coronary artery disease)     Diabetes (HCC)     GERD (gastroesophageal reflux disease)     Hypertension     Sleep apnea       Past Surgical History:   Procedure Laterality Date    HX HEENT      tonsils    HX ORTHOPAEDIC      Plantar Facitits L foot      Social History     Tobacco Use    Smoking status: Never Smoker    Smokeless tobacco: Never Used   Substance Use Topics    Alcohol use: No     Comment:        Family History   Adopted: Yes     Current Facility-Administered Medications   Medication Dose Route Frequency    HYDROmorphone (DILAUDID) injection 1 mg  1 mg IntraVENous Q4H PRN    oxyCODONE IR (ROXICODONE) tablet 5 mg  5 mg Oral Q4H PRN    insulin lispro (HUMALOG) injection   SubCUTAneous AC&HS    glucose chewable tablet 16 g  4 Tablet Oral PRN    dextrose (D50W) injection syrg 12.5-25 g  12.5-25 g IntraVENous PRN    glucagon (GLUCAGEN) injection 1 mg  1 mg IntraMUSCular PRN    sodium chloride (NS) flush 5-40 mL  5-40 mL IntraVENous Q8H    sodium chloride (NS) flush 5-40 mL  5-40 mL IntraVENous PRN    acetaminophen (TYLENOL) tablet 650 mg  650 mg Oral Q6H PRN    Or    acetaminophen (TYLENOL) suppository 650 mg  650 mg Rectal Q6H PRN    polyethylene glycol (MIRALAX) packet 17 g  17 g Oral DAILY PRN    promethazine (PHENERGAN) tablet 12.5 mg  12.5 mg Oral Q6H PRN    Or    ondansetron (ZOFRAN) injection 4 mg  4 mg IntraVENous Q6H PRN    [Held by provider] enoxaparin (LOVENOX) injection 40 mg  40 mg SubCUTAneous Q12H      Allergies   Allergen Reactions    Other Food Angioedema     pickle    Cucumber Fruit Extract Angioedema        Physical Exam:     Visit Vitals  /77   Pulse 85   Temp 97.6 °F (36.4 °C)   Resp 17   Ht 6' 3\" (1.905 m)   Wt (!) 188.2 kg (415 lb)   SpO2 97%   BMI 51.87 kg/m²     ECOG PS: 1  General: obese; no distress  Eyes: anicteric sclerae  HENT: atraumatic  Neck: supple  Respiratory: normal respiratory effort  CV: 2+ BLE peripheral edema noted  GI: soft,  nondistended, no masses, no hepatomegaly, no splenomegaly; RLQ tenderness  Skin: no rashes; no ecchymoses; no petechiae  Psych: alert, oriented, appropriate affect, normal judgment/insight      Results:     Lab Results   Component Value Date/Time    WBC 8.1 08/23/2021 04:00 AM    HGB 11.4 (L) 08/23/2021 04:00 AM    HCT 38.4 08/23/2021 04:00 AM    PLATELET 084 31/23/3602 04:00 AM    MCV 85.0 08/23/2021 04:00 AM    ABS. NEUTROPHILS 5.0 08/23/2021 04:00 AM     Lab Results   Component Value Date/Time    Sodium 135 (L) 08/23/2021 04:00 AM    Potassium 4.2 08/23/2021 04:00 AM    Chloride 103 08/23/2021 04:00 AM    CO2 29 08/23/2021 04:00 AM    Glucose 87 08/23/2021 04:00 AM    BUN 14 08/23/2021 04:00 AM    Creatinine 0.93 08/23/2021 04:00 AM    GFR est AA >60 08/23/2021 04:00 AM    GFR est non-AA >60 08/23/2021 04:00 AM    Calcium 8.8 08/23/2021 04:00 AM    Glucose (POC) 101 08/23/2021 06:43 AM     Lab Results   Component Value Date/Time    Bilirubin, total 0.8 08/23/2021 04:00 AM    ALT (SGPT) 28 08/23/2021 04:00 AM    Alk.  phosphatase 82 08/23/2021 04:00 AM    Protein, total 8.2 08/23/2021 04:00 AM    Albumin 3.1 (L) 08/23/2021 04:00 AM    Globulin 5.1 (H) 08/23/2021 04:00 AM         Assessment and Plan 47 yo male with PMHx of aortic stenosis, nephrolithiasis, HTN, GERD, DM admitted with abd pain. 1. Retroperitoneal lesion of unclear malignant potential  - Clinically suspicious for lymphoma, though sarcoma can not be ruled out by imaging; LD and C reactive protein elevated  - AFP, Beta-HCG , B2 microglobulin pending   - 8/23 Scheduled for  biopsy of retroperitoneal node today; will follow path  -establish follow up in the clinic to review pathology   --adding uric acid for am      2. Severe Aortic stenosis  -scheduled for surgery at [facility] center on 20 Sept.   -8/22 ECHO: LVEF 55-60%; severe aortic stenosis    3. Pain: currently controlled with prn medications     4. DM: management per primary team    5.  HTN:management per primary team      Plan reviewed with Dr Saintclair Lobo By: Brayan Catalan NP

## 2021-08-23 NOTE — PROGRESS NOTES
Occupational Therapy Note:  Chart reviewed and spoke with patient. Patient confirms he is independent with self care/ADLs and no difficulty with ambulation/functional mobility. Patient does not feel OT services are indicated. Will complete order.    Rufus Garcia OTR/L

## 2021-08-23 NOTE — PROGRESS NOTES
Spiritual Care Partner Volunteer visited patient at [facility] in [address] [place] [address] 1 on 8/23/2021        Documented by: Tyrone Lynn.    Paging Service: [phone] ([phone])

## 2021-08-23 NOTE — PROGRESS NOTES
[facility] [address]  [address] [facility], [address] [place] [address]  [phone]      Medical Progress Note      NAME: Roger Dale   :  1975  MRM:  [id number]    Date/Time of service: 2021  11:03 AM       Subjective:     Chief Complaint:  Patient was personally seen and examined by me during this time period. Chart reviewed. abd pain is better. Awaiting biopsy        Objective:       Vitals:       Last 24hrs VS reviewed since prior progress note.  Most recent are:    Visit Vitals  /77   Pulse 85   Temp 97.6 °F (36.4 °C)   Resp 17   Ht 6' 3\" (1.905 m)   Wt (!) 188.2 kg (415 lb)   SpO2 97%   BMI 51.87 kg/m²     SpO2 Readings from Last 6 Encounters:   21 97%   21 99%   19 97%   19 96%   07/15/19 95%   18 97%    O2 Flow Rate (L/min): 3 l/min       Intake/Output Summary (Last 24 hours) at 2021 1100  Last data filed at 2021 1810  Gross per 24 hour   Intake 120 ml   Output 0 ml   Net 120 ml        Exam:     Physical Exam:    Gen:  Well-developed, well-nourished, morbidly obese, in no acute distress  HEENT:  Pink conjunctivae, PERRL, hearing intact to voice, moist mucous membranes  Neck:  Supple, without masses, thyroid non-tender  Resp:  No accessory muscle use, clear breath sounds without wheezes rales or rhonchi  Card:  3/6 murmurs, normal S1, S2 without thrills, +edema  Abd:  Soft, non-tender, non-distended, normoactive bowel sounds are present  Musc:  No cyanosis or clubbing  Skin:  No rashes  Neuro:  Cranial nerves 3-12 are grossly intact, follows commands appropriately  Psych:  Good insight, oriented to person, place and time, alert    Medications Reviewed: (see below)    Lab Data Reviewed: (see below)    ______________________________________________________________________    Medications:     Current Facility-Administered Medications   Medication Dose Route Frequency    HYDROmorphone (DILAUDID) injection 1 mg  1 mg IntraVENous Q4H PRN    oxyCODONE IR (ROXICODONE) tablet 5 mg  5 mg Oral Q4H PRN    insulin lispro (HUMALOG) injection   SubCUTAneous AC&HS    glucose chewable tablet 16 g  4 Tablet Oral PRN    dextrose (D50W) injection syrg 12.5-25 g  12.5-25 g IntraVENous PRN    glucagon (GLUCAGEN) injection 1 mg  1 mg IntraMUSCular PRN    sodium chloride (NS) flush 5-40 mL  5-40 mL IntraVENous Q8H    sodium chloride (NS) flush 5-40 mL  5-40 mL IntraVENous PRN    acetaminophen (TYLENOL) tablet 650 mg  650 mg Oral Q6H PRN    Or    acetaminophen (TYLENOL) suppository 650 mg  650 mg Rectal Q6H PRN    polyethylene glycol (MIRALAX) packet 17 g  17 g Oral DAILY PRN    promethazine (PHENERGAN) tablet 12.5 mg  12.5 mg Oral Q6H PRN    Or    ondansetron (ZOFRAN) injection 4 mg  4 mg IntraVENous Q6H PRN    [Held by provider] enoxaparin (LOVENOX) injection 40 mg  40 mg SubCUTAneous Q12H          Lab Review:     Recent Labs     08/23/21  0400 08/22/21  0414 08/21/21  2248   WBC 8.1 9.2 9.8   HGB 11.4* 11.0* 11.2*   HCT 38.4 36.9 37.5    201 206     Recent Labs     08/23/21  0400 08/22/21  0414 08/21/21  2248   * 137 139   K 4.2 3.8 3.8    106 106   CO2 29 23 24   GLU 87 98 107*   BUN 14 13 13   CREA 0.93 0.91 0.96   CA 8.8 8.3* 8.6   MG 2.6* 2.3  --    PHOS 4.3  --   --    ALB 3.1* 3.1* 3.3*   TBILI 0.8 0.8 0.9   ALT 28 23 26     Lab Results   Component Value Date/Time    Glucose (POC) 101 08/23/2021 06:43 AM    Glucose (POC) 121 (H) 08/22/2021 09:07 PM    Glucose (POC) 99 08/22/2021 04:45 PM    Glucose (POC) 97 08/22/2021 12:41 PM    Glucose (POC) 116 (H) 07/16/2019 03:06 AM          Assessment / Plan:     49 yo hx of HTN, DM, severe AS, presented w/ abd pain, new retroperitoneal mass     1) Retroperitoneal mass: has B-type symptoms. Suspect lymphoma. Will order CT-guided biopsy. Rest of management per Oncology     2) Acute abd pain: due to above.   Use IV dilaudid prn severe pain    3) Severe aortic stenosis: echo with EF 55%, severe AS. Cards to eval    4) HTN: BP on low side. Can resume norvasc, losartan slowly     5) DM type 2: A1C 5.9%. Patient should not be on both Ozempic and Victoza on discharge. Would just keep Ozempic.   Cont SSI while here     Total time spent with patient: 35 min **I personally saw and examined the patient during this time period**                 Care Plan discussed with: Patient, nursing, oncology    Discussed:  Care Plan    Prophylaxis:  Lovenox    Disposition:  Home w/Family           ___________________________________________________    Attending Physician: Pedro Padilla MD

## 2021-08-23 NOTE — PROGRESS NOTES
Verbal report at bedside from Javier keen, [facility]. Patient returned to radiology holding at 1550. Alert and oriented x 4, no complaints of pain, vitals stable on monitor. 1600 patient drinking soda, using cellphone. Patient transported to the floor at this time. Advised patient to not shower until tomorrow. TRANSFER - OUT REPORT:    Verbal report given to [address]  RN(name) on Sarah Notice  being transferred to [phone] (unit) for routine progression of care       Report consisted of patients Situation, Background, Assessment and   Recommendations(SBAR). Information from the following report(s) Procedure Summary was reviewed with the receiving nurse. Lines:   Peripheral IV 08/21/21 Right Wrist (Active)   Site Assessment Clean, dry, & intact 08/23/21 0841   Phlebitis Assessment 0 08/23/21 0841   Infiltration Assessment 0 08/23/21 0841   Dressing Status Clean, dry, & intact 08/23/21 0841   Dressing Type Transparent 08/23/21 0841   Hub Color/Line Status Pink;Capped 08/23/21 0841   Action Taken Open ports on tubing capped 08/22/21 2021   Alcohol Cap Used Yes 08/22/21 2021        Opportunity for questions and clarification was provided.       Patient transported with:   Clicko

## 2021-08-23 NOTE — PROGRESS NOTES
8/23/2021  Reason for Admission:  Retroperitoneal mass                   RUR Score:        10% low risk for readmission             Plan for utilizing home health:      Patient has no history, unsure if he will need this admission    PCP: First and Last name:  Khang Marvin NP   Name of Practice:    Are you a current patient: Yes/No:  Yes   Approximate date of last visit: 3-4 months ago   Can you participate in a virtual visit with your PCP:  Yes                    Current Advanced Directive/Advance Care Plan: Full Code    Healthcare Decision Maker:   Click here to complete [address] including selection of the Healthcare Decision Maker Relationship (ie \"Primary\")                           Transition of Care Plan:                    Patient lives with his sister in a 2 story home with 4 steps to enter. He does report some difficulty with the inside steps, stating he has to stop while climbing to rest. He does sleep on the second floor. Prior to admission he is independent with ADLs and drives. He does not have an history of home health. He states he has a BiPAP machine but has not used it in a while. His emergency contact is a close friend, Kelly Carvajal, who can be reached at [phone]. Either a friend or a family member will pick him up from the hospital when he is ready for discharge. Patient sees SANJIV Chavarria Chi as his PCP and last had a visit about 3-4 months ago. He usually gets prescriptions at [facility] and they are typically covered by insurance. No needs identified at this time, will continue to follow. Transition of Care Plan  1. Continue medical management/treatment  2. Home with family at this time unless any needs identified  3. Friend or family to transport  4. Follow up with PCP, specialties as needed  5. CM will continue to follow    Care Management Interventions  PCP Verified by CM: Yes Deon Crook NP)  Mode of Transport at Discharge:  Other (see comment) (friend or family)  Transition of Care Consult (CM Consult): Discharge Planning  MyChart Signup: No  Discharge Durable Medical Equipment: No  Physical Therapy Consult: Yes  Occupational Therapy Consult: Yes  Speech Therapy Consult: No  Current Support Network: Relative's Home, Family Lives Nearby  Confirm Follow Up Transport: Self  Discharge Location  Discharge Placement: Home with family assistance    Sadaf Kenney, MSW

## 2021-08-24 ENCOUNTER — HOSPITAL ENCOUNTER (INPATIENT)
Dept: ULTRASOUND IMAGING | Age: 46
Discharge: HOME | DRG: 240 | End: 2021-08-24
Attending: INTERNAL MEDICINE
Payer: COMMERCIAL

## 2021-08-24 LAB
AFP-TM SERPL-MCNC: 1.4 NG/ML (ref 0–8.3)
ANION GAP SERPL CALC-SCNC: 4 MMOL/L (ref 5–15)
B2 MICROGLOB SERPL-MCNC: 2.4 MG/L (ref 0.6–2.4)
BUN SERPL-MCNC: 14 MG/DL (ref 6–20)
BUN/CREAT SERPL: 18 (ref 12–20)
CALCIUM SERPL-MCNC: 8.2 MG/DL (ref 8.5–10.1)
CHLORIDE SERPL-SCNC: 104 MMOL/L (ref 97–108)
CO2 SERPL-SCNC: 27 MMOL/L (ref 21–32)
CREAT SERPL-MCNC: 0.76 MG/DL (ref 0.7–1.3)
ERYTHROCYTE [DISTWIDTH] IN BLOOD BY AUTOMATED COUNT: 15 % (ref 11.5–14.5)
GLUCOSE BLD STRIP.AUTO-MCNC: 101 MG/DL (ref 65–117)
GLUCOSE BLD STRIP.AUTO-MCNC: 105 MG/DL (ref 65–117)
GLUCOSE BLD STRIP.AUTO-MCNC: 122 MG/DL (ref 65–117)
GLUCOSE BLD STRIP.AUTO-MCNC: 89 MG/DL (ref 65–117)
GLUCOSE SERPL-MCNC: 96 MG/DL (ref 65–100)
HCG INTACT+B SERPL-ACNC: 476 MIU/ML (ref 0–3)
HCT VFR BLD AUTO: 34.8 % (ref 36.6–50.3)
HGB BLD-MCNC: 10.7 G/DL (ref 12.1–17)
MAGNESIUM SERPL-MCNC: 2.3 MG/DL (ref 1.6–2.4)
MCH RBC QN AUTO: 25.6 PG (ref 26–34)
MCHC RBC AUTO-ENTMCNC: 30.7 G/DL (ref 30–36.5)
MCV RBC AUTO: 83.3 FL (ref 80–99)
NRBC # BLD: 0 K/UL (ref 0–0.01)
NRBC BLD-RTO: 0 PER 100 WBC
PHOSPHATE SERPL-MCNC: 3.6 MG/DL (ref 2.6–4.7)
PLATELET # BLD AUTO: 200 K/UL (ref 150–400)
PMV BLD AUTO: 12.8 FL (ref 8.9–12.9)
POTASSIUM SERPL-SCNC: 3.9 MMOL/L (ref 3.5–5.1)
RBC # BLD AUTO: 4.18 M/UL (ref 4.1–5.7)
SERVICE CMNT-IMP: ABNORMAL
SERVICE CMNT-IMP: NORMAL
SODIUM SERPL-SCNC: 135 MMOL/L (ref 136–145)
URATE SERPL-MCNC: 4.2 MG/DL (ref 3.5–7.2)
WBC # BLD AUTO: 7.4 K/UL (ref 4.1–11.1)

## 2021-08-24 PROCEDURE — 99232 SBSQ HOSP IP/OBS MODERATE 35: CPT | Performed by: INTERNAL MEDICINE

## 2021-08-24 PROCEDURE — 85027 COMPLETE CBC AUTOMATED: CPT

## 2021-08-24 PROCEDURE — 65270000029 HC RM PRIVATE

## 2021-08-24 PROCEDURE — 80048 BASIC METABOLIC PNL TOTAL CA: CPT

## 2021-08-24 PROCEDURE — 74011250637 HC RX REV CODE- 250/637: Performed by: INTERNAL MEDICINE

## 2021-08-24 PROCEDURE — 36415 COLL VENOUS BLD VENIPUNCTURE: CPT

## 2021-08-24 PROCEDURE — 83735 ASSAY OF MAGNESIUM: CPT

## 2021-08-24 PROCEDURE — 82962 GLUCOSE BLOOD TEST: CPT

## 2021-08-24 PROCEDURE — 84100 ASSAY OF PHOSPHORUS: CPT

## 2021-08-24 PROCEDURE — 84550 ASSAY OF BLOOD/URIC ACID: CPT

## 2021-08-24 PROCEDURE — 74011250636 HC RX REV CODE- 250/636: Performed by: INTERNAL MEDICINE

## 2021-08-24 PROCEDURE — 76870 US EXAM SCROTUM: CPT

## 2021-08-24 RX ORDER — OXYCODONE HYDROCHLORIDE 5 MG/1
10 TABLET ORAL
Status: DISCONTINUED | OUTPATIENT
Start: 2021-08-24 | End: 2021-08-24

## 2021-08-24 RX ORDER — OXYCODONE HYDROCHLORIDE 5 MG/1
5 TABLET ORAL ONCE
Status: COMPLETED | OUTPATIENT
Start: 2021-08-24 | End: 2021-08-24

## 2021-08-24 RX ORDER — OXYCODONE HYDROCHLORIDE 5 MG/1
15 TABLET ORAL
Status: DISCONTINUED | OUTPATIENT
Start: 2021-08-24 | End: 2021-08-26 | Stop reason: HOSPADM

## 2021-08-24 RX ADMIN — OXYCODONE 5 MG: 5 TABLET ORAL at 12:00

## 2021-08-24 RX ADMIN — HYDROMORPHONE HYDROCHLORIDE 1 MG: 1 INJECTION, SOLUTION INTRAMUSCULAR; INTRAVENOUS; SUBCUTANEOUS at 01:18

## 2021-08-24 RX ADMIN — HYDROMORPHONE HYDROCHLORIDE 1 MG: 1 INJECTION, SOLUTION INTRAMUSCULAR; INTRAVENOUS; SUBCUTANEOUS at 07:34

## 2021-08-24 RX ADMIN — POLYETHYLENE GLYCOL 3350 17 G: 17 POWDER, FOR SOLUTION ORAL at 07:34

## 2021-08-24 RX ADMIN — Medication 10 ML: at 01:19

## 2021-08-24 RX ADMIN — HYDROMORPHONE HYDROCHLORIDE 1 MG: 1 INJECTION, SOLUTION INTRAMUSCULAR; INTRAVENOUS; SUBCUTANEOUS at 17:45

## 2021-08-24 RX ADMIN — OXYCODONE 10 MG: 5 TABLET ORAL at 16:17

## 2021-08-24 RX ADMIN — Medication 10 ML: at 22:42

## 2021-08-24 RX ADMIN — HYDROMORPHONE HYDROCHLORIDE 1 MG: 1 INJECTION, SOLUTION INTRAMUSCULAR; INTRAVENOUS; SUBCUTANEOUS at 23:02

## 2021-08-24 RX ADMIN — OXYCODONE 5 MG: 5 TABLET ORAL at 10:11

## 2021-08-24 NOTE — PROGRESS NOTES
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 Pain is not under control at the moment however so he will not be leaving today. I did provide him resources for applying for disability, as he said he would likely need that in the future. DOMINGO Steen    10:23 AM  Patient is 48year old male admitted 8.22 for retroperitoneal mass. Patient's RUR is 12% green/low risk for readmission  Chart reviewed--patient discussed at IDR rounds this morning. Patient had a CT guided biopsy yesterday, was also cleared by OT. Per heme/onc patient will likely leave today. No discharge needs noted at this time, will continue to follow until discharge in case anything comes up. Transition of Care Plan  1. Continue medical management/treatment  2. Maybe home today   3. Family will transport  4. Follow up with PCP, specialties as needed  5.  CM will follow until discharge    DOMINGO Steen

## 2021-08-24 NOTE — PROGRESS NOTES
Bedside and Verbal shift change report given to Marshalleliu GreenSitka RN & Chelsie Das (student) (oncoming nurse) by Alexandrea Alejandro RN (offgoing nurse). Report included the following information SBAR, Kardex, Procedure Summary, Intake/Output, MAR, Accordion, Recent Results and Med Rec Status.

## 2021-08-24 NOTE — PROGRESS NOTES
Yoandy No [facility] 79  [address] [facility], [address] [place] [address]  [phone]      Medical Progress Note      NAME:         Polly Sauer   :        1975  MRM:        [id number]    Date of service: 2021      Subjective: Patient has been seen and examined as a follow up for multiple medical issues. Chart, labs, diagnostics reviewed. He says he has aching back discomfort at the site of the biopsy. No fever or chills. No nausea or vomiting    Objective:    Vital Signs:    Visit Vitals  /79 (BP 1 Location: Left upper arm, BP Patient Position: At rest)   Pulse 81   Temp 98 °F (36.7 °C)   Resp 18   Ht 6' 3\" (1.905 m)   Wt (!) 188.2 kg (415 lb)   SpO2 94%   BMI 51.87 kg/m²          Intake/Output Summary (Last 24 hours) at 2021 1306  Last data filed at 2021 1930  Gross per 24 hour   Intake --   Output 0 ml   Net 0 ml        Physical Examination:    General:   Well looking patient in no acute distress  Eyes:   pink conjunctivae, PERRLA with no discharge. ENT:   no ottorrhea or rhinorrhea with dry mucous membranes  Neck: no masses, thyroid non-tender and trachea central.  Pulm: clear breath sounds without crackles or wheezes  Card:  no JVD. + systolic murmur, has regular and normal S1, S2 without thrills, bruits. + peripheral edema  Abd:  Soft, non-tender, obese, non-distended, normoactive bowel sounds   Musc:  No cyanosis, clubbing, atrophy or deformities. Skin:  No rashes, bruising or ulcers. Neuro: Awake and alert.  Generally a non focal exam. Follows commands appropriately  Psych:  Has a good insight and is oriented x 3    Current Facility-Administered Medications   Medication Dose Route Frequency    oxyCODONE IR (ROXICODONE) tablet 10 mg  10 mg Oral Q4H PRN    HYDROmorphone (DILAUDID) injection 1 mg  1 mg IntraVENous Q4H PRN    insulin lispro (HUMALOG) injection   SubCUTAneous AC&HS    glucose chewable tablet 16 g  4 Tablet Oral PRN    dextrose (D50W) injection syrg 12.5-25 g  12.5-25 g IntraVENous PRN    glucagon (GLUCAGEN) injection 1 mg  1 mg IntraMUSCular PRN    sodium chloride (NS) flush 5-40 mL  5-40 mL IntraVENous Q8H    sodium chloride (NS) flush 5-40 mL  5-40 mL IntraVENous PRN    acetaminophen (TYLENOL) tablet 650 mg  650 mg Oral Q6H PRN    Or    acetaminophen (TYLENOL) suppository 650 mg  650 mg Rectal Q6H PRN    polyethylene glycol (MIRALAX) packet 17 g  17 g Oral DAILY PRN    promethazine (PHENERGAN) tablet 12.5 mg  12.5 mg Oral Q6H PRN    Or    ondansetron (ZOFRAN) injection 4 mg  4 mg IntraVENous Q6H PRN    [Held by provider] enoxaparin (LOVENOX) injection 40 mg  40 mg SubCUTAneous Q12H        Laboratory data and review:    Recent Labs     08/24/21  0125 08/23/21  0400 08/22/21  0414   WBC 7.4 8.1 9.2   HGB 10.7* 11.4* 11.0*   HCT 34.8* 38.4 36.9    193 201     Recent Labs     08/24/21  0125 08/23/21  0400 08/22/21  0414 08/21/21  2248 08/21/21 2248   * 135* 137   < > 139   K 3.9 4.2 3.8   < > 3.8    103 106   < > 106   CO2 27 29 23   < > 24   GLU 96 87 98   < > 107*   BUN 14 14 13   < > 13   CREA 0.76 0.93 0.91   < > 0.96   CA 8.2* 8.8 8.3*   < > 8.6   MG 2.3 2.6* 2.3  --   --    PHOS 3.6 4.3  --   --   --    ALB  --  3.1* 3.1*  --  3.3*   ALT  --  28 23  --  26    < > = values in this interval not displayed. No components found for: [place]    Diagnostics: Imaging studies have been reviewed    Assessment and Plan:    Retroperitoneal mass POA: CT abdomen had shown a right retroperitoneal brain mass measuring 7.5 x 5.2 x 5.8 cm. Has B-type symptoms concerning for a likely lymphoma. He is sp a CT guided biopsy done 8/23. Had a scrotal ultrasound today. Given he is still in pain, will adjust pain medications today and monitor. Outpatient follow up by Oncology. Acute abdominal pain: due to above post biopsy. Continue OxyCODONE and IV dilaudid PRN.      Severe aortic stenosis POA: Echo done showed EF 55%, severe AS. Seen by cardiology. Outpatient follow up as scheduled     DM type 2: A1C 5.9%. Patient should not be on both Ozempic and Victoza on discharge. Would continue Ozempic. Cont SSi per protocol.      HTN: BP stable.  Holding Norvasc, Losartan for now      Total time spent for the patient's care: [address] [facility] discussed with: Patient, Care Manager, Nursing Staff and Consultant/Specialist    Discussed:  Care Plan    Prophylaxis:  Lovenox - on hold    Anticipated Disposition:  Home w/Family           ___________________________________________________    Attending Physician:   Jackey Duverney, MD

## 2021-08-24 NOTE — PROGRESS NOTES
[address] [facility] at [address] [facility], [place], [address]  W: [phone] F: [phone]      Reason for Visit:   Roger Dale is a 48 y.o. male who is seen in consultation at the request of Dr. Andrea Maddox for evaluation of retroperitoneal lymphdenopathy, concerning for malignancy. Hematology / Oncology Treatment History:   Diagnosis: pending      Stage: pending    Pathology: 8/23/21 bx of right retroperitoneal node      Prior Treatment: none      Current Treatment: none      History of Present Illness:     Very pleasant 48year old male with relevant medical history of severe aortic stenosis with plan for valve repair next month, CAD, DM2, HTN, GERD, ROBERTO, who presented with symptoms of abdominal/right flank pain similar to prior hx of nephrolithiasis. Workup included CT abd/pelvis without contrast that showed large amount of retroperitoneal lymphadenopathy. The patient denies severe fatigue but does report about 40 lbs weight loss in last 2 months and some decreased appetite. He denies any recent infections or fatigue. Otherwise clinically without complaints today. .   The patient is adopted and has no knowledge of family history    Social history notable for distant history of alcohol use, but no recent ETOH, no other positive findings. Positive ROS findings include: weight loss, very mild night sweats, decreased appetite. Interval History:     Continues with pain to rt side. Concerned regarding being able to work. Owns his own company. No family at bedside. Further workup included Chest/abd/pelvis CT with contrast that showed the following: \"IMPRESSION  Chest:  1. No evidence of primary malignancy or metastatic disease in the chest.  2. Severe aortic valvular calcifications. Correlate for aortic valvular  stenosis.     Abdomen/pelvis:   1.  Redemonstrated right retroperitoneal mass measuring 7.4 x 4.9 x 6.7 cm as  above, including encasement and narrowing of the IVC and central aspects of the  bilateral renal veins. This favored to represent malignancy, with lymphoma  favored over brain metastasis. 2. No other pathologically enlarged lymph nodes within the abdomen or pelvis. Few prominent inguinal lymph nodes are favored to be reactive. 3. Hepatic steatosis. \"    Review of Systems: A complete review of systems was obtained, negative except as described above.     Past Medical History:   Diagnosis Date    Aortic stenosis, severe     Arrhythmia     heart murmur    CAD (coronary artery disease)     Diabetes (HCC)     GERD (gastroesophageal reflux disease)     Hypertension     Sleep apnea       Past Surgical History:   Procedure Laterality Date    HX HEENT      tonsils    HX ORTHOPAEDIC      Plantar Facitits L foot      Social History     Tobacco Use    Smoking status: Never Smoker    Smokeless tobacco: Never Used   Substance Use Topics    Alcohol use: No     Comment:        Family History   Adopted: Yes     Current Facility-Administered Medications   Medication Dose Route Frequency    HYDROmorphone (DILAUDID) injection 1 mg  1 mg IntraVENous Q4H PRN    oxyCODONE IR (ROXICODONE) tablet 5 mg  5 mg Oral Q4H PRN    insulin lispro (HUMALOG) injection   SubCUTAneous AC&HS    glucose chewable tablet 16 g  4 Tablet Oral PRN    dextrose (D50W) injection syrg 12.5-25 g  12.5-25 g IntraVENous PRN    glucagon (GLUCAGEN) injection 1 mg  1 mg IntraMUSCular PRN    sodium chloride (NS) flush 5-40 mL  5-40 mL IntraVENous Q8H    sodium chloride (NS) flush 5-40 mL  5-40 mL IntraVENous PRN    acetaminophen (TYLENOL) tablet 650 mg  650 mg Oral Q6H PRN    Or    acetaminophen (TYLENOL) suppository 650 mg  650 mg Rectal Q6H PRN    polyethylene glycol (MIRALAX) packet 17 g  17 g Oral DAILY PRN    promethazine (PHENERGAN) tablet 12.5 mg  12.5 mg Oral Q6H PRN    Or    ondansetron (ZOFRAN) injection 4 mg  4 mg IntraVENous Q6H PRN    [Held by provider] enoxaparin (LOVENOX) injection 40 mg  40 mg SubCUTAneous Q12H      Allergies   Allergen Reactions    Other Food Angioedema     pickle    Cucumber Fruit Extract Angioedema        Physical Exam:     Visit Vitals  /70 (BP 1 Location: Left upper arm, BP Patient Position: At rest)   Pulse 78   Temp 98.5 °F (36.9 °C)   Resp 18   Ht 6' 3\" (1.905 m)   Wt (!) 188.2 kg (415 lb)   SpO2 94%   BMI 51.87 kg/m²     ECOG PS: 1  General: obese; no distress  Eyes: anicteric sclerae  HENT: atraumatic  Neck: supple  Respiratory: normal respiratory effort  CV: 2+ BLE peripheral edema noted  GI: soft,  nondistended, no masses, no hepatomegaly, no splenomegaly; RLQ tenderness  Skin: no rashes; no ecchymoses; no petechiae  Psych: alert, oriented, appropriate affect, normal judgment/insight      Results:     Lab Results   Component Value Date/Time    WBC 7.4 08/24/2021 01:25 AM    HGB 10.7 (L) 08/24/2021 01:25 AM    HCT 34.8 (L) 08/24/2021 01:25 AM    PLATELET 630 02/19/1268 01:25 AM    MCV 83.3 08/24/2021 01:25 AM    ABS. NEUTROPHILS 5.0 08/23/2021 04:00 AM     Lab Results   Component Value Date/Time    Sodium 135 (L) 08/24/2021 01:25 AM    Potassium 3.9 08/24/2021 01:25 AM    Chloride 104 08/24/2021 01:25 AM    CO2 27 08/24/2021 01:25 AM    Glucose 96 08/24/2021 01:25 AM    BUN 14 08/24/2021 01:25 AM    Creatinine 0.76 08/24/2021 01:25 AM    GFR est AA >60 08/24/2021 01:25 AM    GFR est non-AA >60 08/24/2021 01:25 AM    Calcium 8.2 (L) 08/24/2021 01:25 AM    Glucose (POC) 101 08/24/2021 06:08 AM     Lab Results   Component Value Date/Time    Bilirubin, total 0.8 08/23/2021 04:00 AM    ALT (SGPT) 28 08/23/2021 04:00 AM    Alk.  phosphatase 82 08/23/2021 04:00 AM    Protein, total 8.2 08/23/2021 04:00 AM    Albumin 3.1 (L) 08/23/2021 04:00 AM    Globulin 5.1 (H) 08/23/2021 04:00 AM         Assessment and Plan     49 yo male with PMHx of aortic stenosis, nephrolithiasis, HTN, GERD, DM admitted with abd pain. 1. Retroperitoneal lesion of unclear malignant potential  - Clinically suspicious for lymphoma, though sarcoma can not be ruled out by imaging; LD and C reactive protein elevated  --uric acid normal  - AFP normal , B2 microglobulin pending   ---8/23  biopsy of retroperitoneal node; will follow path  --Beta-HCG elevated at 476: 8/24  ultrasound scrotal/testicular   --established follow up in the clinic to review pathology for this Friday 8/27 at 1:30 pm       2. Severe Aortic stenosis  -scheduled for surgery at [facility] on 20 Sept.   -8/22 ECHO: LVEF 55-60%; severe aortic stenosis    3. Pain: currently controlled with prn medications     4. DM: management per primary team    5. HTN:management per primary team    6.  Financial concerns  Discussed with CM and will send message for clinic        Plan reviewed with Dr Geri Lawson By: Shana Adam NP

## 2021-08-25 ENCOUNTER — TELEPHONE (OUTPATIENT)
Dept: ONCOLOGY | Age: 46
End: 2021-08-25

## 2021-08-25 DIAGNOSIS — C48.0 GERM CELL TUMOR OF RETROPERITONEUM (HCC): Primary | ICD-10-CM

## 2021-08-25 PROBLEM — I35.0 AORTIC STENOSIS: Status: ACTIVE | Noted: 2021-08-25

## 2021-08-25 PROBLEM — C62.90: Status: ACTIVE | Noted: 2021-08-25

## 2021-08-25 PROBLEM — K59.00 CONSTIPATION: Status: ACTIVE | Noted: 2021-08-25

## 2021-08-25 LAB
GLUCOSE BLD STRIP.AUTO-MCNC: 101 MG/DL (ref 65–117)
GLUCOSE BLD STRIP.AUTO-MCNC: 120 MG/DL (ref 65–117)
GLUCOSE BLD STRIP.AUTO-MCNC: 125 MG/DL (ref 65–117)
GLUCOSE BLD STRIP.AUTO-MCNC: 97 MG/DL (ref 65–117)
SERVICE CMNT-IMP: ABNORMAL
SERVICE CMNT-IMP: ABNORMAL
SERVICE CMNT-IMP: NORMAL
SERVICE CMNT-IMP: NORMAL

## 2021-08-25 PROCEDURE — 82962 GLUCOSE BLOOD TEST: CPT

## 2021-08-25 PROCEDURE — 74011250637 HC RX REV CODE- 250/637: Performed by: INTERNAL MEDICINE

## 2021-08-25 PROCEDURE — 65270000029 HC RM PRIVATE

## 2021-08-25 PROCEDURE — 74011250636 HC RX REV CODE- 250/636: Performed by: INTERNAL MEDICINE

## 2021-08-25 PROCEDURE — 99233 SBSQ HOSP IP/OBS HIGH 50: CPT | Performed by: INTERNAL MEDICINE

## 2021-08-25 RX ORDER — POLYETHYLENE GLYCOL 3350 17 G/17G
17 POWDER, FOR SOLUTION ORAL DAILY
Qty: 30 PACKET | Refills: 0 | Status: SHIPPED | OUTPATIENT
Start: 2021-08-25 | End: 2021-08-26 | Stop reason: SDUPTHER

## 2021-08-25 RX ORDER — AMOXICILLIN 250 MG
1 CAPSULE ORAL DAILY
Qty: 30 TABLET | Refills: 0 | Status: SHIPPED | OUTPATIENT
Start: 2021-08-25 | End: 2021-08-26 | Stop reason: SDUPTHER

## 2021-08-25 RX ORDER — ADHESIVE BANDAGE
30 BANDAGE TOPICAL ONCE
Status: COMPLETED | OUTPATIENT
Start: 2021-08-25 | End: 2021-08-25

## 2021-08-25 RX ORDER — OXYCODONE HYDROCHLORIDE 15 MG/1
15 TABLET ORAL
Qty: 20 TABLET | Refills: 0 | Status: SHIPPED | OUTPATIENT
Start: 2021-08-25 | End: 2021-08-26

## 2021-08-25 RX ADMIN — MAGNESIUM HYDROXIDE 30 ML: 2400 SUSPENSION ORAL at 10:24

## 2021-08-25 RX ADMIN — OXYCODONE 15 MG: 5 TABLET ORAL at 14:16

## 2021-08-25 RX ADMIN — HYDROMORPHONE HYDROCHLORIDE 1 MG: 1 INJECTION, SOLUTION INTRAMUSCULAR; INTRAVENOUS; SUBCUTANEOUS at 04:40

## 2021-08-25 RX ADMIN — POLYETHYLENE GLYCOL 3350 17 G: 17 POWDER, FOR SOLUTION ORAL at 14:14

## 2021-08-25 RX ADMIN — OXYCODONE 15 MG: 5 TABLET ORAL at 21:10

## 2021-08-25 RX ADMIN — OXYCODONE 15 MG: 5 TABLET ORAL at 08:36

## 2021-08-25 RX ADMIN — Medication 10 ML: at 21:04

## 2021-08-25 RX ADMIN — Medication 5 ML: at 13:21

## 2021-08-25 NOTE — TELEPHONE ENCOUNTER
[facility] at [place]  [phone]        08/25/21 3:44 PM Scheduled PFTs at [facility] on Friday, 08/27, at 11 AM. Patient should not use or take any breathing medications within 6 hours prior of test. Per nancy, patient to have COVID testing no more than 4 days prior or have had received COVID vaccine. Per chart review, patient had rapid COVID test done 08/22. Will update MD of above.     08/26/21 11:17 AM Per Dr. Smith Persons, can cancel above appointment and reschedule PFTs for 3 weeks from now. Message sent to [facility] to assist with appointment.

## 2021-08-25 NOTE — PROGRESS NOTES
Bedside and Verbal shift change report given to Stefani Henriquez RN (oncoming nurse) by Bianca Cranker, RN (offgoing nurse). Report included the following information SBAR, Kardex, Procedure Summary, Intake/Output, MAR, Accordion, Recent Results and Med Rec Status.

## 2021-08-25 NOTE — PROGRESS NOTES
Pharmacist Discharge Medication Reconciliation    Discharge Provider:  Dr. Deven Lucero       Discharge Medications:      My Medications        START taking these medications        Instructions Each Dose to Equal Morning Noon Evening Bedtime   oxyCODONE IR 15 mg immediate release tablet  Commonly known as: OXY-IR    Your last dose was: Your next dose is: Take 1 Tablet by mouth every four (4) hours as needed for Pain for up to 20 doses. Max Daily Amount: 90 mg.   15 mg                 polyethylene glycol 17 gram packet  Commonly known as: MIRALAX    Your last dose was: Your next dose is: Take 1 Packet by mouth daily for 30 days. Indications: constipation   17 g                 senna-docusate 8.6-50 mg per tablet  Commonly known as: Senna with Docusate Sodium    Your last dose was: Your next dose is: Take 1 Tablet by mouth daily for 30 days. Indications: constipation   1 Tablet                        CONTINUE taking these medications        Instructions Each Dose to Equal Morning Noon Evening Bedtime   amLODIPine-atorvastatin 10-20 mg per tablet  Commonly known as: Caduet    Your last dose was: Your next dose is: Take 1 Tab by mouth daily. 1 Tablet                 metFORMIN  mg tablet  Commonly known as: GLUCOPHAGE XR    Your last dose was: Your next dose is: Take 2 Tabs by mouth two (2) times a day. 1,000 mg                 Ozempic 1 mg/dose (2 mg/1.5 mL) sub-q pen  Generic drug: semaglutide    Your last dose was: Your next dose is:         1 mg by SubCUTAneous route every seven (7) days.  0.75 mg SC every 7 days x 4 weeks then 1 mg per week   1 mg                        STOP taking these medications      dapagliflozin 10 mg Tab tablet  Commonly known as: Farxiga        doxazosin 1 mg tablet  Commonly known as: CARDURA        liraglutide 0.6 mg/0.1 mL (18 mg/3 mL) Pnij  Commonly known as: VICTOZA        losartan 25 mg tablet  Commonly known as: COZAAR                  Where to Get Your Medications        These medications were sent to [id number] [address] [facility], [address] [place] [address], [address] [place] [address]      Phone: [phone]   oxyCODONE IR 15 mg immediate release tablet       Information on where to get these meds will be given to you by the nurse or doctor. Ask your nurse or doctor about these medications  polyethylene glycol 17 gram packet  senna-docusate 8.6-50 mg per tablet       Victoza has been dc'd upon discharge (patient is on Ozempic)  Med rec pharmacist has also called patient's outpatient pharmacy to request that a note be placed on patient's profile to question if both Ozempic and Victoza are ordered to prevent therapeutic duplication in the future.       The patient's chart, MAR, and AVS were reviewed by   Vanda Carmona Celso Emilio Ferreiro 23: [phone]

## 2021-08-25 NOTE — PROGRESS NOTES
[address] [facility] at [address], [place], [address]  W: [phone] F: [phone]      Reason for Visit:   Patricia Manuel is a 48 y.o. male who is seen in consultation at the request of Dr. Miki Beatty for evaluation of retroperitoneal lymphdenopathy, concerning for malignancy. Hematology Oncology Treatment History:     Diagnosis: Metastatic testicular germ cell tumor    Stage: Pending    Pathology:   8/23/21 Retroperitoneal Mass, Core biopsy:  Malignant neoplasm consistent with metastatic testicular germ cell   tumor (see Comment). Comment: The smears and cell block preparation demonstrate pleomorphic malignant   tumor cells in a background of lymphocytes.  Only a scant amount of tumor   is present on the cell block preparation.  Immunohistochemical stains   demonstrate diffuse positivity within the tumor cell population for PLAP,    (c-kit) and focal positivity for HCG. The tumor cells are negative   for cytokeratin cocktail, CAM 5.2, EMA, S100 and alpha-inhibin. Laurina Espinoza   results are consistent with a testicular germ cell tumor and the   combination of diffuse PLAP positive and  positivity is indicative of   a seminomatous differentation.  Correlation with clinical and radiographic   findings is indicated    Prior Treatment: None    Current Treatment: pending    History of Present Illness:   Very pleasant 48year old male with relevant medical history of severe aortic stenosis with plan for valve repair next month, CAD, DM2, HTN, GERD, ROBERTO, who presented with symptoms of abdominal/right flank pain similar to prior hx of nephrolithiasis. Workup included CT abd/pelvis without contrast that showed large amount of retroperitoneal lymphadenopathy. The patient denies severe fatigue but does report about 40 lbs weight loss in last 2 months and some decreased appetite. He denies any recent infections or fatigue.  Otherwise clinically without complaints today. .   The patient is adopted and has no knowledge of family history    Social history notable for distant history of alcohol use, but no recent ETOH, no other positive findings. Positive ROS findings include: weight loss, very mild night sweats, decreased appetite. Interval History:     States pain is better controlled. Has concerns about delaying heart surgery and about his work and financial status. No family at bedside. Review of Systems: A complete review of systems was obtained, negative except as described above.     Past Medical History:   Diagnosis Date    Aortic stenosis, severe     Arrhythmia     heart murmur    CAD (coronary artery disease)     Diabetes (HCC)     GERD (gastroesophageal reflux disease)     Hypertension     Sleep apnea       Past Surgical History:   Procedure Laterality Date    HX HEENT      tonsils    HX ORTHOPAEDIC      Plantar Facitits L foot      Social History     Tobacco Use    Smoking status: Never Smoker    Smokeless tobacco: Never Used   Substance Use Topics    Alcohol use: No     Comment:        Family History   Adopted: Yes     Current Facility-Administered Medications   Medication Dose Route Frequency    magnesium hydroxide (MILK OF MAGNESIA) 400 mg/5 mL oral suspension 30 mL  30 mL Oral ONCE    oxyCODONE IR (ROXICODONE) tablet 15 mg  15 mg Oral Q4H PRN    HYDROmorphone (DILAUDID) injection 1 mg  1 mg IntraVENous Q4H PRN    insulin lispro (HUMALOG) injection   SubCUTAneous AC&HS    glucose chewable tablet 16 g  4 Tablet Oral PRN    dextrose (D50W) injection syrg 12.5-25 g  12.5-25 g IntraVENous PRN    glucagon (GLUCAGEN) injection 1 mg  1 mg IntraMUSCular PRN    sodium chloride (NS) flush 5-40 mL  5-40 mL IntraVENous Q8H    sodium chloride (NS) flush 5-40 mL  5-40 mL IntraVENous PRN    acetaminophen (TYLENOL) tablet 650 mg  650 mg Oral Q6H PRN    Or    acetaminophen (TYLENOL) suppository 650 mg  650 mg Rectal Q6H PRN    polyethylene glycol (MIRALAX) packet 17 g  17 g Oral DAILY PRN    promethazine (PHENERGAN) tablet 12.5 mg  12.5 mg Oral Q6H PRN    Or    ondansetron (ZOFRAN) injection 4 mg  4 mg IntraVENous Q6H PRN    [Held by provider] enoxaparin (LOVENOX) injection 40 mg  40 mg SubCUTAneous Q12H      Allergies   Allergen Reactions    Other Food Angioedema     pickle    Cucumber Fruit Extract Angioedema        Physical Exam:     Visit Vitals  /86 (BP 1 Location: Left upper arm, BP Patient Position: At rest)   Pulse 77   Temp 97.5 °F (36.4 °C)   Resp 20   Ht 6' 3\" (1.905 m)   Wt (!) 415 lb (188.2 kg)   SpO2 93%   BMI 51.87 kg/m²     ECOG PS: 1  General: obese; no distress  Eyes: anicteric sclerae  HENT: atraumatic  Neck: supple  Respiratory: normal respiratory effort  CV: 2+ BLE peripheral edema noted  GI: soft,  nondistended, no masses, no hepatomegaly, no splenomegaly; RLQ tenderness  Skin: no rashes; no ecchymoses; no petechiae  Psych: alert, oriented, appropriate affect, normal judgment/insight      Results:     Lab Results   Component Value Date/Time    WBC 7.4 08/24/2021 01:25 AM    HGB 10.7 (L) 08/24/2021 01:25 AM    HCT 34.8 (L) 08/24/2021 01:25 AM    PLATELET 236 56/53/9743 01:25 AM    MCV 83.3 08/24/2021 01:25 AM    ABS. NEUTROPHILS 5.0 08/23/2021 04:00 AM     Lab Results   Component Value Date/Time    Sodium 135 (L) 08/24/2021 01:25 AM    Potassium 3.9 08/24/2021 01:25 AM    Chloride 104 08/24/2021 01:25 AM    CO2 27 08/24/2021 01:25 AM    Glucose 96 08/24/2021 01:25 AM    BUN 14 08/24/2021 01:25 AM    Creatinine 0.76 08/24/2021 01:25 AM    GFR est AA >60 08/24/2021 01:25 AM    GFR est non-AA >60 08/24/2021 01:25 AM    Calcium 8.2 (L) 08/24/2021 01:25 AM    Glucose (POC) 120 (H) 08/25/2021 06:16 AM     Lab Results   Component Value Date/Time    Bilirubin, total 0.8 08/23/2021 04:00 AM    ALT (SGPT) 28 08/23/2021 04:00 AM    Alk.  phosphatase 82 08/23/2021 04:00 AM    Protein, total 8.2 08/23/2021 04:00 AM    Albumin 3.1 (L) 08/23/2021 04:00 AM    Globulin 5.1 (H) 08/23/2021 04:00 AM       8/23/21: , AFP 1.4, b-hCG 476 mIU/mL  8/24/21: uric acid 4.2. Imaging     8/21/21 CT abd/p without contrast:  IMPRESSION  1. Right retroperitoneal brain mass measuring 7.5 x 5.2 x 5.8 cm as above, with  narrowing of the IVC and likely encasement of the bilateral renal veins, though  not well evaluated without the use of IV contrast. Findings are highly  suspicious for either lymphoma or brain metastasis. 2. Few borderline enlarged inguinal lymph nodes are indeterminant. 3. Hepatic steatosis. 8/22/21 CT ch/abd/p with contrast:  IMPRESSION  Chest:  1. No evidence of primary malignancy or metastatic disease in the chest.  2. Severe aortic valvular calcifications. Correlate for aortic valvular stenosis. Abdomen/pelvis:   1. Redemonstrated right retroperitoneal mass measuring 7.4 x 4.9 x 6.7 cm as  above, including encasement and narrowing of the IVC and central aspects of the  bilateral renal veins. This favored to represent malignancy, with lymphoma  favored over brain metastasis. 2. No other pathologically enlarged lymph nodes within the abdomen or pelvis. Few prominent inguinal lymph nodes are favored to be reactive. 3. Hepatic steatosis. 8/24/21 Ultrasound scrotum/testicles:  IMPRESSION  1. Study limited by patient's increased body habitus   2. No testicular mass identified. Incidental right-sided testicular microlithiasis, a debatable risk factor for malignancy. Pursue and follow-up as  per clinical concern. 3.  Small left hydrocele. Procedures:  7/20/21 PFTs:  FVC 4.83, 80% of predicted. QVQ89.43, 89% of predicted. FEV1% is 86%. TLC is 6.89, which is 86% of perdicted. DLCO is 70% of predicted and when considering alveolar volume is 83% of predicted. Airway resistance is 58% of predicted.   Impression:  The patient's spirometry, lung volumes and airway resistance are all normal. The patient's diffuse capacity is mildly decreased, but corrects when considering alveolar volume. Assessment and Plan   47 yo male with PMHx of aortic stenosis, nephrolithiasis, HTN, GERD, DM admitted with abd pain, found to have RP mass. 1. Metastatic testicular germ cell tumor:  Tumor markers show elevated  and b-. Need to distinguish pure seminoma from non-seminomatous germ cell tumor (NSGCT) or mixed germ cell tumor. Usually, scrotal ultrasound is not a replacement for radical inguinal orchiectomy as surgery will provide accurate histologic diagnosis. However, no testicular mass seen on scrotal ultrasound. Testicular microlithiasis, seen on ultrasound for this patient, has a strong association with testicular cancer. So, I am interested to know whether R orchiectomy is advised by Urology. b-HCG is usually not elevated in pure seminoma, but can be elevated in up to 20% of seminomas. I am unsure of the significance of the prominent inguinal LNs. Risk stratification for advanced testicular germ cell tumors: Good risk NSGCT based on: Retroperitoneal primary tumor, no mets to other organs, serum AFP < 1000, b-hCG < 5000 rylee-international units/ml, LDH < 3 times upper limit of normal. However, accurate staging requires orchiectomy and measurement of tumor markers AFTER surgery. Chemotherapy treatment will likely be 3 cycles of BEP or 4 cycles of EP. Chemotherapy is given on days 1, 8, 15 of a 21-day cycle. -- Discuss cryopreservation of sperm  -- [facility] done 3 weeks ago in prep for heart valve surgery per pt. Need this prior to Bleomycin  -- Port placement tomorrow am. Can discharge home afterwards.   -- Urology consulted to discuss Right radical inguinal orchiectomy - discussed with Dr. Javier Smith who advises against orchiectomy at this time given lack of current mass (pt may have had an original mass which then metastasized and then  off.)  -- Tentative plan to initiate BEP chemtoherapy regimen on Monday, 8/31/21 - this will require treatment Mon through Friday the first week of each 3 week cycle, and weekly on days 8 and 15. Total of 3 cycles (9 weeks) planned  -- Outpatient HemOnc appt this Friday, 8/27 at 1:30 pm - subject to change based on current hospitalization and tests  -- Outpatient Urology appt with Dr. Catrachita Cardona on 8/31/21 - this may need to be rescheduled based on patient's chemotherapy schedule. 2. Severe Aortic stenosis:  8/22 ECHO: LVEF 55-60% and severe aortic stenosis. Pt is scheduled for surgery at [facility] on 20 Sept. Unfortunately, this heart surgery will need to be postponed given new cancer diagnosis. Discussed with Dr. Nancy Deshpande, covering for Dr. Pat Ohara: Severe AS has mortality rate of 50% at 2 yrs, but cannot proceed with surgery if untreated cancer. Also, will need to proceed with IVF carefully to avoid fluid overload. May need gentle daily diuresis. -- Discussing with Cardiology about whether chemotherapy for germ cell tumor takes precedence or not. Chemotherapy regimen requires significant IVF and oral hydration. 3. Pain: currently controlled with prn medications     4. DM: management per primary team    5. HTN:management per primary team    6. 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 He has concerns about how to afford not working and how to have pain controlled while driving (no opioids while driving.) Discussed with CM and clinical .       Signed By: Eleazar Sauceda MD

## 2021-08-25 NOTE — DISCHARGE INSTRUCTIONS
HOSPITALIST DISCHARGE INSTRUCTIONS    NAME: Milan Adam   :  1975   MRN:  [id number]     Date:     2021    ADMIT DATE: 2021     DISCHARGE DATE: 2021     PRINCIPAL ADMITTING DIAGNOSIS:  Mass in the abdomen [R19.00]  Retroperitoneal mass [R19.00]    DISCHARGE DIAGNOSES:  Principal Problem:    Retroperitoneal mass (2021)    DM (diabetes mellitus), type 2 ([address] (12/15/2020)    Essential hypertension (12/15/2020)    Aortic stenosis (2021)    MEDICATIONS:    · It is important that medications are taken exactly as they are prescribed on the discharge medication instructions and keep them your  in the bottles provided by the pharmacist.   · Keep a list of the medication names, dosages, and times to be taken at all times. · Do not take other medications without consulting your doctor. Recommended diet:  Diabetic Diet    Recommended activity: Activity as tolerated    Post discharge care:    Notify follow up health care provider or return to the emergency department if you cannot get hold of your doctor if you feel worse or experience symptoms similar to those that brought you to hospital    Follow-up Information     Follow up With Specialties Details Why Kelle Palm MD Hematology and Oncology, Internal Medicine, Hematology, Oncology Go on 2021 appt at 1:30 pm for review of pathology and test results [address] [place] [address]  [phone]      River Leal NP Nurse Practitioner   [address] [id number]  [address] Urology  On 2021 Dr. Betancourt [address]   [facility] [id number]          Information obtained by :  I understand that if any problems occur once I am at home I am to contact my physician and I understand and acknowledge receipt of the instructions indicated above. Physician's or R.N.'s Signature                                                                  Date/Time                                                                                                                                              Patient or Representative Signature                                                          Date/Time

## 2021-08-25 NOTE — TELEPHONE ENCOUNTER
[address] at [facility] [address]  [phone]        08/25/21 3:20 PM [address] [facility] to initiate referral. Spoke with Radha Ahn. The andrology department is closed for the day and unable to see patient virtually today. Provided Elena with contact information for this office--someone from department is to call this nurse back tomorrow morning. Faxed referral, patient demographics, insurance card, and consult note to [phone]. Will continue to monitor. 08/26/21 10:56 AM [address] fertility clinic to follow up on status of referral. Andrology lab has attempted to call patient x 3 with no return call as of yet. Per Radha Ahn, patient can return call at [phone]. Will notify MD of above. No other contact number on file for patient. 11:11 AM [address] fertility clinic to alert them that patient should now be back in his room after undergoing port placement. Also need to clarify if visit would be for in-person or virtual. Spoke with Leonila Byrne regarding above. She states visit will likely be for in-person appointment. She stated she is forwarding message to andrology lab and Radha Ahn to request that they call patient again to coordinate visit.

## 2021-08-25 NOTE — PROGRESS NOTES
Spoke to Dr. Cannon Said. D/C patient once patient has BM and after seen by Urology.  Give milk of Magnesia and PRN Miralax if still no BM

## 2021-08-25 NOTE — CONSULTS
Urology Consult    Patient: Johanny Rios MRN: [id number]  SSN: [id number]    YOB: 1975  Age: 48 y.o. Sex: male          Date of Consultation:  August 25, 2021  Requesting Physician: Allie Sawant MD  Reason for Consultation:  Metastatic testicular germ cell          History of Present Illness:  Johanny Rios is a 48 y.o. male admitted 8/21/2021 to the hospital for Mass in the abdomen [R19.00]  Retroperitoneal mass [R19.00]. He was admitted with  R severe flank pain the patient has hx of nephrolithiasis and assumed pain was related to that , Work up noted on CT noted recetroperitonal brain mass , he subsequently had a bx of the mass which showed metastatic testicular germ cell tumor , Urology was consulted . Pt followed by Dr. Anton Wen for hx of kidney stones with URS in 2017. Pt had not been seen in office since . Pt seen in bed unaware of nature of consult . Discussed U/s result with pt . Discussed follow up with urology outpatient but nothing urgent . Pt denies any testicular pain or discomfort . Past Medical History:   Diagnosis Date    Aortic stenosis, severe     Arrhythmia     heart murmur    CAD (coronary artery disease)     Diabetes (HCC)     GERD (gastroesophageal reflux disease)     Hypertension     Sleep apnea         Past Surgical History:   Procedure Laterality Date    HX HEENT      tonsils    HX ORTHOPAEDIC      Plantar Facitits L foot       Allergies   Allergen Reactions    Other Food Angioedema     pickle    Cucumber Fruit Extract Angioedema        Prior to Admission medications    Medication Sig Start Date End Date Taking? Authorizing Provider   polyethylene glycol (MIRALAX) 17 gram packet Take 1 Packet by mouth daily for 30 days. Indications: constipation 8/25/21 9/24/21 Yes Allie Sawant MD   senna-docusate (Senna with Docusate Sodium) 8.6-50 mg per tablet Take 1 Tablet by mouth daily for 30 days.  Indications: constipation 21 Yes Amber Loya MD   oxyCODONE IR (OXY-IR) 15 mg immediate release tablet Take 1 Tablet by mouth every four (4) hours as needed for Pain for up to 20 doses. Max Daily Amount: 90 mg. 21 Yes Amber Loya MD   dapagliflozin Chrissy Bilis) 10 mg tab tablet Take 1 Tablet by mouth daily. 21  Yes Abbie Pereyra NP   semaglutide (Ozempic) 1 mg/dose (2 mg/1.5 mL) sub-q pen 1 mg by SubCUTAneous route every seven (7) days. 0.75 mg SC every 7 days x 4 weeks then 1 mg per week 21  Yes Abbie Pereyra NP   doxazosin (CARDURA) 1 mg tablet TAKE ONE TABLET BY MOUTH ONE TIME DAILY 21  Yes Abbie Pereyra NP   losartan (COZAAR) 25 mg tablet TAKE ONE TABLET BY MOUTH ONE TIME DAILY 21  Yes Abbie Pereyra NP   metFORMIN ER (GLUCOPHAGE XR) 500 mg tablet Take 2 Tabs by mouth two (2) times a day. 21  Yes Abbie Pereyra NP   liraglutide (VICTOZA) 0.6 mg/0.1 mL (18 mg/3 mL) pnij 0.6 mg by SubCUTAneous route daily. 21  Yes Abbie Pereyra NP   amLODIPine-atorvastatin (Caduet) 10-20 mg per tablet Take 1 Tab by mouth daily. 12/15/20  Yes Abbie Pereyra NP       Social History     Tobacco Use    Smoking status: Never Smoker    Smokeless tobacco: Never Used   Substance Use Topics    Alcohol use: No     Comment:          Family History   Adopted: Yes        ROS:  Admission ROS from 2021 was reviewed and changes (other than per HPI) include: none. Physical Exam:    Vital Signs:  Temp (24hrs), Av.8 °F (36.6 °C), Min:97.5 °F (36.4 °C), Max:98.2 °F (36.8 °C)   Blood pressure 121/86, pulse 77, temperature 97.5 °F (36.4 °C), resp. rate 20, height 6' 3\" (1.905 m), weight (!) 188.2 kg (415 lb), SpO2 93 %.   I&O's:  701 - 0  In: 240 [P.O.:240]  Out: -     Appearance: well-developed NAD ,obese   Neck: supple   Respiratory Effort: breathing easily   Lower Extremity: no edema   Abdomen/Flank: soft non-tender without masses; no CVA tenderness   Liver/Spleen: no organomegaly   Hernia: no ventral hernia   Anus/Perineum: non-tender   Rectal: deffered  Hemoccult: not indicated   Scrotum: without lesions   Testes: bilaterally non-tender, no masses   Epididymes: bilaterally no masses palpated, non-tender   Penis: no plaques; no lesions   Meatus: patent   Prostate: symmetric, non-tender, no nodules   SV's: non-palpable   Lymph: no adenopathy   Skin Inspection: warm and dry   Mood/Affect: normal      Lab Review:     CBC   Recent Labs     08/24/21 0125 08/23/21  0400   WBC 7.4 8.1   HGB 10.7* 11.4*   HCT 34.8* 38.4    193     CMP   Recent Labs     08/24/21 0125 08/23/21  0400   * 135*   K 3.9 4.2    103   CO2 27 29   GLU 96 87   BUN 14 14   CREA 0.76 0.93   CA 8.2* 8.8   MG 2.3 2.6*   PHOS 3.6 4.3   ALB  --  3.1*   TBILI  --  0.8   ALT  --  28       Other No results for input(s): INR, PSA, INREXT in the last 72 hours. No lab exists for component: PTT    UA:   Lab Results   Component Value Date/Time    Color DARK YELLOW 08/22/2021 01:09 AM    Appearance CLOUDY (A) 08/22/2021 01:09 AM    Specific gravity 1.025 08/22/2021 01:09 AM    Specific gravity 1.030 04/24/2017 06:00 AM    pH (UA) 5.5 08/22/2021 01:09 AM    Protein Negative 08/22/2021 01:09 AM    Glucose >1,000 (A) 08/22/2021 01:09 AM    Ketone 15 (A) 08/22/2021 01:09 AM    Bilirubin Negative 08/22/2021 01:09 AM    Urobilinogen 0.2 08/22/2021 01:09 AM    Nitrites Negative 08/22/2021 01:09 AM    Leukocyte Esterase Negative 08/22/2021 01:09 AM    Epithelial cells FEW 08/22/2021 01:09 AM    Bacteria Negative 08/22/2021 01:09 AM    WBC 0-4 08/22/2021 01:09 AM    RBC 0-5 08/22/2021 01:09 AM       Cultures:  NA    Imaging:      CT:   EXAM:  CT ABD PELV WO CONT     INDICATION: abd pain and flank pain     COMPARISON: CT abdomen pelvis 4/27/2017.     CONTRAST:  None.     TECHNIQUE:   Thin axial images were obtained through the abdomen and pelvis.  Coronal and  sagittal reconstructions were generated. Oral contrast was not administered. CT  dose reduction was achieved through use of a standardized protocol tailored for  this examination and automatic exposure control for dose modulation.      FINDINGS:   Lower Thorax:  Lung Bases: Clear.     Heart: The heart is normal in size. No pericardial effusion. Prominent aortic  valvular calcifications.     Abdomen/Pelvis:  Evaluation of the solid organs is markedly limited without the use of IV  contrast.     Liver:  No focal liver lesions. Diffuse hepatic steatosis.     Biliary system: Gallbladder is unremarkable.     Spleen: Normal.     Pancreas: Normal.     Kidneys/Ureters/Bladder: No renal masses. No renal or ureteral calculi. No  hydronephrosis or hydroureter. The bladder is normal.     Adrenals: Normal.     Stomach/bowel: No dilation or abnormal wall thickening is present. No free  intraperitoneal air noted. Mild sigmoid diverticulosis. Normal appendix.     Reproductive Organs: Prostate is normal in size.     Vasculature: Normal caliber arteries.     Nodes: There is a right retroperitoneal brain mass the anterior aspect of the  IVC and extending into the preaortic region and aortocaval region measuring  approximately 7.5 x 5.2 x 5.8 cm (series 2 image 42). This appears to  encapsulates the bilateral renal veins as they approach the IVC, though this is  not well evaluated without the use of IV contrast. The IVC is also narrowed. There are borderline enlarged inguinal lymph nodes, largest measuring 15 mm in  the right groin (series 2 image 98).    Fluid: No free fluid.     Bones/Soft Tissue: No acute fractures or aggressive osseous lesions are seen.     IMPRESSION  1. Right retroperitoneal brain mass measuring 7.5 x 5.2 x 5.8 cm as above, with  narrowing of the IVC and likely encasement of the bilateral renal veins, though  not well evaluated without the use of IV contrast. Findings are highly  suspicious for either lymphoma or brain metastasis.   2. Few borderline enlarged inguinal lymph nodes are indeterminant. 3. Hepatic steatosis.       EXAM: US SCROTUM/TESTICLES      INDICATION: Rule out testicular mass given RP LAD and elevatd bHCG.     COMPARISON: CT chest abdomen and pelvis 8/22/2021.     TECHNIQUE:  Real-time sonography of the scrotum was performed with a high frequency linear  transducer. Multiple static images were obtained. Color Doppler evaluation was  also performed.     FINDINGS:  Study limited by patient's increased body habitus     RIGHT TESTICLE: The right testicle is normal in size with normal color-flow. Microlithiasis without appreciable mass. The right testis measures 3.4 cm x 2.1  cm x 1.7 cm and the right testicular volume is 6. 1.      RIGHT EPIDIDYMIS: The right epididymis is normal.     LEFT TESTICLE: The left testicle is normal in size and echotexture with normal  color-flow. The left testis measures 3.7 cm x 2.9 cm x 2.6 cm and the left  testicular volume is 14.6. Small hydrocele.     LEFT EPIDIDYMIS: The left epididymis is normal.     IMPRESSION  1. Study limited by patient's increased body habitus   2. No testicular mass identified. Incidental right-sided testicular  microlithiasis, a debatable risk factor for malignancy. Pursue and follow-up as  per clinical concern. 3.  Small left hydrocele.        Assessment:     Principal Problem:    Retroperitoneal mass (8/22/2021)    Active Problems:    DM (diabetes mellitus), type 2 ([address] (12/15/2020)      Essential hypertension (12/15/2020)      Aortic stenosis (8/25/2021)          Plan:     1. Metastatic testicular germ cell neoplasm - scrotal ultrasound does not show a testicular mass but microlithiasis on the right . No acute surgical intervention at this time . - Pt scheduled with Dr. James Gustafson on 8/31 at 0800 at [facility]    Pt has scheduled follow up with hem / onc for path result and treatment plan     Please call with questions     Signed By: Tanner Alanis.  Vasile Hernandez, SANJIV  - August 25, 2021

## 2021-08-26 ENCOUNTER — APPOINTMENT (OUTPATIENT)
Dept: INTERVENTIONAL RADIOLOGY/VASCULAR | Age: 46
DRG: 240 | End: 2021-08-26
Attending: NURSE PRACTITIONER
Payer: COMMERCIAL

## 2021-08-26 VITALS
RESPIRATION RATE: 16 BRPM | TEMPERATURE: 97.7 F | HEART RATE: 76 BPM | OXYGEN SATURATION: 93 % | HEIGHT: 75 IN | DIASTOLIC BLOOD PRESSURE: 73 MMHG | WEIGHT: 315 LBS | BODY MASS INDEX: 39.17 KG/M2 | SYSTOLIC BLOOD PRESSURE: 122 MMHG

## 2021-08-26 LAB
GLUCOSE BLD STRIP.AUTO-MCNC: 120 MG/DL (ref 65–117)
GLUCOSE BLD STRIP.AUTO-MCNC: 96 MG/DL (ref 65–117)
SERVICE CMNT-IMP: ABNORMAL
SERVICE CMNT-IMP: NORMAL

## 2021-08-26 PROCEDURE — C1894 INTRO/SHEATH, NON-LASER: HCPCS

## 2021-08-26 PROCEDURE — 74011250636 HC RX REV CODE- 250/636: Performed by: NURSE PRACTITIONER

## 2021-08-26 PROCEDURE — 74011250636 HC RX REV CODE- 250/636: Performed by: INTERNAL MEDICINE

## 2021-08-26 PROCEDURE — 77030020851 HC CAUT BTRY HI AARM -A

## 2021-08-26 PROCEDURE — 82962 GLUCOSE BLOOD TEST: CPT

## 2021-08-26 PROCEDURE — 2709999900 HC NON-CHARGEABLE SUPPLY

## 2021-08-26 PROCEDURE — 0JH63XZ INSERTION OF TUNNELED VASCULAR ACCESS DEVICE INTO CHEST SUBCUTANEOUS TISSUE AND FASCIA, PERCUTANEOUS APPROACH: ICD-10-PCS | Performed by: STUDENT IN AN ORGANIZED HEALTH CARE EDUCATION/TRAINING PROGRAM

## 2021-08-26 PROCEDURE — 91303 HC RX REV CODE- 250/636: CPT | Performed by: NURSE PRACTITIONER

## 2021-08-26 PROCEDURE — C1788 PORT, INDWELLING, IMP: HCPCS

## 2021-08-26 PROCEDURE — 99152 MOD SED SAME PHYS/QHP 5/>YRS: CPT

## 2021-08-26 PROCEDURE — 76937 US GUIDE VASCULAR ACCESS: CPT

## 2021-08-26 PROCEDURE — 02H633Z INSERTION OF INFUSION DEVICE INTO RIGHT ATRIUM, PERCUTANEOUS APPROACH: ICD-10-PCS | Performed by: STUDENT IN AN ORGANIZED HEALTH CARE EDUCATION/TRAINING PROGRAM

## 2021-08-26 PROCEDURE — 77030010507 HC ADH SKN DERMBND J&J -B

## 2021-08-26 PROCEDURE — 74011250637 HC RX REV CODE- 250/637: Performed by: INTERNAL MEDICINE

## 2021-08-26 PROCEDURE — 77030031139 HC SUT VCRL2 J&J -A

## 2021-08-26 PROCEDURE — 74011000250 HC RX REV CODE- 250: Performed by: STUDENT IN AN ORGANIZED HEALTH CARE EDUCATION/TRAINING PROGRAM

## 2021-08-26 PROCEDURE — 99153 MOD SED SAME PHYS/QHP EA: CPT

## 2021-08-26 PROCEDURE — 74011250636 HC RX REV CODE- 250/636: Performed by: STUDENT IN AN ORGANIZED HEALTH CARE EDUCATION/TRAINING PROGRAM

## 2021-08-26 PROCEDURE — C1769 GUIDE WIRE: HCPCS

## 2021-08-26 RX ORDER — PALONOSETRON 0.05 MG/ML
0.25 INJECTION, SOLUTION INTRAVENOUS ONCE
Status: CANCELLED | OUTPATIENT
Start: 2021-08-30 | End: 2021-08-30

## 2021-08-26 RX ORDER — AMOXICILLIN 250 MG
1 CAPSULE ORAL DAILY
Qty: 30 TABLET | Refills: 0 | Status: SHIPPED | OUTPATIENT
Start: 2021-08-26 | End: 2021-09-25

## 2021-08-26 RX ORDER — ALBUTEROL SULFATE 0.83 MG/ML
2.5 SOLUTION RESPIRATORY (INHALATION) AS NEEDED
Status: CANCELLED
Start: 2021-09-02

## 2021-08-26 RX ORDER — HEPARIN 100 UNIT/ML
300-500 SYRINGE INTRAVENOUS AS NEEDED
Status: CANCELLED
Start: 2021-08-31

## 2021-08-26 RX ORDER — ACETAMINOPHEN 325 MG/1
650 TABLET ORAL AS NEEDED
Status: CANCELLED
Start: 2021-09-08

## 2021-08-26 RX ORDER — ACETAMINOPHEN 325 MG/1
650 TABLET ORAL AS NEEDED
Status: CANCELLED
Start: 2021-08-31

## 2021-08-26 RX ORDER — SODIUM CHLORIDE 9 MG/ML
10 INJECTION INTRAMUSCULAR; INTRAVENOUS; SUBCUTANEOUS AS NEEDED
Status: CANCELLED | OUTPATIENT
Start: 2021-09-01

## 2021-08-26 RX ORDER — ALBUTEROL SULFATE 0.83 MG/ML
2.5 SOLUTION RESPIRATORY (INHALATION) AS NEEDED
Status: CANCELLED
Start: 2021-09-13

## 2021-08-26 RX ORDER — ONDANSETRON 2 MG/ML
8 INJECTION INTRAMUSCULAR; INTRAVENOUS AS NEEDED
Status: CANCELLED | OUTPATIENT
Start: 2021-09-03

## 2021-08-26 RX ORDER — DIPHENHYDRAMINE HYDROCHLORIDE 50 MG/ML
50 INJECTION, SOLUTION INTRAMUSCULAR; INTRAVENOUS AS NEEDED
Status: CANCELLED
Start: 2021-09-13

## 2021-08-26 RX ORDER — ACETAMINOPHEN 325 MG/1
650 TABLET ORAL AS NEEDED
Status: CANCELLED
Start: 2021-08-30

## 2021-08-26 RX ORDER — LIDOCAINE HYDROCHLORIDE 10 MG/ML
1-10 INJECTION INFILTRATION; PERINEURAL
Status: COMPLETED | OUTPATIENT
Start: 2021-08-26 | End: 2021-08-26

## 2021-08-26 RX ORDER — ONDANSETRON 2 MG/ML
8 INJECTION INTRAMUSCULAR; INTRAVENOUS AS NEEDED
Status: CANCELLED | OUTPATIENT
Start: 2021-08-31

## 2021-08-26 RX ORDER — SODIUM CHLORIDE 0.9 % (FLUSH) 0.9 %
10 SYRINGE (ML) INJECTION AS NEEDED
Status: CANCELLED | OUTPATIENT
Start: 2021-09-01

## 2021-08-26 RX ORDER — LIDOCAINE HYDROCHLORIDE AND EPINEPHRINE 10; 10 MG/ML; UG/ML
1-20 INJECTION, SOLUTION INFILTRATION; PERINEURAL ONCE
Status: COMPLETED | OUTPATIENT
Start: 2021-08-26 | End: 2021-08-26

## 2021-08-26 RX ORDER — SODIUM CHLORIDE 0.9 % (FLUSH) 0.9 %
10 SYRINGE (ML) INJECTION AS NEEDED
Status: CANCELLED | OUTPATIENT
Start: 2021-09-03

## 2021-08-26 RX ORDER — DIPHENHYDRAMINE HYDROCHLORIDE 50 MG/ML
25 INJECTION, SOLUTION INTRAMUSCULAR; INTRAVENOUS AS NEEDED
Status: CANCELLED
Start: 2021-09-01

## 2021-08-26 RX ORDER — HYDROCORTISONE SODIUM SUCCINATE 100 MG/2ML
100 INJECTION, POWDER, FOR SOLUTION INTRAMUSCULAR; INTRAVENOUS AS NEEDED
Status: CANCELLED | OUTPATIENT
Start: 2021-09-08

## 2021-08-26 RX ORDER — ALBUTEROL SULFATE 0.83 MG/ML
2.5 SOLUTION RESPIRATORY (INHALATION) AS NEEDED
Status: CANCELLED
Start: 2021-09-01

## 2021-08-26 RX ORDER — EPINEPHRINE 1 MG/ML
0.3 INJECTION, SOLUTION, CONCENTRATE INTRAVENOUS AS NEEDED
Status: CANCELLED | OUTPATIENT
Start: 2021-09-08

## 2021-08-26 RX ORDER — HYDROCORTISONE SODIUM SUCCINATE 100 MG/2ML
100 INJECTION, POWDER, FOR SOLUTION INTRAMUSCULAR; INTRAVENOUS AS NEEDED
Status: CANCELLED | OUTPATIENT
Start: 2021-09-03

## 2021-08-26 RX ORDER — SODIUM CHLORIDE 9 MG/ML
25 INJECTION, SOLUTION INTRAVENOUS CONTINUOUS
Status: CANCELLED | OUTPATIENT
Start: 2021-08-31

## 2021-08-26 RX ORDER — DIPHENHYDRAMINE HYDROCHLORIDE 50 MG/ML
25 INJECTION, SOLUTION INTRAMUSCULAR; INTRAVENOUS AS NEEDED
Status: CANCELLED
Start: 2021-09-02

## 2021-08-26 RX ORDER — DIPHENHYDRAMINE HYDROCHLORIDE 50 MG/ML
50 INJECTION, SOLUTION INTRAMUSCULAR; INTRAVENOUS AS NEEDED
Status: CANCELLED
Start: 2021-09-08

## 2021-08-26 RX ORDER — OXYCODONE HYDROCHLORIDE 15 MG/1
15 TABLET ORAL
Qty: 20 TABLET | Refills: 0 | Status: SHIPPED | OUTPATIENT
Start: 2021-08-26 | End: 2021-08-31

## 2021-08-26 RX ORDER — ONDANSETRON 2 MG/ML
8 INJECTION INTRAMUSCULAR; INTRAVENOUS AS NEEDED
Status: CANCELLED | OUTPATIENT
Start: 2021-08-30

## 2021-08-26 RX ORDER — SODIUM CHLORIDE 9 MG/ML
10 INJECTION INTRAMUSCULAR; INTRAVENOUS; SUBCUTANEOUS AS NEEDED
Status: CANCELLED | OUTPATIENT
Start: 2021-09-02

## 2021-08-26 RX ORDER — ONDANSETRON 2 MG/ML
8 INJECTION INTRAMUSCULAR; INTRAVENOUS ONCE
Status: CANCELLED | OUTPATIENT
Start: 2021-09-02 | End: 2021-09-02

## 2021-08-26 RX ORDER — EPINEPHRINE 1 MG/ML
0.3 INJECTION, SOLUTION, CONCENTRATE INTRAVENOUS AS NEEDED
Status: CANCELLED | OUTPATIENT
Start: 2021-09-01

## 2021-08-26 RX ORDER — HYDROCORTISONE SODIUM SUCCINATE 100 MG/2ML
100 INJECTION, POWDER, FOR SOLUTION INTRAMUSCULAR; INTRAVENOUS AS NEEDED
Status: CANCELLED | OUTPATIENT
Start: 2021-08-30

## 2021-08-26 RX ORDER — ONDANSETRON 2 MG/ML
8 INJECTION INTRAMUSCULAR; INTRAVENOUS AS NEEDED
Status: CANCELLED | OUTPATIENT
Start: 2021-09-02

## 2021-08-26 RX ORDER — ACETAMINOPHEN 325 MG/1
650 TABLET ORAL ONCE
Status: CANCELLED
Start: 2021-09-13 | End: 2021-09-13

## 2021-08-26 RX ORDER — FUROSEMIDE 10 MG/ML
20 INJECTION INTRAMUSCULAR; INTRAVENOUS ONCE
Status: CANCELLED
Start: 2021-08-30 | End: 2021-08-30

## 2021-08-26 RX ORDER — SODIUM CHLORIDE 0.9 % (FLUSH) 0.9 %
10 SYRINGE (ML) INJECTION AS NEEDED
Status: CANCELLED | OUTPATIENT
Start: 2021-09-08

## 2021-08-26 RX ORDER — SODIUM CHLORIDE 9 MG/ML
10 INJECTION INTRAMUSCULAR; INTRAVENOUS; SUBCUTANEOUS AS NEEDED
Status: CANCELLED | OUTPATIENT
Start: 2021-09-13

## 2021-08-26 RX ORDER — SODIUM CHLORIDE 9 MG/ML
25 INJECTION, SOLUTION INTRAVENOUS CONTINUOUS
Status: CANCELLED
Start: 2021-09-08

## 2021-08-26 RX ORDER — ACETAMINOPHEN 325 MG/1
650 TABLET ORAL AS NEEDED
Status: CANCELLED
Start: 2021-09-13

## 2021-08-26 RX ORDER — SODIUM CHLORIDE 9 MG/ML
25 INJECTION, SOLUTION INTRAVENOUS CONTINUOUS
Status: CANCELLED
Start: 2021-09-13

## 2021-08-26 RX ORDER — SODIUM CHLORIDE 0.9 % (FLUSH) 0.9 %
10 SYRINGE (ML) INJECTION AS NEEDED
Status: CANCELLED | OUTPATIENT
Start: 2021-09-13

## 2021-08-26 RX ORDER — SODIUM CHLORIDE 9 MG/ML
25 INJECTION, SOLUTION INTRAVENOUS CONTINUOUS
Status: CANCELLED | OUTPATIENT
Start: 2021-09-03

## 2021-08-26 RX ORDER — SODIUM CHLORIDE 0.9 % (FLUSH) 0.9 %
10 SYRINGE (ML) INJECTION AS NEEDED
Status: CANCELLED | OUTPATIENT
Start: 2021-08-30

## 2021-08-26 RX ORDER — HEPARIN 100 UNIT/ML
300-500 SYRINGE INTRAVENOUS AS NEEDED
Status: CANCELLED
Start: 2021-09-01

## 2021-08-26 RX ORDER — ONDANSETRON 2 MG/ML
8 INJECTION INTRAMUSCULAR; INTRAVENOUS ONCE
Status: CANCELLED | OUTPATIENT
Start: 2021-09-03 | End: 2021-09-03

## 2021-08-26 RX ORDER — HEPARIN 100 UNIT/ML
300-500 SYRINGE INTRAVENOUS AS NEEDED
Status: CANCELLED
Start: 2021-09-13

## 2021-08-26 RX ORDER — OXYCODONE AND ACETAMINOPHEN 7.5; 325 MG/1; MG/1
1 TABLET ORAL
Status: DISCONTINUED | OUTPATIENT
Start: 2021-08-26 | End: 2021-08-26

## 2021-08-26 RX ORDER — ONDANSETRON 2 MG/ML
8 INJECTION INTRAMUSCULAR; INTRAVENOUS AS NEEDED
Status: CANCELLED | OUTPATIENT
Start: 2021-09-08

## 2021-08-26 RX ORDER — SODIUM CHLORIDE 9 MG/ML
25 INJECTION, SOLUTION INTRAVENOUS CONTINUOUS
Status: CANCELLED | OUTPATIENT
Start: 2021-09-02

## 2021-08-26 RX ORDER — DIPHENHYDRAMINE HYDROCHLORIDE 50 MG/ML
25 INJECTION, SOLUTION INTRAMUSCULAR; INTRAVENOUS AS NEEDED
Status: CANCELLED
Start: 2021-09-13

## 2021-08-26 RX ORDER — ALBUTEROL SULFATE 0.83 MG/ML
2.5 SOLUTION RESPIRATORY (INHALATION) AS NEEDED
Status: CANCELLED
Start: 2021-09-03

## 2021-08-26 RX ORDER — EPINEPHRINE 1 MG/ML
0.3 INJECTION, SOLUTION, CONCENTRATE INTRAVENOUS AS NEEDED
Status: CANCELLED | OUTPATIENT
Start: 2021-09-13

## 2021-08-26 RX ORDER — SODIUM CHLORIDE 0.9 % (FLUSH) 0.9 %
10 SYRINGE (ML) INJECTION AS NEEDED
Status: CANCELLED | OUTPATIENT
Start: 2021-08-31

## 2021-08-26 RX ORDER — DIPHENHYDRAMINE HYDROCHLORIDE 50 MG/ML
50 INJECTION, SOLUTION INTRAMUSCULAR; INTRAVENOUS AS NEEDED
Status: CANCELLED
Start: 2021-09-01

## 2021-08-26 RX ORDER — ACETAMINOPHEN 325 MG/1
650 TABLET ORAL AS NEEDED
Status: CANCELLED
Start: 2021-09-01

## 2021-08-26 RX ORDER — HEPARIN 100 UNIT/ML
100-500 SYRINGE INTRAVENOUS ONCE
Status: COMPLETED | OUTPATIENT
Start: 2021-08-26 | End: 2021-08-26

## 2021-08-26 RX ORDER — SODIUM CHLORIDE 9 MG/ML
10 INJECTION INTRAMUSCULAR; INTRAVENOUS; SUBCUTANEOUS AS NEEDED
Status: CANCELLED | OUTPATIENT
Start: 2021-09-03

## 2021-08-26 RX ORDER — DIPHENHYDRAMINE HYDROCHLORIDE 50 MG/ML
50 INJECTION, SOLUTION INTRAMUSCULAR; INTRAVENOUS AS NEEDED
Status: CANCELLED
Start: 2021-08-31

## 2021-08-26 RX ORDER — DIPHENHYDRAMINE HYDROCHLORIDE 50 MG/ML
25 INJECTION, SOLUTION INTRAMUSCULAR; INTRAVENOUS AS NEEDED
Status: CANCELLED
Start: 2021-08-30

## 2021-08-26 RX ORDER — ACETAMINOPHEN 325 MG/1
650 TABLET ORAL AS NEEDED
Status: CANCELLED
Start: 2021-09-02

## 2021-08-26 RX ORDER — EPINEPHRINE 1 MG/ML
0.3 INJECTION, SOLUTION, CONCENTRATE INTRAVENOUS AS NEEDED
Status: CANCELLED | OUTPATIENT
Start: 2021-09-03

## 2021-08-26 RX ORDER — HYDROCORTISONE SODIUM SUCCINATE 100 MG/2ML
100 INJECTION, POWDER, FOR SOLUTION INTRAMUSCULAR; INTRAVENOUS AS NEEDED
Status: CANCELLED | OUTPATIENT
Start: 2021-08-31

## 2021-08-26 RX ORDER — EPINEPHRINE 1 MG/ML
0.3 INJECTION, SOLUTION, CONCENTRATE INTRAVENOUS AS NEEDED
Status: CANCELLED | OUTPATIENT
Start: 2021-09-02

## 2021-08-26 RX ORDER — DIPHENHYDRAMINE HYDROCHLORIDE 50 MG/ML
25 INJECTION, SOLUTION INTRAMUSCULAR; INTRAVENOUS AS NEEDED
Status: CANCELLED
Start: 2021-09-08

## 2021-08-26 RX ORDER — EPINEPHRINE 1 MG/ML
0.3 INJECTION, SOLUTION, CONCENTRATE INTRAVENOUS AS NEEDED
Status: CANCELLED | OUTPATIENT
Start: 2021-08-30

## 2021-08-26 RX ORDER — ALBUTEROL SULFATE 0.83 MG/ML
2.5 SOLUTION RESPIRATORY (INHALATION) AS NEEDED
Status: CANCELLED
Start: 2021-08-31

## 2021-08-26 RX ORDER — HYDROCORTISONE SODIUM SUCCINATE 100 MG/2ML
100 INJECTION, POWDER, FOR SOLUTION INTRAMUSCULAR; INTRAVENOUS AS NEEDED
Status: CANCELLED | OUTPATIENT
Start: 2021-09-13

## 2021-08-26 RX ORDER — SODIUM CHLORIDE 9 MG/ML
25 INJECTION, SOLUTION INTRAVENOUS CONTINUOUS
Status: CANCELLED | OUTPATIENT
Start: 2021-09-01

## 2021-08-26 RX ORDER — SODIUM CHLORIDE 9 MG/ML
10 INJECTION INTRAMUSCULAR; INTRAVENOUS; SUBCUTANEOUS AS NEEDED
Status: CANCELLED | OUTPATIENT
Start: 2021-08-30

## 2021-08-26 RX ORDER — DIPHENHYDRAMINE HYDROCHLORIDE 50 MG/ML
50 INJECTION, SOLUTION INTRAMUSCULAR; INTRAVENOUS AS NEEDED
Status: CANCELLED
Start: 2021-09-03

## 2021-08-26 RX ORDER — ONDANSETRON 2 MG/ML
8 INJECTION INTRAMUSCULAR; INTRAVENOUS AS NEEDED
Status: CANCELLED | OUTPATIENT
Start: 2021-09-13

## 2021-08-26 RX ORDER — ONDANSETRON 2 MG/ML
8 INJECTION INTRAMUSCULAR; INTRAVENOUS AS NEEDED
Status: CANCELLED | OUTPATIENT
Start: 2021-09-01

## 2021-08-26 RX ORDER — HEPARIN 100 UNIT/ML
300-500 SYRINGE INTRAVENOUS AS NEEDED
Status: CANCELLED
Start: 2021-08-30

## 2021-08-26 RX ORDER — ONDANSETRON 2 MG/ML
8 INJECTION INTRAMUSCULAR; INTRAVENOUS ONCE
Status: CANCELLED | OUTPATIENT
Start: 2021-09-01 | End: 2021-09-01

## 2021-08-26 RX ORDER — HEPARIN 100 UNIT/ML
300-500 SYRINGE INTRAVENOUS AS NEEDED
Status: CANCELLED
Start: 2021-09-02

## 2021-08-26 RX ORDER — ALBUTEROL SULFATE 0.83 MG/ML
2.5 SOLUTION RESPIRATORY (INHALATION) AS NEEDED
Status: CANCELLED
Start: 2021-08-30

## 2021-08-26 RX ORDER — DIPHENHYDRAMINE HYDROCHLORIDE 50 MG/ML
25 INJECTION, SOLUTION INTRAMUSCULAR; INTRAVENOUS ONCE
Status: CANCELLED
Start: 2021-09-13 | End: 2021-09-13

## 2021-08-26 RX ORDER — SODIUM CHLORIDE 9 MG/ML
25 INJECTION, SOLUTION INTRAVENOUS CONTINUOUS
Status: CANCELLED | OUTPATIENT
Start: 2021-08-30

## 2021-08-26 RX ORDER — HEPARIN 100 UNIT/ML
300-500 SYRINGE INTRAVENOUS AS NEEDED
Status: CANCELLED
Start: 2021-09-03

## 2021-08-26 RX ORDER — OXYCODONE AND ACETAMINOPHEN 10; 325 MG/1; MG/1
1 TABLET ORAL
Status: DISCONTINUED | OUTPATIENT
Start: 2021-08-26 | End: 2021-08-26 | Stop reason: HOSPADM

## 2021-08-26 RX ORDER — DIPHENHYDRAMINE HYDROCHLORIDE 50 MG/ML
50 INJECTION, SOLUTION INTRAMUSCULAR; INTRAVENOUS AS NEEDED
Status: CANCELLED
Start: 2021-08-30

## 2021-08-26 RX ORDER — POLYETHYLENE GLYCOL 3350 17 G/17G
17 POWDER, FOR SOLUTION ORAL DAILY
Qty: 30 PACKET | Refills: 0 | Status: SHIPPED | OUTPATIENT
Start: 2021-08-26 | End: 2021-09-25

## 2021-08-26 RX ORDER — DIPHENHYDRAMINE HYDROCHLORIDE 50 MG/ML
25 INJECTION, SOLUTION INTRAMUSCULAR; INTRAVENOUS ONCE
Status: CANCELLED
Start: 2021-09-08 | End: 2021-09-06

## 2021-08-26 RX ORDER — DIPHENHYDRAMINE HYDROCHLORIDE 50 MG/ML
50 INJECTION, SOLUTION INTRAMUSCULAR; INTRAVENOUS AS NEEDED
Status: CANCELLED
Start: 2021-09-02

## 2021-08-26 RX ORDER — SODIUM CHLORIDE 0.9 % (FLUSH) 0.9 %
10 SYRINGE (ML) INJECTION AS NEEDED
Status: CANCELLED | OUTPATIENT
Start: 2021-09-02

## 2021-08-26 RX ORDER — ACETAMINOPHEN 325 MG/1
650 TABLET ORAL ONCE
Status: CANCELLED
Start: 2021-09-08 | End: 2021-09-06

## 2021-08-26 RX ORDER — ALBUTEROL SULFATE 0.83 MG/ML
2.5 SOLUTION RESPIRATORY (INHALATION) AS NEEDED
Status: CANCELLED
Start: 2021-09-08

## 2021-08-26 RX ORDER — DIPHENHYDRAMINE HYDROCHLORIDE 50 MG/ML
25 INJECTION, SOLUTION INTRAMUSCULAR; INTRAVENOUS AS NEEDED
Status: CANCELLED
Start: 2021-09-03

## 2021-08-26 RX ORDER — HYDROCORTISONE SODIUM SUCCINATE 100 MG/2ML
100 INJECTION, POWDER, FOR SOLUTION INTRAMUSCULAR; INTRAVENOUS AS NEEDED
Status: CANCELLED | OUTPATIENT
Start: 2021-09-01

## 2021-08-26 RX ORDER — ONDANSETRON 2 MG/ML
8 INJECTION INTRAMUSCULAR; INTRAVENOUS ONCE
Status: CANCELLED | OUTPATIENT
Start: 2021-08-31 | End: 2021-08-31

## 2021-08-26 RX ORDER — SODIUM CHLORIDE 9 MG/ML
10 INJECTION INTRAMUSCULAR; INTRAVENOUS; SUBCUTANEOUS AS NEEDED
Status: CANCELLED | OUTPATIENT
Start: 2021-09-08

## 2021-08-26 RX ORDER — SODIUM CHLORIDE 9 MG/ML
10 INJECTION INTRAMUSCULAR; INTRAVENOUS; SUBCUTANEOUS AS NEEDED
Status: CANCELLED | OUTPATIENT
Start: 2021-08-31

## 2021-08-26 RX ORDER — DIPHENHYDRAMINE HYDROCHLORIDE 50 MG/ML
25 INJECTION, SOLUTION INTRAMUSCULAR; INTRAVENOUS ONCE
Status: CANCELLED
Start: 2021-08-30 | End: 2021-08-30

## 2021-08-26 RX ORDER — DIPHENHYDRAMINE HYDROCHLORIDE 50 MG/ML
25 INJECTION, SOLUTION INTRAMUSCULAR; INTRAVENOUS AS NEEDED
Status: CANCELLED
Start: 2021-08-31

## 2021-08-26 RX ORDER — ACETAMINOPHEN 325 MG/1
650 TABLET ORAL ONCE
Status: CANCELLED
Start: 2021-08-30 | End: 2021-08-30

## 2021-08-26 RX ORDER — MIDAZOLAM HYDROCHLORIDE 1 MG/ML
1-5 INJECTION, SOLUTION INTRAMUSCULAR; INTRAVENOUS
Status: DISCONTINUED | OUTPATIENT
Start: 2021-08-26 | End: 2021-08-26 | Stop reason: HOSPADM

## 2021-08-26 RX ORDER — EPINEPHRINE 1 MG/ML
0.3 INJECTION, SOLUTION, CONCENTRATE INTRAVENOUS AS NEEDED
Status: CANCELLED | OUTPATIENT
Start: 2021-08-31

## 2021-08-26 RX ORDER — HEPARIN 100 UNIT/ML
300-500 SYRINGE INTRAVENOUS AS NEEDED
Status: CANCELLED
Start: 2021-09-08

## 2021-08-26 RX ORDER — FUROSEMIDE 10 MG/ML
20 INJECTION INTRAMUSCULAR; INTRAVENOUS ONCE
Status: CANCELLED
Start: 2021-09-02 | End: 2021-09-02

## 2021-08-26 RX ORDER — ACETAMINOPHEN 325 MG/1
650 TABLET ORAL AS NEEDED
Status: CANCELLED
Start: 2021-09-03

## 2021-08-26 RX ORDER — HYDROCORTISONE SODIUM SUCCINATE 100 MG/2ML
100 INJECTION, POWDER, FOR SOLUTION INTRAMUSCULAR; INTRAVENOUS AS NEEDED
Status: CANCELLED | OUTPATIENT
Start: 2021-09-02

## 2021-08-26 RX ORDER — FENTANYL CITRATE 50 UG/ML
25-100 INJECTION, SOLUTION INTRAMUSCULAR; INTRAVENOUS
Status: DISCONTINUED | OUTPATIENT
Start: 2021-08-26 | End: 2021-08-26 | Stop reason: HOSPADM

## 2021-08-26 RX ADMIN — JNJ-78436735 0.5 ML: 50000000000 SUSPENSION INTRAMUSCULAR at 15:19

## 2021-08-26 RX ADMIN — MIDAZOLAM 1 MG: 1 INJECTION INTRAMUSCULAR; INTRAVENOUS at 08:56

## 2021-08-26 RX ADMIN — OXYCODONE AND ACETAMINOPHEN 1 TABLET: 7.5; 325 TABLET ORAL at 11:11

## 2021-08-26 RX ADMIN — FENTANYL CITRATE 25 MCG: 50 INJECTION, SOLUTION INTRAMUSCULAR; INTRAVENOUS at 09:01

## 2021-08-26 RX ADMIN — LIDOCAINE HYDROCHLORIDE 7 ML: 10 INJECTION, SOLUTION INFILTRATION; PERINEURAL at 09:01

## 2021-08-26 RX ADMIN — CEFAZOLIN 3 G: 1 INJECTION, POWDER, FOR SOLUTION INTRAMUSCULAR; INTRAVENOUS; PARENTERAL at 08:30

## 2021-08-26 RX ADMIN — FENTANYL CITRATE 25 MCG: 50 INJECTION, SOLUTION INTRAMUSCULAR; INTRAVENOUS at 09:10

## 2021-08-26 RX ADMIN — Medication 10 ML: at 05:20

## 2021-08-26 RX ADMIN — HEPARIN 400 UNITS: 100 SYRINGE at 09:21

## 2021-08-26 RX ADMIN — FENTANYL CITRATE 50 MCG: 50 INJECTION, SOLUTION INTRAMUSCULAR; INTRAVENOUS at 08:56

## 2021-08-26 RX ADMIN — MIDAZOLAM 1 MG: 1 INJECTION INTRAMUSCULAR; INTRAVENOUS at 09:09

## 2021-08-26 RX ADMIN — MIDAZOLAM 1 MG: 1 INJECTION INTRAMUSCULAR; INTRAVENOUS at 09:01

## 2021-08-26 RX ADMIN — LIDOCAINE HYDROCHLORIDE AND EPINEPHRINE 2 ML: 10; 10 INJECTION, SOLUTION INFILTRATION; PERINEURAL at 09:05

## 2021-08-26 RX ADMIN — HYDROMORPHONE HYDROCHLORIDE 1 MG: 1 INJECTION, SOLUTION INTRAMUSCULAR; INTRAVENOUS; SUBCUTANEOUS at 12:53

## 2021-08-26 NOTE — PROGRESS NOTES
TRANSFER - OUT REPORT:    Verbal report given to be given at bedside in clpo (name) on Sandro Irving being transferred to clpo (unit) for routine post - op       Report consisted of patient's Situation, Background, Assessment and   Recommendations(SBAR). Information from the following report(s) Procedure Summary was reviewed with the receiving nurse. Opportunity for questions and clarification was provided.       Patient transported with:   Registered Nurse

## 2021-08-26 NOTE — PROGRESS NOTES
[facility]  [address] [facility], [id number] [place] [address]  [phone]      Medical Progress Note      NAME:         Meryl Kincaid   :        1975  MRM:        [id number]    Date of service: 2021      Subjective: Patient has been seen and examined as a follow up for multiple medical issues. Chart, labs, diagnostics reviewed. Had complained of constipation, persistent aching abdominal discomfort although better with current pain regimen. No fever or chills. No nausea or vomiting    Objective:    Vital Signs:    Visit Vitals  /84 (BP 1 Location: Left upper arm, BP Patient Position: At rest)   Pulse 77   Temp 97.8 °F (36.6 °C)   Resp 19   Ht 6' 3\" (1.905 m)   Wt (!) 188.2 kg (415 lb)   SpO2 93%   BMI 51.87 kg/m²          Intake/Output Summary (Last 24 hours) at 2021  Last data filed at 2021 1416  Gross per 24 hour   Intake 480 ml   Output --   Net 480 ml        Physical Examination:    General:   Weak and not in distress, anxious. Eyes:   pink conjunctivae, PERRLA with no discharge. ENT:   no ottorrhea or rhinorrhea with dry mucous membranes  Neck: no masses, thyroid non-tender and trachea central.  Pulm:  no accessory muscle use, decreased but clear breath sounds without crackles or wheezes  Card:  no JVD or murmurs, has regular and normal S1, S2 without thrills, bruits. + peripheral edema  Abd:  Soft, mild discomfort, non-distended, normoactive bowel sounds  Musc:  No cyanosis, clubbing, atrophy or deformities. Skin:  No rashes, bruising or ulcers. Neuro: Awake and alert.  Generally a non focal exam. Follows commands appropriately  Psych:  Has a good insight and is oriented x 3    Current Facility-Administered Medications   Medication Dose Route Frequency    oxyCODONE IR (ROXICODONE) tablet 15 mg  15 mg Oral Q4H PRN    HYDROmorphone (DILAUDID) injection 1 mg  1 mg IntraVENous Q4H PRN  insulin lispro (HUMALOG) injection   SubCUTAneous AC&HS    glucose chewable tablet 16 g  4 Tablet Oral PRN    dextrose (D50W) injection syrg 12.5-25 g  12.5-25 g IntraVENous PRN    glucagon (GLUCAGEN) injection 1 mg  1 mg IntraMUSCular PRN    sodium chloride (NS) flush 5-40 mL  5-40 mL IntraVENous Q8H    sodium chloride (NS) flush 5-40 mL  5-40 mL IntraVENous PRN    acetaminophen (TYLENOL) tablet 650 mg  650 mg Oral Q6H PRN    Or    acetaminophen (TYLENOL) suppository 650 mg  650 mg Rectal Q6H PRN    polyethylene glycol (MIRALAX) packet 17 g  17 g Oral DAILY PRN    promethazine (PHENERGAN) tablet 12.5 mg  12.5 mg Oral Q6H PRN    Or    ondansetron (ZOFRAN) injection 4 mg  4 mg IntraVENous Q6H PRN        Laboratory data and review:    Recent Labs     08/24/21  0125 08/23/21  0400   WBC 7.4 8.1   HGB 10.7* 11.4*   HCT 34.8* 38.4    193     Recent Labs     08/24/21  0125 08/23/21  0400   * 135*   K 3.9 4.2    103   CO2 27 29   GLU 96 87   BUN 14 14   CREA 0.76 0.93   CA 8.2* 8.8   MG 2.3 2.6*   PHOS 3.6 4.3   ALB  --  3.1*   ALT  --  28     No components found for: [place]    Diagnostics: Imaging studies have been reviewed    Assessment and Plan:    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 CT abdomen had shown a right retroperitoneal brain mass measuring 7.5 x 5.2 x 5.8 cm. Had a CT guided biopsy done 8/23 and pathology showed malignant neoplasm consistent with metastatic testicular germ cell Tumor. Scrotal ultrasound showed no testicular mass but a right sided microlithiasis. Beta HCG was elevated. He was seen by Urology and Oncology and will follow up with both teams for further management. Given he will need chemotherapy soon, he has been scheduled for a port placement prior to discharge     Acute abdominal pain: due to above post biopsy, metastatic disease, constipation.  Continue OxyCODONE with a bowel regimen. Monitor clinical progress     Severe aortic stenosis POA: Echo done showed EF 55%, severe AS for which he has a scheduled AVR. Seen by cardiology. Outpatient follow up as scheduled with primary team     DM type 2: A1C 5.9%.  Patient should not be on both Ozempic and Victoza on discharge. Blood glucose remains stable. Continue Ozempic, Metformin. DM diet        HTN: BP stable. Resume Caduet.  Hold Losartan     Total time spent for the patient's care: [address] discussed with: Patient, Care Manager, Nursing Staff and Consultant/Specialist    Discussed:  Care Plan    Prophylaxis:  SCD's    Anticipated Disposition:  Home w/Family           ___________________________________________________    Attending Physician:   Maggie Mackey MD

## 2021-08-26 NOTE — PROGRESS NOTES
8/26/2021  Case Management Progress Note    10:47 AM  Patient is 48year old male admitted 8.22 for retroperitoneal mass. Patient's RUR is 14% green/low risk for readmission  Chart reviewed--patient discussed at IDR rounds this morning  Patient was supposed to discharge yesterday, however biopsy came back and showed metastatic testicular cancer. Heme onc has seen and patient should be able to discharge today with outpatient follow up--he already had a port placed for chemotherapy. Patient does not have any CM needs at this time, I had previously provided disability paperwork/resources. Will continue to follow. Transition of Care Plan  1. Continue medical management/treatment  2. Port placed for chemo today   3. Home when ready   4. Family will transport  5.  Cm will follow    DOMINGO Campo

## 2021-08-26 NOTE — H&P
Interventional and Vascular Radiology History and Physical    Patient: Naila Gould 48 y.o. male       Chief Complaint: Abdominal Pain (RLQ), Flank Pain (Right Lower), and Fever      History of Present Illness: 48year old male with metastatic abdominal/retroperitoneal adenopathy and possible testicular source of malignancy presents for [address] placement. History:    Past Medical History:   Diagnosis Date    Aortic stenosis, severe     Arrhythmia     heart murmur    CAD (coronary artery disease)     Diabetes (HCC)     GERD (gastroesophageal reflux disease)     Hypertension     Sleep apnea      Family History   Adopted: Yes     Social History     Socioeconomic History    Marital status: SINGLE     Spouse name: Not on file    Number of children: Not on file    Years of education: Not on file    Highest education level: Not on file   Occupational History    Not on file   Tobacco Use    Smoking status: Never Smoker    Smokeless tobacco: Never Used   Substance and Sexual Activity    Alcohol use: No     Comment:      Drug use: No    Sexual activity: Not on file   Other Topics Concern    Not on file   Social History Narrative    Not on file     Social Determinants of Health     Financial Resource Strain:     Difficulty of Paying Living Expenses:    Food Insecurity:     Worried About Running Out of Food in the Last Year:     Ran Out of Food in the Last Year:    Transportation Needs:     Lack of Transportation (Medical):      Lack of Transportation (Non-Medical):    Physical Activity:     Days of Exercise per Week:     Minutes of Exercise per Session:    Stress:     Feeling of Stress :    Social Connections:     Frequency of Communication with Friends and Family:     Frequency of Social Gatherings with Friends and Family:     Attends Protestant Services:     Active Member of Clubs or Organizations:     Attends Club or Organization Meetings:     Marital Status:    Intimate Partner Violence:  Fear of Current or Ex-Partner:     Emotionally Abused:     Physically Abused:     Sexually Abused: Allergies: Allergies   Allergen Reactions    Other Food Angioedema     pickle    Cucumber Fruit Extract Angioedema       Current Medications:  Current Facility-Administered Medications   Medication Dose Route Frequency    oxyCODONE IR (ROXICODONE) tablet 15 mg  15 mg Oral Q4H PRN    HYDROmorphone (DILAUDID) injection 1 mg  1 mg IntraVENous Q4H PRN    insulin lispro (HUMALOG) injection   SubCUTAneous AC&HS    glucose chewable tablet 16 g  4 Tablet Oral PRN    dextrose (D50W) injection syrg 12.5-25 g  12.5-25 g IntraVENous PRN    glucagon (GLUCAGEN) injection 1 mg  1 mg IntraMUSCular PRN    sodium chloride (NS) flush 5-40 mL  5-40 mL IntraVENous Q8H    sodium chloride (NS) flush 5-40 mL  5-40 mL IntraVENous PRN    acetaminophen (TYLENOL) tablet 650 mg  650 mg Oral Q6H PRN    Or    acetaminophen (TYLENOL) suppository 650 mg  650 mg Rectal Q6H PRN    polyethylene glycol (MIRALAX) packet 17 g  17 g Oral DAILY PRN    promethazine (PHENERGAN) tablet 12.5 mg  12.5 mg Oral Q6H PRN    Or    ondansetron (ZOFRAN) injection 4 mg  4 mg IntraVENous Q6H PRN        Physical Exam:  Blood pressure 122/81, pulse 76, temperature 97.7 °F (36.5 °C), resp. rate 18, height 6' 3\" (1.905 m), weight (!) 188.2 kg (415 lb), SpO2 96 %.   No acute distress  Good peripheral perfusion  Nonlabored respirations  Procedure site right chest/neck without abnormalities     Alerts:    Hospital Problems  Date Reviewed: 5/5/2021        Codes Class Noted POA    Aortic stenosis ICD-10-CM: I35.0  ICD-9-CM: 424.1  8/25/2021 Yes        * (Principal) Germ cell tumor of retroperitoneum ([facility] ICD-10-CM: C48.0  ICD-9-CM: 158.0  8/25/2021 Yes        Malignant germ cell tumor of testis ([facility] ICD-10-CM: C62.90  ICD-9-CM: 186.9  8/25/2021 Yes        Constipation ICD-10-CM: K59.00  ICD-9-CM: 564.00  8/25/2021 Yes        Retroperitoneal mass ICD-10-CM: R19.00  ICD-9-CM: 789.30  8/22/2021 Yes        DM (diabetes mellitus), type 2 ([facility] ICD-10-CM: E11.9  ICD-9-CM: 250.00  12/15/2020 Yes        Essential hypertension ICD-10-CM: I10  ICD-9-CM: 401.9  12/15/2020 Yes              Laboratory:      Recent Labs     08/24/21  0125   HGB 10.7*   HCT 34.8*   WBC 7.4      BUN 14   CREA 0.76   K 3.9         Plan of Care/Planned Procedure:  Risks, benefits, and alternatives reviewed with patient and he agrees to proceed with the procedure. Conscious sedation will be performed with IV fentanyl and versed.        Juana Ibrahim MD

## 2021-08-26 NOTE — PROGRESS NOTES
[address] [facility] at [address], [place], [address] [facility]  W: [phone] F: [phone]      Reason for Visit:   Lary Perales is a 48 y.o. male who is seen in consultation at the request of Dr. Blanca Veliz for evaluation of retroperitoneal lymphdenopathy, concerning for malignancy. Hematology Oncology Treatment History:     Diagnosis: Metastatic testicular germ cell tumor    Stage: Pending    Pathology:   8/23/21 Retroperitoneal Mass, Core biopsy:  Malignant neoplasm consistent with metastatic testicular germ cell   tumor (see Comment). Comment: The smears and cell block preparation demonstrate pleomorphic malignant   tumor cells in a background of lymphocytes.  Only a scant amount of tumor   is present on the cell block preparation.  Immunohistochemical stains   demonstrate diffuse positivity within the tumor cell population for PLAP,    (c-kit) and focal positivity for HCG. The tumor cells are negative   for cytokeratin cocktail, CAM 5.2, EMA, S100 and alpha-inhibin. Juanjose Bodily   results are consistent with a testicular germ cell tumor and the   combination of diffuse PLAP positive and  positivity is indicative of   a seminomatous differentation.  Correlation with clinical and radiographic   findings is indicated    Prior Treatment: None    Current Treatment: pending    History of Present Illness:   Very pleasant 48year old male with relevant medical history of severe aortic stenosis with plan for valve repair next month, CAD, DM2, HTN, GERD, ROBERTO, who presented with symptoms of abdominal/right flank pain similar to prior hx of nephrolithiasis. Workup included CT abd/pelvis without contrast that showed large amount of retroperitoneal lymphadenopathy. The patient denies severe fatigue but does report about 40 lbs weight loss in last 2 months and some decreased appetite. He denies any recent infections or fatigue.  Otherwise clinically without complaints today. .   The patient is adopted and has no knowledge of family history    Social history notable for distant history of alcohol use, but no recent ETOH, no other positive findings. Positive ROS findings include: weight loss, very mild night sweats, decreased appetite. Interval History:     Had port placed this am: tolerated procedure. Has not had a BM since last Saturday; normally goes daily. No family at bedside. Review of Systems: A complete review of systems was obtained, negative except as described above.     Past Medical History:   Diagnosis Date    Aortic stenosis, severe     Arrhythmia     heart murmur    CAD (coronary artery disease)     Diabetes (HCC)     GERD (gastroesophageal reflux disease)     Hypertension     Sleep apnea       Past Surgical History:   Procedure Laterality Date    HX HEENT      tonsils    HX ORTHOPAEDIC      Plantar Facitits L foot      Social History     Tobacco Use    Smoking status: Never Smoker    Smokeless tobacco: Never Used   Substance Use Topics    Alcohol use: No     Comment:        Family History   Adopted: Yes     Current Facility-Administered Medications   Medication Dose Route Frequency    oxyCODONE IR (ROXICODONE) tablet 15 mg  15 mg Oral Q4H PRN    HYDROmorphone (DILAUDID) injection 1 mg  1 mg IntraVENous Q4H PRN    insulin lispro (HUMALOG) injection   SubCUTAneous AC&HS    glucose chewable tablet 16 g  4 Tablet Oral PRN    dextrose (D50W) injection syrg 12.5-25 g  12.5-25 g IntraVENous PRN    glucagon (GLUCAGEN) injection 1 mg  1 mg IntraMUSCular PRN    sodium chloride (NS) flush 5-40 mL  5-40 mL IntraVENous Q8H    sodium chloride (NS) flush 5-40 mL  5-40 mL IntraVENous PRN    acetaminophen (TYLENOL) tablet 650 mg  650 mg Oral Q6H PRN    Or    acetaminophen (TYLENOL) suppository 650 mg  650 mg Rectal Q6H PRN    polyethylene glycol (MIRALAX) packet 17 g  17 g Oral DAILY PRN    promethazine (PHENERGAN) tablet 12.5 mg  12.5 mg Oral Q6H PRN    Or    ondansetron (ZOFRAN) injection 4 mg  4 mg IntraVENous Q6H PRN      Allergies   Allergen Reactions    Other Food Angioedema     pickle    Cucumber Fruit Extract Angioedema        Physical Exam:     Visit Vitals  /73 (BP 1 Location: Right upper arm, BP Patient Position: At rest)   Pulse 76   Temp 97.7 °F (36.5 °C)   Resp 16   Ht 6' 3\" (1.905 m)   Wt (!) 188.2 kg (415 lb)   SpO2 93%   BMI 51.87 kg/m²     ECOG PS: 1  General: obese; no distress  Eyes: anicteric sclerae  HENT: atraumatic  Neck: supple  Respiratory: normal respiratory effort  CV: port to Rt upper chest area. 2+ BLE peripheral edema noted  GI: soft,  nondistended, no masses, no hepatomegaly, no splenomegaly; RLQ tenderness  Skin: no rashes; no ecchymoses; no petechiae  Psych: alert, oriented, appropriate affect, normal judgment/insight      Results:     Lab Results   Component Value Date/Time    WBC 7.4 08/24/2021 01:25 AM    HGB 10.7 (L) 08/24/2021 01:25 AM    HCT 34.8 (L) 08/24/2021 01:25 AM    PLATELET 860 67/15/0173 01:25 AM    MCV 83.3 08/24/2021 01:25 AM    ABS. NEUTROPHILS 5.0 08/23/2021 04:00 AM     Lab Results   Component Value Date/Time    Sodium 135 (L) 08/24/2021 01:25 AM    Potassium 3.9 08/24/2021 01:25 AM    Chloride 104 08/24/2021 01:25 AM    CO2 27 08/24/2021 01:25 AM    Glucose 96 08/24/2021 01:25 AM    BUN 14 08/24/2021 01:25 AM    Creatinine 0.76 08/24/2021 01:25 AM    GFR est AA >60 08/24/2021 01:25 AM    GFR est non-AA >60 08/24/2021 01:25 AM    Calcium 8.2 (L) 08/24/2021 01:25 AM    Glucose (POC) 96 08/26/2021 06:47 AM     Lab Results   Component Value Date/Time    Bilirubin, total 0.8 08/23/2021 04:00 AM    ALT (SGPT) 28 08/23/2021 04:00 AM    Alk.  phosphatase 82 08/23/2021 04:00 AM    Protein, total 8.2 08/23/2021 04:00 AM    Albumin 3.1 (L) 08/23/2021 04:00 AM    Globulin 5.1 (H) 08/23/2021 04:00 AM       8/23/21: , AFP 1.4, b-hCG 476 mIU/mL  8/24/21: uric acid 4.2. Imaging     8/21/21 CT abd/p without contrast:  IMPRESSION  1. Right retroperitoneal brain mass measuring 7.5 x 5.2 x 5.8 cm as above, with  narrowing of the IVC and likely encasement of the bilateral renal veins, though  not well evaluated without the use of IV contrast. Findings are highly  suspicious for either lymphoma or brain metastasis. 2. Few borderline enlarged inguinal lymph nodes are indeterminant. 3. Hepatic steatosis. 8/22/21 CT ch/abd/p with contrast:  IMPRESSION  Chest:  1. No evidence of primary malignancy or metastatic disease in the chest.  2. Severe aortic valvular calcifications. Correlate for aortic valvular stenosis. Abdomen/pelvis:   1. Redemonstrated right retroperitoneal mass measuring 7.4 x 4.9 x 6.7 cm as  above, including encasement and narrowing of the IVC and central aspects of the  bilateral renal veins. This favored to represent malignancy, with lymphoma  favored over brain metastasis. 2. No other pathologically enlarged lymph nodes within the abdomen or pelvis. Few prominent inguinal lymph nodes are favored to be reactive. 3. Hepatic steatosis. 8/24/21 Ultrasound scrotum/testicles:  IMPRESSION  1. Study limited by patient's increased body habitus   2. No testicular mass identified. Incidental right-sided testicular microlithiasis, a debatable risk factor for malignancy. Pursue and follow-up as  per clinical concern. 3.  Small left hydrocele. Procedures:  7/20/21 PFTs:  FVC 4.83, 80% of predicted. UOH57.36, 89% of predicted. FEV1% is 86%. TLC is 6.89, which is 86% of perdicted. DLCO is 70% of predicted and when considering alveolar volume is 83% of predicted. Airway resistance is 58% of predicted. Impression:  The patient's spirometry, lung volumes and airway resistance are all normal. The patient's diffuse capacity is mildly decreased, but corrects when considering alveolar volume.     Assessment and Plan   49 yo male with PMHx of aortic stenosis, nephrolithiasis, HTN, GERD, DM admitted with abd pain, found to have RP mass. 1. Metastatic testicular germ cell tumor:  Tumor markers show elevated  and b-. Need to distinguish pure seminoma from non-seminomatous germ cell tumor (NSGCT) or mixed germ cell tumor. Usually, scrotal ultrasound is not a replacement for radical inguinal orchiectomy as surgery will provide accurate histologic diagnosis. However, no testicular mass seen on scrotal ultrasound. Testicular microlithiasis, seen on ultrasound for this patient, has a strong association with testicular cancer. So, I am interested to know whether R orchiectomy is advised by Urology. b-HCG is usually not elevated in pure seminoma, but can be elevated in up to 20% of seminomas. I am unsure of the significance of the prominent inguinal LNs. Risk stratification for advanced testicular germ cell tumors: Good risk NSGCT based on: Retroperitoneal primary tumor, no mets to other organs, serum AFP < 1000, b-hCG < 5000 rylee-international units/ml, LDH < 3 times upper limit of normal. However, accurate staging requires orchiectomy and measurement of tumor markers AFTER surgery. Chemotherapy treatment will likely be 3 cycles of BEP or 4 cycles of EP. Chemotherapy is given on days 1, 8, 15 of a 21-day cycle. -- Discussed cryopreservation of sperm; pt has received message from  [facility]; # provided; pt will call them; aware would need to meet today or tomorrow with chemo planned to start on Mon  -- Records obtained from cardiology surgeon's office / Dr Karey Stanleyelor ([phone]); including PFTs  done 3 weeks ago in prep for heart valve surgery Needed  prior to Bleomycin administration. -- 8/27 Port placed. Can discharge home afterwards.   -- Urology consulted to discuss Right radical inguinal orchiectomy - discussed with Dr. Kings Saucedo who advises against orchiectomy at this time given lack of current mass (pt may have had an original mass which then metastasized and then  off.)  -- Tentative plan to initiate BEP chemtoherapy regimen on Monday, 21 - this will require treatment Mon through Friday the first week of each 3 week cycle, and weekly on days 8 and 15. Total of 3 cycles (9 weeks) planned  -- Outpatient HemOnc appt this  at 1:30 pm - subject to change based on current hospitalization and tests  -- Outpatient Urology appt with Dr. Cassia Smalls on 21 - this may need to be rescheduled based on patient's chemotherapy schedule. -recommend COVID vaccine prior to discharge; pt agreed to vaccine. Plan for  Jefferson/Jefferson vaccine prior to discharge. Reviewed with nursing. 2. Severe Aortic stenosis:   ECHO: LVEF 55-60% and severe aortic stenosis. Pt is scheduled for surgery at [facility] on . Unfortunately, this heart surgery will need to be postponed given new cancer diagnosis. Discussed with Dr. Arline Bowen, covering for Dr. Zaria Turner: Severe AS has mortality rate of 50% at 2 yrs, but cannot proceed with surgery if untreated cancer. Also, will need to proceed with IVF carefully to avoid fluid overload. May need gentle daily diuresis. -- Discussing with Cardiology about whether chemotherapy for germ cell tumor takes precedence or not. Chemotherapy regimen requires significant IVF and oral hydration. 3. Pain: currently controlled with prn medications     4. DM: management per primary team    5. HTN:management per primary team    6. 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 He has concerns about how to afford not working and how to have pain controlled while driving (no opioids while driving.) Discussed with CM and clinical . 7. Constipation:   Bowel regimen in place; discussed with hospitalist; planning on enema this am    Plan reviewed with Dr Leeroy Payne.        Signed By: Bashir Kiser NP

## 2021-08-26 NOTE — DISCHARGE SUMMARY
[facility] [place] [facility]  [address]  Tel: [phone]    Physician Discharge Summary    Patient ID:    Kaleb Lazo  Age:              48 y.o.    : 1975  MRN:             [id number]     PCP: Delilah Pena NP     Date of Admission: 2021    Date of Discharge:  2021    Principal admission Diagnosis:   Mass in the abdomen [R19.00]  Retroperitoneal mass [R19.00]    Discharge Diagnoses:  Principal Problem:    Germ cell tumor of retroperitoneum ([facility] (2021)    Malignant germ cell tumor of testis ([facility] (2021)    Constipation (2021)    DM (diabetes mellitus), type 2 ([facility] (12/15/2020)    Essential hypertension (12/15/2020)    Aortic stenosis (2021)     Hospital Course:     Mr. Fritz Cantu is a 48 y.o. admitted to [facility] and treated for the following:    Malignant germ cell tumor of testis with metastasis to retroperitoneum ([facility] (2021) POA: admitted with abdominal pain and weight loss. CT abdomen had shown a right retroperitoneal brain mass measuring 7.5 x 5.2 x 5.8 cm. Had a CT guided biopsy done  and pathology showed malignant neoplasm consistent with metastatic testicular germ cell Tumor. Scrotal ultrasound showed no testicular mass but a right sided microlithiasis. Beta HCG was elevated. He was seen by Urology and Oncology and will follow up with both teams for further management. He had a port placed and received the J&J COVID vaccine prior to discharge     Acute abdominal pain: due to above post biopsy, metastatic disease, constipation. Continue OxyCODONE with a bowel regimen. Severe aortic stenosis POA: Echo done showed EF 55%, severe AS for which he has a scheduled AVR. Seen by cardiology here. Outpatient follow up as scheduled with primary team. Given the cancer diagnosis needing chemotherapy, surgery maybe differed     DM type 2: A1C 5.9%.   Patient should not be on both Ozempic and Victoza on discharge. Blood glucose remains stable. Continue Ozempic, Metformin. DM diet        HTN: BP stable. Continue Caduet. Hold Losartan and cardura    Discharge Exam:    Visit Vitals  /73 (BP 1 Location: Right upper arm, BP Patient Position: At rest)   Pulse 76   Temp 97.7 °F (36.5 °C)   Resp 16   Ht 6' 3\" (1.905 m)   Wt (!) 188.2 kg (415 lb)   SpO2 93%   BMI 51.87 kg/m²        Patient has been seen and examined. General: well looking and in no acute distress  Pulm: clear breath sounds without wheezes  Card: no murmurs, normal S1, S2 without thrills, bruits   Abd:    soft, non-tender, normoactive bowel sounds  Skin: no rashes and skin turgor is good  Neuro: awake, alert and has a non focal     Activity: Activity as tolerated    Diet: Diabetic Diet    Current Discharge Medication List        START taking these medications    Details   oxyCODONE IR (OXY-IR) 15 mg immediate release tablet Take 1 Tablet by mouth every four (4) hours as needed for Pain for up to 20 doses. Max Daily Amount: 90 mg.  Qty: 20 Tablet, Refills: 0    Associated Diagnoses: Retroperitoneal mass      senna-docusate (Senna with Docusate Sodium) 8.6-50 mg per tablet Take 1 Tablet by mouth daily for 30 days. Indications: constipation  Qty: 30 Tablet, Refills: 0      polyethylene glycol (MIRALAX) 17 gram packet Take 1 Packet by mouth daily for 30 days. Indications: constipation  Qty: 30 Packet, Refills: 0           CONTINUE these medications which have NOT CHANGED    Details   semaglutide (Ozempic) 1 mg/dose (2 mg/1.5 mL) sub-q pen 1 mg by SubCUTAneous route every seven (7) days. 0.75 mg SC every 7 days x 4 weeks then 1 mg per week  Qty: 1 Box, Refills: 1    Associated Diagnoses: Controlled type 2 diabetes mellitus without complication, without long-term current use of insulin (HCC)      metFORMIN ER (GLUCOPHAGE XR) 500 mg tablet Take 2 Tabs by mouth two (2) times a day.   Qty: 360 Tab, Refills: 1    Associated Diagnoses: Controlled type 2 diabetes mellitus without complication, without long-term current use of insulin (HCC)      amLODIPine-atorvastatin (Caduet) 10-20 mg per tablet Take 1 Tab by mouth daily. Qty: 90 Tab, Refills: 1    Associated Diagnoses: Essential hypertension           STOP taking these medications       dapagliflozin (Farxiga) 10 mg tab tablet Comments:   Reason for Stopping:         doxazosin (CARDURA) 1 mg tablet Comments:   Reason for Stopping:         losartan (COZAAR) 25 mg tablet Comments:   Reason for Stopping:         liraglutide (VICTOZA) 0.6 mg/0.1 mL (18 mg/3 mL) pnij Comments:   Reason for Stopping: Follow-up Information       Follow up With Specialties Details Why Shonna Hunt MD Hematology and Oncology, Internal Medicine, Hematology, Oncology Go on 8/27/2021 appt at 1:30 pm for review of pathology and test results [address]  [phone]      Jasvir Stiles NP Nurse Practitioner On 8/31/2021 [facility] [id number]  [address] Urology  On 8/31/2021 Dr. Jesse Almazan [address] [address]            Follow-up tests or labs: None    Discharge Condition: Stable    Disposition: home    Time taken to arrange discharge:  35 minutes.     Signed:  Baron Taveras MD     [facility]  8/26/2021   1:42 PM

## 2021-08-26 NOTE — PROGRESS NOTES
9:30 AM    TRANSFER - IN REPORT:    Verbal report received from Yamileth RN (name) on Dafne Ladd  being received from Angio Lab (unit) for routine post - op      Report consisted of patients Situation, Background, Assessment and   Recommendations(SBAR). Information from the following report(s) Procedure Summary was reviewed with the receiving nurse. Opportunity for questions and clarification was provided. Assessment completed upon patients arrival to unit and care assumed. 9:59 AM    TRANSFER - OUT REPORT:    Verbal report given to Stuart Washington (name) on Dafne Ladd  being transferred to 5th Floor (unit) for routine progression of care       Report consisted of patients Situation, Background, Assessment and   Recommendations(SBAR). Information from the following report(s) Procedure Summary, Med Rec Status and Cardiac Rhythm NSR was reviewed with the receiving nurse. Lines:   Venous Access Device Vas-cath Power Injectable Implantable Port 08/26/21 Upper chest (subclavicular area, right (Active)       Peripheral IV 08/26/21 Left Antecubital (Active)        Opportunity for questions and clarification was provided.       Patient transported with:   [facility]

## 2021-08-26 NOTE — PROGRESS NOTES
Problem: Falls - Risk of  Goal: *Absence of Falls  Description: Document Alma Nieto Fall Risk and appropriate interventions in the flowsheet.   Outcome: Progressing Towards Goal  Note: Fall Risk Interventions:            Medication Interventions: Evaluate medications/consider consulting pharmacy         History of Falls Interventions: Evaluate medications/consider consulting pharmacy         Problem: Patient Education: Go to Patient Education Activity  Goal: Patient/Family Education  Outcome: Progressing Towards Goal

## 2021-08-26 NOTE — PROGRESS NOTES
COVID vaccine given in left deltoid without any side effects. S/s to report were discussed. Vaccination card given.

## 2021-08-26 NOTE — PROGRESS NOTES
Bedside, Verbal and Written shift change report given to Stuart Washington (oncoming nurse) by aMdeleine Fuentes RN (offgoing nurse). Report included the following information SBAR, Kardex, OR Summary, Procedure Summary, Intake/Output, MAR and Recent Results.

## 2021-08-26 NOTE — PROGRESS NOTES
[address] [facility]  [phone]    Hematology / Oncology Consult    Reason for Visit:   Idris Hernández is a 48 y.o. male who is seen for follow up of Germ cell tumor. 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 Comment: The smears and cell block preparation demonstrate pleomorphic malignant   tumor cells in a background of lymphocytes.  Only a scant amount of tumor   is present on the cell block preparation.  Immunohistochemical stains   demonstrate diffuse positivity within the tumor cell population for PLAP,    (c-kit) and focal positivity for HCG. The tumor cells are negative   for cytokeratin cocktail, CAM 5.2, EMA, S100 and alpha-inhibin. Carmie Pond   results are consistent with a testicular germ cell tumor and the   combination of diffuse PLAP positive and  positivity is indicative of   a seminomatous differentation.  Correlation with clinical and radiographic   findings is indicated     Prior Treatment: None     Current Treatment: planning for BEP x 3 cycles to start 8/30/21    History of Present Illness:   Idris Hernández is a 48 y.o. male with severe Aortic stenosis, CAD, DM II, HTN coming in for evaluation of testicular germ cell tumor. He was hospitalized on 8/22/21 with R flank pain, found on CT imaging to have a large retroperitoneal mass. He denied fatigue, but did endorse a 40 lb weight loss over 2 months. No recent recent infections or fatigue. He is a nonsmoker. Has remote h/o EtOH abuse, but none recently. The patient is adopted and has no knowledge of family history. Of note, he considers himself , but never filed paperwork. Wife lives in [place]. Has a daughter from another woman.  He may move in with his mother-in-law in town for extra support. Patient here for follow up and chemotherapy teaching. He states his current R flank pain is rated at 4/10 which is manageable. He is not currently taking any medications for this. No f/c/n/v. Has some constipation. He is accompanied by his mother-in-law today.        PMHx: Severe AS, CAD, DM, GERD, HTN, ROBERTO  PSurgHx: Tonsillectomy, Plantar fasciitis surgery on left  SHx: Never smoker. Remote h/o EtOH abuse, none currently. Legally . Has daughter in 25s from another woman. FHx: Unknown as pt was adopted. Review of Systems: A complete review of systems was obtained, negative except as described above. Physical Exam:   Blood pressure 136/83, pulse 86, temperature (!) 96.7 °F (35.9 °C), resp. rate 18, height 6' 3\" (1.905 m), weight (!) 416 lb 12.8 oz (189.1 kg), SpO2 97 %. ECOG PS: 0  General: Well developed, no acute distress, obese   Eyes:  Aanicteric sclerae  HENT: Atraumatic, OP clear  Neck: Supple  Lymphatic: Deferred today  Respiratory: CTAB, normal respiratory effort  CV: Normal rate, regular rhythm, no murmurs, 2+ edema in BLE  GI: Soft, nontender, nondistended, no masses  MS: Normal gait and station. Digits without clubbing or cyanosis. Skin: No rashes, ecchymoses, or petechiae. Normal temperature, turgor, and texture. Neuro/Psych: Alert, oriented. Moves all 4 extremities. Appropriate affect with expected anxiety, normal judgment/insight. Results:     Lab Results   Component Value Date/Time    WBC 7.4 08/24/2021 01:25 AM    HGB 10.7 (L) 08/24/2021 01:25 AM    HCT 34.8 (L) 08/24/2021 01:25 AM    PLATELET 931 91/22/6279 01:25 AM    MCV 83.3 08/24/2021 01:25 AM    ABS.  NEUTROPHILS 5.0 08/23/2021 04:00 AM     Lab Results   Component Value Date/Time    Sodium 135 (L) 08/24/2021 01:25 AM    Potassium 3.9 08/24/2021 01:25 AM    Chloride 104 08/24/2021 01:25 AM    CO2 27 08/24/2021 01:25 AM    Glucose 96 08/24/2021 01:25 AM    BUN 14 08/24/2021 01:25 AM Creatinine 0.76 2021 01:25 AM    GFR est AA >60 2021 01:25 AM    GFR est non-AA >60 2021 01:25 AM    Calcium 8.2 (L) 2021 01:25 AM    Glucose (POC) 120 (H) 2021 11:57 AM     Lab Results   Component Value Date/Time    Bilirubin, total 0.8 2021 04:00 AM    ALT (SGPT) 28 2021 04:00 AM    Alk. phosphatase 82 2021 04:00 AM    Protein, total 8.2 2021 04:00 AM    Albumin 3.1 (L) 2021 04:00 AM    Globulin 5.1 (H) 2021 04:00 AM     No results found for: IRON, FE, TIBC, IBCT, PSAT, FERR    No results found for: B12LT, FOL, RBCF  No results found for: TSH, TSH2, TSH3, TSHP, TSHEXT  Lab Results   Component Value Date/Time    Hep C Virus Ab <0.1 2021 08:45 AM     21: , AFP 1.4, b-hCG 476 mIU/mL  21: uric acid 4.2. Imagin/21/21 CT abd/p without contrast:  IMPRESSION  1. Right retroperitoneal brain mass measuring 7.5 x 5.2 x 5.8 cm as above, with  narrowing of the IVC and likely encasement of the bilateral renal veins, though  not well evaluated without the use of IV contrast. Findings are highly  suspicious for either lymphoma or brain metastasis. 2. Few borderline enlarged inguinal lymph nodes are indeterminant. 3. Hepatic steatosis.     21 CT ch/abd/p with contrast:  IMPRESSION  Chest:  1. No evidence of primary malignancy or metastatic disease in the chest.  2. Severe aortic valvular calcifications. Correlate for aortic valvular stenosis. Abdomen/pelvis:   1. Redemonstrated right retroperitoneal mass measuring 7.4 x 4.9 x 6.7 cm as  above, including encasement and narrowing of the IVC and central aspects of the  bilateral renal veins. This favored to represent malignancy, with lymphoma  favored over brain metastasis. 2. No other pathologically enlarged lymph nodes within the abdomen or pelvis. Few prominent inguinal lymph nodes are favored to be reactive.   3. Hepatic steatosis.     21 Ultrasound scrotum/testicles:  IMPRESSION  1.  Study limited by patient's increased body habitus   2.  No testicular mass identified. Incidental right-sided testicular microlithiasis, a debatable risk factor for malignancy. Pursue and follow-up as  per clinical concern. 3.  Small left hydrocele.        Procedures:  7/20/21 PFTs:  FVC 4.83, 80% of predicted. JTH66.59, 89% of predicted. FEV1% is 86%. TLC is 6.89, which is 86% of perdicted. DLCO is 70% of predicted and when considering alveolar volume is 83% of predicted. Airway resistance is 58% of predicted. Impression:  The patient's spirometry, lung volumes and airway resistance are all normal. The patient's diffuse capacity is mildly decreased, but corrects when considering alveolar volume. Assessment & Plan:   Kings Stauffer is a 48 y.o. male comes in for evaluation and management of germ cell tumor. 1. Testicular germ cell tumor:  Tumor markers show elevated  and b-. Need to distinguish pure seminoma from non-seminomatous germ cell tumor (NSGCT) or mixed germ cell tumor. Usually, scrotal ultrasound is not a replacement for radical inguinal orchiectomy as surgery will provide accurate histologic diagnosis. However, no testicular mass seen on scrotal ultrasound. Testicular microlithiasis, seen on ultrasound for this patient, has a strong association with testicular cancer. So, I am interested to know whether R orchiectomy is advised by Urology. b-HCG is usually not elevated in pure seminoma, but can be elevated in up to 20% of seminomas. I am unsure of the significance of the prominent inguinal LNs. Risk stratification for advanced testicular germ cell tumors: Good risk NSGCT based on: Retroperitoneal primary tumor, no mets to other organs, serum AFP < 1000, b-hCG < 5000 rylee-international units/ml, LDH < 3 times upper limit of normal. However, accurate staging requires orchiectomy and measurement of tumor markers AFTER surgery.  Orchiectomy not advised at this time given lack of testicular mass. I advise chemotherapy treatment with BEP regimen x 3 cycles. Chemotherapy is given on days 1-5, 8, 15 of a 21-day cycle. We discussed the risks and benefits of BEP chemotherapy. Potential side effects include, but are not limited to: nausea, vomiting, diarrhea, taste changes, myelosuppression, infection, fatigue, alopecia, neuropathy, hearing loss, renal failure, pulmonary toxicity, cardiac toxicity, allergic reactions, infertility, and rarely, death. The patient has consented to beginning chemotherapy. Supportive care medications include: topical lidocaine, zofran, compazine, docusate   -- Discussed cryopreservation of sperm - referred to [facility]. Pt did not attend appt this morning.  -- Each cycle should begin on schedule regardless of degree of myelosuppression. -- If febrile neutropenia or thrombocytopenic bleeding occurs, dose reduce Etoposide 25% for subsequent cycles. -- Plan to repeat PFTs prior to cycle 2. (Received COVID vaccine 8/26/21)  -- Return on Monday, 8/30/21 for C1D1 of BEP regimen - treating Mon through Friday the first week of each 3 week cycle, and weekly on days 8 and 15. Total of 3 cycles (9 weeks) planned. -- Return on Thursday, 9/2/21 for follow up. -- Outpatient Urology appt with Dr. Sky Ribeiro on 8/31/21 - this may need to be rescheduled based on patient's chemotherapy schedule.     2. Severe Aortic stenosis, non-rheumatic:  8/22 ECHO: LVEF 55-60% and severe aortic stenosis. Pt is scheduled for aortic valve replacement with mechanical valve by Dr. Julianne Dorman on 9/20/21. Unfortunately, this heart surgery will need to be postponed given new cancer diagnosis. Discussed with Dr. Rocco Cardona, covering for Dr. Carolynn Mcmahan and with Dr. Bello Black: Severe AS has mortality rate of 50% at 2 yrs, but cannot proceed with surgery if untreated cancer. Also, will need to proceed with IVF carefully to avoid fluid overload.  May need gentle daily diuresis. -- Use Lasix 20mg IV, post treatment on Days 1, 4 of each 21-day cycle. -- Lasix 20mg PO prn at home if weight gain > 5 lbs in 3 days.      3. Neoplasm related pain, acute:  Has Oxycodone for prn use. Knows not to drive with this medication. Can also alternate Tylenol and Advil, taking with food in stomach. 4. DM / HTN:   Continue home meds: Metformin, Victoza (or Ozempic). Will monitor for steroid-induced hyperglycemia.      5. Constipation:   Likely 2/2 opioids during hospital stay. Advised aggressive fluids as well as stool softeners prn.     6. 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 He has concerns about how to afford not working and how to have pain controlled while driving (no opioids while driving.) Discussed with CM and clinical . -- Shannan Scott in  to meet with patient. Emotional well being: Pt is coping well with his/her disease and has excellent support. I appreciate the opportunity to participate in Mr. Jesus Haynes care.     Signed By: Robert Soler MD     August 27, 2021

## 2021-08-26 NOTE — PROGRESS NOTES
Discharge reviewed with patient, verbalizes understanding. Peripheral IV removed. Rx given to patient, notified of meds at preferred pharmacy as well. Med changes reviewed. Family in to transport home.

## 2021-08-27 ENCOUNTER — OFFICE VISIT (OUTPATIENT)
Dept: ONCOLOGY | Age: 46
End: 2021-08-27
Payer: COMMERCIAL

## 2021-08-27 VITALS
WEIGHT: 315 LBS | HEART RATE: 86 BPM | TEMPERATURE: 96.7 F | DIASTOLIC BLOOD PRESSURE: 83 MMHG | BODY MASS INDEX: 39.17 KG/M2 | RESPIRATION RATE: 18 BRPM | HEIGHT: 75 IN | SYSTOLIC BLOOD PRESSURE: 136 MMHG | OXYGEN SATURATION: 97 %

## 2021-08-27 DIAGNOSIS — I35.0 SEVERE AORTIC STENOSIS: ICD-10-CM

## 2021-08-27 DIAGNOSIS — E11.9 TYPE 2 DIABETES MELLITUS WITHOUT COMPLICATION, WITHOUT LONG-TERM CURRENT USE OF INSULIN (HCC): ICD-10-CM

## 2021-08-27 DIAGNOSIS — D49.9 EXTRAGONADAL GERM CELL TUMOR: Primary | ICD-10-CM

## 2021-08-27 DIAGNOSIS — T45.1X5A CINV (CHEMOTHERAPY-INDUCED NAUSEA AND VOMITING): ICD-10-CM

## 2021-08-27 DIAGNOSIS — R11.2 CINV (CHEMOTHERAPY-INDUCED NAUSEA AND VOMITING): ICD-10-CM

## 2021-08-27 DIAGNOSIS — G89.3 ACUTE NEOPLASM-RELATED PAIN: ICD-10-CM

## 2021-08-27 DIAGNOSIS — K59.03 DRUG-INDUCED CONSTIPATION: ICD-10-CM

## 2021-08-27 PROCEDURE — 99215 OFFICE O/P EST HI 40 MIN: CPT | Performed by: INTERNAL MEDICINE

## 2021-08-27 RX ORDER — LIDOCAINE AND PRILOCAINE 25; 25 MG/G; MG/G
CREAM TOPICAL
Qty: 30 G | Refills: 1 | Status: SHIPPED | OUTPATIENT
Start: 2021-08-27 | End: 2021-12-02

## 2021-08-27 RX ORDER — ONDANSETRON HYDROCHLORIDE 8 MG/1
8 TABLET, FILM COATED ORAL
Qty: 30 TABLET | Refills: 2 | Status: SHIPPED | OUTPATIENT
Start: 2021-08-27 | End: 2022-07-22

## 2021-08-27 RX ORDER — DOCUSATE SODIUM 100 MG/1
100 CAPSULE, LIQUID FILLED ORAL
Qty: 30 CAPSULE | Refills: 2 | Status: SHIPPED | OUTPATIENT
Start: 2021-08-27 | End: 2021-11-25

## 2021-08-27 RX ORDER — PROCHLORPERAZINE MALEATE 10 MG
10 TABLET ORAL
Qty: 30 TABLET | Refills: 2 | Status: SHIPPED | OUTPATIENT
Start: 2021-08-27 | End: 2022-07-22

## 2021-08-27 NOTE — LETTER
8/27/2021    Patient: Temo Whittington   YOB: 1975   Date of Visit: 8/27/2021     Catie Mendiola NP  [place] [id number]  [address] [facility] [address]    Dear Catie Mendiola NP,      Thank you for referring Mr. Misael Reddy to [email or web address] for evaluation. My notes for this consultation are attached. If you have questions, please do not hesitate to call me. I look forward to following your patient along with you.       Sincerely,    Abram Lieberman MD

## 2021-08-27 NOTE — PROGRESS NOTES
[address] [facility] at [facility]  [phone]    Hematology / Oncology Consult    Reason for Visit:   Lemuel Yip is a 48 y.o. male who is seen for follow up of Germ cell tumor. 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 Comment: The smears and cell block preparation demonstrate pleomorphic malignant   tumor cells in a background of lymphocytes.  Only a scant amount of tumor   is present on the cell block preparation.  Immunohistochemical stains   demonstrate diffuse positivity within the tumor cell population for PLAP,    (c-kit) and focal positivity for HCG. The tumor cells are negative   for cytokeratin cocktail, CAM 5.2, EMA, S100 and alpha-inhibin. Marlon Maribel   results are consistent with a testicular germ cell tumor and the   combination of diffuse PLAP positive and  positivity is indicative of   a seminomatous differentation.  Correlation with clinical and radiographic   findings is indicated     Prior Treatment: None     Current Treatment: planning for BEP x 3 cycles to start 8/30/21    History of Present Illness:   Lemuel Yip is a 48 y.o. male with severe Aortic stenosis, CAD, DM II, HTN coming in for evaluation of testicular germ cell tumor. He was hospitalized on 8/22/21 with R flank pain, found on CT imaging to have a large retroperitoneal mass. He denied fatigue, but did endorse a 40 lb weight loss over 2 months. No recent recent infections or fatigue. He is a nonsmoker. Has remote h/o EtOH abuse, but none recently. The patient is adopted and has no knowledge of family history. Of note, he considers himself , but never filed paperwork. Wife lives in [place]. Has a daughter from another woman.  He may move in with his mother-in-law in town for extra support. Interval History:  Patient has tolerated chemotherapy well so far this week. No n/v at all. Has 1 loose stool intermittently with normal stools. His R flank pain has decreased to a 2/10. He is not currently taking any medications for this. No fevers, chills. Has occasional hiccups which last 2-3 min and then resolve. His biggest complaint today is urinary frequency causing him to visit the bathroom almost every 30 min. He is drinking 1 liter daily. He is accompanied by his mother-in-law today. PMHx: Severe AS, CAD, DM, GERD, HTN, ROBERTO  PSurgHx: Tonsillectomy, Plantar fasciitis surgery on left  SHx: Never smoker. Remote h/o EtOH abuse, none currently. Legally . Has daughter in 25s from another woman. FHx: Unknown as pt was adopted. Review of Systems: A complete review of systems was obtained, negative except as described above. Physical Exam:   Blood pressure (!) 149/86, pulse 79, temperature 98 °F (36.7 °C), resp. rate 20, height 6' 3\" (1.905 m), weight (!) 412 lb 0.6 oz (186.9 kg), SpO2 98 %. ECOG PS: 0  General: Well developed, no acute distress, obese   Eyes:  Aanicteric sclerae  HENT: Atraumatic, OP clear  Neck: Supple  Lymphatic: Deferred today  Respiratory: CTAB, normal respiratory effort  CV: Normal rate, regular rhythm, no murmurs, 1+ edema in BLE  GI: Soft, nontender, nondistended, no masses  MS: Normal gait and station. Digits without clubbing or cyanosis. Skin: No rashes, ecchymoses, or petechiae. Normal temperature, turgor, and texture. Neuro/Psych: Alert, oriented. Moves all 4 extremities. Appropriate affect. Normal judgment/insight. Results:     Lab Results   Component Value Date/Time    WBC 6.4 08/30/2021 08:11 AM    HGB 10.9 (L) 08/30/2021 08:11 AM    HCT 37.4 08/30/2021 08:11 AM    PLATELET 339 55/12/6437 08:11 AM    MCV 85.2 08/30/2021 08:11 AM    ABS.  NEUTROPHILS 3.5 08/30/2021 08:11 AM     Lab Results   Component Value Date/Time    Sodium 139 2021 10:15 AM    Potassium 3.9 2021 10:15 AM    Chloride 109 (H) 2021 10:15 AM    CO2 26 2021 10:15 AM    Glucose 154 (H) 2021 10:15 AM    BUN 14 2021 10:15 AM    Creatinine 0.87 2021 10:15 AM    GFR est AA >60 2021 10:15 AM    GFR est non-AA >60 2021 10:15 AM    Calcium 8.4 (L) 2021 10:15 AM    Glucose (POC) 120 (H) 2021 11:57 AM     Lab Results   Component Value Date/Time    Bilirubin, total 0.4 2021 08:11 AM    ALT (SGPT) 32 2021 08:11 AM    Alk. phosphatase 72 2021 08:11 AM    Protein, total 8.3 (H) 2021 08:11 AM    Albumin 3.2 (L) 2021 08:11 AM    Globulin 5.1 (H) 2021 08:11 AM     No results found for: IRON, FE, TIBC, IBCT, PSAT, FERR    No results found for: B12LT, FOL, RBCF  No results found for: TSH, TSH2, TSH3, TSHP, TSHEXT, TSHEXT  Lab Results   Component Value Date/Time    Hep C Virus Ab <0.1 2021 08:45 AM     21: , AFP 1.4, b-hCG 476 mIU/mL  21: uric acid 4.2. Imagin/21/21 CT abd/p without contrast:  IMPRESSION  1. Right retroperitoneal brain mass measuring 7.5 x 5.2 x 5.8 cm as above, with  narrowing of the IVC and likely encasement of the bilateral renal veins, though  not well evaluated without the use of IV contrast. Findings are highly  suspicious for either lymphoma or brain metastasis. 2. Few borderline enlarged inguinal lymph nodes are indeterminant. 3. Hepatic steatosis.     21 CT ch/abd/p with contrast:  IMPRESSION  Chest:  1. No evidence of primary malignancy or metastatic disease in the chest.  2. Severe aortic valvular calcifications. Correlate for aortic valvular stenosis. Abdomen/pelvis:   1. Redemonstrated right retroperitoneal mass measuring 7.4 x 4.9 x 6.7 cm as  above, including encasement and narrowing of the IVC and central aspects of the  bilateral renal veins.  This favored to represent malignancy, with lymphoma  favored over brain metastasis. 2. No other pathologically enlarged lymph nodes within the abdomen or pelvis. Few prominent inguinal lymph nodes are favored to be reactive. 3. Hepatic steatosis.     8/24/21 Ultrasound scrotum/testicles:  IMPRESSION  1.  Study limited by patient's increased body habitus   2.  No testicular mass identified. Incidental right-sided testicular microlithiasis, a debatable risk factor for malignancy. Pursue and follow-up as  per clinical concern. 3.  Small left hydrocele.        Procedures:  7/20/21 PFTs:  FVC 4.83, 80% of predicted. OVH03.56, 89% of predicted. FEV1% is 86%. TLC is 6.89, which is 86% of perdicted. DLCO is 70% of predicted and when considering alveolar volume is 83% of predicted. Airway resistance is 58% of predicted. Impression:  The patient's spirometry, lung volumes and airway resistance are all normal. The patient's diffuse capacity is mildly decreased, but corrects when considering alveolar volume. Assessment & Plan:   Eugenio Hooper is a 48 y.o. male comes in for evaluation and management of germ cell tumor. 1. Testicular germ cell tumor:  Tumor markers show elevated  and b-. Need to distinguish pure seminoma from non-seminomatous germ cell tumor (NSGCT) or mixed germ cell tumor. Usually, scrotal ultrasound is not a replacement for radical inguinal orchiectomy as surgery will provide accurate histologic diagnosis. However, no testicular mass seen on scrotal ultrasound. Testicular microlithiasis, seen on ultrasound for this patient, has a strong association with testicular cancer. So, I am interested to know whether R orchiectomy is advised by Urology. b-HCG is usually not elevated in pure seminoma, but can be elevated in up to 20% of seminomas. I am unsure of the significance of the prominent inguinal LNs.    Risk stratification for advanced testicular germ cell tumors: Good risk NSGCT based on: Retroperitoneal primary tumor, no mets to other organs, serum AFP < 1000, b-hCG < 5000 rylee-international units/ml, LDH < 3 times upper limit of normal. However, accurate staging requires orchiectomy and measurement of tumor markers AFTER surgery. Orchiectomy not advised at this time given lack of testicular mass. I advise chemotherapy treatment with BEP regimen x 3 cycles. Chemotherapy is given on days 1-5, 8, 15 of a 21-day cycle. We discussed the risks and benefits of BEP chemotherapy. Potential side effects include, but are not limited to: nausea, vomiting, diarrhea, taste changes, myelosuppression, infection, fatigue, alopecia, neuropathy, hearing loss, renal failure, pulmonary toxicity, cardiac toxicity, allergic reactions, infertility, and rarely, death. The patient has consented to beginning chemotherapy. Supportive care medications include: topical lidocaine, zofran, compazine, docusate   -- Previously discussed cryopreservation of sperm - referred to [facility]. Pt states he picked up a sperm collection kit from them. He asks that I call his insurance to complete a prior authorization. -- Each cycle should begin on schedule regardless of degree of myelosuppression. -- If febrile neutropenia or thrombocytopenic bleeding occurs, dose reduce Etoposide 25% for subsequent cycles. -- Plan to repeat PFTs prior to cycle 2. (Received COVID vaccine 8/26/21)  -- Proceed with C1D4 of BEP regimen - treating Mon through Friday the first week of each 3 week cycle, and weekly on days 8 and 15. Total of 3 cycles (9 weeks) planned. -- Return on 9/20/21 for C2D1 and MD/NP follow up. -- Needs referral to different Urology team who accepts pt's insurance.      2. Severe Aortic stenosis, non-rheumatic:  8/22 ECHO: LVEF 55-60% and severe aortic stenosis. Previously scheduled aortic valve replacement with mechanical valve by Dr. Diony Upton on 9/20/21 is now hold given cancer diagnosis.  Discussed with Dr. Tete Murray, covering for Dr. Sen Pierre and with  Crescencio: Severe AS has mortality rate of 50% at 2 yrs, but cannot proceed with surgery if untreated cancer. 4-lb weight loss is likely related to diuresis. Reviewed Cr and will proceed with Lasix and cut down on oral fluids at this time. -- Use Lasix 20mg IV, post treatment on Days 1, 4 of each 21-day cycle. -- Lasix 20mg PO prn at home if weight gain > 5 lbs in 3 days.      3. Neoplasm related pain, acute:  Has Oxycodone for prn use, but not taking currently. Can use Tylenol and Advil, prn, taking with food. 4. DM / HTN:   Continue home meds: Metformin, Victoza (or Ozempic). Will monitor for steroid-induced hyperglycemia. BG of 154 from 9/1 is reasonable.     5. H/o constipation:   Was 2/2 opioids during hospital stay and now improved. Not currently taking, but has Oxycodone available prn.    6. 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 He has concerns about how to afford not working and how to have pain controlled while driving (no opioids while driving.) Discussed with CM and clinical . Tony Moreno in  to meet with patient. 7. Polyuria:  2/2 fluid hydration. Advised pt he can now cut down to 1/2 liter of water daily and stop drinking fluids after 6pm.     Emotional well being: Pt is coping well with his/her disease and has excellent support. I appreciate the opportunity to participate in [facility].     Signed By: Chao Pinon MD     September 2, 2021

## 2021-08-27 NOTE — PROGRESS NOTES
Chief Complaint   Patient presents with    New Patient     Lexa Mejia is a pleasant 48year old male who is here as a new patient for Germ cell tumor. He reports pain in his right shoulder and right lower side.

## 2021-08-29 DIAGNOSIS — I10 ESSENTIAL HYPERTENSION: ICD-10-CM

## 2021-08-30 ENCOUNTER — HOSPITAL ENCOUNTER (OUTPATIENT)
Dept: INFUSION THERAPY | Age: 46
Discharge: HOME | End: 2021-08-30
Payer: COMMERCIAL

## 2021-08-30 VITALS
BODY MASS INDEX: 39.17 KG/M2 | SYSTOLIC BLOOD PRESSURE: 144 MMHG | TEMPERATURE: 97.7 F | RESPIRATION RATE: 20 BRPM | DIASTOLIC BLOOD PRESSURE: 89 MMHG | OXYGEN SATURATION: 98 % | HEART RATE: 73 BPM | HEIGHT: 75 IN | WEIGHT: 315 LBS

## 2021-08-30 DIAGNOSIS — I35.0 NONRHEUMATIC AORTIC VALVE STENOSIS: ICD-10-CM

## 2021-08-30 DIAGNOSIS — C48.0 GERM CELL TUMOR OF RETROPERITONEUM (HCC): Primary | ICD-10-CM

## 2021-08-30 LAB
ALBUMIN SERPL-MCNC: 3.2 G/DL (ref 3.5–5)
ALBUMIN/GLOB SERPL: 0.6 {RATIO} (ref 1.1–2.2)
ALP SERPL-CCNC: 72 U/L (ref 45–117)
ALT SERPL-CCNC: 32 U/L (ref 12–78)
ANION GAP SERPL CALC-SCNC: 4 MMOL/L (ref 5–15)
AST SERPL-CCNC: 26 U/L (ref 15–37)
BASOPHILS # BLD: 0.1 K/UL (ref 0–0.1)
BASOPHILS NFR BLD: 1 % (ref 0–1)
BILIRUB SERPL-MCNC: 0.4 MG/DL (ref 0.2–1)
BUN SERPL-MCNC: 9 MG/DL (ref 6–20)
BUN/CREAT SERPL: 10 (ref 12–20)
CALCIUM SERPL-MCNC: 8.1 MG/DL (ref 8.5–10.1)
CHLORIDE SERPL-SCNC: 108 MMOL/L (ref 97–108)
CO2 SERPL-SCNC: 27 MMOL/L (ref 21–32)
CREAT SERPL-MCNC: 0.88 MG/DL (ref 0.7–1.3)
DIFFERENTIAL METHOD BLD: ABNORMAL
EOSINOPHIL # BLD: 0.1 K/UL (ref 0–0.4)
EOSINOPHIL NFR BLD: 2 % (ref 0–7)
ERYTHROCYTE [DISTWIDTH] IN BLOOD BY AUTOMATED COUNT: 15 % (ref 11.5–14.5)
GLOBULIN SER CALC-MCNC: 5.1 G/DL (ref 2–4)
GLUCOSE SERPL-MCNC: 95 MG/DL (ref 65–100)
HBV SURFACE AB SER QL: NONREACTIVE
HBV SURFACE AB SER-ACNC: <3.1 MIU/ML
HBV SURFACE AG SER QL: <0.1 INDEX
HBV SURFACE AG SER QL: NEGATIVE
HCT VFR BLD AUTO: 37.4 % (ref 36.6–50.3)
HGB BLD-MCNC: 10.9 G/DL (ref 12.1–17)
IMM GRANULOCYTES # BLD AUTO: 0 K/UL (ref 0–0.04)
IMM GRANULOCYTES NFR BLD AUTO: 0 % (ref 0–0.5)
LYMPHOCYTES # BLD: 1.9 K/UL (ref 0.8–3.5)
LYMPHOCYTES NFR BLD: 30 % (ref 12–49)
MAGNESIUM SERPL-MCNC: 2.1 MG/DL (ref 1.6–2.4)
MCH RBC QN AUTO: 24.8 PG (ref 26–34)
MCHC RBC AUTO-ENTMCNC: 29.1 G/DL (ref 30–36.5)
MCV RBC AUTO: 85.2 FL (ref 80–99)
MONOCYTES # BLD: 0.8 K/UL (ref 0–1)
MONOCYTES NFR BLD: 13 % (ref 5–13)
NEUTS SEG # BLD: 3.5 K/UL (ref 1.8–8)
NEUTS SEG NFR BLD: 54 % (ref 32–75)
NRBC # BLD: 0 K/UL (ref 0–0.01)
NRBC BLD-RTO: 0 PER 100 WBC
PLATELET # BLD AUTO: 216 K/UL (ref 150–400)
PMV BLD AUTO: 11.6 FL (ref 8.9–12.9)
POTASSIUM SERPL-SCNC: 3.6 MMOL/L (ref 3.5–5.1)
PROT SERPL-MCNC: 8.3 G/DL (ref 6.4–8.2)
RBC # BLD AUTO: 4.39 M/UL (ref 4.1–5.7)
SODIUM SERPL-SCNC: 139 MMOL/L (ref 136–145)
WBC # BLD AUTO: 6.4 K/UL (ref 4.1–11.1)

## 2021-08-30 PROCEDURE — 86704 HEP B CORE ANTIBODY TOTAL: CPT

## 2021-08-30 PROCEDURE — 96367 TX/PROPH/DG ADDL SEQ IV INF: CPT

## 2021-08-30 PROCEDURE — 80053 COMPREHEN METABOLIC PANEL: CPT

## 2021-08-30 PROCEDURE — 77030016057 HC NDL HUBR APOL -B

## 2021-08-30 PROCEDURE — 74011250636 HC RX REV CODE- 250/636: Performed by: INTERNAL MEDICINE

## 2021-08-30 PROCEDURE — 86706 HEP B SURFACE ANTIBODY: CPT

## 2021-08-30 PROCEDURE — 74011250637 HC RX REV CODE- 250/637: Performed by: INTERNAL MEDICINE

## 2021-08-30 PROCEDURE — 96417 CHEMO IV INFUS EACH ADDL SEQ: CPT

## 2021-08-30 PROCEDURE — 36415 COLL VENOUS BLD VENIPUNCTURE: CPT

## 2021-08-30 PROCEDURE — 96375 TX/PRO/DX INJ NEW DRUG ADDON: CPT

## 2021-08-30 PROCEDURE — 74011000250 HC RX REV CODE- 250: Performed by: INTERNAL MEDICINE

## 2021-08-30 PROCEDURE — 85025 COMPLETE CBC W/AUTO DIFF WBC: CPT

## 2021-08-30 PROCEDURE — 96411 CHEMO IV PUSH ADDL DRUG: CPT

## 2021-08-30 PROCEDURE — 96361 HYDRATE IV INFUSION ADD-ON: CPT

## 2021-08-30 PROCEDURE — 83735 ASSAY OF MAGNESIUM: CPT

## 2021-08-30 PROCEDURE — 96413 CHEMO IV INFUSION 1 HR: CPT

## 2021-08-30 PROCEDURE — 87340 HEPATITIS B SURFACE AG IA: CPT

## 2021-08-30 PROCEDURE — 74011000258 HC RX REV CODE- 258: Performed by: INTERNAL MEDICINE

## 2021-08-30 RX ORDER — PALONOSETRON 0.05 MG/ML
0.25 INJECTION, SOLUTION INTRAVENOUS ONCE
Status: COMPLETED | OUTPATIENT
Start: 2021-08-30 | End: 2021-08-30

## 2021-08-30 RX ORDER — SODIUM CHLORIDE 0.9 % (FLUSH) 0.9 %
10 SYRINGE (ML) INJECTION AS NEEDED
Status: DISCONTINUED | OUTPATIENT
Start: 2021-08-30 | End: 2021-08-30

## 2021-08-30 RX ORDER — FUROSEMIDE 10 MG/ML
20 INJECTION INTRAMUSCULAR; INTRAVENOUS ONCE
Status: COMPLETED | OUTPATIENT
Start: 2021-08-30 | End: 2021-08-30

## 2021-08-30 RX ORDER — SODIUM CHLORIDE 9 MG/ML
25 INJECTION, SOLUTION INTRAVENOUS CONTINUOUS
Status: DISCONTINUED | OUTPATIENT
Start: 2021-08-30 | End: 2021-08-30

## 2021-08-30 RX ORDER — HEPARIN 100 UNIT/ML
300-500 SYRINGE INTRAVENOUS AS NEEDED
Status: DISCONTINUED | OUTPATIENT
Start: 2021-08-30 | End: 2021-08-30

## 2021-08-30 RX ORDER — SODIUM CHLORIDE 9 MG/ML
10 INJECTION INTRAMUSCULAR; INTRAVENOUS; SUBCUTANEOUS AS NEEDED
Status: DISCONTINUED | OUTPATIENT
Start: 2021-08-30 | End: 2021-08-30

## 2021-08-30 RX ORDER — DIPHENHYDRAMINE HYDROCHLORIDE 50 MG/ML
25 INJECTION, SOLUTION INTRAMUSCULAR; INTRAVENOUS ONCE
Status: COMPLETED | OUTPATIENT
Start: 2021-08-30 | End: 2021-08-30

## 2021-08-30 RX ORDER — SEMAGLUTIDE 1.34 MG/ML
INJECTION, SOLUTION SUBCUTANEOUS
Qty: 3 UNSPECIFIED | Refills: 1 | Status: SHIPPED | OUTPATIENT
Start: 2021-08-30 | End: 2021-08-31

## 2021-08-30 RX ORDER — HYDROCORTISONE SODIUM SUCCINATE 100 MG/2ML
100 INJECTION, POWDER, FOR SOLUTION INTRAMUSCULAR; INTRAVENOUS AS NEEDED
Status: DISCONTINUED | OUTPATIENT
Start: 2021-08-30 | End: 2021-08-30

## 2021-08-30 RX ORDER — DIPHENHYDRAMINE HYDROCHLORIDE 50 MG/ML
25 INJECTION, SOLUTION INTRAMUSCULAR; INTRAVENOUS AS NEEDED
Status: DISCONTINUED | OUTPATIENT
Start: 2021-08-30 | End: 2021-08-30

## 2021-08-30 RX ORDER — ACETAMINOPHEN 325 MG/1
650 TABLET ORAL ONCE
Status: COMPLETED | OUTPATIENT
Start: 2021-08-30 | End: 2021-08-30

## 2021-08-30 RX ORDER — ONDANSETRON 2 MG/ML
8 INJECTION INTRAMUSCULAR; INTRAVENOUS AS NEEDED
Status: DISCONTINUED | OUTPATIENT
Start: 2021-08-30 | End: 2021-08-30

## 2021-08-30 RX ORDER — AMLODIPINE BESYLATE AND ATORVASTATIN CALCIUM 10; 20 MG/1; MG/1
TABLET, FILM COATED ORAL
Qty: 90 TABLET | Refills: 1 | Status: SHIPPED | OUTPATIENT
Start: 2021-08-30 | End: 2021-08-31 | Stop reason: SDUPTHER

## 2021-08-30 RX ADMIN — SODIUM CHLORIDE 30 UNITS: 900 INJECTION, SOLUTION INTRAVENOUS at 11:01

## 2021-08-30 RX ADMIN — SODIUM CHLORIDE 10 ML: 9 INJECTION, SOLUTION INTRAMUSCULAR; INTRAVENOUS; SUBCUTANEOUS at 13:44

## 2021-08-30 RX ADMIN — DEXAMETHASONE SODIUM PHOSPHATE 12 MG: 10 INJECTION, SOLUTION INTRAMUSCULAR; INTRAVENOUS at 09:22

## 2021-08-30 RX ADMIN — ACETAMINOPHEN 650 MG: 325 TABLET ORAL at 09:12

## 2021-08-30 RX ADMIN — ETOPOSIDE 316 MG: 20 INJECTION, SOLUTION, CONCENTRATE INTRAVENOUS at 11:18

## 2021-08-30 RX ADMIN — PALONOSETRON 0.25 MG: 0.05 INJECTION, SOLUTION INTRAVENOUS at 09:16

## 2021-08-30 RX ADMIN — POTASSIUM CHLORIDE: 2 INJECTION, SOLUTION, CONCENTRATE INTRAVENOUS at 09:45

## 2021-08-30 RX ADMIN — SODIUM CHLORIDE 25 ML/HR: 900 INJECTION, SOLUTION INTRAVENOUS at 09:09

## 2021-08-30 RX ADMIN — SODIUM CHLORIDE 10 ML: 9 INJECTION, SOLUTION INTRAMUSCULAR; INTRAVENOUS; SUBCUTANEOUS at 09:13

## 2021-08-30 RX ADMIN — FUROSEMIDE 20 MG: 10 INJECTION, SOLUTION INTRAVENOUS at 13:38

## 2021-08-30 RX ADMIN — DIPHENHYDRAMINE HYDROCHLORIDE 25 MG: 50 INJECTION INTRAMUSCULAR; INTRAVENOUS at 09:13

## 2021-08-30 RX ADMIN — CISPLATIN 63.2 MG: 1 INJECTION INTRAVENOUS at 12:35

## 2021-08-30 RX ADMIN — Medication 500 UNITS: at 13:44

## 2021-08-30 RX ADMIN — FOSAPREPITANT 150 MG: 150 INJECTION, POWDER, LYOPHILIZED, FOR SOLUTION INTRAVENOUS at 09:20

## 2021-08-30 RX ADMIN — SODIUM CHLORIDE 10 ML: 9 INJECTION, SOLUTION INTRAMUSCULAR; INTRAVENOUS; SUBCUTANEOUS at 13:38

## 2021-08-30 NOTE — PROGRESS NOTES
\[facility]\"" Progress Note    Date: 2021    Name: Ellis Acosta    MRN: [id number]         : 1975    Mr. Dolly Maravilla arrived ambulatory and in no distress for C1D1 of Bleomycin, Etoposide, Cisplatin Regimen. Assessment was completed, no acute issues at this time, no new complaints voiced. Right chest wall port accessed without difficulty, labs drawn & sent for processing. Chemotherapy Flowsheet 2021   Cycle C1D1   Date 2021   Drug / Regimen Bleomycin, Etoposide, Cisplatin   Pre Hydration given   Pre Meds given   Notes given       Patient proceed to appointment with Dr. Chris Kim. Mr. Radha Cabrera vitals were reviewed. Patient Vitals for the past 24 hrs:   Temp Pulse Resp BP SpO2   21 1343 97.7 °F (36.5 °C) 73 20 (!) 144/89 --   21 0930 -- -- -- (!) 130/94 --   21 0929 -- -- -- (!) 148/112 --   21 0759 -- -- -- (!) 158/110 --   21 0757 98.1 °F (36.7 °C) 98 20 (!) 167/107 98 %       Lab results were obtained and reviewed. Recent Results (from the past 12 hour(s))   CBC WITH AUTOMATED DIFF    Collection Time: 21  8:11 AM   Result Value Ref Range    WBC 6.4 4.1 - 11.1 K/uL    RBC 4.39 4. 10 - 5.70 M/uL    HGB 10.9 (L) 12.1 - 17.0 g/dL    HCT 37.4 36.6 - 50.3 %    MCV 85.2 80.0 - 99.0 FL    MCH 24.8 (L) 26.0 - 34.0 PG    MCHC 29.1 (L) 30.0 - 36.5 g/dL    RDW 15.0 (H) 11.5 - 14.5 %    PLATELET 518 953 - 977 K/uL    MPV 11.6 8.9 - 12.9 FL    NRBC 0.0 0  WBC    ABSOLUTE NRBC 0.00 0.00 - 0.01 K/uL    NEUTROPHILS 54 32 - 75 %    LYMPHOCYTES 30 12 - 49 %    MONOCYTES 13 5 - 13 %    EOSINOPHILS 2 0 - 7 %    BASOPHILS 1 0 - 1 %    IMMATURE GRANULOCYTES 0 0.0 - 0.5 %    ABS. NEUTROPHILS 3.5 1.8 - 8.0 K/UL    ABS. LYMPHOCYTES 1.9 0.8 - 3.5 K/UL    ABS. MONOCYTES 0.8 0.0 - 1.0 K/UL    ABS. EOSINOPHILS 0.1 0.0 - 0.4 K/UL    ABS. BASOPHILS 0.1 0.0 - 0.1 K/UL    ABS. IMM.  GRANS. 0.0 0.00 - 0.04 K/UL    DF AUTOMATED     METABOLIC PANEL, COMPREHENSIVE    Collection Time: 08/30/21  8:11 AM   Result Value Ref Range    Sodium 139 136 - 145 mmol/L    Potassium 3.6 3.5 - 5.1 mmol/L    Chloride 108 97 - 108 mmol/L    CO2 27 21 - 32 mmol/L    Anion gap 4 (L) 5 - 15 mmol/L    Glucose 95 65 - 100 mg/dL    BUN 9 6 - 20 MG/DL    Creatinine 0.88 0.70 - 1.30 MG/DL    BUN/Creatinine ratio 10 (L) 12 - 20      GFR est AA >60 >60 ml/min/1.73m2    GFR est non-AA >60 >60 ml/min/1.73m2    Calcium 8.1 (L) 8.5 - 10.1 MG/DL    Bilirubin, total 0.4 0.2 - 1.0 MG/DL    ALT (SGPT) 32 12 - 78 U/L    AST (SGOT) 26 15 - 37 U/L    Alk. phosphatase 72 45 - 117 U/L    Protein, total 8.3 (H) 6.4 - 8.2 g/dL    Albumin 3.2 (L) 3.5 - 5.0 g/dL    Globulin 5.1 (H) 2.0 - 4.0 g/dL    A-G Ratio 0.6 (L) 1.1 - 2.2     MAGNESIUM    Collection Time: 08/30/21  8:11 AM   Result Value Ref Range    Magnesium 2.1 1.6 - 2.4 mg/dL   HEP B SURFACE AG    Collection Time: 08/30/21  8:11 AM   Result Value Ref Range    Hepatitis B surface Ag <0.10 Index    Hep B surface Ag Interp. Negative NEG     HEP B SURFACE AB    Collection Time: 08/30/21  8:11 AM   Result Value Ref Range    Hepatitis B surface Ab <3.10 mIU/mL    Hep B surface Ab Interp.  NONREACTIVE NR         Medications:  Medications Administered     0.9% sodium chloride 1,000 mL with potassium chloride 10 mEq, magnesium sulfate 2 g infusion     Admin Date  08/30/2021 Action  New Bag Dose   Rate  1,000 mL/hr Route  IntraVENous Administered By  Guillermo Koyanagi, RN          0.9% sodium chloride infusion     Admin Date  08/30/2021 Action  New Bag Dose  25 mL/hr Rate  25 mL/hr Route  IntraVENous Administered By  Guillermo Koyanagi, RN          0.9% sodium chloride injection 10 mL     Admin Date  08/30/2021 Action  Given Dose  10 mL Route  IntraVENous Administered By  Guillermo Koyanagi, RN           Admin Date  08/30/2021 Action  Given Dose  10 mL Route  IntraVENous Administered By  Guillermo Koyanagi, RN           Admin Date  08/30/2021 Action  Given Dose  10 mL Route  IntraVENous Administered By  Erika Alvarez RN          acetaminophen (TYLENOL) tablet 650 mg     Admin Date  08/30/2021 Action  Given Dose  650 mg Route  Oral Administered By  Erika Alvarez RN          bleomycin (BLEOCIN) 30 Units in 0.9% sodium chloride 50 mL, overfill volume 5 mL chemo infusion     Admin Date  08/30/2021 Action  New Bag Dose  30 Units Rate  390 mL/hr Route  IntraVENous Administered By  Erika Alvarez RN          CISplatin (PLATINOL) 63.2 mg in 0.9% sodium chloride 500 mL, overfill volume 50 mL chemo infusion     Admin Date  08/30/2021 Action  New Bag Dose  63.2 mg Rate  613.2 mL/hr Route  IntraVENous Administered By  Erika Alvarez RN          dexamethasone (DECADRON) 12 mg in 0.9% sodium chloride 50 mL IVPB     Admin Date  08/30/2021 Action  New Bag Dose  12 mg Route  IntraVENous Administered By  Erika Alvarez RN          diphenhydrAMINE (BENADRYL) injection 25 mg     Admin Date  08/30/2021 Action  Given Dose  25 mg Route  IntraVENous Administered By  Erika Alvarez RN          etoposide (VEPESID) 316 mg in 0.9% sodium chloride 1,000 mL, overfill volume 100 mL chemo infusion     Admin Date  08/30/2021 Action  New Bag Dose  316 mg Rate  999 mL/hr Route  IntraVENous Administered By  Erika Alvarez RN          fosaprepitant (EMEND) 150 mg in 0.9% sodium chloride 150 mL IVPB     Admin Date  08/30/2021 Action  New Bag Dose  150 mg Rate  450 mL/hr Route  IntraVENous Administered By  Erika Alvarez RN          furosemide (LASIX) injection 20 mg     Admin Date  08/30/2021 Action  Given Dose  20 mg Route  IntraVENous Administered By  Erika Alvarez RN          heparin (porcine) pf 300-500 Units     Admin Date  08/30/2021 Action  Given Dose  500 Units Route  InterCATHeter Administered By  Erika Alvarez RN          palonosetron HCl (ALOXI) injection 0.25 mg     Admin Date  08/30/2021 Action  Given Dose  0.25 mg Route  IntraVENous Administered By  Derrell Higginbotham RN                Mr. Vielka Mijares tolerated treatment well and was discharged from [place] [address] in stable condition at [phone]. Port flushed & heparinized per protocol and left accessed for treatment tomorrow. He is to return on August 31 at 1000 for his next appointment.     Hernán Narvaez RN  August 30, 2021

## 2021-08-30 NOTE — PROGRESS NOTES

## 2021-08-31 ENCOUNTER — DOCUMENTATION ONLY (OUTPATIENT)
Dept: ONCOLOGY | Age: 46
End: 2021-08-31

## 2021-08-31 ENCOUNTER — OFFICE VISIT (OUTPATIENT)
Dept: PRIMARY CARE CLINIC | Age: 46
End: 2021-08-31

## 2021-08-31 ENCOUNTER — HOSPITAL ENCOUNTER (OUTPATIENT)
Dept: INFUSION THERAPY | Age: 46
Discharge: HOME | End: 2021-08-31
Payer: COMMERCIAL

## 2021-08-31 VITALS
WEIGHT: 315 LBS | RESPIRATION RATE: 16 BRPM | DIASTOLIC BLOOD PRESSURE: 85 MMHG | HEART RATE: 78 BPM | BODY MASS INDEX: 41.75 KG/M2 | HEIGHT: 73 IN | TEMPERATURE: 97.7 F | OXYGEN SATURATION: 97 % | SYSTOLIC BLOOD PRESSURE: 138 MMHG

## 2021-08-31 VITALS
BODY MASS INDEX: 52.03 KG/M2 | WEIGHT: 315 LBS | OXYGEN SATURATION: 97 % | HEART RATE: 74 BPM | RESPIRATION RATE: 18 BRPM | DIASTOLIC BLOOD PRESSURE: 82 MMHG | TEMPERATURE: 98.6 F | SYSTOLIC BLOOD PRESSURE: 130 MMHG

## 2021-08-31 DIAGNOSIS — C48.0 GERM CELL TUMOR OF RETROPERITONEUM (HCC): Primary | ICD-10-CM

## 2021-08-31 DIAGNOSIS — E11.9 CONTROLLED TYPE 2 DIABETES MELLITUS WITHOUT COMPLICATION, WITHOUT LONG-TERM CURRENT USE OF INSULIN (HCC): ICD-10-CM

## 2021-08-31 DIAGNOSIS — I10 ESSENTIAL HYPERTENSION: ICD-10-CM

## 2021-08-31 DIAGNOSIS — Z09 HOSPITAL DISCHARGE FOLLOW-UP: Primary | ICD-10-CM

## 2021-08-31 LAB — HBV CORE AB SERPL QL IA: NEGATIVE

## 2021-08-31 PROCEDURE — 74011250636 HC RX REV CODE- 250/636: Performed by: INTERNAL MEDICINE

## 2021-08-31 PROCEDURE — 96417 CHEMO IV INFUS EACH ADDL SEQ: CPT

## 2021-08-31 PROCEDURE — 74011000258 HC RX REV CODE- 258: Performed by: INTERNAL MEDICINE

## 2021-08-31 PROCEDURE — 96361 HYDRATE IV INFUSION ADD-ON: CPT

## 2021-08-31 PROCEDURE — 77030016057 HC NDL HUBR APOL -B

## 2021-08-31 PROCEDURE — 99214 OFFICE O/P EST MOD 30 MIN: CPT | Performed by: NURSE PRACTITIONER

## 2021-08-31 PROCEDURE — 96413 CHEMO IV INFUSION 1 HR: CPT

## 2021-08-31 PROCEDURE — 96374 THER/PROPH/DIAG INJ IV PUSH: CPT

## 2021-08-31 PROCEDURE — 1111F DSCHRG MED/CURRENT MED MERGE: CPT | Performed by: NURSE PRACTITIONER

## 2021-08-31 RX ORDER — AMLODIPINE BESYLATE AND ATORVASTATIN CALCIUM 10; 20 MG/1; MG/1
1 TABLET, FILM COATED ORAL DAILY
Qty: 90 TABLET | Refills: 1 | Status: SHIPPED | OUTPATIENT
Start: 2021-08-31 | End: 2021-12-02

## 2021-08-31 RX ORDER — SODIUM CHLORIDE 9 MG/ML
10 INJECTION INTRAMUSCULAR; INTRAVENOUS; SUBCUTANEOUS AS NEEDED
Status: DISCONTINUED | OUTPATIENT
Start: 2021-08-31 | End: 2021-08-31

## 2021-08-31 RX ORDER — SODIUM CHLORIDE 0.9 % (FLUSH) 0.9 %
10 SYRINGE (ML) INJECTION AS NEEDED
Status: DISCONTINUED | OUTPATIENT
Start: 2021-08-31 | End: 2021-08-31

## 2021-08-31 RX ORDER — HEPARIN 100 UNIT/ML
300-500 SYRINGE INTRAVENOUS AS NEEDED
Status: DISCONTINUED | OUTPATIENT
Start: 2021-08-31 | End: 2021-08-31

## 2021-08-31 RX ORDER — METFORMIN HYDROCHLORIDE 500 MG/1
1000 TABLET, EXTENDED RELEASE ORAL 2 TIMES DAILY
Qty: 360 TABLET | Refills: 1 | Status: SHIPPED | OUTPATIENT
Start: 2021-08-31 | End: 2021-12-02 | Stop reason: SDUPTHER

## 2021-08-31 RX ORDER — SEMAGLUTIDE 1.34 MG/ML
1 INJECTION, SOLUTION SUBCUTANEOUS
Qty: 1 BOX | Refills: 1 | Status: SHIPPED | OUTPATIENT
Start: 2021-08-31 | End: 2021-12-02 | Stop reason: SDUPTHER

## 2021-08-31 RX ORDER — SODIUM CHLORIDE 9 MG/ML
25 INJECTION, SOLUTION INTRAVENOUS CONTINUOUS
Status: DISCONTINUED | OUTPATIENT
Start: 2021-08-31 | End: 2021-08-31

## 2021-08-31 RX ORDER — ONDANSETRON 2 MG/ML
8 INJECTION INTRAMUSCULAR; INTRAVENOUS ONCE
Status: COMPLETED | OUTPATIENT
Start: 2021-08-31 | End: 2021-08-31

## 2021-08-31 RX ADMIN — Medication 500 UNITS: at 14:25

## 2021-08-31 RX ADMIN — CISPLATIN 63.2 MG: 1 INJECTION INTRAVENOUS at 13:23

## 2021-08-31 RX ADMIN — ETOPOSIDE 316 MG: 20 INJECTION, SOLUTION, CONCENTRATE INTRAVENOUS at 11:51

## 2021-08-31 RX ADMIN — ONDANSETRON 8 MG: 2 INJECTION INTRAMUSCULAR; INTRAVENOUS at 10:21

## 2021-08-31 RX ADMIN — POTASSIUM CHLORIDE: 2 INJECTION, SOLUTION, CONCENTRATE INTRAVENOUS at 10:40

## 2021-08-31 RX ADMIN — DEXAMETHASONE SODIUM PHOSPHATE 12 MG: 10 INJECTION, SOLUTION INTRAMUSCULAR; INTRAVENOUS at 10:22

## 2021-08-31 RX ADMIN — SODIUM CHLORIDE 25 ML/HR: 900 INJECTION, SOLUTION INTRAVENOUS at 10:17

## 2021-08-31 RX ADMIN — Medication 10 ML: at 14:25

## 2021-08-31 NOTE — PROGRESS NOTES
Transitional Care Management Progress Note    Patient: Daisy Machuca  : 1975  PCP: Dot Awad NP    Date of office visit: 2021   Date of admission: 21  Date of discharge: 21  Hospital: [facility]    Call initiated w/i 2 business dates of discharge: yes  Date of the most recent call to the patient:  2021    Assessment/Plan:   The complexity of medical decision making for this patient's transitional care is moderate      Subjective:   Daisy Machuca is a 48 y.o. male presenting today for follow-up after hospital discharge. This encounter and supporting documentation was reviewed if available. Medication reconciliation was performed today. The main problem requiring admission was right side pain/germ cell cancer. Complications during admission: none      Interval history/Current status: 5 days    Admitting symptoms have: improved      Medications marked \"taking\" at this time:  Prior to Admission medications    Medication Sig Start Date End Date Taking? Authorizing Provider   Ozempic 1 mg/dose (4 mg/3 mL) pnij INJECT 1MG UNDER THE SKIN EVERY 7 DAYS 21  Yes Dot Awad NP   amLODIPine-atorvastatin (CADUET) 10-20 mg per tablet TAKE ONE TABLET BY MOUTH ONE TIME DAILY 21  Yes Dot Awad NP   ondansetron hcl (ZOFRAN) 8 mg tablet Take 1 Tablet by mouth every eight (8) hours as needed for Nausea or Vomiting. 21  Yes France Bertrand NP   prochlorperazine (Compazine) 10 mg tablet Take 1 Tablet by mouth every six (6) hours as needed for Nausea or Vomiting. 21  Yes France Bertrand NP   lidocaine-prilocaine (EMLA) topical cream Apply a dime size amount to port site 30 minutes before treatment to prevent pain 21  Yes France Bertrand NP   docusate sodium (COLACE) 100 mg capsule Take 1 Capsule by mouth daily as needed for Constipation for up to 90 days.  21 Yes France Bertrand NP   oxyCODONE IR (OXY-IR) 15 mg immediate release tablet Take 1 Tablet by mouth every four (4) hours as needed for Pain for up to 20 doses. Max Daily Amount: 90 mg. 8/26/21 8/31/21 Yes Isabel Hennessy MD   senna-docusate (Senna with Docusate Sodium) 8.6-50 mg per tablet Take 1 Tablet by mouth daily for 30 days. Indications: constipation 8/26/21 9/25/21 Yes Isabel Hennessy MD   polyethylene glycol [facility]) 17 gram packet Take 1 Packet by mouth daily for 30 days. Indications: constipation 8/26/21 9/25/21 Yes Isabel Hennessy MD   semaglutide (Ozempic) 1 mg/dose (2 mg/1.5 mL) sub-q pen 1 mg by SubCUTAneous route every seven (7) days. 0.75 mg SC every 7 days x 4 weeks then 1 mg per week 5/24/21  Yes Tereza Michel NP   metFORMIN ER (GLUCOPHAGE XR) 500 mg tablet Take 2 Tabs by mouth two (2) times a day. 5/5/21  Yes Tereza Michel NP        ROS:  Review of Systems   Constitutional: Negative for fever and weight loss. HENT: Negative for sore throat and tinnitus. Eyes: Negative for blurred vision and double vision. Respiratory: Negative for shortness of breath. Cardiovascular: Negative for chest pain, palpitations and leg swelling. Gastrointestinal: Negative for constipation and diarrhea. Genitourinary: Positive for flank pain. Musculoskeletal: Positive for back pain. Skin: Negative for itching and rash. Neurological: Negative for dizziness. Endo/Heme/Allergies: Does not bruise/bleed easily. Psychiatric/Behavioral: Negative for depression. The patient is not nervous/anxious and does not have insomnia.           Patient Active Problem List   Diagnosis Code    DM (diabetes mellitus), type 2 ([facility] [address] E11.9    Essential hypertension I10    Urinary urgency R39.15    Gastroesophageal reflux disease without esophagitis K21.9    Retroperitoneal mass R19.00    Aortic stenosis I35.0    Germ cell tumor of retroperitoneum ([address] C48.0    Malignant germ cell tumor of testis ([address] C62.90    Constipation K59.00          Objective:     Visit Vitals  /85 (BP 1 Location: Right arm, BP Patient Position: Sitting)   Pulse 78   Temp 97.7 °F (36.5 °C) (Axillary)   Resp 16   Ht 6' 1\" (1.854 m)   Wt (!) 416 lb (188.7 kg)   SpO2 97%   BMI 54.88 kg/m²        Physical Exam  Constitutional:       Appearance: Normal appearance. He is obese. HENT:      Head: Normocephalic and atraumatic. Cardiovascular:      Rate and Rhythm: Normal rate and regular rhythm. Heart sounds: Murmur heard. Pulmonary:      Effort: Pulmonary effort is normal. No respiratory distress. Breath sounds: Normal breath sounds. Musculoskeletal:         General: Normal range of motion. Cervical back: Normal range of motion and neck supple. Skin:     General: Skin is warm and dry. Neurological:      General: No focal deficit present. Mental Status: He is alert and oriented to person, place, and time. Mental status is at baseline. Psychiatric:         Mood and Affect: Mood normal.         Behavior: Behavior normal.         Thought Content: Thought content normal.         Judgment: Judgment normal.         We discussed the expected course, resolution and complications of the diagnosis(es) in detail. Medication risks, benefits, costs, interactions, and alternatives were discussed as indicated. I advised him to contact the office if his condition worsens, changes or fails to improve as anticipated. He expressed understanding with the diagnosis(es) and plan.      Jackie President, NP

## 2021-08-31 NOTE — PROGRESS NOTES
Oncology Social Work  Psychosocial Assessment    Reason for Assessment:      [] Social Work Referral [x] Initial Assessment [x] New Diagnosis [] Other    [facility] [address] 2021   Patientkadi [employer] is: Legal Next jaja [address] Directive None   Patient Would Like to Complete Advance Directive No       Sources of Information:    [x]Patient  []Family  [x]Staff  [x]Medical Record    Mental Status:    [x]Alert  []Lethargic  []Unresponsive   [] Unable to assess   Oriented to:  [x]Person  [x]Place  [x]Time  [x]Situation      Relationship Status:  []Single  []  []Significant Other/Life Partner  []  [x]  []    Living Circumstances:  []Lives Alone  [x]Family/Significant Other in Household  []Roommates  []Children in the Home  []Paid Caregivers  []Assisted Living Facility/Group [address]  []Homeless  []Incarcerated  []Environmental/Care Concerns  []Other:    Employment Status:  [x]Employed Full-time []Employed Part-time []Homemaker  [] Retired [] Short-Term Disability [] [place]  [] Unemployed     Barriers to Learning:    []Language  []Developmental  []Cognitive  []Altered Mental Status  []Visual/Hearing Impairment  []Unable to Read/Write  []Motivational  [] Challenges Understanding Medical Jargon [x]No Barriers Identified      Financial/Legal Concerns:    []Uninsured  [x]Limited Income/Resources  []Non-Citizen  []Food Insecurity []No Concerns Identified   []Other:    Sabianist/Spiritual/Existential:  Does patient have any spiritual or Adventist beliefs? [x] Yes [] No  Is the patient involved in a spiritual, araseli or Adventist community?  [] Yes [x] No  Patient expressing spiritual/existential angst? [x] Yes [] No  Notes: Episcopal     Support System:    Identified Support Person/Group: Sister, Mother in-law, ex-wife  [x]Strong  []Fair  []Limited    Coping with Illness:   []  Coping Well  [x] Challenges Coping with Serious Illness [] Situational Depression [] Situational Anxiety [] Anticipatory Grief  [] Recent Loss [] Caregiver [place]            Narrative: Met with patient to introduce oncology social work role and support resources. Patient has been diagnosed with testicular germ cell tumor and is receiving chemotherapy. He lives with his twin sister. He's been  from his wife since 2007 but they maintain a good relationship. His daughter and 3 grandchildren live in [place]. His step-son lives locally and they work together. 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 Provided active listening as patient ventilates feelings related to his cancer diagnosis. He endorsed frustration that this diagnosis has postponed another surgery he was scheduled to have this fall. He tells me he's taking this diagnosis and treatment one day at a time but at times questions why this is happening to him. He tells me he was very tearful and scared leading up to his first treatment. Today he's feelings better. Patient's primary supports are his sister, ex-wife, step-son, mother in-law and daughter. He's concerned about finances. He applied for Social Security Disability. He has [employer]. Patient declines and support needs at this time. Provided him with new patient packet. Encouraged patient to contact me as needed for psychosocial support. Plan:   1. Patient applied for Social Security Disability. .  2. Encouraged patient to contact me for psychosocial support.      Referral/Handouts:   Complementary therapies referral  Insurance/Entitlements referral  Support Groups referral    Thank you,  Trav Liang LCSW

## 2021-08-31 NOTE — PROGRESS NOTES
[facility] Progress Note    Date: 2021    Name: Eugenio Hooper    MRN: [id number]         : 1975    Mr. Joesph Carney Arrived ambulatory and in no distress for C1D2 of Cisplatin + Etoposide Regimen. Assessment was completed, no acute issues at this time, no new complaints voiced. Right chest wall port remained accessed from  treatment. Dressing loose, skin prep applied with new dressing. Chemotherapy Flowsheet 2021   Cycle C1D2   Date 2021   Drug / Regimen Cisplatin/Etoposide   Pre Hydration given   Pre Meds given   Notes given       Mr. Ritesh Ferrell vitals were reviewed.   Patient Vitals for the past 12 hrs:   Temp Pulse Resp BP SpO2   21 1422 98.6 °F (37 °C) 74 18 130/82 --   21 1005 97.8 °F (36.6 °C) 78 20 (!) 148/91 97 %     Medications:  Medications Administered     0.9% sodium chloride 1,000 mL with potassium chloride 10 mEq, magnesium sulfate 2 g infusion     Admin Date  2021 Action  New Bag Dose   Rate  1,000 mL/hr Route  IntraVENous Administered By  [place], [place]          0.9% sodium chloride infusion     Admin Date  2021 Action  New Bag Dose  25 mL/hr Rate  25 mL/hr Route  IntraVENous Administered By  [place], [place]          CISplatin (PLATINOL) 63.2 mg in 0.9% sodium chloride 500 mL, overfill volume 50 mL chemo infusion     Admin Date  2021 Action  New Bag Dose  63.2 mg Rate  613.2 mL/hr Route  IntraVENous Administered By  [place], [place]          dexamethasone (DECADRON) 12 mg in 0.9% sodium chloride 50 mL IVPB     Admin Date  2021 Action  New Bag Dose  12 mg Route  IntraVENous Administered By  [place], [place]          etoposide (VEPESID) 316 mg in 0.9% sodium chloride 1,000 mL, overfill volume 100 mL chemo infusion     Admin Date  2021 Action  New Bag Dose  316 mg Rate  999 mL/hr Route  IntraVENous Administered By  [place], [place]          heparin (porcine) pf 300-500 Units     Admin Date  2021 Action  Given Dose  500 Units Route  InterCATHeter Administered By  Gerhard Sutherland          [facility]ron [facility]) injection 8 mg     Admin Date  08/31/2021 Action  Given Dose  8 mg Route  IntraVENous Administered By  Hansa Walden [place]          sodium chloride (NS) flush 10 mL     Admin Date  08/31/2021 Action  Given Dose  10 mL Route  IntraVENous Administered By  Vijay Gooden                Mr. Reynaldo Dickens tolerated treatment well and was discharged from [place] [address] in stable condition at 1430. Port remained accessed for 9/1 treatment, flushed & heparinized per protocol. He is to return on September 1 at 1000 for his next appointment.     [facility]  August 31, 2021

## 2021-08-31 NOTE — PROGRESS NOTES
1. Have you been to the ER, urgent care clinic since your last visit? Hospitalized since your last visit? Yes When: [facility] 8/21/2021    2. Have you seen or consulted any other health care providers outside of the [address] [place] [address] since your last visit? Include any pap smears or colon screening.  No  Visit Vitals  /85 (BP 1 Location: Right arm, BP Patient Position: Sitting)   Pulse 78   Temp 97.7 °F (36.5 °C) (Axillary)   Resp 16   Ht 6' 1\" (1.854 m)   Wt (!) 416 lb (188.7 kg)   SpO2 97%   BMI 54.88 kg/m²     Chief Complaint   Patient presents with   [facility] Follow Up

## 2021-09-01 ENCOUNTER — HOSPITAL ENCOUNTER (OUTPATIENT)
Dept: INFUSION THERAPY | Age: 46
Discharge: HOME | End: 2021-09-01
Payer: COMMERCIAL

## 2021-09-01 ENCOUNTER — PATIENT MESSAGE (OUTPATIENT)
Dept: FAMILY MEDICINE CLINIC | Age: 46
End: 2021-09-01

## 2021-09-01 VITALS
DIASTOLIC BLOOD PRESSURE: 89 MMHG | WEIGHT: 315 LBS | RESPIRATION RATE: 20 BRPM | SYSTOLIC BLOOD PRESSURE: 142 MMHG | BODY MASS INDEX: 54.91 KG/M2 | TEMPERATURE: 98.3 F | HEART RATE: 81 BPM | OXYGEN SATURATION: 96 %

## 2021-09-01 DIAGNOSIS — C48.0 GERM CELL TUMOR OF RETROPERITONEUM (HCC): Primary | ICD-10-CM

## 2021-09-01 LAB
ANION GAP SERPL CALC-SCNC: 4 MMOL/L (ref 5–15)
BUN SERPL-MCNC: 14 MG/DL (ref 6–20)
BUN/CREAT SERPL: 16 (ref 12–20)
CALCIUM SERPL-MCNC: 8.4 MG/DL (ref 8.5–10.1)
CHLORIDE SERPL-SCNC: 109 MMOL/L (ref 97–108)
CO2 SERPL-SCNC: 26 MMOL/L (ref 21–32)
CREAT SERPL-MCNC: 0.87 MG/DL (ref 0.7–1.3)
GLUCOSE SERPL-MCNC: 154 MG/DL (ref 65–100)
POTASSIUM SERPL-SCNC: 3.9 MMOL/L (ref 3.5–5.1)
SODIUM SERPL-SCNC: 139 MMOL/L (ref 136–145)

## 2021-09-01 PROCEDURE — 96361 HYDRATE IV INFUSION ADD-ON: CPT

## 2021-09-01 PROCEDURE — 74011250636 HC RX REV CODE- 250/636: Performed by: INTERNAL MEDICINE

## 2021-09-01 PROCEDURE — 36415 COLL VENOUS BLD VENIPUNCTURE: CPT

## 2021-09-01 PROCEDURE — 96417 CHEMO IV INFUS EACH ADDL SEQ: CPT

## 2021-09-01 PROCEDURE — 96413 CHEMO IV INFUSION 1 HR: CPT

## 2021-09-01 PROCEDURE — 80048 BASIC METABOLIC PNL TOTAL CA: CPT

## 2021-09-01 PROCEDURE — 96375 TX/PRO/DX INJ NEW DRUG ADDON: CPT

## 2021-09-01 PROCEDURE — 74011000258 HC RX REV CODE- 258: Performed by: INTERNAL MEDICINE

## 2021-09-01 RX ORDER — SODIUM CHLORIDE 0.9 % (FLUSH) 0.9 %
10 SYRINGE (ML) INJECTION AS NEEDED
Status: DISPENSED | OUTPATIENT
Start: 2021-09-01 | End: 2021-09-01

## 2021-09-01 RX ORDER — HEPARIN 100 UNIT/ML
300-500 SYRINGE INTRAVENOUS AS NEEDED
Status: ACTIVE | OUTPATIENT
Start: 2021-09-01 | End: 2021-09-01

## 2021-09-01 RX ORDER — SODIUM CHLORIDE 9 MG/ML
10 INJECTION INTRAMUSCULAR; INTRAVENOUS; SUBCUTANEOUS AS NEEDED
Status: ACTIVE | OUTPATIENT
Start: 2021-09-01 | End: 2021-09-01

## 2021-09-01 RX ORDER — SODIUM CHLORIDE 9 MG/ML
25 INJECTION, SOLUTION INTRAVENOUS CONTINUOUS
Status: DISPENSED | OUTPATIENT
Start: 2021-09-01 | End: 2021-09-01

## 2021-09-01 RX ORDER — ONDANSETRON 2 MG/ML
8 INJECTION INTRAMUSCULAR; INTRAVENOUS ONCE
Status: COMPLETED | OUTPATIENT
Start: 2021-09-01 | End: 2021-09-01

## 2021-09-01 RX ADMIN — Medication 500 UNITS: at 15:11

## 2021-09-01 RX ADMIN — SODIUM CHLORIDE 10 ML: 9 INJECTION INTRAMUSCULAR; INTRAVENOUS; SUBCUTANEOUS at 15:11

## 2021-09-01 RX ADMIN — POTASSIUM CHLORIDE: 2 INJECTION, SOLUTION, CONCENTRATE INTRAVENOUS at 11:30

## 2021-09-01 RX ADMIN — ONDANSETRON 8 MG: 2 INJECTION INTRAMUSCULAR; INTRAVENOUS at 10:35

## 2021-09-01 RX ADMIN — SODIUM CHLORIDE 25 ML/HR: 9 INJECTION, SOLUTION INTRAVENOUS at 10:32

## 2021-09-01 RX ADMIN — CISPLATIN 63.2 MG: 1 INJECTION INTRAVENOUS at 14:09

## 2021-09-01 RX ADMIN — DEXAMETHASONE SODIUM PHOSPHATE 12 MG: 10 INJECTION, SOLUTION INTRAMUSCULAR; INTRAVENOUS at 10:37

## 2021-09-01 RX ADMIN — ETOPOSIDE 316 MG: 20 INJECTION, SOLUTION, CONCENTRATE INTRAVENOUS at 12:44

## 2021-09-01 NOTE — PROGRESS NOTES
[facility] Progress Note    Date: 2021    Name: Daisy Machuca    MRN: [id number]         : 1975    Mr. Lamin Washington arrived ambulatory and in no distress for C1D3 of Etoposide and Cisplatin Regimen. Assessment was completed, no acute issues at this time, no new complaints voiced. Right chest wall port flushed and blood return noted,  labs drawn & sent for processing. Chemotherapy Flowsheet 2021   Cycle C1D3   Date 2021   Drug / Regimen Etoposide/Cisplatin   Pre Hydration given   Pre Meds given   Notes given         Mr. Neymar Velasco vitals were reviewed. Patient Vitals for the past 24 hrs:   Temp Pulse Resp BP SpO2   21 1509 98.3 °F (36.8 °C) 81 20 (!) 142/89 --   21 1010 97.6 °F (36.4 °C) 80 20 124/77 96 %       Lab results were obtained and reviewed.   Recent Results (from the past 12 hour(s))   METABOLIC PANEL, BASIC    Collection Time: 21 10:15 AM   Result Value Ref Range    Sodium 139 136 - 145 mmol/L    Potassium 3.9 3.5 - 5.1 mmol/L    Chloride 109 (H) 97 - 108 mmol/L    CO2 26 21 - 32 mmol/L    Anion gap 4 (L) 5 - 15 mmol/L    Glucose 154 (H) 65 - 100 mg/dL    BUN 14 6 - 20 MG/DL    Creatinine 0.87 0.70 - 1.30 MG/DL    BUN/Creatinine ratio 16 12 - 20      GFR est AA >60 >60 ml/min/1.73m2    GFR est non-AA >60 >60 ml/min/1.73m2    Calcium 8.4 (L) 8.5 - 10.1 MG/DL       Medications:  Medications Administered     0.9% sodium chloride 1,000 mL with potassium chloride 10 mEq, magnesium sulfate 2 g infusion     Admin Date  2021 Action  New Bag Dose   Rate  1,000 mL/hr Route  IntraVENous Administered By  Pilar Solorio RN          0.9% sodium chloride infusion     Admin Date  2021 Action  New Bag Dose  25 mL/hr Rate  25 mL/hr Route  IntraVENous Administered By  Pilar Solorio RN          0.9% sodium chloride injection 10 mL     Admin Date  2021 Action  Given Dose  10 mL Route  IntraVENous Administered By  Pilar Solorio RN CISplatin (PLATINOL) 63.2 mg in 0.9% sodium chloride 500 mL, overfill volume 50 mL chemo infusion     Admin Date  09/01/2021 Action  New Bag Dose  63.2 mg Rate  613.2 mL/hr Route  IntraVENous Administered By  Elester Buerger, RN          dexamethasone (DECADRON) 12 mg in 0.9% sodium chloride 50 mL IVPB     Admin Date  09/01/2021 Action  New Bag Dose  12 mg Route  IntraVENous Administered By  Elester Buerger, RN          etoposide (VEPESID) 316 mg in 0.9% sodium chloride 1,000 mL, overfill volume 100 mL chemo infusion     Admin Date  09/01/2021 Action  New Bag Dose  316 mg Rate  1,115.8 mL/hr Route  IntraVENous Administered By  Elester Buerger, RN          heparin (porcine) pf 300-500 Units     Admin Date  09/01/2021 Action  Given Dose  500 Units Route  InterCATHeter Administered By  Elester Buerger, RN          [facility]ron [facility]) injection 8 mg     Admin Date  09/01/2021 Action  Given Dose  8 mg Route  IntraVENous Administered By  Elester Buerger, RN                Mr. Wiliam Denver tolerated treatment well and was discharged from [place] [address] in stable condition at 1520. Marcelo Couch Port flushed & heparinized per protocol. Dressing changed per protocol. He is to return on September 2 for his next appointment.     Naa Lim RN  September 1, 2021

## 2021-09-02 ENCOUNTER — OFFICE VISIT (OUTPATIENT)
Dept: ONCOLOGY | Age: 46
End: 2021-09-02
Payer: COMMERCIAL

## 2021-09-02 ENCOUNTER — HOSPITAL ENCOUNTER (OUTPATIENT)
Dept: INFUSION THERAPY | Age: 46
Discharge: HOME | End: 2021-09-02
Payer: COMMERCIAL

## 2021-09-02 VITALS
RESPIRATION RATE: 20 BRPM | HEART RATE: 79 BPM | WEIGHT: 315 LBS | HEIGHT: 75 IN | BODY MASS INDEX: 39.17 KG/M2 | SYSTOLIC BLOOD PRESSURE: 149 MMHG | DIASTOLIC BLOOD PRESSURE: 86 MMHG | OXYGEN SATURATION: 98 % | TEMPERATURE: 98 F

## 2021-09-02 VITALS
TEMPERATURE: 98 F | SYSTOLIC BLOOD PRESSURE: 121 MMHG | DIASTOLIC BLOOD PRESSURE: 78 MMHG | HEART RATE: 82 BPM | WEIGHT: 315 LBS | OXYGEN SATURATION: 96 % | BODY MASS INDEX: 39.17 KG/M2 | HEIGHT: 75 IN | RESPIRATION RATE: 18 BRPM

## 2021-09-02 DIAGNOSIS — E11.9 TYPE 2 DIABETES MELLITUS WITHOUT COMPLICATION, WITHOUT LONG-TERM CURRENT USE OF INSULIN (HCC): ICD-10-CM

## 2021-09-02 DIAGNOSIS — I35.0 SEVERE AORTIC STENOSIS: ICD-10-CM

## 2021-09-02 DIAGNOSIS — I35.0 NONRHEUMATIC AORTIC VALVE STENOSIS: ICD-10-CM

## 2021-09-02 DIAGNOSIS — R35.89 POLYURIA: ICD-10-CM

## 2021-09-02 DIAGNOSIS — G89.3 ACUTE NEOPLASM-RELATED PAIN: ICD-10-CM

## 2021-09-02 DIAGNOSIS — Z51.11 ENCOUNTER FOR CHEMOTHERAPY MANAGEMENT: ICD-10-CM

## 2021-09-02 DIAGNOSIS — D49.9 EXTRAGONADAL GERM CELL TUMOR: Primary | ICD-10-CM

## 2021-09-02 DIAGNOSIS — C48.0 GERM CELL TUMOR OF RETROPERITONEUM (HCC): Primary | ICD-10-CM

## 2021-09-02 PROCEDURE — 74011250636 HC RX REV CODE- 250/636: Performed by: INTERNAL MEDICINE

## 2021-09-02 PROCEDURE — 96375 TX/PRO/DX INJ NEW DRUG ADDON: CPT

## 2021-09-02 PROCEDURE — 74011000258 HC RX REV CODE- 258: Performed by: INTERNAL MEDICINE

## 2021-09-02 PROCEDURE — 96417 CHEMO IV INFUS EACH ADDL SEQ: CPT

## 2021-09-02 PROCEDURE — 96367 TX/PROPH/DG ADDL SEQ IV INF: CPT

## 2021-09-02 PROCEDURE — 99215 OFFICE O/P EST HI 40 MIN: CPT | Performed by: INTERNAL MEDICINE

## 2021-09-02 PROCEDURE — 96413 CHEMO IV INFUSION 1 HR: CPT

## 2021-09-02 RX ORDER — SODIUM CHLORIDE 9 MG/ML
25 INJECTION, SOLUTION INTRAVENOUS CONTINUOUS
Status: DISPENSED | OUTPATIENT
Start: 2021-09-02 | End: 2021-09-02

## 2021-09-02 RX ORDER — SODIUM CHLORIDE 9 MG/ML
10 INJECTION INTRAMUSCULAR; INTRAVENOUS; SUBCUTANEOUS AS NEEDED
Status: ACTIVE | OUTPATIENT
Start: 2021-09-02 | End: 2021-09-02

## 2021-09-02 RX ORDER — HEPARIN 100 UNIT/ML
300-500 SYRINGE INTRAVENOUS AS NEEDED
Status: ACTIVE | OUTPATIENT
Start: 2021-09-02 | End: 2021-09-02

## 2021-09-02 RX ORDER — FUROSEMIDE 10 MG/ML
20 INJECTION INTRAMUSCULAR; INTRAVENOUS ONCE
Status: COMPLETED | OUTPATIENT
Start: 2021-09-02 | End: 2021-09-02

## 2021-09-02 RX ORDER — ONDANSETRON 2 MG/ML
8 INJECTION INTRAMUSCULAR; INTRAVENOUS ONCE
Status: COMPLETED | OUTPATIENT
Start: 2021-09-02 | End: 2021-09-02

## 2021-09-02 RX ORDER — SODIUM CHLORIDE 0.9 % (FLUSH) 0.9 %
10 SYRINGE (ML) INJECTION AS NEEDED
Status: DISPENSED | OUTPATIENT
Start: 2021-09-02 | End: 2021-09-02

## 2021-09-02 RX ADMIN — DEXAMETHASONE SODIUM PHOSPHATE 12 MG: 10 INJECTION, SOLUTION INTRAMUSCULAR; INTRAVENOUS at 11:40

## 2021-09-02 RX ADMIN — CISPLATIN 63.2 MG: 1 INJECTION INTRAVENOUS at 14:35

## 2021-09-02 RX ADMIN — ETOPOSIDE 316 MG: 20 INJECTION, SOLUTION, CONCENTRATE INTRAVENOUS at 13:19

## 2021-09-02 RX ADMIN — SODIUM CHLORIDE 10 ML: 9 INJECTION INTRAMUSCULAR; INTRAVENOUS; SUBCUTANEOUS at 15:56

## 2021-09-02 RX ADMIN — Medication 500 UNITS: at 15:56

## 2021-09-02 RX ADMIN — FUROSEMIDE 20 MG: 10 INJECTION, SOLUTION INTRAVENOUS at 15:44

## 2021-09-02 RX ADMIN — SODIUM CHLORIDE 25 ML/HR: 9 INJECTION, SOLUTION INTRAVENOUS at 11:36

## 2021-09-02 RX ADMIN — POTASSIUM CHLORIDE: 2 INJECTION, SOLUTION, CONCENTRATE INTRAVENOUS at 12:03

## 2021-09-02 RX ADMIN — ONDANSETRON 8 MG: 2 INJECTION INTRAMUSCULAR; INTRAVENOUS at 11:36

## 2021-09-02 NOTE — PROGRESS NOTES
Chief Complaint   Patient presents with    Follow-up     Shruthi Bayron is a pleasant 48year old male who presents as a follow up for germ cell tumor.  He reports lower right side pain

## 2021-09-02 NOTE — PROGRESS NOTES
[facility] Progress Note    Date: 2021    Name: Temo Whittington    MRN: [id number]         : 1975    Mr. Todd Mark Arrived ambulatory and in no distress for C1D4 of Etopside and Cisplatin Regimen. Assessment was completed, no acute issues at this time, no new complaints voiced. Right chest wall port accessed without difficulty. Chemotherapy Flowsheet 2021   Cycle C1D4   Date 2021   Drug / Regimen Etopside/Cisplatin   Pre Hydration given   Pre Meds given   Notes given        Patient proceed to appointment with Dr. Vinny Thomas. Mr. Gil Hearing vitals were reviewed.   Visit Vitals  BP (!) 149/86 (BP 1 Location: Right upper arm)   Pulse 79   Temp 98 °F (36.7 °C)   Resp 20   Ht 6' 3\" (1.905 m)   Wt (!) 186.9 kg (412 lb)   SpO2 98%   BMI 51.50 kg/m²     Patient Vitals for the past 12 hrs:   Temp Pulse Resp BP SpO2   21 1544 98 °F (36.7 °C) 82 18 121/78 96 %   21 1002 98 °F (36.7 °C) 79 20 (!) 149/86 98 %         Medications:  Medications Administered     0.9% sodium chloride 1,000 mL with potassium chloride 10 mEq, magnesium sulfate 2 g infusion     Admin Date  2021 Action  New Bag Dose   Rate  1,000 mL/hr Route  IntraVENous Administered By  Osbaldo Dawkins RN          0.9% sodium chloride infusion     Admin Date  2021 Action  New Bag Dose  25 mL/hr Rate  25 mL/hr Route  IntraVENous Administered By  Osbaldo Dawkins RN          0.9% sodium chloride injection 10 mL     Admin Date  2021 Action  Given Dose  10 mL Route  IntraVENous Administered By  Osbaldo Dawkins RN          CISplatin (PLATINOL) 63.2 mg in 0.9% sodium chloride 500 mL, overfill volume 50 mL chemo infusion     Admin Date  2021 Action  New Bag Dose  63.2 mg Rate  613.2 mL/hr Route  IntraVENous Administered By  Osbaldo Dawkins RN          dexamethasone (DECADRON) 12 mg in 0.9% sodium chloride 50 mL IVPB     Admin Date  2021 Action  New Bag Dose  12 mg Route  IntraVENous Administered By  Magnus Talamantes RN          etoposide (VEPESID) 316 mg in 0.9% sodium chloride 1,000 mL, overfill volume 100 mL chemo infusion     Admin Date  09/02/2021 Action  New Bag Dose  316 mg Rate  1,115.8 mL/hr Route  IntraVENous Administered By  Magnus Talamantes RN          furosemide (LASIX) injection 20 mg     Admin Date  09/02/2021 Action  Given Dose  20 mg Route  IntraVENous Administered By  Magnus Talamantes RN          heparin (porcine) pf 300-500 Units     Admin Date  09/02/2021 Action  Given Dose  500 Units Route  InterCATHeter Administered By  Magnus Talamantes RN          ondansetron [facility]) injection 8 mg     Admin Date  09/02/2021 Action  Given Dose  8 mg Route  IntraVENous Administered By  Magnus Talamantes RN                Mr. Cecille Matta tolerated treatment well and was discharged from [place] [address] in stable condition. Port  flushed & heparinized per protocol, dressing reinforced  He is to return on September 3 at 10 AM for his next appointment.

## 2021-09-02 NOTE — PROGRESS NOTES
[facility] Progress Note    Date: 2021    Name: Kelly Fields    MRN: [id number]         : 1975    Mr. Kamaljit Dozier Arrived ambulatory and in no distress for C1D4 of Etopside + Cisplatin Regimen. Assessment was completed, no acute issues at this time, no new complaints voiced. Right chest wall port accessed without difficulty. Chemotherapy Flowsheet 2021   Cycle C1D4   Date 2021   Drug / Regimen Etopside/Cisplatin   Pre Hydration given   Pre Meds given   Notes given        Patient proceed to appointment with Dr. Marlyn Bhat. Mr. Penelope Smith vitals were reviewed.   Visit Vitals  BP (!) 149/86 (BP 1 Location: Right upper arm)   Pulse 79   Temp 98 °F (36.7 °C)   Resp 20   Ht 6' 3\" (1.905 m)   Wt (!) 186.9 kg (412 lb)   SpO2 98%   BMI 51.50 kg/m²         Medications:  Medications Administered             Medications Administered     0.9% sodium chloride 1,000 mL with potassium chloride 10 mEq, magnesium sulfate 2 g infusion     Admin Date  2021 Action  New Bag Dose   Rate  1,000 mL/hr Route  IntraVENous Administered By  Eusebia Batres RN          0.9% sodium chloride infusion     Admin Date  2021 Action  New Bag Dose  25 mL/hr Rate  25 mL/hr Route  IntraVENous Administered By  Eusebia Batres RN          0.9% sodium chloride injection 10 mL     Admin Date  2021 Action  Given Dose  10 mL Route  IntraVENous Administered By  Eusebia Batres RN          CISplatin (PLATINOL) 63.2 mg in 0.9% sodium chloride 500 mL, overfill volume 50 mL chemo infusion     Admin Date  2021 Action  New Bag Dose  63.2 mg Rate  613.2 mL/hr Route  IntraVENous Administered By  Eusebia Batres RN          dexamethasone (DECADRON) 12 mg in 0.9% sodium chloride 50 mL IVPB     Admin Date  2021 Action  New Bag Dose  12 mg Route  IntraVENous Administered By  Eusebia Batres RN          etoposide (VEPESID) 316 mg in 0.9% sodium chloride 1,000 mL, overfill volume 100 mL chemo infusion     Admin Date  09/02/2021 Action  New Bag Dose  316 mg Rate  1,115.8 mL/hr Route  IntraVENous Administered By  Noel Charles RN          furosemide (LASIX) injection 20 mg     Admin Date  09/02/2021 Action  Given Dose  20 mg Route  IntraVENous Administered By  Noel Charles RN          heparin (porcine) pf 300-500 Units     Admin Date  09/02/2021 Action  Given Dose  500 Units Route  InterCATHeter Administered By  Noel Charles RN          ondansetron [facility]) injection 8 mg     Admin Date  09/02/2021 Action  Given Dose  8 mg Route  IntraVENous Administered By  Noel Charles RN                  Mr. Fritz Cantu tolerated treatment well and was discharged from [place] [address] in stable conditio. Port flushed & heparinized per protocol, reinforced dressing. He is to return on September 3 at 10 AM for his next appointment.     Cuba Gibbons RN  September 2, 2021

## 2021-09-03 ENCOUNTER — HOSPITAL ENCOUNTER (OUTPATIENT)
Dept: INFUSION THERAPY | Age: 46
Discharge: HOME | End: 2021-09-03
Payer: COMMERCIAL

## 2021-09-03 VITALS
SYSTOLIC BLOOD PRESSURE: 134 MMHG | RESPIRATION RATE: 18 BRPM | TEMPERATURE: 97.9 F | DIASTOLIC BLOOD PRESSURE: 74 MMHG | OXYGEN SATURATION: 98 % | HEART RATE: 70 BPM

## 2021-09-03 DIAGNOSIS — C48.0 GERM CELL TUMOR OF RETROPERITONEUM (HCC): Primary | ICD-10-CM

## 2021-09-03 PROCEDURE — 96361 HYDRATE IV INFUSION ADD-ON: CPT

## 2021-09-03 PROCEDURE — 74011250636 HC RX REV CODE- 250/636: Performed by: INTERNAL MEDICINE

## 2021-09-03 PROCEDURE — 96417 CHEMO IV INFUS EACH ADDL SEQ: CPT

## 2021-09-03 PROCEDURE — 96375 TX/PRO/DX INJ NEW DRUG ADDON: CPT

## 2021-09-03 PROCEDURE — 96413 CHEMO IV INFUSION 1 HR: CPT

## 2021-09-03 PROCEDURE — 74011000258 HC RX REV CODE- 258: Performed by: INTERNAL MEDICINE

## 2021-09-03 RX ORDER — HEPARIN 100 UNIT/ML
300-500 SYRINGE INTRAVENOUS AS NEEDED
Status: ACTIVE | OUTPATIENT
Start: 2021-09-03 | End: 2021-09-03

## 2021-09-03 RX ORDER — SODIUM CHLORIDE 0.9 % (FLUSH) 0.9 %
10 SYRINGE (ML) INJECTION AS NEEDED
Status: DISPENSED | OUTPATIENT
Start: 2021-09-03 | End: 2021-09-03

## 2021-09-03 RX ORDER — SODIUM CHLORIDE 9 MG/ML
25 INJECTION, SOLUTION INTRAVENOUS CONTINUOUS
Status: DISPENSED | OUTPATIENT
Start: 2021-09-03 | End: 2021-09-03

## 2021-09-03 RX ORDER — SODIUM CHLORIDE 9 MG/ML
10 INJECTION INTRAMUSCULAR; INTRAVENOUS; SUBCUTANEOUS AS NEEDED
Status: ACTIVE | OUTPATIENT
Start: 2021-09-03 | End: 2021-09-03

## 2021-09-03 RX ORDER — ONDANSETRON 2 MG/ML
8 INJECTION INTRAMUSCULAR; INTRAVENOUS ONCE
Status: COMPLETED | OUTPATIENT
Start: 2021-09-03 | End: 2021-09-03

## 2021-09-03 RX ADMIN — CISPLATIN 63.2 MG: 1 INJECTION INTRAVENOUS at 12:55

## 2021-09-03 RX ADMIN — SODIUM CHLORIDE 25 ML/HR: 9 INJECTION, SOLUTION INTRAVENOUS at 10:11

## 2021-09-03 RX ADMIN — DEXAMETHASONE SODIUM PHOSPHATE 12 MG: 10 INJECTION, SOLUTION INTRAMUSCULAR; INTRAVENOUS at 10:16

## 2021-09-03 RX ADMIN — POTASSIUM CHLORIDE: 2 INJECTION, SOLUTION, CONCENTRATE INTRAVENOUS at 10:36

## 2021-09-03 RX ADMIN — ETOPOSIDE 316 MG: 20 INJECTION, SOLUTION, CONCENTRATE INTRAVENOUS at 11:44

## 2021-09-03 RX ADMIN — ONDANSETRON 8 MG: 2 INJECTION INTRAMUSCULAR; INTRAVENOUS at 10:11

## 2021-09-03 NOTE — PROGRESS NOTES
[facility] Progress Note    Date: September 3, 2021    Name: Meryl Kincaid    MRN: [id number]         : 1975    Mr. Anali Morse Arrived ambulatory and in no distress for C1D5 of Etoposide+Cisplatin Regimen. Assessment was completed, no acute issues at this time, no new complaints voiced. Right chest wall port remains accessed with + blood return. Chemotherapy Flowsheet 9/3/2021   Cycle C1D5   Date 9/3/2021   Drug / Regimen Etoposide+Cisplatin   Pre Hydration given   Pre Meds given   Notes given       Mr. Tommie Alfaro vitals were reviewed.   Visit Vitals  /74   Pulse 70   Temp 97.9 °F (36.6 °C)   Resp 18   SpO2 98%     Medications:  Medications Administered     0.9% sodium chloride 1,000 mL with potassium chloride 10 mEq, magnesium sulfate 2 g infusion     Admin Date  2021 Action  New Bag Dose   Rate  1,000 mL/hr Route  IntraVENous Administered By  Elizabeth Oliveira, RN          0.9% sodium chloride infusion     Admin Date  2021 Action  New Bag Dose  25 mL/hr Rate  25 mL/hr Route  IntraVENous Administered By  Elizabeth Oliveira RN          CISplatin (PLATINOL) 63.2 mg in 0.9% sodium chloride 500 mL, overfill volume 50 mL chemo infusion     Admin Date  2021 Action  New Bag Dose  63.2 mg Rate  613.2 mL/hr Route  IntraVENous Administered By  Elizabeth Oliveira, RN          dexamethasone (DECADRON) 12 mg in 0.9% sodium chloride 50 mL IVPB     Admin Date  2021 Action  New Bag Dose  12 mg Route  IntraVENous Administered By  Elizabeth Oliveira, RN          etoposide (VEPESID) 316 mg in 0.9% sodium chloride 1,000 mL, overfill volume 100 mL chemo infusion     Admin Date  2021 Action  New Bag Dose  316 mg Rate  1,115.8 mL/hr Route  IntraVENous Administered By  Elizabeth Oliveira, RN          [facility]ron [facility]) injection 8 mg     Admin Date  2021 Action  Given Dose  8 mg Route  IntraVENous Administered By  Elizabeth Oliveira, SILVIANO                Mr. Anali Morse tolerated treatment well and was discharged from [place] [address] in stable condition. Port de-accessed, flushed & heparinized per protocol. He is aware of future appointment. Future Appointments   Date Time Provider Cyrus Banks   9/8/2021  8:30 AM SS INF6 CH1 >4H [facility]   9/13/2021  8:00 AM SS INF5 CH2 >4H [facility]   9/17/2021  1:00 PM PULMONARY LAB [facility]   9/20/2021  8:00 AM SS INF5 CH2 >4H [facility]   9/20/2021  8:45 AM Daily Bertrand, SANJIV ONCSF BS AMB   9/21/2021 10:00 AM SS INF2 CH4 <2H [facility]   9/22/2021  1:00 PM SS INF2 CH4 <2H [facility]   9/23/2021  1:00 PM SS INF3 CH4 <2H [facility]   9/24/2021  1:00 PM SS INF2 CH4 <2H [facility]   9/27/2021  8:00 AM [facility]   10/4/2021  9:00 AM SS INF5 CH2 >4H [facility]   10/11/2021  8:00 AM SS INF6 CH1 >4H [facility]   10/12/2021 10:00 AM SS INF3 CH4 <2H [facility]   10/13/2021  1:00 PM SS INF2 CH4 <2H [facility]   10/14/2021 10:00 AM SS INF2 CH4 <2H [facility]   10/15/2021  1:00 PM SS INF1 CH4 <2H [facility]   10/18/2021  9:00 AM SS INF6 CH1 >4H [facility]   10/25/2021  8:00 AM SS INF6 CH2 >4H [address], RN  September 3, 2021

## 2021-09-08 ENCOUNTER — HOSPITAL ENCOUNTER (OUTPATIENT)
Dept: INFUSION THERAPY | Age: 46
Discharge: HOME | End: 2021-09-08
Payer: COMMERCIAL

## 2021-09-08 VITALS
HEART RATE: 69 BPM | RESPIRATION RATE: 18 BRPM | DIASTOLIC BLOOD PRESSURE: 87 MMHG | TEMPERATURE: 97.9 F | OXYGEN SATURATION: 98 % | BODY MASS INDEX: 39.17 KG/M2 | HEIGHT: 75 IN | SYSTOLIC BLOOD PRESSURE: 128 MMHG | WEIGHT: 315 LBS

## 2021-09-08 DIAGNOSIS — C48.0 GERM CELL TUMOR OF RETROPERITONEUM (HCC): Primary | ICD-10-CM

## 2021-09-08 LAB
BASO+EOS+MONOS # BLD AUTO: 0.1 K/UL (ref 0.2–1.2)
BASO+EOS+MONOS NFR BLD AUTO: 3 % (ref 3.2–16.9)
DIFFERENTIAL METHOD BLD: ABNORMAL
ERYTHROCYTE [DISTWIDTH] IN BLOOD BY AUTOMATED COUNT: 15.5 % (ref 11.8–15.8)
HCT VFR BLD AUTO: 36.4 % (ref 36.6–50.3)
HGB BLD-MCNC: 11.6 G/DL (ref 12.1–17)
LYMPHOCYTES # BLD: 1.3 K/UL (ref 0.8–3.5)
LYMPHOCYTES NFR BLD: 29 % (ref 12–49)
MCH RBC QN AUTO: 25.9 PG (ref 26–34)
MCHC RBC AUTO-ENTMCNC: 31.9 G/DL (ref 30–36.5)
MCV RBC AUTO: 81.3 FL (ref 80–99)
NEUTS SEG # BLD: 2.9 K/UL (ref 1.8–8)
NEUTS SEG NFR BLD: 68 % (ref 32–75)
PLATELET # BLD AUTO: 120 K/UL (ref 150–400)
RBC # BLD AUTO: 4.48 M/UL (ref 4.1–5.7)
WBC # BLD AUTO: 4.3 K/UL (ref 4.1–11.1)

## 2021-09-08 PROCEDURE — 85025 COMPLETE CBC W/AUTO DIFF WBC: CPT

## 2021-09-08 PROCEDURE — 74011250636 HC RX REV CODE- 250/636: Performed by: INTERNAL MEDICINE

## 2021-09-08 PROCEDURE — 96375 TX/PRO/DX INJ NEW DRUG ADDON: CPT

## 2021-09-08 PROCEDURE — 77030012965 HC NDL HUBR BBMI -A

## 2021-09-08 PROCEDURE — 36415 COLL VENOUS BLD VENIPUNCTURE: CPT

## 2021-09-08 PROCEDURE — 96409 CHEMO IV PUSH SNGL DRUG: CPT

## 2021-09-08 PROCEDURE — 74011000258 HC RX REV CODE- 258: Performed by: INTERNAL MEDICINE

## 2021-09-08 PROCEDURE — 74011250637 HC RX REV CODE- 250/637: Performed by: INTERNAL MEDICINE

## 2021-09-08 RX ORDER — SODIUM CHLORIDE 9 MG/ML
25 INJECTION, SOLUTION INTRAVENOUS CONTINUOUS
Status: DISCONTINUED | OUTPATIENT
Start: 2021-09-08 | End: 2021-09-10 | Stop reason: HOSPADM

## 2021-09-08 RX ORDER — SODIUM CHLORIDE 9 MG/ML
10 INJECTION INTRAMUSCULAR; INTRAVENOUS; SUBCUTANEOUS AS NEEDED
Status: ACTIVE | OUTPATIENT
Start: 2021-09-08 | End: 2021-09-08

## 2021-09-08 RX ORDER — ACETAMINOPHEN 325 MG/1
650 TABLET ORAL ONCE
Status: COMPLETED | OUTPATIENT
Start: 2021-09-08 | End: 2021-09-08

## 2021-09-08 RX ORDER — SODIUM CHLORIDE 0.9 % (FLUSH) 0.9 %
10 SYRINGE (ML) INJECTION AS NEEDED
Status: DISPENSED | OUTPATIENT
Start: 2021-09-08 | End: 2021-09-08

## 2021-09-08 RX ORDER — HEPARIN 100 UNIT/ML
300-500 SYRINGE INTRAVENOUS AS NEEDED
Status: ACTIVE | OUTPATIENT
Start: 2021-09-08 | End: 2021-09-08

## 2021-09-08 RX ORDER — DIPHENHYDRAMINE HYDROCHLORIDE 50 MG/ML
25 INJECTION, SOLUTION INTRAMUSCULAR; INTRAVENOUS ONCE
Status: COMPLETED | OUTPATIENT
Start: 2021-09-08 | End: 2021-09-08

## 2021-09-08 RX ADMIN — Medication 500 UNITS: at 10:34

## 2021-09-08 RX ADMIN — DIPHENHYDRAMINE HYDROCHLORIDE 25 MG: 50 INJECTION INTRAMUSCULAR; INTRAVENOUS at 09:25

## 2021-09-08 RX ADMIN — SODIUM CHLORIDE 10 ML: 9 INJECTION INTRAMUSCULAR; INTRAVENOUS; SUBCUTANEOUS at 10:34

## 2021-09-08 RX ADMIN — ACETAMINOPHEN 650 MG: 325 TABLET ORAL at 09:23

## 2021-09-08 RX ADMIN — SODIUM CHLORIDE 30 UNITS: 900 INJECTION, SOLUTION INTRAVENOUS at 10:11

## 2021-09-08 RX ADMIN — SODIUM CHLORIDE 25 ML/HR: 9 INJECTION, SOLUTION INTRAVENOUS at 09:23

## 2021-09-08 NOTE — PROGRESS NOTES
[facility] - Chemotherapy Progress Note    0825 Pt admit to [facility] for C 1 D 8 ambulatory in stable condition. Assessment completed. No new concerns voiced. PAC with positive blood return. Chemotherapy Flowsheet 9/8/2021   Cycle C 1 D 8   Date 9/8/2021   Drug / Regimen Bleomycin   Pre Hydration -   Pre Meds -   Notes -       Blood pressure 137/87, pulse 79, temperature 97.9 °F (36.6 °C), resp. rate 18, height 6' 3\" (1.905 m), weight (!) 182.8 kg (402 lb 14.4 oz), SpO2 98 %. 1. Do you have any symptoms of COVID-19? SOB, coughing, fever, or generally not feeling well No  2. Have you been exposed to COVID-19 recently? No  Have you had any recent contact with family/friend that has a pending COVID test?No    Medications:  Medications Administered     0.9% sodium chloride infusion     Admin Date  09/08/2021 Action  New Bag Dose  25 mL/hr Rate  25 mL/hr Route  IntraVENous Administered By  Boston Walker RN          0.9% sodium chloride injection 10 mL     Admin Date  09/08/2021 Action  Given Dose  10 mL Route  IntraVENous Administered By  [place] Walker RN          acetaminophen (TYLENOL) tablet 650 mg     Admin Date  09/08/2021 Action  Given Dose  650 mg Route  Oral Administered By  [place] Walker RN          bleomycin (BLEOCIN) 30 Units in 0.9% sodium chloride 50 mL, overfill volume 5 mL chemo infusion     Admin Date  09/08/2021 Action  New Bag Dose  30 Units Rate  390 mL/hr Route  IntraVENous Administered By  [place] Walker RN          diphenhydrAMINE (BENADRYL) injection 25 mg     Admin Date  09/08/2021 Action  Given Dose  25 mg Route  IntraVENous Administered By  [place] Walker RN          heparin (porcine) pf 300-500 Units     Admin Date  09/08/2021 Action  Given Dose  500 Units Route  InterCATHeter Administered By  [place] Walker RN                1045 Pt tolerated treatment well.  PAC maintained positive blood return throughout treatment, flushed with positive blood return at conclusion and throughout treatment. D/c home ambulatory in no distress. Pt aware of next appointment scheduled for 9/13/21. Recent Results (from the past 12 hour(s))   CBC WITH 3 PART DIFF    Collection Time: 09/08/21  8:30 AM   Result Value Ref Range    WBC 4.3 4.1 - 11.1 K/uL    RBC 4.48 4.10 - 5.70 M/uL    HGB 11.6 (L) 12.1 - 17.0 g/dL    HCT 36.4 (L) 36.6 - 50.3 %    MCV 81.3 80.0 - 99.0 FL    MCH 25.9 (L) 26.0 - 34.0 PG    MCHC 31.9 30.0 - 36.5 g/dL    RDW 15.5 11.8 - 15.8 %    PLATELET 791 (L) 663 - 400 K/uL    NEUTROPHILS 68 32 - 75 %    MIXED CELLS 3 (L) 3.2 - 16.9 %    LYMPHOCYTES 29 12 - 49 %    ABS. NEUTROPHILS 2.9 1.8 - 8.0 K/UL    ABS. MIXED CELLS 0.1 (L) 0.2 - 1.2 K/uL    ABS.  LYMPHOCYTES 1.3 0.8 - 3.5 K/UL    DF AUTOMATED

## 2021-09-13 ENCOUNTER — HOSPITAL ENCOUNTER (OUTPATIENT)
Dept: INFUSION THERAPY | Age: 46
Discharge: HOME | End: 2021-09-13
Payer: COMMERCIAL

## 2021-09-13 ENCOUNTER — TELEPHONE (OUTPATIENT)
Dept: ONCOLOGY | Age: 46
End: 2021-09-13

## 2021-09-13 ENCOUNTER — APPOINTMENT (OUTPATIENT)
Dept: INFUSION THERAPY | Age: 46
End: 2021-09-13
Payer: COMMERCIAL

## 2021-09-13 VITALS
DIASTOLIC BLOOD PRESSURE: 77 MMHG | HEIGHT: 75 IN | SYSTOLIC BLOOD PRESSURE: 137 MMHG | BODY MASS INDEX: 39.17 KG/M2 | HEART RATE: 80 BPM | WEIGHT: 315 LBS | TEMPERATURE: 97.7 F | RESPIRATION RATE: 18 BRPM

## 2021-09-13 DIAGNOSIS — C48.0 GERM CELL TUMOR OF RETROPERITONEUM (HCC): Primary | ICD-10-CM

## 2021-09-13 LAB
ANION GAP SERPL CALC-SCNC: 3 MMOL/L (ref 5–15)
BASOPHILS # BLD: 0 K/UL (ref 0–0.1)
BASOPHILS NFR BLD: 1 % (ref 0–1)
BUN SERPL-MCNC: 16 MG/DL (ref 6–20)
BUN/CREAT SERPL: 21 (ref 12–20)
CALCIUM SERPL-MCNC: 8.6 MG/DL (ref 8.5–10.1)
CHLORIDE SERPL-SCNC: 109 MMOL/L (ref 97–108)
CO2 SERPL-SCNC: 28 MMOL/L (ref 21–32)
CREAT SERPL-MCNC: 0.76 MG/DL (ref 0.7–1.3)
DIFFERENTIAL METHOD BLD: ABNORMAL
EOSINOPHIL # BLD: 0 K/UL (ref 0–0.4)
EOSINOPHIL NFR BLD: 1 % (ref 0–7)
ERYTHROCYTE [DISTWIDTH] IN BLOOD BY AUTOMATED COUNT: 14.2 % (ref 11.5–14.5)
GLUCOSE SERPL-MCNC: 123 MG/DL (ref 65–100)
HCT VFR BLD AUTO: 32.4 % (ref 36.6–50.3)
HGB BLD-MCNC: 10.2 G/DL (ref 12.1–17)
IMM GRANULOCYTES # BLD AUTO: 0 K/UL
IMM GRANULOCYTES NFR BLD AUTO: 0 %
LYMPHOCYTES # BLD: 1.1 K/UL (ref 0.8–3.5)
LYMPHOCYTES NFR BLD: 80 % (ref 12–49)
MCH RBC QN AUTO: 25.4 PG (ref 26–34)
MCHC RBC AUTO-ENTMCNC: 31.5 G/DL (ref 30–36.5)
MCV RBC AUTO: 80.6 FL (ref 80–99)
MONOCYTES # BLD: 0.1 K/UL (ref 0–1)
MONOCYTES NFR BLD: 7 % (ref 5–13)
NEUTS SEG # BLD: 0.1 K/UL (ref 1.8–8)
NEUTS SEG NFR BLD: 11 % (ref 32–75)
NRBC # BLD: 0 K/UL (ref 0–0.01)
NRBC BLD-RTO: 0 PER 100 WBC
PATH REV BLD -IMP: NORMAL
PLATELET # BLD AUTO: 58 K/UL (ref 150–400)
PMV BLD AUTO: 11.9 FL (ref 8.9–12.9)
POTASSIUM SERPL-SCNC: 4.1 MMOL/L (ref 3.5–5.1)
RBC # BLD AUTO: 4.02 M/UL (ref 4.1–5.7)
RBC MORPH BLD: ABNORMAL
SODIUM SERPL-SCNC: 140 MMOL/L (ref 136–145)
WBC # BLD AUTO: 1.3 K/UL (ref 4.1–11.1)
WBC MORPH BLD: ABNORMAL

## 2021-09-13 PROCEDURE — 96523 IRRIG DRUG DELIVERY DEVICE: CPT

## 2021-09-13 PROCEDURE — 80048 BASIC METABOLIC PNL TOTAL CA: CPT

## 2021-09-13 PROCEDURE — 85025 COMPLETE CBC W/AUTO DIFF WBC: CPT

## 2021-09-13 PROCEDURE — 36415 COLL VENOUS BLD VENIPUNCTURE: CPT

## 2021-09-13 PROCEDURE — 77030012965 HC NDL HUBR BBMI -A

## 2021-09-13 RX ORDER — DIPHENHYDRAMINE HYDROCHLORIDE 50 MG/ML
25 INJECTION, SOLUTION INTRAMUSCULAR; INTRAVENOUS AS NEEDED
Status: CANCELLED
Start: 2021-09-23

## 2021-09-13 RX ORDER — EPINEPHRINE 1 MG/ML
0.3 INJECTION, SOLUTION, CONCENTRATE INTRAVENOUS AS NEEDED
Status: CANCELLED | OUTPATIENT
Start: 2021-09-27

## 2021-09-13 RX ORDER — SODIUM CHLORIDE 9 MG/ML
10 INJECTION INTRAMUSCULAR; INTRAVENOUS; SUBCUTANEOUS AS NEEDED
Status: CANCELLED | OUTPATIENT
Start: 2021-09-21

## 2021-09-13 RX ORDER — EPINEPHRINE 1 MG/ML
0.3 INJECTION, SOLUTION, CONCENTRATE INTRAVENOUS AS NEEDED
Status: CANCELLED | OUTPATIENT
Start: 2021-09-24

## 2021-09-13 RX ORDER — ALBUTEROL SULFATE 0.83 MG/ML
2.5 SOLUTION RESPIRATORY (INHALATION) AS NEEDED
Status: CANCELLED
Start: 2021-09-21

## 2021-09-13 RX ORDER — ACETAMINOPHEN 325 MG/1
650 TABLET ORAL AS NEEDED
Status: CANCELLED
Start: 2021-09-24

## 2021-09-13 RX ORDER — ONDANSETRON 2 MG/ML
8 INJECTION INTRAMUSCULAR; INTRAVENOUS ONCE
Status: CANCELLED | OUTPATIENT
Start: 2021-09-24 | End: 2021-09-24

## 2021-09-13 RX ORDER — ACETAMINOPHEN 325 MG/1
650 TABLET ORAL ONCE
Status: CANCELLED
Start: 2021-09-27 | End: 2021-09-27

## 2021-09-13 RX ORDER — ALBUTEROL SULFATE 0.83 MG/ML
2.5 SOLUTION RESPIRATORY (INHALATION) AS NEEDED
Status: CANCELLED
Start: 2021-09-23

## 2021-09-13 RX ORDER — EPINEPHRINE 1 MG/ML
0.3 INJECTION, SOLUTION, CONCENTRATE INTRAVENOUS AS NEEDED
Status: CANCELLED | OUTPATIENT
Start: 2021-09-21

## 2021-09-13 RX ORDER — HEPARIN 100 UNIT/ML
300-500 SYRINGE INTRAVENOUS AS NEEDED
Status: CANCELLED
Start: 2021-10-04

## 2021-09-13 RX ORDER — ACETAMINOPHEN 325 MG/1
650 TABLET ORAL AS NEEDED
Status: CANCELLED
Start: 2021-09-20

## 2021-09-13 RX ORDER — SODIUM CHLORIDE 9 MG/ML
25 INJECTION, SOLUTION INTRAVENOUS CONTINUOUS
Status: CANCELLED
Start: 2021-10-04

## 2021-09-13 RX ORDER — HYDROCORTISONE SODIUM SUCCINATE 100 MG/2ML
100 INJECTION, POWDER, FOR SOLUTION INTRAMUSCULAR; INTRAVENOUS AS NEEDED
Status: CANCELLED | OUTPATIENT
Start: 2021-09-21

## 2021-09-13 RX ORDER — ONDANSETRON 2 MG/ML
8 INJECTION INTRAMUSCULAR; INTRAVENOUS AS NEEDED
Status: CANCELLED | OUTPATIENT
Start: 2021-09-21

## 2021-09-13 RX ORDER — SODIUM CHLORIDE 9 MG/ML
25 INJECTION, SOLUTION INTRAVENOUS CONTINUOUS
Status: CANCELLED | OUTPATIENT
Start: 2021-09-21

## 2021-09-13 RX ORDER — SODIUM CHLORIDE 9 MG/ML
25 INJECTION, SOLUTION INTRAVENOUS CONTINUOUS
Status: CANCELLED | OUTPATIENT
Start: 2021-09-22

## 2021-09-13 RX ORDER — EPINEPHRINE 1 MG/ML
0.3 INJECTION, SOLUTION, CONCENTRATE INTRAVENOUS AS NEEDED
Status: CANCELLED | OUTPATIENT
Start: 2021-09-22

## 2021-09-13 RX ORDER — ONDANSETRON 2 MG/ML
8 INJECTION INTRAMUSCULAR; INTRAVENOUS AS NEEDED
Status: CANCELLED | OUTPATIENT
Start: 2021-09-27

## 2021-09-13 RX ORDER — ACETAMINOPHEN 325 MG/1
650 TABLET ORAL AS NEEDED
Status: CANCELLED
Start: 2021-09-23

## 2021-09-13 RX ORDER — ACETAMINOPHEN 325 MG/1
650 TABLET ORAL AS NEEDED
Status: CANCELLED
Start: 2021-09-27

## 2021-09-13 RX ORDER — DIPHENHYDRAMINE HYDROCHLORIDE 50 MG/ML
25 INJECTION, SOLUTION INTRAMUSCULAR; INTRAVENOUS AS NEEDED
Status: CANCELLED
Start: 2021-09-24

## 2021-09-13 RX ORDER — ONDANSETRON 2 MG/ML
8 INJECTION INTRAMUSCULAR; INTRAVENOUS AS NEEDED
Status: CANCELLED | OUTPATIENT
Start: 2021-09-22

## 2021-09-13 RX ORDER — HEPARIN 100 UNIT/ML
300-500 SYRINGE INTRAVENOUS AS NEEDED
Status: CANCELLED
Start: 2021-09-22

## 2021-09-13 RX ORDER — HEPARIN 100 UNIT/ML
300-500 SYRINGE INTRAVENOUS AS NEEDED
Status: CANCELLED
Start: 2021-09-21

## 2021-09-13 RX ORDER — SODIUM CHLORIDE 0.9 % (FLUSH) 0.9 %
10 SYRINGE (ML) INJECTION AS NEEDED
Status: CANCELLED | OUTPATIENT
Start: 2021-09-21

## 2021-09-13 RX ORDER — DIPHENHYDRAMINE HYDROCHLORIDE 50 MG/ML
25 INJECTION, SOLUTION INTRAMUSCULAR; INTRAVENOUS AS NEEDED
Status: CANCELLED
Start: 2021-09-20

## 2021-09-13 RX ORDER — ALBUTEROL SULFATE 0.83 MG/ML
2.5 SOLUTION RESPIRATORY (INHALATION) AS NEEDED
Status: CANCELLED
Start: 2021-09-22

## 2021-09-13 RX ORDER — DIPHENHYDRAMINE HYDROCHLORIDE 50 MG/ML
50 INJECTION, SOLUTION INTRAMUSCULAR; INTRAVENOUS AS NEEDED
Status: CANCELLED
Start: 2021-09-23

## 2021-09-13 RX ORDER — EPINEPHRINE 1 MG/ML
0.3 INJECTION, SOLUTION, CONCENTRATE INTRAVENOUS AS NEEDED
Status: CANCELLED | OUTPATIENT
Start: 2021-10-04

## 2021-09-13 RX ORDER — HEPARIN 100 UNIT/ML
300-500 SYRINGE INTRAVENOUS AS NEEDED
Status: CANCELLED
Start: 2021-09-24

## 2021-09-13 RX ORDER — EPINEPHRINE 1 MG/ML
0.3 INJECTION, SOLUTION, CONCENTRATE INTRAVENOUS AS NEEDED
Status: CANCELLED | OUTPATIENT
Start: 2021-09-20

## 2021-09-13 RX ORDER — EPINEPHRINE 1 MG/ML
0.3 INJECTION, SOLUTION, CONCENTRATE INTRAVENOUS AS NEEDED
Status: CANCELLED | OUTPATIENT
Start: 2021-09-23

## 2021-09-13 RX ORDER — HEPARIN 100 UNIT/ML
300-500 SYRINGE INTRAVENOUS AS NEEDED
Status: CANCELLED
Start: 2021-09-27

## 2021-09-13 RX ORDER — SODIUM CHLORIDE 9 MG/ML
10 INJECTION INTRAMUSCULAR; INTRAVENOUS; SUBCUTANEOUS AS NEEDED
Status: CANCELLED | OUTPATIENT
Start: 2021-09-23

## 2021-09-13 RX ORDER — SODIUM CHLORIDE 9 MG/ML
25 INJECTION, SOLUTION INTRAVENOUS CONTINUOUS
Status: CANCELLED | OUTPATIENT
Start: 2021-09-23

## 2021-09-13 RX ORDER — HYDROCORTISONE SODIUM SUCCINATE 100 MG/2ML
100 INJECTION, POWDER, FOR SOLUTION INTRAMUSCULAR; INTRAVENOUS AS NEEDED
Status: CANCELLED | OUTPATIENT
Start: 2021-09-27

## 2021-09-13 RX ORDER — DIPHENHYDRAMINE HYDROCHLORIDE 50 MG/ML
25 INJECTION, SOLUTION INTRAMUSCULAR; INTRAVENOUS AS NEEDED
Status: CANCELLED
Start: 2021-09-22

## 2021-09-13 RX ORDER — SODIUM CHLORIDE 0.9 % (FLUSH) 0.9 %
10 SYRINGE (ML) INJECTION AS NEEDED
Status: CANCELLED | OUTPATIENT
Start: 2021-09-22

## 2021-09-13 RX ORDER — SODIUM CHLORIDE 9 MG/ML
10 INJECTION INTRAMUSCULAR; INTRAVENOUS; SUBCUTANEOUS AS NEEDED
Status: CANCELLED | OUTPATIENT
Start: 2021-09-22

## 2021-09-13 RX ORDER — ACETAMINOPHEN 325 MG/1
650 TABLET ORAL AS NEEDED
Status: CANCELLED
Start: 2021-10-04

## 2021-09-13 RX ORDER — ONDANSETRON 2 MG/ML
8 INJECTION INTRAMUSCULAR; INTRAVENOUS AS NEEDED
Status: CANCELLED | OUTPATIENT
Start: 2021-09-24

## 2021-09-13 RX ORDER — SODIUM CHLORIDE 0.9 % (FLUSH) 0.9 %
10 SYRINGE (ML) INJECTION AS NEEDED
Status: CANCELLED | OUTPATIENT
Start: 2021-09-23

## 2021-09-13 RX ORDER — SODIUM CHLORIDE 0.9 % (FLUSH) 0.9 %
10 SYRINGE (ML) INJECTION AS NEEDED
Status: CANCELLED | OUTPATIENT
Start: 2021-09-27

## 2021-09-13 RX ORDER — ALBUTEROL SULFATE 0.83 MG/ML
2.5 SOLUTION RESPIRATORY (INHALATION) AS NEEDED
Status: CANCELLED
Start: 2021-09-27

## 2021-09-13 RX ORDER — DIPHENHYDRAMINE HYDROCHLORIDE 50 MG/ML
50 INJECTION, SOLUTION INTRAMUSCULAR; INTRAVENOUS AS NEEDED
Status: CANCELLED
Start: 2021-09-21

## 2021-09-13 RX ORDER — ONDANSETRON 2 MG/ML
8 INJECTION INTRAMUSCULAR; INTRAVENOUS AS NEEDED
Status: CANCELLED | OUTPATIENT
Start: 2021-09-23

## 2021-09-13 RX ORDER — HEPARIN 100 UNIT/ML
300-500 SYRINGE INTRAVENOUS AS NEEDED
Status: CANCELLED
Start: 2021-09-23

## 2021-09-13 RX ORDER — ONDANSETRON 2 MG/ML
8 INJECTION INTRAMUSCULAR; INTRAVENOUS AS NEEDED
Status: CANCELLED | OUTPATIENT
Start: 2021-10-04

## 2021-09-13 RX ORDER — ALBUTEROL SULFATE 0.83 MG/ML
2.5 SOLUTION RESPIRATORY (INHALATION) AS NEEDED
Status: CANCELLED
Start: 2021-10-04

## 2021-09-13 RX ORDER — HYDROCORTISONE SODIUM SUCCINATE 100 MG/2ML
100 INJECTION, POWDER, FOR SOLUTION INTRAMUSCULAR; INTRAVENOUS AS NEEDED
Status: CANCELLED | OUTPATIENT
Start: 2021-09-20

## 2021-09-13 RX ORDER — ACETAMINOPHEN 325 MG/1
650 TABLET ORAL AS NEEDED
Status: CANCELLED
Start: 2021-09-22

## 2021-09-13 RX ORDER — ACETAMINOPHEN 325 MG/1
650 TABLET ORAL ONCE
Status: CANCELLED
Start: 2021-10-04 | End: 2021-10-04

## 2021-09-13 RX ORDER — DIPHENHYDRAMINE HYDROCHLORIDE 50 MG/ML
50 INJECTION, SOLUTION INTRAMUSCULAR; INTRAVENOUS AS NEEDED
Status: CANCELLED
Start: 2021-09-24

## 2021-09-13 RX ORDER — HYDROCORTISONE SODIUM SUCCINATE 100 MG/2ML
100 INJECTION, POWDER, FOR SOLUTION INTRAMUSCULAR; INTRAVENOUS AS NEEDED
Status: CANCELLED | OUTPATIENT
Start: 2021-09-23

## 2021-09-13 RX ORDER — ONDANSETRON 2 MG/ML
8 INJECTION INTRAMUSCULAR; INTRAVENOUS ONCE
Status: CANCELLED | OUTPATIENT
Start: 2021-09-23 | End: 2021-09-23

## 2021-09-13 RX ORDER — DIPHENHYDRAMINE HYDROCHLORIDE 50 MG/ML
25 INJECTION, SOLUTION INTRAMUSCULAR; INTRAVENOUS ONCE
Status: CANCELLED
Start: 2021-09-27 | End: 2021-09-27

## 2021-09-13 RX ORDER — DIPHENHYDRAMINE HYDROCHLORIDE 50 MG/ML
50 INJECTION, SOLUTION INTRAMUSCULAR; INTRAVENOUS AS NEEDED
Status: CANCELLED
Start: 2021-09-22

## 2021-09-13 RX ORDER — ONDANSETRON 2 MG/ML
8 INJECTION INTRAMUSCULAR; INTRAVENOUS ONCE
Status: CANCELLED | OUTPATIENT
Start: 2021-09-22 | End: 2021-09-22

## 2021-09-13 RX ORDER — ALBUTEROL SULFATE 0.83 MG/ML
2.5 SOLUTION RESPIRATORY (INHALATION) AS NEEDED
Status: CANCELLED
Start: 2021-09-20

## 2021-09-13 RX ORDER — ALBUTEROL SULFATE 0.83 MG/ML
2.5 SOLUTION RESPIRATORY (INHALATION) AS NEEDED
Status: CANCELLED
Start: 2021-09-24

## 2021-09-13 RX ORDER — ACETAMINOPHEN 325 MG/1
650 TABLET ORAL AS NEEDED
Status: CANCELLED
Start: 2021-09-21

## 2021-09-13 RX ORDER — DIPHENHYDRAMINE HYDROCHLORIDE 50 MG/ML
25 INJECTION, SOLUTION INTRAMUSCULAR; INTRAVENOUS AS NEEDED
Status: CANCELLED
Start: 2021-10-04

## 2021-09-13 RX ORDER — DIPHENHYDRAMINE HYDROCHLORIDE 50 MG/ML
25 INJECTION, SOLUTION INTRAMUSCULAR; INTRAVENOUS AS NEEDED
Status: CANCELLED
Start: 2021-09-21

## 2021-09-13 RX ORDER — DIPHENHYDRAMINE HYDROCHLORIDE 50 MG/ML
50 INJECTION, SOLUTION INTRAMUSCULAR; INTRAVENOUS AS NEEDED
Status: CANCELLED
Start: 2021-09-20

## 2021-09-13 RX ORDER — HYDROCORTISONE SODIUM SUCCINATE 100 MG/2ML
100 INJECTION, POWDER, FOR SOLUTION INTRAMUSCULAR; INTRAVENOUS AS NEEDED
Status: CANCELLED | OUTPATIENT
Start: 2021-09-24

## 2021-09-13 RX ORDER — HYDROCORTISONE SODIUM SUCCINATE 100 MG/2ML
100 INJECTION, POWDER, FOR SOLUTION INTRAMUSCULAR; INTRAVENOUS AS NEEDED
Status: CANCELLED | OUTPATIENT
Start: 2021-09-22

## 2021-09-13 RX ORDER — SODIUM CHLORIDE 9 MG/ML
10 INJECTION INTRAMUSCULAR; INTRAVENOUS; SUBCUTANEOUS AS NEEDED
Status: CANCELLED | OUTPATIENT
Start: 2021-09-24

## 2021-09-13 RX ORDER — SODIUM CHLORIDE 0.9 % (FLUSH) 0.9 %
10 SYRINGE (ML) INJECTION AS NEEDED
Status: CANCELLED | OUTPATIENT
Start: 2021-09-24

## 2021-09-13 RX ORDER — HYDROCORTISONE SODIUM SUCCINATE 100 MG/2ML
100 INJECTION, POWDER, FOR SOLUTION INTRAMUSCULAR; INTRAVENOUS AS NEEDED
Status: CANCELLED | OUTPATIENT
Start: 2021-10-04

## 2021-09-13 RX ORDER — DIPHENHYDRAMINE HYDROCHLORIDE 50 MG/ML
25 INJECTION, SOLUTION INTRAMUSCULAR; INTRAVENOUS ONCE
Status: CANCELLED
Start: 2021-10-04 | End: 2021-10-04

## 2021-09-13 RX ORDER — SODIUM CHLORIDE 9 MG/ML
10 INJECTION INTRAMUSCULAR; INTRAVENOUS; SUBCUTANEOUS AS NEEDED
Status: CANCELLED | OUTPATIENT
Start: 2021-09-27

## 2021-09-13 RX ORDER — DIPHENHYDRAMINE HYDROCHLORIDE 50 MG/ML
50 INJECTION, SOLUTION INTRAMUSCULAR; INTRAVENOUS AS NEEDED
Status: CANCELLED
Start: 2021-09-27

## 2021-09-13 RX ORDER — ONDANSETRON 2 MG/ML
8 INJECTION INTRAMUSCULAR; INTRAVENOUS ONCE
Status: CANCELLED | OUTPATIENT
Start: 2021-09-21 | End: 2021-09-21

## 2021-09-13 RX ORDER — DIPHENHYDRAMINE HYDROCHLORIDE 50 MG/ML
50 INJECTION, SOLUTION INTRAMUSCULAR; INTRAVENOUS AS NEEDED
Status: CANCELLED
Start: 2021-10-04

## 2021-09-13 RX ORDER — SODIUM CHLORIDE 0.9 % (FLUSH) 0.9 %
10 SYRINGE (ML) INJECTION AS NEEDED
Status: CANCELLED | OUTPATIENT
Start: 2021-10-04

## 2021-09-13 RX ORDER — SODIUM CHLORIDE 9 MG/ML
10 INJECTION INTRAMUSCULAR; INTRAVENOUS; SUBCUTANEOUS AS NEEDED
Status: CANCELLED | OUTPATIENT
Start: 2021-10-04

## 2021-09-13 RX ORDER — SODIUM CHLORIDE 9 MG/ML
25 INJECTION, SOLUTION INTRAVENOUS CONTINUOUS
Status: CANCELLED | OUTPATIENT
Start: 2021-09-24

## 2021-09-13 RX ORDER — FUROSEMIDE 10 MG/ML
20 INJECTION INTRAMUSCULAR; INTRAVENOUS ONCE
Status: CANCELLED
Start: 2021-09-23 | End: 2021-09-23

## 2021-09-13 RX ORDER — ESOMEPRAZOLE MAGNESIUM 20 MG/1
20 FOR SUSPENSION ORAL DAILY
Qty: 90 PACKET | Refills: 1 | Status: SHIPPED | OUTPATIENT
Start: 2021-09-13 | End: 2021-11-12

## 2021-09-13 RX ORDER — SODIUM CHLORIDE 9 MG/ML
25 INJECTION, SOLUTION INTRAVENOUS CONTINUOUS
Status: CANCELLED
Start: 2021-09-27

## 2021-09-13 RX ORDER — DIPHENHYDRAMINE HYDROCHLORIDE 50 MG/ML
25 INJECTION, SOLUTION INTRAMUSCULAR; INTRAVENOUS AS NEEDED
Status: CANCELLED
Start: 2021-09-27

## 2021-09-13 RX ORDER — ONDANSETRON 2 MG/ML
8 INJECTION INTRAMUSCULAR; INTRAVENOUS AS NEEDED
Status: CANCELLED | OUTPATIENT
Start: 2021-09-20

## 2021-09-13 NOTE — PROGRESS NOTES
[facility] Progress Note    Date: 2021    Name: Sandro Irving    MRN: [id number]         : 1975    Mr. Nerissa Corey Arrived ambulatory and in no distress for C1D15 of Bleomycin Regimen. Assessment was completed, no acute issues at this time, no new complaints voiced. Right chest wall port accessed without difficulty, labs drawn & sent for processing. Chemotherapy Flowsheet 2021   Cycle C 1 D 8   Date 2021   Drug / Regimen Bleomycin   Pre Hydration -   Pre Meds -   Notes -         Mr. Tramaine Garcia vitals were reviewed. Visit Vitals  /77   Pulse 80   Temp 97.7 °F (36.5 °C)   Resp 18   Ht 6' 3\" (1.905 m)   Wt (!) 183.3 kg (404 lb 1.6 oz)   BMI 50.51 kg/m²       Lab results were obtained and reviewed. Recent Results (from the past 12 hour(s))   CBC WITH AUTOMATED DIFF    Collection Time: 21  8:36 AM   Result Value Ref Range    WBC 1.3 (L) 4.1 - 11.1 K/uL    RBC 4.02 (L) 4.10 - 5.70 M/uL    HGB 10.2 (L) 12.1 - 17.0 g/dL    HCT 32.4 (L) 36.6 - 50.3 %    MCV 80.6 80.0 - 99.0 FL    MCH 25.4 (L) 26.0 - 34.0 PG    MCHC 31.5 30.0 - 36.5 g/dL    RDW 14.2 11.5 - 14.5 %    PLATELET 58 (L) 992 - 400 K/uL    MPV 11.9 8.9 - 12.9 FL    NRBC 0.0 0  WBC    ABSOLUTE NRBC 0.00 0.00 - 0.01 K/uL    NEUTROPHILS PENDING %    LYMPHOCYTES PENDING %    MONOCYTES PENDING %    EOSINOPHILS PENDING %    BASOPHILS PENDING %    IMMATURE GRANULOCYTES PENDING %    ABS. NEUTROPHILS PENDING K/UL    ABS. LYMPHOCYTES PENDING K/UL    ABS. MONOCYTES PENDING K/UL    ABS. EOSINOPHILS PENDING K/UL    ABS. BASOPHILS PENDING K/UL    ABS. IMM. GRANS.  PENDING K/UL    DF PENDING    METABOLIC PANEL, BASIC    Collection Time: 21  8:48 AM   Result Value Ref Range    Sodium 140 136 - 145 mmol/L    Potassium 4.1 3.5 - 5.1 mmol/L    Chloride 109 (H) 97 - 108 mmol/L    CO2 28 21 - 32 mmol/L    Anion gap 3 (L) 5 - 15 mmol/L    Glucose 123 (H) 65 - 100 mg/dL    BUN 16 6 - 20 MG/DL    Creatinine 0.76 0.70 - 1.30 MG/DL BUN/Creatinine ratio 21 (H) 12 - 20      GFR est AA >60 >60 ml/min/1.73m2    GFR est non-AA >60 >60 ml/min/1.73m2    Calcium 8.6 8.5 - 10.1 MG/DL       Medications:  Treatment held      Mr. Anali Morse tolerated treatment well and was discharged from [place] [address] in stable condition. Port de-accessed, flushed & heparinized per protocol. He is to return on September 20 at 0800   for his next appointment.     Yarely Chambers RN  September 13, 2021

## 2021-09-13 NOTE — TELEPHONE ENCOUNTER
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 Need servicing provider/facility information. [facility] and transferred to [facility] for assistance. No answer, left message requesting return call regarding above. Provided office phone number as well.     09/13/21 2:24 PM Attempted to call [facility]. Transferred to [facility]. No answer, left message requesting a return call. Provided office phone number as well.     09/15/21 9:03 AM Attempted to call [facility] clinic to follow up on above. Spoke with Sarah Cody. Updated her that this nurse has left message for Sara Margarito x 2, no call back as of yet. Sarah Cody stated she would send message to Sara Walkerch requesting that she call this nurse back to assist in completing prior authorization form. Will notify MD of above as well.

## 2021-09-13 NOTE — TELEPHONE ENCOUNTER
----- Message from Kareem Kennedy LPN sent at 5/7/5455  4:48 PM EDT -----  Regarding: FW: Non-Urgent Medical Question  Contact: [phone]    ----- Message -----  From: Meryl Kincaid  Sent: 9/1/2021   5:11 PM EDT  To: [facility] Nurse Pool  Subject: Non-Urgent Medical Question                      Can you see if we can get the Nexiums refilled now my insurance just restarted and the acid reflux is getting bad, I forgot to say something yesterday, ty

## 2021-09-14 ENCOUNTER — TELEPHONE (OUTPATIENT)
Dept: ONCOLOGY | Age: 46
End: 2021-09-14

## 2021-09-14 ENCOUNTER — HOSPITAL ENCOUNTER (OUTPATIENT)
Dept: GENERAL RADIOLOGY | Age: 46
Discharge: HOME | End: 2021-09-14
Payer: COMMERCIAL

## 2021-09-14 ENCOUNTER — OFFICE VISIT (OUTPATIENT)
Dept: ONCOLOGY | Age: 46
End: 2021-09-14
Payer: COMMERCIAL

## 2021-09-14 VITALS
WEIGHT: 315 LBS | DIASTOLIC BLOOD PRESSURE: 80 MMHG | TEMPERATURE: 96.1 F | HEIGHT: 75 IN | OXYGEN SATURATION: 94 % | HEART RATE: 86 BPM | SYSTOLIC BLOOD PRESSURE: 121 MMHG | RESPIRATION RATE: 16 BRPM | BODY MASS INDEX: 39.17 KG/M2

## 2021-09-14 DIAGNOSIS — T45.1X5A CHEMOTHERAPY INDUCED NEUTROPENIA (HCC): ICD-10-CM

## 2021-09-14 DIAGNOSIS — M25.511 ACUTE PAIN OF RIGHT SHOULDER: ICD-10-CM

## 2021-09-14 DIAGNOSIS — L53.9 ERYTHEMA OF SKIN: ICD-10-CM

## 2021-09-14 DIAGNOSIS — D70.1 CHEMOTHERAPY INDUCED NEUTROPENIA (HCC): ICD-10-CM

## 2021-09-14 DIAGNOSIS — K52.1 DIARRHEA DUE TO DRUG: ICD-10-CM

## 2021-09-14 DIAGNOSIS — M25.511 ACUTE PAIN OF RIGHT SHOULDER: Primary | ICD-10-CM

## 2021-09-14 DIAGNOSIS — L08.9 SKIN INFECTION: Primary | ICD-10-CM

## 2021-09-14 DIAGNOSIS — D49.9 EXTRAGONADAL GERM CELL TUMOR: ICD-10-CM

## 2021-09-14 PROCEDURE — 99214 OFFICE O/P EST MOD 30 MIN: CPT | Performed by: INTERNAL MEDICINE

## 2021-09-14 PROCEDURE — 73030 X-RAY EXAM OF SHOULDER: CPT

## 2021-09-14 RX ORDER — DIPHENOXYLATE HYDROCHLORIDE AND ATROPINE SULFATE 2.5; .025 MG/1; MG/1
1 TABLET ORAL
Qty: 21 TABLET | Refills: 0 | Status: SHIPPED | OUTPATIENT
Start: 2021-09-14

## 2021-09-14 RX ORDER — DOXYCYCLINE 100 MG/1
100 CAPSULE ORAL 2 TIMES DAILY
Qty: 14 CAPSULE | Refills: 0 | Status: SHIPPED | OUTPATIENT
Start: 2021-09-14 | End: 2021-09-21

## 2021-09-14 NOTE — TELEPHONE ENCOUNTER
Spoke with patient and he reports right shoulder pain that started Sunday. He states its very painful to turn his head or move the right shoulder at a pain level of 8. He reports putting biofreeze to help with the pain but denies taking any OTC pain medication. He states symptoms worsen with movement. He also reports itchiness around his port area. Denies redness or swelling around port or any other symptoms.

## 2021-09-14 NOTE — PROGRESS NOTES
[id number] [facility] Oncology at [facility]  [phone]    Hematology / Oncology Established Visit    Reason for Visit:   Viji Martinez is a 48 y.o. male who is seen for follow up of Germ cell tumor. 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 Comment: The smears and cell block preparation demonstrate pleomorphic malignant   tumor cells in a background of lymphocytes.  Only a scant amount of tumor   is present on the cell block preparation.  Immunohistochemical stains   demonstrate diffuse positivity within the tumor cell population for PLAP,    (c-kit) and focal positivity for HCG. The tumor cells are negative   for cytokeratin cocktail, CAM 5.2, EMA, S100 and alpha-inhibin. Luzmaria Pool   results are consistent with a testicular germ cell tumor and the   combination of diffuse PLAP positive and  positivity is indicative of   a seminomatous differentation.  Correlation with clinical and radiographic   findings is indicated     Prior Treatment: None     Current Treatment: BEP x 3 cycles, 8/30/21 - current    History of Present Illness:   Viji Martinez is a 48 y.o. male with severe Aortic stenosis, CAD, DM II, HTN coming in for evaluation of testicular germ cell tumor. He was hospitalized on 8/22/21 with R flank pain, found on CT imaging to have a large retroperitoneal mass. He denied fatigue, but did endorse a 40 lb weight loss over 2 months. No recent recent infections or fatigue. He is a nonsmoker. Has remote h/o EtOH abuse, but none recently. The patient is adopted and has no knowledge of family history. Of note, he considers himself , but never filed paperwork. Wife lives in [address] [place] [address]. Has a daughter from another woman.  He may move in with his mother-in-law in town for extra support. Interval History:  Patient here for C2D1 of BEP chemotherapy regimen. Reports right lower leg became red and warm to touch. Denies pain or fevers. Reports h/o hospitalization for cellulitis in April 2021 for 5 days. Reports the stitch in his right upper chest is \"driving him crazy,\" causing pain and itching. Right shoulder pain initially improved on Thursday/Friday of last week but developed worsening pain on Saturday and Sunday. Continues to take doxycycline. Not taking ibuprofen or tylenol. Diarrhea resolved. Knows to take lomotil if it returns. No CP, SOB, n/v. No fevers or chills. PMHx: Severe AS, CAD, DM, GERD, HTN, ROBERTO  PSurgHx: Tonsillectomy, Plantar fasciitis surgery on left  SHx: Never smoker. Remote h/o EtOH abuse, none currently. Legally . Has daughter in 25s from another woman. FHx: Unknown as pt was adopted. Review of Systems: A complete review of systems was obtained, negative except as described above. Physical Exam:   Blood pressure 126/79, pulse 79, temperature 97.9 °F (36.6 °C), height 6' 3\" (1.905 m), weight (!) 393 lb 4.8 oz (178.4 kg), SpO2 96 %. ECOG PS: 0  General: Well developed, no acute distress, obese, face covering in place. Eyes:  Aanicteric sclerae  HENT: Atraumatic, OP clear  Neck: Supple  Lymphatic: Deferred today  Respiratory: CTAB, normal respiratory effort  CV: Normal rate, regular rhythm, no murmurs, 1+ edema in BLE. Port right upper chest.   GI: Soft, nontender, nondistended, no masses  MS: Normal gait and station. Digits without clubbing or cyanosis. TTP of right clavicle and scapula. No erythema or swelling of right shoulder present. Decreased ROM 2/2 pain. Skin: No rashes, ecchymoses, or petechiae. Normal temperature, turgor, and texture. Mild erythema at port site with retained stitches at R neck. Dark erythema in RLE from above ankle to below knee. Neuro/Psych: Alert, oriented. Moves all 4 extremities. Appropriate affect. Normal judgment/insight. Results:     Lab Results   Component Value Date/Time    WBC 5.2 2021 08:11 AM    HGB 11.4 (L) 2021 08:11 AM    HCT 36.1 (L) 2021 08:11 AM    PLATELET 537  08:11 AM    MCV 80.4 2021 08:11 AM    ABS. NEUTROPHILS 1.8 2021 08:11 AM     Lab Results   Component Value Date/Time    Sodium 139 2021 08:11 AM    Potassium 4.0 2021 08:11 AM    Chloride 109 (H) 2021 08:11 AM    CO2 27 2021 08:11 AM    Glucose 119 (H) 2021 08:11 AM    BUN 14 2021 08:11 AM    Creatinine 0.92 2021 08:11 AM    GFR est AA >60 2021 08:11 AM    GFR est non-AA >60 2021 08:11 AM    Calcium 8.9 2021 08:11 AM    Glucose (POC) 120 (H) 2021 11:57 AM     Lab Results   Component Value Date/Time    Bilirubin, total 0.4 2021 08:11 AM    ALT (SGPT) 34 2021 08:11 AM    Alk. phosphatase 82 2021 08:11 AM    Protein, total 7.8 2021 08:11 AM    Albumin 3.2 (L) 2021 08:11 AM    Globulin 4.6 (H) 2021 08:11 AM     No results found for: IRON, FE, TIBC, IBCT, PSAT, FERR    No results found for: B12LT, FOL, RBCF  No results found for: TSH, TSH2, TSH3, TSHP, TSHEXT, TSHEXT  Lab Results   Component Value Date/Time    Hepatitis B surface Ag <0.10 2021 08:11 AM    Hepatitis B surface Ab <3.10 2021 08:11 AM    Hep B Core Ab, total Negative 2021 08:11 AM    Hep C Virus Ab <0.1 2021 08:45 AM     8/23/21: , AFP 1.4, b-hCG 476 mIU/mL  21: uric acid 4.2.  21:     Imagin/21/21 CT abd/p without contrast:  IMPRESSION  1. Right retroperitoneal brain mass measuring 7.5 x 5.2 x 5.8 cm as above, with  narrowing of the IVC and likely encasement of the bilateral renal veins, though  not well evaluated without the use of IV contrast. Findings are highly  suspicious for either lymphoma or brain metastasis.   2. Few borderline enlarged inguinal lymph nodes are indeterminant. 3. Hepatic steatosis.     8/22/21 CT ch/abd/p with contrast:  IMPRESSION  Chest:  1. No evidence of primary malignancy or metastatic disease in the chest.  2. Severe aortic valvular calcifications. Correlate for aortic valvular stenosis. Abdomen/pelvis:   1. Redemonstrated right retroperitoneal mass measuring 7.4 x 4.9 x 6.7 cm as  above, including encasement and narrowing of the IVC and central aspects of the  bilateral renal veins. This favored to represent malignancy, with lymphoma  favored over brain metastasis. 2. No other pathologically enlarged lymph nodes within the abdomen or pelvis. Few prominent inguinal lymph nodes are favored to be reactive. 3. Hepatic steatosis.     8/24/21 Ultrasound scrotum/testicles:  IMPRESSION  1.  Study limited by patient's increased body habitus   2.  No testicular mass identified. Incidental right-sided testicular microlithiasis, a debatable risk factor for malignancy. Pursue and follow-up as  per clinical concern. 3.  Small left hydrocele.        Procedures:  7/20/21 PFTs:  FVC 4.83, 80% of predicted. JBE27.64, 89% of predicted. FEV1% is 86%. TLC is 6.89, which is 86% of perdicted. DLCO is 70% of predicted and when considering alveolar volume is 83% of predicted. Airway resistance is 58% of predicted. Impression:  The patient's spirometry, lung volumes and airway resistance are all normal. The patient's diffuse capacity is mildly decreased, but corrects when considering alveolar volume. Assessment & Plan:   Ernestine Kim is a 48 y.o. male comes in for evaluation and management of germ cell tumor. 1. Testicular germ cell tumor:  Tumor markers show elevated  and b-. Need to distinguish pure seminoma from non-seminomatous germ cell tumor (NSGCT) or mixed germ cell tumor. Usually, scrotal ultrasound is not a replacement for radical inguinal orchiectomy as surgery will provide accurate histologic diagnosis.  However, no testicular mass seen on scrotal ultrasound. Testicular microlithiasis, seen on ultrasound for this patient, has a strong association with testicular cancer. So, I am interested to know whether R orchiectomy is advised by Urology. b-HCG is usually not elevated in pure seminoma, but can be elevated in up to 20% of seminomas. I am unsure of the significance of the prominent inguinal LNs. Risk stratification for advanced testicular germ cell tumors: Good risk NSGCT based on: Retroperitoneal primary tumor, no mets to other organs, serum AFP < 1000, b-hCG < 5000 rylee-international units/ml, LDH < 3 times upper limit of normal. However, accurate staging requires orchiectomy and measurement of tumor markers AFTER surgery. Orchiectomy not advised at this time given lack of testicular mass. I advise chemotherapy treatment with BEP regimen x 3 cycles. Chemotherapy is given on days 1-5, 8, 15 of a 21-day cycle. We discussed the risks and benefits of BEP chemotherapy. Potential side effects include, but are not limited to: nausea, vomiting, diarrhea, taste changes, myelosuppression, infection, fatigue, alopecia, neuropathy, hearing loss, renal failure, pulmonary toxicity, cardiac toxicity, allergic reactions, infertility, and rarely, death. The patient has consented to beginning chemotherapy. Supportive care medications include: topical lidocaine, zofran, compazine, docusate. -- Previously discussed cryopreservation of sperm - referred to [facility]. Prior auth form completed per pt request.  -- Proceed with C2D1 of BEP regimen. -- Each cycle should begin on schedule regardless of degree of myelosuppression. -- If febrile neutropenia or thrombocytopenic bleeding occurs, dose reduce Etoposide 25% for subsequent cycles. -- No need to hold Bleomycin for hematologic toxicity. -- Repeat PFTs prior to C3, ordered  -- Return in 3 weeks for C3D1 and MD/NP follow up.   -- Needs referral to different Urology team who accepts pt's insurance.      2. Severe Aortic stenosis, non-rheumatic:  8/22 ECHO: LVEF 55-60% and severe aortic stenosis. Previously scheduled aortic valve replacement with mechanical valve by Dr. Trang Solis on 9/20/21 is now hold given cancer diagnosis. Discussed with Dr. Elian Lares, covering for Dr. Bibi Tadeo and with Dr. Scott Lopez: Severe AS has mortality rate of 50% at 2 yrs, but cannot proceed with surgery if untreated cancer. Reviewed Cr and will proceed with Lasix and cut down on oral fluids at this time. -- Use Lasix 20mg oral, post treatment on Days 1, 4 of each 21-day cycle. -- Lasix 20mg PO prn at home if weight gain > 5 lbs in 3 days.      3. Neoplasm related pain, acute:  Has Oxycodone for prn use, but not taking currently. Can use Tylenol and Advil, prn, take with food. 4. DM / HTN:   Continue home meds: Metformin, Victoza (or Ozempic). Will monitor for steroid-induced hyperglycemia. BG of 154 from 9/1 is reasonable.     5. FEN / GI:   Constipation was 2/2 opioids during hospital stay and now improved. Developed several days of diarrhea but now having firm stools. -- Lomotil TID prn sent to pharmacy if diarrhea occurs. 6. 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 He has concerns about how to afford not working and how to have pain controlled while driving (no opioids while driving.) Discussed with CM and clinical . Annabel Fernandez in  to meet with patient. 7. Right shoulder pain:  Atraumatic shoulder pain, but decreased ROM - likely arthritis. X-ray normal.   -- Advised tylenol/ibuprofen prn shoulder pain and supportive care with heat and rest.     8. Port insertion site erythema / Right leg cellulitis:  Mild but erring on side of caution given neutropenia and will treat with antibiotics. On Doxycycline already to complete 7 days. Adding Keflex.    -- Refer back to IR for suture removal. Appt 9/21/21 at 1100 am.   -- Start Keflex 500mg QHS for cellulitis. Add yogurt with probiotics. Emotional well being: Pt is coping well with his/her disease and has excellent support. I appreciate the opportunity to participate in Mr. Bere Yañez care. I have personally seen and evaluated the patient in conjunction with Maria Alejandra Carrillo NP. I find the patient's history and physical exam are consistent with the NP's documentation. I agree with the above assessment and plan, which I have modified as needed. Proceed with C2D1 of BEP regimen which pt is tolerating well.      Signed By: Prem Steve MD     September 20, 2021

## 2021-09-14 NOTE — TELEPHONE ENCOUNTER
09/14/21 1:25 PM     Reports progressive shoulder pain that started on Sunday. Denies heavy lifting or trauma. Still able to lift arm above head but has pain with lifting. Has tried biofreeze topical with minimal relief of symptoms. Has not tried OTC pain reliever such as ibuprofen or tylenol. Reports port site started itching several days ago. Denies redness or swelling. Advised pt I would like him to come in for assessment. Pt verbalized understanding and will be here around 230.

## 2021-09-14 NOTE — TELEPHONE ENCOUNTER
Patient called and stated that he is having a lot of pain in his shoulder and would like to talk to someone.   NP#[phone]

## 2021-09-14 NOTE — PROGRESS NOTES
[id number] [facility]  [phone]    Hematology / Oncology Consult    Reason for Visit:   Idris Hernández is a 48 y.o. male who is seen for follow up of Germ cell tumor. 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 Comment: The smears and cell block preparation demonstrate pleomorphic malignant   tumor cells in a background of lymphocytes.  Only a scant amount of tumor   is present on the cell block preparation.  Immunohistochemical stains   demonstrate diffuse positivity within the tumor cell population for PLAP,    (c-kit) and focal positivity for HCG. The tumor cells are negative   for cytokeratin cocktail, CAM 5.2, EMA, S100 and alpha-inhibin. Carmie Pond   results are consistent with a testicular germ cell tumor and the   combination of diffuse PLAP positive and  positivity is indicative of   a seminomatous differentation.  Correlation with clinical and radiographic   findings is indicated     Prior Treatment: None     Current Treatment: BEP x 3 cycles, 8/30/21 - current    History of Present Illness:   Idris Hernández is a 48 y.o. male with severe Aortic stenosis, CAD, DM II, HTN coming in for evaluation of testicular germ cell tumor. He was hospitalized on 8/22/21 with R flank pain, found on CT imaging to have a large retroperitoneal mass. He denied fatigue, but did endorse a 40 lb weight loss over 2 months. No recent recent infections or fatigue. He is a nonsmoker. Has remote h/o EtOH abuse, but none recently. The patient is adopted and has no knowledge of family history. Of note, he considers himself , but never filed paperwork. Wife lives in [facility]. Has a daughter from another woman.  He may move in with his mother-in-law in town for extra support. Interval History:  Patient here for urgent follow up. Reports right shoulder pain that started on Sunday. Pain is getting progressively worse. Denies trauma or injury. Reports pruritus at port site. Denies redness or swelling of shoulder or port site. Tried biofreeze to shoulder with minimal relief. Denies SOB, CP or nausea. No fevers, chills. Of note, reports he was having 2-3 loose per day for the past week but stool has finally firmed up the past couple days. He tried imodium and it did not work. PMHx: Severe AS, CAD, DM, GERD, HTN, ROBERTO  PSurgHx: Tonsillectomy, Plantar fasciitis surgery on left  SHx: Never smoker. Remote h/o EtOH abuse, none currently. Legally . Has daughter in 25s from another woman. FHx: Unknown as pt was adopted. Review of Systems: A complete review of systems was obtained, negative except as described above. Physical Exam:   Blood pressure 121/80, pulse 86, temperature (!) 96.1 °F (35.6 °C), resp. rate 16, height 6' 3\" (1.905 m), weight (!) 407 lb (184.6 kg), SpO2 94 %. ECOG PS: 0  General: Well developed, no acute distress, obese   Eyes:  Aanicteric sclerae  HENT: Atraumatic, OP clear  Neck: Supple  Lymphatic: Deferred today  Respiratory: CTAB, normal respiratory effort  CV: Normal rate, regular rhythm, no murmurs, 1+ edema in BLE. Port right upper chest.   GI: Soft, nontender, nondistended, no masses  MS: Normal gait and station. Digits without clubbing or cyanosis. TTP of right clavicle and scapula. No erythema or swelling of right shoulder present. Decreased ROM 2/2 pain. Skin: No rashes, ecchymoses, or petechiae. Normal temperature, turgor, and texture. Mild erythema present on right neck where port cannula is in place. Neuro/Psych: Alert, oriented. Moves all 4 extremities. Appropriate affect. Normal judgment/insight.     Results:     Lab Results   Component Value Date/Time    WBC 1.3 (L) 09/13/2021 08:36 AM    HGB 10.2 (L) 09/13/2021 08:36 AM    HCT 32.4 (L) 2021 08:36 AM    PLATELET 58 (L)  08:36 AM    MCV 80.6 2021 08:36 AM    ABS. NEUTROPHILS 0.1 (L) 2021 08:36 AM     Lab Results   Component Value Date/Time    Sodium 140 2021 08:48 AM    Potassium 4.1 2021 08:48 AM    Chloride 109 (H) 2021 08:48 AM    CO2 28 2021 08:48 AM    Glucose 123 (H) 2021 08:48 AM    BUN 16 2021 08:48 AM    Creatinine 0.76 2021 08:48 AM    GFR est AA >60 2021 08:48 AM    GFR est non-AA >60 2021 08:48 AM    Calcium 8.6 2021 08:48 AM    Glucose (POC) 120 (H) 2021 11:57 AM     Lab Results   Component Value Date/Time    Bilirubin, total 0.4 2021 08:11 AM    ALT (SGPT) 32 2021 08:11 AM    Alk. phosphatase 72 2021 08:11 AM    Protein, total 8.3 (H) 2021 08:11 AM    Albumin 3.2 (L) 2021 08:11 AM    Globulin 5.1 (H) 2021 08:11 AM     No results found for: IRON, FE, TIBC, IBCT, PSAT, FERR    No results found for: B12LT, FOL, RBCF  No results found for: TSH, TSH2, TSH3, TSHP, TSHEXT, TSHEXT  Lab Results   Component Value Date/Time    Hepatitis B surface Ag <0.10 2021 08:11 AM    Hepatitis B surface Ab <3.10 2021 08:11 AM    Hep B Core Ab, total Negative 2021 08:11 AM    Hep C Virus Ab <0.1 2021 08:45 AM     21: , AFP 1.4, b-hCG 476 mIU/mL  21: uric acid 4.2. Imagin/21/21 CT abd/p without contrast:  IMPRESSION  1. Right retroperitoneal brain mass measuring 7.5 x 5.2 x 5.8 cm as above, with  narrowing of the IVC and likely encasement of the bilateral renal veins, though  not well evaluated without the use of IV contrast. Findings are highly  suspicious for either lymphoma or brain metastasis. 2. Few borderline enlarged inguinal lymph nodes are indeterminant. 3. Hepatic steatosis.     21 CT ch/abd/p with contrast:  IMPRESSION  Chest:  1.  No evidence of primary malignancy or metastatic disease in the chest.  2. Severe aortic valvular calcifications. Correlate for aortic valvular stenosis. Abdomen/pelvis:   1. Redemonstrated right retroperitoneal mass measuring 7.4 x 4.9 x 6.7 cm as  above, including encasement and narrowing of the IVC and central aspects of the  bilateral renal veins. This favored to represent malignancy, with lymphoma  favored over brain metastasis. 2. No other pathologically enlarged lymph nodes within the abdomen or pelvis. Few prominent inguinal lymph nodes are favored to be reactive. 3. Hepatic steatosis.     8/24/21 Ultrasound scrotum/testicles:  IMPRESSION  1.  Study limited by patient's increased body habitus   2.  No testicular mass identified. Incidental right-sided testicular microlithiasis, a debatable risk factor for malignancy. Pursue and follow-up as  per clinical concern. 3.  Small left hydrocele.        Procedures:  7/20/21 PFTs:  FVC 4.83, 80% of predicted. FQJ24.35, 89% of predicted. FEV1% is 86%. TLC is 6.89, which is 86% of perdicted. DLCO is 70% of predicted and when considering alveolar volume is 83% of predicted. Airway resistance is 58% of predicted. Impression:  The patient's spirometry, lung volumes and airway resistance are all normal. The patient's diffuse capacity is mildly decreased, but corrects when considering alveolar volume. Assessment & Plan:   Lary Perales is a 48 y.o. male comes in for evaluation and management of germ cell tumor. 1. Testicular germ cell tumor:  Tumor markers show elevated  and b-. Need to distinguish pure seminoma from non-seminomatous germ cell tumor (NSGCT) or mixed germ cell tumor. Usually, scrotal ultrasound is not a replacement for radical inguinal orchiectomy as surgery will provide accurate histologic diagnosis. However, no testicular mass seen on scrotal ultrasound. Testicular microlithiasis, seen on ultrasound for this patient, has a strong association with testicular cancer.  So, I am interested to know whether R orchiectomy is advised by Urology. b-HCG is usually not elevated in pure seminoma, but can be elevated in up to 20% of seminomas. I am unsure of the significance of the prominent inguinal LNs. Risk stratification for advanced testicular germ cell tumors: Good risk NSGCT based on: Retroperitoneal primary tumor, no mets to other organs, serum AFP < 1000, b-hCG < 5000 rylee-international units/ml, LDH < 3 times upper limit of normal. However, accurate staging requires orchiectomy and measurement of tumor markers AFTER surgery. Orchiectomy not advised at this time given lack of testicular mass. I advise chemotherapy treatment with BEP regimen x 3 cycles. Chemotherapy is given on days 1-5, 8, 15 of a 21-day cycle. We discussed the risks and benefits of BEP chemotherapy. Potential side effects include, but are not limited to: nausea, vomiting, diarrhea, taste changes, myelosuppression, infection, fatigue, alopecia, neuropathy, hearing loss, renal failure, pulmonary toxicity, cardiac toxicity, allergic reactions, infertility, and rarely, death. The patient has consented to beginning chemotherapy. Supportive care medications include: topical lidocaine, zofran, compazine, docusate   -- Previously discussed cryopreservation of sperm - referred to THE [facility]. Pt states he picked up a sperm collection kit from them. He asks that I call his insurance to complete a prior authorization. -- Each cycle should begin on schedule regardless of degree of myelosuppression. -- If febrile neutropenia or thrombocytopenic bleeding occurs, dose reduce Etoposide 25% for subsequent cycles. -- Plan to repeat PFTs prior to cycle 2. (Received COVID vaccine 8/26/21)  -- Completed Cycle 1 of BEP regimen - treating Mon through Friday the first week of each 3 week cycle, and weekly on days 8 and 15. Total of 3 cycles (9 weeks) planned. Held C1D15 due to thrombocytopenia.    -- Return on 9/20/21 for C2D1 and MD/NP follow up. -- Needs referral to different Urology team who accepts pt's insurance.      2. Severe Aortic stenosis, non-rheumatic:  8/22 ECHO: LVEF 55-60% and severe aortic stenosis. Previously scheduled aortic valve replacement with mechanical valve by Dr. Ernestina Gowers on 9/20/21 is now hold given cancer diagnosis. Discussed with Dr. Brandon Gomes, covering for Dr. Humberto Murillo and with Dr. Suleiman Pickard: Severe AS has mortality rate of 50% at 2 yrs, but cannot proceed with surgery if untreated cancer. Reviewed Cr and will proceed with Lasix and cut down on oral fluids at this time. -- Use Lasix 20mg IV, post treatment on Days 1, 4 of each 21-day cycle. -- Lasix 20mg PO prn at home if weight gain > 5 lbs in 3 days.      3. Neoplasm related pain, acute:  Has Oxycodone for prn use, but not taking currently. Can use Tylenol and Advil, prn, take with food. 4. DM / HTN:   Continue home meds: Metformin, Victoza (or Ozempic). Will monitor for steroid-induced hyperglycemia. BG of 154 from 9/1 is reasonable.     5. FEN / GI:   Constipation was 2/2 opioids during hospital stay and now improved. Developed several days of diarrhea but now having firm stools. -- Lomotil TID prn sent to pharmacy if diarrhea occurs. 6. 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 He has concerns about how to afford not working and how to have pain controlled while driving (no opioids while driving.) Discussed with CM and clinical . Ed Mendiola in  to meet with patient. 7. Polyuria:  2/2 fluid hydration. Advised pt he can now cut down to 1/2 liter of water daily and stop drinking fluids after 6pm.     8. Right shoulder pain:  Atraumatic shoulder pain, but decreased ROM - likely arthritis. X-ray normal.   -- Advised tylenol/ibuprofen prn shoulder pain and supportive care with heat and rest.   -- Advised pt to call us back by Thursday if these measure are not improving the pain.     9. Port insertion site erythema:  Mild but erring on side of caution given neutropenia and will treat with antibiotics. -- Start Doxycycline 100mg BID x 7 days for possible skin infection. 9. Chemotherapy induced neutropenia/thrombocytopenia:   Neutropenic and bleeding precautions advised. Emotional well being: Pt is coping well with his/her disease and has excellent support. I appreciate the opportunity to participate in Mr. Angela flores. I have personally seen and evaluated the patient in conjunction with Chau Garcia NP. I find the patient's history and physical exam are consistent with the NP's documentation. I agree with the above assessment and plan, which I have edited if needed. Given current neutropenia, will treat for possible port site infection with Doxycycline. Warned to take with food and plenty of water. R shoulder pain likely a separate issue - No effusion or fracture seen on x-ray. Advised Tylenol, Advil prn.        Signed By: Tasneem Awad MD     September 14, 2021 No complaints

## 2021-09-14 NOTE — PATIENT INSTRUCTIONS
1. Please go get xray of your shoulder today - I will call you with the results. 2. You can start taking tylenol 500mg every 4-6 hours as needed for pain. Do not exceed more than 3 grams in one day. This helps with pain control    3. Start ibuprofen 200 to 400 mg every 6 hours (would not exceed more than 800mg in a day) as needed for pain. Continue drinking plenty of water. 4. Could try a heating pad to shoulder. 5. Start doxycycline 100mg twice daily x 7 days. This is an antibiotic to cover for infection. Take with full glass of water. With food. Avoid getting sunburned. 6. I am calling in lomotil. Can take three times daily as needed if you are having more than 3-4 loose stools per day     7. Can also try topical benadryl or cortisone cream to port site to help with itching.

## 2021-09-14 NOTE — PROGRESS NOTES
Chief Complaint   Patient presents with    Follow-up     Delphine Thayer is a pleasant 48year old male who presents as a follow up for germ cell tumor.  He reports right shoulder pain

## 2021-09-14 NOTE — TELEPHONE ENCOUNTER
Spoke with patient and he states [facility] only received the script for his diarrhea and not the antibiotic. Spoke with caroline and advised antibiotic was not received and caroline resent script.

## 2021-09-14 NOTE — TELEPHONE ENCOUNTER
Spoke with caroline she sent med to [facility] on file .  Attempted to contact patient line busy will try again

## 2021-09-17 ENCOUNTER — HOSPITAL ENCOUNTER (OUTPATIENT)
Dept: PULMONOLOGY | Age: 46
Discharge: HOME | End: 2021-09-17
Attending: NURSE PRACTITIONER
Payer: COMMERCIAL

## 2021-09-17 VITALS — OXYGEN SATURATION: 96 %

## 2021-09-17 DIAGNOSIS — C48.0 GERM CELL TUMOR OF RETROPERITONEUM (HCC): ICD-10-CM

## 2021-09-17 PROCEDURE — 94729 DIFFUSING CAPACITY: CPT

## 2021-09-17 PROCEDURE — 94760 N-INVAS EAR/PLS OXIMETRY 1: CPT

## 2021-09-17 PROCEDURE — 94375 RESPIRATORY FLOW VOLUME LOOP: CPT

## 2021-09-20 ENCOUNTER — APPOINTMENT (OUTPATIENT)
Dept: INFUSION THERAPY | Age: 46
End: 2021-09-20
Payer: COMMERCIAL

## 2021-09-20 ENCOUNTER — OFFICE VISIT (OUTPATIENT)
Dept: ONCOLOGY | Age: 46
End: 2021-09-20
Payer: COMMERCIAL

## 2021-09-20 ENCOUNTER — HOSPITAL ENCOUNTER (OUTPATIENT)
Dept: INFUSION THERAPY | Age: 46
Discharge: HOME | End: 2021-09-20
Payer: COMMERCIAL

## 2021-09-20 VITALS
DIASTOLIC BLOOD PRESSURE: 79 MMHG | OXYGEN SATURATION: 96 % | HEART RATE: 79 BPM | SYSTOLIC BLOOD PRESSURE: 126 MMHG | WEIGHT: 315 LBS | HEIGHT: 75 IN | TEMPERATURE: 97.9 F | BODY MASS INDEX: 39.17 KG/M2

## 2021-09-20 VITALS
DIASTOLIC BLOOD PRESSURE: 81 MMHG | RESPIRATION RATE: 18 BRPM | SYSTOLIC BLOOD PRESSURE: 138 MMHG | HEART RATE: 79 BPM | TEMPERATURE: 97.6 F | BODY MASS INDEX: 39.17 KG/M2 | WEIGHT: 315 LBS | OXYGEN SATURATION: 96 % | HEIGHT: 75 IN

## 2021-09-20 DIAGNOSIS — Z51.11 CHEMOTHERAPY MANAGEMENT, ENCOUNTER FOR: ICD-10-CM

## 2021-09-20 DIAGNOSIS — D49.9 EXTRAGONADAL GERM CELL TUMOR: Primary | ICD-10-CM

## 2021-09-20 DIAGNOSIS — G89.29 CHRONIC RIGHT SHOULDER PAIN: ICD-10-CM

## 2021-09-20 DIAGNOSIS — L03.115 CELLULITIS OF RIGHT LEG: ICD-10-CM

## 2021-09-20 DIAGNOSIS — C48.0 GERM CELL TUMOR OF RETROPERITONEUM (HCC): Primary | ICD-10-CM

## 2021-09-20 DIAGNOSIS — I35.0 SEVERE AORTIC STENOSIS: ICD-10-CM

## 2021-09-20 DIAGNOSIS — M25.511 CHRONIC RIGHT SHOULDER PAIN: ICD-10-CM

## 2021-09-20 LAB
ALBUMIN SERPL-MCNC: 3.2 G/DL (ref 3.5–5)
ALBUMIN/GLOB SERPL: 0.7 {RATIO} (ref 1.1–2.2)
ALP SERPL-CCNC: 82 U/L (ref 45–117)
ALT SERPL-CCNC: 34 U/L (ref 12–78)
ANION GAP SERPL CALC-SCNC: 3 MMOL/L (ref 5–15)
AST SERPL-CCNC: 20 U/L (ref 15–37)
BASOPHILS # BLD: 0 K/UL (ref 0–0.1)
BASOPHILS NFR BLD: 0 % (ref 0–1)
BILIRUB SERPL-MCNC: 0.4 MG/DL (ref 0.2–1)
BUN SERPL-MCNC: 14 MG/DL (ref 6–20)
BUN/CREAT SERPL: 15 (ref 12–20)
CALCIUM SERPL-MCNC: 8.9 MG/DL (ref 8.5–10.1)
CHLORIDE SERPL-SCNC: 109 MMOL/L (ref 97–108)
CO2 SERPL-SCNC: 27 MMOL/L (ref 21–32)
CREAT SERPL-MCNC: 0.92 MG/DL (ref 0.7–1.3)
DIFFERENTIAL METHOD BLD: ABNORMAL
EOSINOPHIL # BLD: 0.1 K/UL (ref 0–0.4)
EOSINOPHIL NFR BLD: 1 % (ref 0–7)
ERYTHROCYTE [DISTWIDTH] IN BLOOD BY AUTOMATED COUNT: 16 % (ref 11.5–14.5)
GLOBULIN SER CALC-MCNC: 4.6 G/DL (ref 2–4)
GLUCOSE SERPL-MCNC: 119 MG/DL (ref 65–100)
HCT VFR BLD AUTO: 36.1 % (ref 36.6–50.3)
HGB BLD-MCNC: 11.4 G/DL (ref 12.1–17)
IMM GRANULOCYTES # BLD AUTO: 0 K/UL (ref 0–0.04)
IMM GRANULOCYTES NFR BLD AUTO: 0 % (ref 0–0.5)
LYMPHOCYTES # BLD: 2.3 K/UL (ref 0.8–3.5)
LYMPHOCYTES NFR BLD: 45 % (ref 12–49)
MAGNESIUM SERPL-MCNC: 2.1 MG/DL (ref 1.6–2.4)
MCH RBC QN AUTO: 25.4 PG (ref 26–34)
MCHC RBC AUTO-ENTMCNC: 31.6 G/DL (ref 30–36.5)
MCV RBC AUTO: 80.4 FL (ref 80–99)
MONOCYTES # BLD: 1 K/UL (ref 0–1)
MONOCYTES NFR BLD: 19 % (ref 5–13)
NEUTS SEG # BLD: 1.8 K/UL (ref 1.8–8)
NEUTS SEG NFR BLD: 35 % (ref 32–75)
NRBC # BLD: 0 K/UL (ref 0–0.01)
NRBC BLD-RTO: 0 PER 100 WBC
PLATELET # BLD AUTO: 269 K/UL (ref 150–400)
PMV BLD AUTO: 11.6 FL (ref 8.9–12.9)
POTASSIUM SERPL-SCNC: 4 MMOL/L (ref 3.5–5.1)
PROT SERPL-MCNC: 7.8 G/DL (ref 6.4–8.2)
RBC # BLD AUTO: 4.49 M/UL (ref 4.1–5.7)
RBC MORPH BLD: ABNORMAL
SODIUM SERPL-SCNC: 139 MMOL/L (ref 136–145)
WBC # BLD AUTO: 5.2 K/UL (ref 4.1–11.1)
WBC MORPH BLD: ABNORMAL

## 2021-09-20 PROCEDURE — 74011250637 HC RX REV CODE- 250/637: Performed by: INTERNAL MEDICINE

## 2021-09-20 PROCEDURE — 80053 COMPREHEN METABOLIC PANEL: CPT

## 2021-09-20 PROCEDURE — 99215 OFFICE O/P EST HI 40 MIN: CPT | Performed by: INTERNAL MEDICINE

## 2021-09-20 PROCEDURE — 96367 TX/PROPH/DG ADDL SEQ IV INF: CPT

## 2021-09-20 PROCEDURE — 96413 CHEMO IV INFUSION 1 HR: CPT

## 2021-09-20 PROCEDURE — 96375 TX/PRO/DX INJ NEW DRUG ADDON: CPT

## 2021-09-20 PROCEDURE — 82105 ALPHA-FETOPROTEIN SERUM: CPT

## 2021-09-20 PROCEDURE — 96417 CHEMO IV INFUS EACH ADDL SEQ: CPT

## 2021-09-20 PROCEDURE — 74011250636 HC RX REV CODE- 250/636: Performed by: INTERNAL MEDICINE

## 2021-09-20 PROCEDURE — 96411 CHEMO IV PUSH ADDL DRUG: CPT

## 2021-09-20 PROCEDURE — 74011000258 HC RX REV CODE- 258: Performed by: INTERNAL MEDICINE

## 2021-09-20 PROCEDURE — 36415 COLL VENOUS BLD VENIPUNCTURE: CPT

## 2021-09-20 PROCEDURE — 77030016057 HC NDL HUBR APOL -B

## 2021-09-20 PROCEDURE — 96361 HYDRATE IV INFUSION ADD-ON: CPT

## 2021-09-20 PROCEDURE — 83735 ASSAY OF MAGNESIUM: CPT

## 2021-09-20 PROCEDURE — 85025 COMPLETE CBC W/AUTO DIFF WBC: CPT

## 2021-09-20 RX ORDER — CEPHALEXIN 500 MG/1
500 CAPSULE ORAL 4 TIMES DAILY
Qty: 28 CAPSULE | Refills: 0 | Status: SHIPPED | OUTPATIENT
Start: 2021-09-20 | End: 2021-10-11 | Stop reason: SDUPTHER

## 2021-09-20 RX ORDER — HEPARIN 100 UNIT/ML
300-500 SYRINGE INTRAVENOUS AS NEEDED
Status: ACTIVE | OUTPATIENT
Start: 2021-09-20 | End: 2021-09-20

## 2021-09-20 RX ORDER — SODIUM CHLORIDE 9 MG/ML
10 INJECTION INTRAMUSCULAR; INTRAVENOUS; SUBCUTANEOUS AS NEEDED
Status: ACTIVE | OUTPATIENT
Start: 2021-09-20 | End: 2021-09-20

## 2021-09-20 RX ORDER — SODIUM CHLORIDE 0.9 % (FLUSH) 0.9 %
10 SYRINGE (ML) INJECTION AS NEEDED
Status: DISPENSED | OUTPATIENT
Start: 2021-09-20 | End: 2021-09-20

## 2021-09-20 RX ORDER — SODIUM CHLORIDE 9 MG/ML
25 INJECTION, SOLUTION INTRAVENOUS CONTINUOUS
Status: DISPENSED | OUTPATIENT
Start: 2021-09-20 | End: 2021-09-20

## 2021-09-20 RX ORDER — PALONOSETRON 0.05 MG/ML
0.25 INJECTION, SOLUTION INTRAVENOUS ONCE
Status: COMPLETED | OUTPATIENT
Start: 2021-09-20 | End: 2021-09-20

## 2021-09-20 RX ORDER — FUROSEMIDE 10 MG/ML
20 INJECTION INTRAMUSCULAR; INTRAVENOUS ONCE
Status: DISCONTINUED | OUTPATIENT
Start: 2021-09-20 | End: 2021-09-20

## 2021-09-20 RX ORDER — ACETAMINOPHEN 325 MG/1
650 TABLET ORAL ONCE
Status: COMPLETED | OUTPATIENT
Start: 2021-09-20 | End: 2021-09-20

## 2021-09-20 RX ORDER — FUROSEMIDE 20 MG/1
20 TABLET ORAL
Qty: 20 TABLET | Refills: 0 | Status: SHIPPED | OUTPATIENT
Start: 2021-09-20

## 2021-09-20 RX ORDER — DIPHENHYDRAMINE HYDROCHLORIDE 50 MG/ML
25 INJECTION, SOLUTION INTRAMUSCULAR; INTRAVENOUS ONCE
Status: COMPLETED | OUTPATIENT
Start: 2021-09-20 | End: 2021-09-20

## 2021-09-20 RX ADMIN — ACETAMINOPHEN 650 MG: 325 TABLET ORAL at 09:35

## 2021-09-20 RX ADMIN — POTASSIUM CHLORIDE: 2 INJECTION, SOLUTION, CONCENTRATE INTRAVENOUS at 10:10

## 2021-09-20 RX ADMIN — SODIUM CHLORIDE 25 ML/HR: 9 INJECTION, SOLUTION INTRAVENOUS at 09:35

## 2021-09-20 RX ADMIN — DIPHENHYDRAMINE HYDROCHLORIDE 25 MG: 50 INJECTION INTRAMUSCULAR; INTRAVENOUS at 09:36

## 2021-09-20 RX ADMIN — FOSAPREPITANT 150 MG: 150 INJECTION, POWDER, LYOPHILIZED, FOR SOLUTION INTRAVENOUS at 09:45

## 2021-09-20 RX ADMIN — DEXAMETHASONE SODIUM PHOSPHATE 12 MG: 10 INJECTION, SOLUTION INTRAMUSCULAR; INTRAVENOUS at 09:50

## 2021-09-20 RX ADMIN — CISPLATIN 63.2 MG: 1 INJECTION INTRAVENOUS at 12:54

## 2021-09-20 RX ADMIN — PALONOSETRON 0.25 MG: 0.05 INJECTION, SOLUTION INTRAVENOUS at 09:40

## 2021-09-20 RX ADMIN — Medication 500 UNITS: at 13:56

## 2021-09-20 RX ADMIN — SODIUM CHLORIDE 30 UNITS: 900 INJECTION, SOLUTION INTRAVENOUS at 11:20

## 2021-09-20 RX ADMIN — ETOPOSIDE 316 MG: 20 INJECTION, SOLUTION, CONCENTRATE INTRAVENOUS at 11:38

## 2021-09-20 RX ADMIN — SODIUM CHLORIDE 10 ML: 9 INJECTION INTRAMUSCULAR; INTRAVENOUS; SUBCUTANEOUS at 13:56

## 2021-09-20 NOTE — PROGRESS NOTES
Viji Martinez is a 48 y.o. 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 Patient with complaints of right shoulder pain, rates as a 6 out of 10.

## 2021-09-20 NOTE — PATIENT INSTRUCTIONS
1. Please start keflex 500mg take 4 times daily for 7 days. Make sure to take with a daily probiotic (such as yogurt with added probiotics) to prevent yeast infection and diarrhea. Take with food. 2. Please call if redness is going outside the marker area     3. Take tylenol as needed for pain. Can taking ibuprofen sparingly, once or twice daily as needed. No more than 400mg.

## 2021-09-20 NOTE — PROGRESS NOTES
[facility] Progress Note    Date: 2021    Name: Tammy Ren    MRN: [id number]         : 1975    Mr. Mana Kennedy arrived ambulatory and in no distress for C2D1 of Bleomycin/Etoposide/Cisplatin Regimen. Assessment was completed, patient has new purple-red area on lower right anterior leg that is warm to touch. He states that he was hospitalized in the past for cellulitis in the same leg. Notified Celeste Ortega NP prior to his scheduled appointment. Right chest wall port accessed without difficulty, labs drawn & sent for processing. Chemotherapy Flowsheet 2021   Cycle C2D1   Date 2021   Drug / Regimen Bleomycin/Etoposide/Cisplatin   Pre Hydration given   Pre Meds given   Notes given       Patient proceed to appointment with Dr. Jim Porter. Mr. Aleta Oliver vitals were reviewed. Patient Vitals for the past 24 hrs:   Temp Pulse Resp BP SpO2   21 1354 97.6 °F (36.4 °C) 79 18 138/81 --   21 0814 97.9 °F (36.6 °C) 79 18 126/79 96 %       Lab results were obtained and reviewed. Recent Results (from the past 12 hour(s))   CBC WITH AUTOMATED DIFF    Collection Time: 21  8:11 AM   Result Value Ref Range    WBC 5.2 4.1 - 11.1 K/uL    RBC 4.49 4. 10 - 5.70 M/uL    HGB 11.4 (L) 12.1 - 17.0 g/dL    HCT 36.1 (L) 36.6 - 50.3 %    MCV 80.4 80.0 - 99.0 FL    MCH 25.4 (L) 26.0 - 34.0 PG    MCHC 31.6 30.0 - 36.5 g/dL    RDW 16.0 (H) 11.5 - 14.5 %    PLATELET 210 129 - 013 K/uL    MPV 11.6 8.9 - 12.9 FL    NRBC 0.0 0  WBC    ABSOLUTE NRBC 0.00 0.00 - 0.01 K/uL    NEUTROPHILS 35 32 - 75 %    LYMPHOCYTES 45 12 - 49 %    MONOCYTES 19 (H) 5 - 13 %    EOSINOPHILS 1 0 - 7 %    BASOPHILS 0 0 - 1 %    IMMATURE GRANULOCYTES 0 0.0 - 0.5 %    ABS. NEUTROPHILS 1.8 1.8 - 8.0 K/UL    ABS. LYMPHOCYTES 2.3 0.8 - 3.5 K/UL    ABS. MONOCYTES 1.0 0.0 - 1.0 K/UL    ABS. EOSINOPHILS 0.1 0.0 - 0.4 K/UL    ABS. BASOPHILS 0.0 0.0 - 0.1 K/UL    ABS. IMM.  GRANS. 0.0 0.00 - 0.04 K/UL    DF MANUAL      RBC COMMENTS NORMOCYTIC, NORMOCHROMIC      WBC COMMENTS ATYPICAL LYMPHOCYTES PRESENT     METABOLIC PANEL, COMPREHENSIVE    Collection Time: 09/20/21  8:11 AM   Result Value Ref Range    Sodium 139 136 - 145 mmol/L    Potassium 4.0 3.5 - 5.1 mmol/L    Chloride 109 (H) 97 - 108 mmol/L    CO2 27 21 - 32 mmol/L    Anion gap 3 (L) 5 - 15 mmol/L    Glucose 119 (H) 65 - 100 mg/dL    BUN 14 6 - 20 MG/DL    Creatinine 0.92 0.70 - 1.30 MG/DL    BUN/Creatinine ratio 15 12 - 20      GFR est AA >60 >60 ml/min/1.73m2    GFR est non-AA >60 >60 ml/min/1.73m2    Calcium 8.9 8.5 - 10.1 MG/DL    Bilirubin, total 0.4 0.2 - 1.0 MG/DL    ALT (SGPT) 34 12 - 78 U/L    AST (SGOT) 20 15 - 37 U/L    Alk.  phosphatase 82 45 - 117 U/L    Protein, total 7.8 6.4 - 8.2 g/dL    Albumin 3.2 (L) 3.5 - 5.0 g/dL    Globulin 4.6 (H) 2.0 - 4.0 g/dL    A-G Ratio 0.7 (L) 1.1 - 2.2     MAGNESIUM    Collection Time: 09/20/21  8:11 AM   Result Value Ref Range    Magnesium 2.1 1.6 - 2.4 mg/dL       Medications:  Medications Administered     0.9% sodium chloride 1,000 mL with potassium chloride 10 mEq, magnesium sulfate 2 g infusion     Admin Date  09/20/2021 Action  New Bag Dose   Rate  1,000 mL/hr Route  IntraVENous Administered By  Tobin Erickson RN          0.9% sodium chloride infusion     Admin Date  09/20/2021 Action  New Bag Dose  25 mL/hr Rate  25 mL/hr Route  IntraVENous Administered By  Tobin Erickson RN          0.9% sodium chloride injection 10 mL     Admin Date  09/20/2021 Action  Given Dose  10 mL Route  IntraVENous Administered By  Tobin Erickson RN          acetaminophen (TYLENOL) tablet 650 mg     Admin Date  09/20/2021 Action  Given Dose  650 mg Route  Oral Administered By  Tobin Erickson RN          bleomycin (BLEOCIN) 30 Units in 0.9% sodium chloride 50 mL, overfill volume 5 mL chemo infusion     Admin Date  09/20/2021 Action  New Bag Dose  30 Units Rate  390 mL/hr Route  IntraVENous Administered By  Tobin Erickson, RN CISplatin (PLATINOL) 63.2 mg in 0.9% sodium chloride 500 mL, overfill volume 50 mL chemo infusion     Admin Date  09/20/2021 Action  New Bag Dose  63.2 mg Rate  613.2 mL/hr Route  IntraVENous Administered By  Derrell Higginbotham RN          dexamethasone (DECADRON) 12 mg in 0.9% sodium chloride 50 mL IVPB     Admin Date  09/20/2021 Action  New Bag Dose  12 mg Route  IntraVENous Administered By  Derrell Higginbotham RN          diphenhydrAMINE (BENADRYL) injection 25 mg     Admin Date  09/20/2021 Action  Given Dose  25 mg Route  IntraVENous Administered By  Derrell Higginbotham RN          etoposide (VEPESID) 316 mg in 0.9% sodium chloride 1,000 mL, overfill volume 100 mL chemo infusion     Admin Date  09/20/2021 Action  New Bag Dose  316 mg Rate  1,115.8 mL/hr Route  IntraVENous Administered By  Derrell Higginbotham RN          fosaprepitant (EMEND) 150 mg in 0.9% sodium chloride 150 mL IVPB     Admin Date  09/20/2021 Action  New Bag Dose  150 mg Rate  450 mL/hr Route  IntraVENous Administered By  Derrell Higginbotham RN          heparin (porcine) pf 300-500 Units     Admin Date  09/20/2021 Action  Given Dose  500 Units Route  InterCATHeter Administered By  Derrell Higginbotham RN          palonosetron HCl (ALOXI) injection 0.25 mg     Admin Date  09/20/2021 Action  Given Dose  0.25 mg Route  IntraVENous Administered By  Derrell Higginbotham RN              IV Lasix discontinued and patient to take lasix orally when he gets home. Mr. iVelka Mijares tolerated treatment well and was discharged from [place] [address] in stable condition at 1400. Port flushed & heparinized per protocol and left accessed for his treatment 9/21/21. He is to return on September 21 at 1000 for his next appointment.     Hernán Narvaez RN  September 20, 2021

## 2021-09-20 NOTE — LETTER
9/20/2021    Patient: Kings Stauffer   YOB: 1975   Date of Visit: 9/20/2021     Mike Hughes NP  Via In Basket    Dear Mike Hughes NP,      Thank you for referring Mr. Bacilio Del Rosario to [facility] for evaluation. My notes for this consultation are attached. If you have questions, please do not hesitate to call me. I look forward to following your patient along with you.       Sincerely,    Mehul Joyce M.D.

## 2021-09-21 ENCOUNTER — HOSPITAL ENCOUNTER (OUTPATIENT)
Dept: INFUSION THERAPY | Age: 46
Discharge: HOME | End: 2021-09-21
Payer: COMMERCIAL

## 2021-09-21 ENCOUNTER — HOSPITAL ENCOUNTER (OUTPATIENT)
Dept: INTERVENTIONAL RADIOLOGY/VASCULAR | Age: 46
Discharge: HOME | End: 2021-09-21
Attending: NURSE PRACTITIONER | Admitting: RADIOLOGY

## 2021-09-21 VITALS
HEART RATE: 83 BPM | SYSTOLIC BLOOD PRESSURE: 141 MMHG | RESPIRATION RATE: 18 BRPM | HEIGHT: 75 IN | WEIGHT: 315 LBS | DIASTOLIC BLOOD PRESSURE: 89 MMHG | BODY MASS INDEX: 39.17 KG/M2 | OXYGEN SATURATION: 95 % | TEMPERATURE: 98 F

## 2021-09-21 DIAGNOSIS — C48.0 GERM CELL TUMOR OF RETROPERITONEUM (HCC): Primary | ICD-10-CM

## 2021-09-21 DIAGNOSIS — Z95.828 PORT-A-CATH IN PLACE: ICD-10-CM

## 2021-09-21 LAB — AFP-TM SERPL-MCNC: 3.4 NG/ML (ref 0–8.3)

## 2021-09-21 PROCEDURE — 74011000258 HC RX REV CODE- 258: Performed by: INTERNAL MEDICINE

## 2021-09-21 PROCEDURE — 74011250636 HC RX REV CODE- 250/636: Performed by: INTERNAL MEDICINE

## 2021-09-21 PROCEDURE — 96413 CHEMO IV INFUSION 1 HR: CPT

## 2021-09-21 PROCEDURE — 96361 HYDRATE IV INFUSION ADD-ON: CPT

## 2021-09-21 PROCEDURE — 96375 TX/PRO/DX INJ NEW DRUG ADDON: CPT

## 2021-09-21 PROCEDURE — 96417 CHEMO IV INFUS EACH ADDL SEQ: CPT

## 2021-09-21 RX ORDER — SODIUM CHLORIDE 9 MG/ML
25 INJECTION, SOLUTION INTRAVENOUS CONTINUOUS
Status: CANCELLED | OUTPATIENT
Start: 2021-10-13

## 2021-09-21 RX ORDER — ONDANSETRON 2 MG/ML
8 INJECTION INTRAMUSCULAR; INTRAVENOUS AS NEEDED
Status: CANCELLED | OUTPATIENT
Start: 2021-10-11

## 2021-09-21 RX ORDER — ONDANSETRON 2 MG/ML
8 INJECTION INTRAMUSCULAR; INTRAVENOUS ONCE
Status: CANCELLED | OUTPATIENT
Start: 2021-10-14 | End: 2021-10-14

## 2021-09-21 RX ORDER — ALBUTEROL SULFATE 0.83 MG/ML
2.5 SOLUTION RESPIRATORY (INHALATION) AS NEEDED
Status: CANCELLED
Start: 2021-10-15

## 2021-09-21 RX ORDER — DIPHENHYDRAMINE HYDROCHLORIDE 50 MG/ML
25 INJECTION, SOLUTION INTRAMUSCULAR; INTRAVENOUS ONCE
Status: CANCELLED
Start: 2021-10-11 | End: 2021-10-11

## 2021-09-21 RX ORDER — DIPHENHYDRAMINE HYDROCHLORIDE 50 MG/ML
25 INJECTION, SOLUTION INTRAMUSCULAR; INTRAVENOUS AS NEEDED
Status: CANCELLED
Start: 2021-10-11

## 2021-09-21 RX ORDER — DIPHENHYDRAMINE HYDROCHLORIDE 50 MG/ML
50 INJECTION, SOLUTION INTRAMUSCULAR; INTRAVENOUS AS NEEDED
Status: CANCELLED
Start: 2021-10-14

## 2021-09-21 RX ORDER — SODIUM CHLORIDE 9 MG/ML
10 INJECTION INTRAMUSCULAR; INTRAVENOUS; SUBCUTANEOUS AS NEEDED
Status: CANCELLED | OUTPATIENT
Start: 2021-10-25

## 2021-09-21 RX ORDER — ACETAMINOPHEN 325 MG/1
650 TABLET ORAL AS NEEDED
Status: CANCELLED
Start: 2021-10-11

## 2021-09-21 RX ORDER — EPINEPHRINE 1 MG/ML
0.3 INJECTION, SOLUTION, CONCENTRATE INTRAVENOUS AS NEEDED
Status: CANCELLED | OUTPATIENT
Start: 2021-10-11

## 2021-09-21 RX ORDER — ONDANSETRON 2 MG/ML
8 INJECTION INTRAMUSCULAR; INTRAVENOUS AS NEEDED
Status: CANCELLED | OUTPATIENT
Start: 2021-10-13

## 2021-09-21 RX ORDER — SODIUM CHLORIDE 0.9 % (FLUSH) 0.9 %
10 SYRINGE (ML) INJECTION AS NEEDED
Status: CANCELLED | OUTPATIENT
Start: 2021-10-15

## 2021-09-21 RX ORDER — EPINEPHRINE 1 MG/ML
0.3 INJECTION, SOLUTION, CONCENTRATE INTRAVENOUS AS NEEDED
Status: CANCELLED | OUTPATIENT
Start: 2021-10-25

## 2021-09-21 RX ORDER — HEPARIN 100 UNIT/ML
300-500 SYRINGE INTRAVENOUS AS NEEDED
Status: CANCELLED
Start: 2021-10-15

## 2021-09-21 RX ORDER — EPINEPHRINE 1 MG/ML
0.3 INJECTION, SOLUTION, CONCENTRATE INTRAVENOUS AS NEEDED
Status: CANCELLED | OUTPATIENT
Start: 2021-10-12

## 2021-09-21 RX ORDER — HYDROCORTISONE SODIUM SUCCINATE 100 MG/2ML
100 INJECTION, POWDER, FOR SOLUTION INTRAMUSCULAR; INTRAVENOUS AS NEEDED
Status: CANCELLED | OUTPATIENT
Start: 2021-10-13

## 2021-09-21 RX ORDER — ACETAMINOPHEN 325 MG/1
650 TABLET ORAL AS NEEDED
Status: CANCELLED
Start: 2021-10-13

## 2021-09-21 RX ORDER — ALBUTEROL SULFATE 0.83 MG/ML
2.5 SOLUTION RESPIRATORY (INHALATION) AS NEEDED
Status: CANCELLED
Start: 2021-10-13

## 2021-09-21 RX ORDER — HYDROCORTISONE SODIUM SUCCINATE 100 MG/2ML
100 INJECTION, POWDER, FOR SOLUTION INTRAMUSCULAR; INTRAVENOUS AS NEEDED
Status: CANCELLED | OUTPATIENT
Start: 2021-10-11

## 2021-09-21 RX ORDER — SODIUM CHLORIDE 0.9 % (FLUSH) 0.9 %
10 SYRINGE (ML) INJECTION AS NEEDED
Status: CANCELLED | OUTPATIENT
Start: 2021-10-11

## 2021-09-21 RX ORDER — HEPARIN 100 UNIT/ML
300-500 SYRINGE INTRAVENOUS AS NEEDED
Status: ACTIVE | OUTPATIENT
Start: 2021-09-21 | End: 2021-09-21

## 2021-09-21 RX ORDER — ACETAMINOPHEN 325 MG/1
650 TABLET ORAL AS NEEDED
Status: CANCELLED
Start: 2021-10-12

## 2021-09-21 RX ORDER — DIPHENHYDRAMINE HYDROCHLORIDE 50 MG/ML
50 INJECTION, SOLUTION INTRAMUSCULAR; INTRAVENOUS AS NEEDED
Status: CANCELLED
Start: 2021-10-25

## 2021-09-21 RX ORDER — DIPHENHYDRAMINE HYDROCHLORIDE 50 MG/ML
50 INJECTION, SOLUTION INTRAMUSCULAR; INTRAVENOUS AS NEEDED
Status: CANCELLED
Start: 2021-10-15

## 2021-09-21 RX ORDER — SODIUM CHLORIDE 0.9 % (FLUSH) 0.9 %
10 SYRINGE (ML) INJECTION AS NEEDED
Status: CANCELLED | OUTPATIENT
Start: 2021-10-13

## 2021-09-21 RX ORDER — ONDANSETRON 2 MG/ML
8 INJECTION INTRAMUSCULAR; INTRAVENOUS AS NEEDED
Status: CANCELLED | OUTPATIENT
Start: 2021-10-18

## 2021-09-21 RX ORDER — ALBUTEROL SULFATE 0.83 MG/ML
2.5 SOLUTION RESPIRATORY (INHALATION) AS NEEDED
Status: CANCELLED
Start: 2021-10-14

## 2021-09-21 RX ORDER — SODIUM CHLORIDE 9 MG/ML
25 INJECTION, SOLUTION INTRAVENOUS CONTINUOUS
Status: CANCELLED
Start: 2021-10-18

## 2021-09-21 RX ORDER — SODIUM CHLORIDE 9 MG/ML
10 INJECTION INTRAMUSCULAR; INTRAVENOUS; SUBCUTANEOUS AS NEEDED
Status: CANCELLED | OUTPATIENT
Start: 2021-10-12

## 2021-09-21 RX ORDER — ONDANSETRON 2 MG/ML
8 INJECTION INTRAMUSCULAR; INTRAVENOUS ONCE
Status: CANCELLED | OUTPATIENT
Start: 2021-10-12 | End: 2021-10-12

## 2021-09-21 RX ORDER — SODIUM CHLORIDE 0.9 % (FLUSH) 0.9 %
10 SYRINGE (ML) INJECTION AS NEEDED
Status: CANCELLED | OUTPATIENT
Start: 2021-10-14

## 2021-09-21 RX ORDER — DIPHENHYDRAMINE HYDROCHLORIDE 50 MG/ML
25 INJECTION, SOLUTION INTRAMUSCULAR; INTRAVENOUS ONCE
Status: CANCELLED
Start: 2021-10-18 | End: 2021-10-18

## 2021-09-21 RX ORDER — FUROSEMIDE 10 MG/ML
20 INJECTION INTRAMUSCULAR; INTRAVENOUS ONCE
Status: CANCELLED
Start: 2021-10-14 | End: 2021-10-14

## 2021-09-21 RX ORDER — ONDANSETRON 2 MG/ML
8 INJECTION INTRAMUSCULAR; INTRAVENOUS AS NEEDED
Status: CANCELLED | OUTPATIENT
Start: 2021-10-14

## 2021-09-21 RX ORDER — DIPHENHYDRAMINE HYDROCHLORIDE 50 MG/ML
25 INJECTION, SOLUTION INTRAMUSCULAR; INTRAVENOUS AS NEEDED
Status: CANCELLED
Start: 2021-10-15

## 2021-09-21 RX ORDER — ALBUTEROL SULFATE 0.83 MG/ML
2.5 SOLUTION RESPIRATORY (INHALATION) AS NEEDED
Status: CANCELLED
Start: 2021-10-18

## 2021-09-21 RX ORDER — HEPARIN 100 UNIT/ML
300-500 SYRINGE INTRAVENOUS AS NEEDED
Status: CANCELLED
Start: 2021-10-14

## 2021-09-21 RX ORDER — PALONOSETRON 0.05 MG/ML
0.25 INJECTION, SOLUTION INTRAVENOUS ONCE
Status: CANCELLED | OUTPATIENT
Start: 2021-10-11 | End: 2021-10-11

## 2021-09-21 RX ORDER — ALBUTEROL SULFATE 0.83 MG/ML
2.5 SOLUTION RESPIRATORY (INHALATION) AS NEEDED
Status: CANCELLED
Start: 2021-10-11

## 2021-09-21 RX ORDER — SODIUM CHLORIDE 9 MG/ML
10 INJECTION INTRAMUSCULAR; INTRAVENOUS; SUBCUTANEOUS AS NEEDED
Status: CANCELLED | OUTPATIENT
Start: 2021-10-18

## 2021-09-21 RX ORDER — DIPHENHYDRAMINE HYDROCHLORIDE 50 MG/ML
50 INJECTION, SOLUTION INTRAMUSCULAR; INTRAVENOUS AS NEEDED
Status: CANCELLED
Start: 2021-10-12

## 2021-09-21 RX ORDER — DIPHENHYDRAMINE HYDROCHLORIDE 50 MG/ML
50 INJECTION, SOLUTION INTRAMUSCULAR; INTRAVENOUS AS NEEDED
Status: CANCELLED
Start: 2021-10-11

## 2021-09-21 RX ORDER — DIPHENHYDRAMINE HYDROCHLORIDE 50 MG/ML
25 INJECTION, SOLUTION INTRAMUSCULAR; INTRAVENOUS AS NEEDED
Status: CANCELLED
Start: 2021-10-25

## 2021-09-21 RX ORDER — HEPARIN 100 UNIT/ML
300-500 SYRINGE INTRAVENOUS AS NEEDED
Status: CANCELLED
Start: 2021-10-12

## 2021-09-21 RX ORDER — SODIUM CHLORIDE 9 MG/ML
10 INJECTION INTRAMUSCULAR; INTRAVENOUS; SUBCUTANEOUS AS NEEDED
Status: CANCELLED | OUTPATIENT
Start: 2021-10-14

## 2021-09-21 RX ORDER — HEPARIN 100 UNIT/ML
300-500 SYRINGE INTRAVENOUS AS NEEDED
Status: CANCELLED
Start: 2021-10-11

## 2021-09-21 RX ORDER — SODIUM CHLORIDE 9 MG/ML
25 INJECTION, SOLUTION INTRAVENOUS CONTINUOUS
Status: CANCELLED | OUTPATIENT
Start: 2021-10-15

## 2021-09-21 RX ORDER — DIPHENHYDRAMINE HYDROCHLORIDE 50 MG/ML
25 INJECTION, SOLUTION INTRAMUSCULAR; INTRAVENOUS AS NEEDED
Status: CANCELLED
Start: 2021-10-12

## 2021-09-21 RX ORDER — ACETAMINOPHEN 325 MG/1
650 TABLET ORAL ONCE
Status: CANCELLED
Start: 2021-10-18 | End: 2021-10-18

## 2021-09-21 RX ORDER — SODIUM CHLORIDE 9 MG/ML
25 INJECTION, SOLUTION INTRAVENOUS CONTINUOUS
Status: CANCELLED
Start: 2021-10-25

## 2021-09-21 RX ORDER — HYDROCORTISONE SODIUM SUCCINATE 100 MG/2ML
100 INJECTION, POWDER, FOR SOLUTION INTRAMUSCULAR; INTRAVENOUS AS NEEDED
Status: CANCELLED | OUTPATIENT
Start: 2021-10-18

## 2021-09-21 RX ORDER — SODIUM CHLORIDE 0.9 % (FLUSH) 0.9 %
10 SYRINGE (ML) INJECTION AS NEEDED
Status: CANCELLED | OUTPATIENT
Start: 2021-10-12

## 2021-09-21 RX ORDER — SODIUM CHLORIDE 9 MG/ML
10 INJECTION INTRAMUSCULAR; INTRAVENOUS; SUBCUTANEOUS AS NEEDED
Status: ACTIVE | OUTPATIENT
Start: 2021-09-21 | End: 2021-09-21

## 2021-09-21 RX ORDER — ONDANSETRON 2 MG/ML
8 INJECTION INTRAMUSCULAR; INTRAVENOUS AS NEEDED
Status: CANCELLED | OUTPATIENT
Start: 2021-10-15

## 2021-09-21 RX ORDER — ACETAMINOPHEN 325 MG/1
650 TABLET ORAL AS NEEDED
Status: CANCELLED
Start: 2021-10-14

## 2021-09-21 RX ORDER — SODIUM CHLORIDE 9 MG/ML
25 INJECTION, SOLUTION INTRAVENOUS CONTINUOUS
Status: CANCELLED | OUTPATIENT
Start: 2021-10-14

## 2021-09-21 RX ORDER — ONDANSETRON 2 MG/ML
8 INJECTION INTRAMUSCULAR; INTRAVENOUS AS NEEDED
Status: CANCELLED | OUTPATIENT
Start: 2021-10-25

## 2021-09-21 RX ORDER — ACETAMINOPHEN 325 MG/1
650 TABLET ORAL ONCE
Status: CANCELLED
Start: 2021-10-25 | End: 2021-10-25

## 2021-09-21 RX ORDER — ONDANSETRON 2 MG/ML
8 INJECTION INTRAMUSCULAR; INTRAVENOUS ONCE
Status: CANCELLED | OUTPATIENT
Start: 2021-10-13 | End: 2021-10-13

## 2021-09-21 RX ORDER — DIPHENHYDRAMINE HYDROCHLORIDE 50 MG/ML
25 INJECTION, SOLUTION INTRAMUSCULAR; INTRAVENOUS AS NEEDED
Status: CANCELLED
Start: 2021-10-13

## 2021-09-21 RX ORDER — SODIUM CHLORIDE 9 MG/ML
10 INJECTION INTRAMUSCULAR; INTRAVENOUS; SUBCUTANEOUS AS NEEDED
Status: CANCELLED | OUTPATIENT
Start: 2021-10-13

## 2021-09-21 RX ORDER — DIPHENHYDRAMINE HYDROCHLORIDE 50 MG/ML
25 INJECTION, SOLUTION INTRAMUSCULAR; INTRAVENOUS AS NEEDED
Status: CANCELLED
Start: 2021-10-14

## 2021-09-21 RX ORDER — EPINEPHRINE 1 MG/ML
0.3 INJECTION, SOLUTION, CONCENTRATE INTRAVENOUS AS NEEDED
Status: CANCELLED | OUTPATIENT
Start: 2021-10-13

## 2021-09-21 RX ORDER — ONDANSETRON 2 MG/ML
8 INJECTION INTRAMUSCULAR; INTRAVENOUS AS NEEDED
Status: CANCELLED | OUTPATIENT
Start: 2021-10-12

## 2021-09-21 RX ORDER — DIPHENHYDRAMINE HYDROCHLORIDE 50 MG/ML
25 INJECTION, SOLUTION INTRAMUSCULAR; INTRAVENOUS AS NEEDED
Status: CANCELLED
Start: 2021-10-18

## 2021-09-21 RX ORDER — SODIUM CHLORIDE 0.9 % (FLUSH) 0.9 %
10 SYRINGE (ML) INJECTION AS NEEDED
Status: CANCELLED | OUTPATIENT
Start: 2021-10-18

## 2021-09-21 RX ORDER — EPINEPHRINE 1 MG/ML
0.3 INJECTION, SOLUTION, CONCENTRATE INTRAVENOUS AS NEEDED
Status: CANCELLED | OUTPATIENT
Start: 2021-10-18

## 2021-09-21 RX ORDER — SODIUM CHLORIDE 9 MG/ML
25 INJECTION, SOLUTION INTRAVENOUS CONTINUOUS
Status: CANCELLED | OUTPATIENT
Start: 2021-10-11

## 2021-09-21 RX ORDER — DIPHENHYDRAMINE HYDROCHLORIDE 50 MG/ML
50 INJECTION, SOLUTION INTRAMUSCULAR; INTRAVENOUS AS NEEDED
Status: CANCELLED
Start: 2021-10-18

## 2021-09-21 RX ORDER — ALBUTEROL SULFATE 0.83 MG/ML
2.5 SOLUTION RESPIRATORY (INHALATION) AS NEEDED
Status: CANCELLED
Start: 2021-10-12

## 2021-09-21 RX ORDER — ACETAMINOPHEN 325 MG/1
650 TABLET ORAL AS NEEDED
Status: CANCELLED
Start: 2021-10-25

## 2021-09-21 RX ORDER — HYDROCORTISONE SODIUM SUCCINATE 100 MG/2ML
100 INJECTION, POWDER, FOR SOLUTION INTRAMUSCULAR; INTRAVENOUS AS NEEDED
Status: CANCELLED | OUTPATIENT
Start: 2021-10-15

## 2021-09-21 RX ORDER — ACETAMINOPHEN 325 MG/1
650 TABLET ORAL ONCE
Status: CANCELLED
Start: 2021-10-11 | End: 2021-10-11

## 2021-09-21 RX ORDER — DIPHENHYDRAMINE HYDROCHLORIDE 50 MG/ML
25 INJECTION, SOLUTION INTRAMUSCULAR; INTRAVENOUS ONCE
Status: CANCELLED
Start: 2021-10-25 | End: 2021-10-25

## 2021-09-21 RX ORDER — ALBUTEROL SULFATE 0.83 MG/ML
2.5 SOLUTION RESPIRATORY (INHALATION) AS NEEDED
Status: CANCELLED
Start: 2021-10-25

## 2021-09-21 RX ORDER — EPINEPHRINE 1 MG/ML
0.3 INJECTION, SOLUTION, CONCENTRATE INTRAVENOUS AS NEEDED
Status: CANCELLED | OUTPATIENT
Start: 2021-10-14

## 2021-09-21 RX ORDER — ACETAMINOPHEN 325 MG/1
650 TABLET ORAL AS NEEDED
Status: CANCELLED
Start: 2021-10-15

## 2021-09-21 RX ORDER — HEPARIN 100 UNIT/ML
300-500 SYRINGE INTRAVENOUS AS NEEDED
Status: CANCELLED
Start: 2021-10-25

## 2021-09-21 RX ORDER — HEPARIN 100 UNIT/ML
300-500 SYRINGE INTRAVENOUS AS NEEDED
Status: CANCELLED
Start: 2021-10-13

## 2021-09-21 RX ORDER — SODIUM CHLORIDE 0.9 % (FLUSH) 0.9 %
10 SYRINGE (ML) INJECTION AS NEEDED
Status: DISPENSED | OUTPATIENT
Start: 2021-09-21 | End: 2021-09-21

## 2021-09-21 RX ORDER — ONDANSETRON 2 MG/ML
8 INJECTION INTRAMUSCULAR; INTRAVENOUS ONCE
Status: COMPLETED | OUTPATIENT
Start: 2021-09-21 | End: 2021-09-21

## 2021-09-21 RX ORDER — SODIUM CHLORIDE 9 MG/ML
10 INJECTION INTRAMUSCULAR; INTRAVENOUS; SUBCUTANEOUS AS NEEDED
Status: CANCELLED | OUTPATIENT
Start: 2021-10-11

## 2021-09-21 RX ORDER — EPINEPHRINE 1 MG/ML
0.3 INJECTION, SOLUTION, CONCENTRATE INTRAVENOUS AS NEEDED
Status: CANCELLED | OUTPATIENT
Start: 2021-10-15

## 2021-09-21 RX ORDER — SODIUM CHLORIDE 9 MG/ML
10 INJECTION INTRAMUSCULAR; INTRAVENOUS; SUBCUTANEOUS AS NEEDED
Status: CANCELLED | OUTPATIENT
Start: 2021-10-15

## 2021-09-21 RX ORDER — SODIUM CHLORIDE 9 MG/ML
25 INJECTION, SOLUTION INTRAVENOUS CONTINUOUS
Status: CANCELLED | OUTPATIENT
Start: 2021-10-12

## 2021-09-21 RX ORDER — ACETAMINOPHEN 325 MG/1
650 TABLET ORAL AS NEEDED
Status: CANCELLED
Start: 2021-10-18

## 2021-09-21 RX ORDER — HYDROCORTISONE SODIUM SUCCINATE 100 MG/2ML
100 INJECTION, POWDER, FOR SOLUTION INTRAMUSCULAR; INTRAVENOUS AS NEEDED
Status: CANCELLED | OUTPATIENT
Start: 2021-10-14

## 2021-09-21 RX ORDER — SODIUM CHLORIDE 9 MG/ML
25 INJECTION, SOLUTION INTRAVENOUS CONTINUOUS
Status: DISPENSED | OUTPATIENT
Start: 2021-09-21 | End: 2021-09-21

## 2021-09-21 RX ORDER — FUROSEMIDE 10 MG/ML
20 INJECTION INTRAMUSCULAR; INTRAVENOUS ONCE
Status: CANCELLED
Start: 2021-10-11 | End: 2021-10-11

## 2021-09-21 RX ORDER — HYDROCORTISONE SODIUM SUCCINATE 100 MG/2ML
100 INJECTION, POWDER, FOR SOLUTION INTRAMUSCULAR; INTRAVENOUS AS NEEDED
Status: CANCELLED | OUTPATIENT
Start: 2021-10-25

## 2021-09-21 RX ORDER — HEPARIN 100 UNIT/ML
300-500 SYRINGE INTRAVENOUS AS NEEDED
Status: CANCELLED
Start: 2021-10-18

## 2021-09-21 RX ORDER — DIPHENHYDRAMINE HYDROCHLORIDE 50 MG/ML
50 INJECTION, SOLUTION INTRAMUSCULAR; INTRAVENOUS AS NEEDED
Status: CANCELLED
Start: 2021-10-13

## 2021-09-21 RX ORDER — SODIUM CHLORIDE 0.9 % (FLUSH) 0.9 %
10 SYRINGE (ML) INJECTION AS NEEDED
Status: CANCELLED | OUTPATIENT
Start: 2021-10-25

## 2021-09-21 RX ORDER — HYDROCORTISONE SODIUM SUCCINATE 100 MG/2ML
100 INJECTION, POWDER, FOR SOLUTION INTRAMUSCULAR; INTRAVENOUS AS NEEDED
Status: CANCELLED | OUTPATIENT
Start: 2021-10-12

## 2021-09-21 RX ORDER — ONDANSETRON 2 MG/ML
8 INJECTION INTRAMUSCULAR; INTRAVENOUS ONCE
Status: CANCELLED | OUTPATIENT
Start: 2021-10-15 | End: 2021-10-15

## 2021-09-21 RX ADMIN — HEPARIN 500 UNITS: 100 SYRINGE at 12:47

## 2021-09-21 RX ADMIN — SODIUM CHLORIDE 10 ML: 9 INJECTION INTRAMUSCULAR; INTRAVENOUS; SUBCUTANEOUS at 09:33

## 2021-09-21 RX ADMIN — CISPLATIN 63.2 MG: 1 INJECTION INTRAVENOUS at 11:41

## 2021-09-21 RX ADMIN — ETOPOSIDE 316 MG: 20 INJECTION, SOLUTION, CONCENTRATE INTRAVENOUS at 10:29

## 2021-09-21 RX ADMIN — DEXAMETHASONE SODIUM PHOSPHATE 12 MG: 10 INJECTION, SOLUTION INTRAMUSCULAR; INTRAVENOUS at 09:40

## 2021-09-21 RX ADMIN — Medication 10 ML: at 12:47

## 2021-09-21 RX ADMIN — ONDANSETRON 8 MG: 2 INJECTION INTRAMUSCULAR; INTRAVENOUS at 09:33

## 2021-09-21 RX ADMIN — POTASSIUM CHLORIDE: 2 INJECTION, SOLUTION, CONCENTRATE INTRAVENOUS at 07:44

## 2021-09-21 RX ADMIN — SODIUM CHLORIDE 25 ML/HR: 900 INJECTION, SOLUTION INTRAVENOUS at 07:40

## 2021-09-21 NOTE — PROGRESS NOTES
\[facility] [place] [facility]\"" Progress Note    Date: 2021    Name: Kelly Fields    MRN: [id number]         : 1975    Mr. Kamaljit Dozier Arrived ambulatory and in no distress for C2D2 of Cisplatin + Etoposide Regimen. Assessment was completed, no acute issues at this time, no new complaints voiced. Right chest wall port flushed and dressing changed. Covid questionnaire completed. 1. Do you have any symptoms of COVID-19? SOB, coughing, fever, or generally not feeling well - no    2. Have you been exposed to COVID-19 recently? - no    3. Have you had any recent contact with family/friend that has a pending COVID test? - no    Chemotherapy Flowsheet 2021   Cycle C2D2   Date 2021   Drug / Regimen Cisplatin/Etoposide   Pre Hydration given   Pre Meds given   Notes given       Mr. Penelope Smith vitals were reviewed.   Visit Vitals  /75 (BP 1 Location: Left arm, BP Patient Position: Sitting)   Pulse 78   Temp 98 °F (36.7 °C)   Resp 18   Ht 6' 3\" (1.905 m)   Wt (!) 180 kg (396 lb 14.4 oz)   SpO2 95%   BMI 49.61 kg/m²     Medications:  Medications Administered     0.9% sodium chloride 1,000 mL with potassium chloride 10 mEq, magnesium sulfate 2 g infusion     Admin Date  2021 Action  New Bag Dose   Rate  1,000 mL/hr Route  IntraVENous Administered By  Opal Adame RN          0.9% sodium chloride infusion     Admin Date  2021 Action  New Bag Dose  25 mL/hr Rate  25 mL/hr Route  IntraVENous Administered By  Opal Adame RN          0.9% sodium chloride injection 10 mL     Admin Date  2021 Action  Given Dose  10 mL Route  IntraVENous Administered By  Opal Adame RN          CISplatin (PLATINOL) 63.2 mg in 0.9% sodium chloride 500 mL, overfill volume 50 mL chemo infusion     Admin Date  2021 Action  New Bag Dose  63.2 mg Rate  613.2 mL/hr Route  IntraVENous Administered By  Peg Lui RN          dexamethasone (DECADRON) 12 mg in 0.9% sodium chloride 50 mL IVPB Admin Date  09/21/2021 Action  New Bag Dose  12 mg Route  IntraVENous Administered By  Adelbert Heimlich, RN          etoposide (VEPESID) 316 mg in 0.9% sodium chloride 1,000 mL, overfill volume 100 mL chemo infusion     Admin Date  09/21/2021 Action  New Bag Dose  316 mg Rate  1,115.8 mL/hr Route  IntraVENous Administered By  Adelbert Heimlich, RN          heparin (porcine) pf 300-500 Units     Admin Date  09/21/2021 Action  Given Dose  500 Units Route  InterCATHeter Administered By  Adelbert Heimlich, RN          ondansetron [facility]) injection 8 mg     Admin Date  09/21/2021 Action  Given Dose  8 mg Route  IntraVENous Administered By  Adelbert Heimlich, RN          sodium chloride (NS) flush 10 mL     Admin Date  09/21/2021 Action  Given Dose  10 mL Route  IntraVENous Administered By  Adelbert Heimlich, RN              Mr. Chan Galan tolerated treatment well and was discharged from [place] [address] in stable condition at 1245pm.   Port  Flushed, heparinized and capped for treatment tomorrow. He is to return on September 22 2021 at 1300 for his next appointment.     Calli Luke RN  September 21, 2021

## 2021-09-21 NOTE — PROCEDURES
[facility] [place] [address]  PULMONARY FUNCTION TEST    Name:  Olivia Chaney  MR#:  [id number]  :  1975  ACCOUNT #:  [de-identified]  DATE OF SERVICE:  2021      Spirometry reveals an FEV1-TO-FVC ratio is normal at 85% predicted with normal FVC and FEV1. Diffusion capacity is moderately reduced at 55% predicted, but does correct when taking into consideration alveolar volume.       William Aceves MD      [id number]/  D:  2021 16:52  T:  2021 19:49  JOB #:  [id number]

## 2021-09-21 NOTE — PROGRESS NOTES
[id number] [facility] Oncology at [facility]  [phone]    Hematology / Oncology Established Visit    Reason for Visit:   Harpreet Dumont is a 48 y.o. male who is seen for follow up of Germ cell tumor. 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 Comment: The smears and cell block preparation demonstrate pleomorphic malignant   tumor cells in a background of lymphocytes.  Only a scant amount of tumor   is present on the cell block preparation.  Immunohistochemical stains   demonstrate diffuse positivity within the tumor cell population for PLAP,    (c-kit) and focal positivity for HCG. The tumor cells are negative   for cytokeratin cocktail, CAM 5.2, EMA, S100 and alpha-inhibin. [facility]   results are consistent with a testicular germ cell tumor and the   combination of diffuse PLAP positive and  positivity is indicative of   a seminomatous differentation.  Correlation with clinical and radiographic   findings is indicated     Prior Treatment: None     Current Treatment: BEP x 3 cycles, 8/30/21 - current    History of Present Illness:   Harpreet Dumont is a 48 y.o. male with severe Aortic stenosis, CAD, DM II, HTN coming in for evaluation of testicular germ cell tumor. He was hospitalized on 8/22/21 with R flank pain, found on CT imaging to have a large retroperitoneal mass. He denied fatigue, but did endorse a 40 lb weight loss over 2 months. No recent recent infections or fatigue. He is a nonsmoker. Has remote h/o EtOH abuse, but none recently. The patient is adopted and has no knowledge of family history. Of note, he considers himself , but never filed paperwork. Wife lives in [place]. Has a daughter from another woman.  He may move in with his mother-in-law in town for extra support. Interval History:  Patient here for C3D1 of BEP chemotherapy regimen. Redness in RLE returned on Sat morning, but no fevers, chills, or pain at this site. No fevers, chills, sweats. He reports he is eating and drinking well, but does have intermittent fatigue. Denies any worsening SOB, but does report an intermittent dry cough which started only after chemotherapy started. PMHx: Severe AS, CAD, DM, GERD, HTN, ROBERTO  PSurgHx: Tonsillectomy, Plantar fasciitis surgery on left  SHx: Never smoker. Remote h/o EtOH abuse, none currently. Legally . Has daughter in 25s from another woman. FHx: Unknown as pt was adopted. Review of Systems: A complete review of systems was obtained, negative except as described above. Physical Exam:   There were no vitals taken for this visit. ECOG PS: 0  General: Well developed, no acute distress, obese, face covering in place. Eyes:  Aanicteric sclerae  HENT: Atraumatic, OP clear  Neck: Supple  Lymphatic: Deferred today  Respiratory: CTAB, normal respiratory effort  CV: Normal rate, regular rhythm, no murmurs, 1+ edema in BLE. Port right upper chest.   GI: Soft, nontender, nondistended, no masses  MS: Normal gait and station. Digits without clubbing or cyanosis. TTP of right clavicle and scapula. No erythema or swelling of right shoulder present. Decreased ROM 2/2 pain. Skin: No rashes, ecchymoses, or petechiae. Normal temperature, turgor, and texture. Mild erythema at port site with retained stitches at R neck. Dark erythema in RLE from above ankle to below knee. Neuro/Psych: Alert, oriented. Moves all 4 extremities. Appropriate affect. Normal judgment/insight. Results:     Lab Results   Component Value Date/Time    WBC 5.2 09/20/2021 08:11 AM    HGB 11.4 (L) 09/20/2021 08:11 AM    HCT 36.1 (L) 09/20/2021 08:11 AM    PLATELET 997 47/58/3889 08:11 AM    MCV 80.4 09/20/2021 08:11 AM    ABS.  NEUTROPHILS 1.8 09/20/2021 08:11 AM Lab Results   Component Value Date/Time    Sodium 139 2021 08:11 AM    Potassium 4.0 2021 08:11 AM    Chloride 109 (H) 2021 08:11 AM    CO2 27 2021 08:11 AM    Glucose 119 (H) 2021 08:11 AM    BUN 14 2021 08:11 AM    Creatinine 0.92 2021 08:11 AM    GFR est AA >60 2021 08:11 AM    GFR est non-AA >60 2021 08:11 AM    Calcium 8.9 2021 08:11 AM    Glucose (POC) 120 (H) 2021 11:57 AM     Lab Results   Component Value Date/Time    Bilirubin, total 0.4 2021 08:11 AM    ALT (SGPT) 34 2021 08:11 AM    Alk. phosphatase 82 2021 08:11 AM    Protein, total 7.8 2021 08:11 AM    Albumin 3.2 (L) 2021 08:11 AM    Globulin 4.6 (H) 2021 08:11 AM     No results found for: IRON, FE, TIBC, IBCT, PSAT, FERR    No results found for: B12LT, FOL, RBCF  No results found for: TSH, TSH2, TSH3, TSHP, TSHEXT, TSHEXT  Lab Results   Component Value Date/Time    Hepatitis B surface Ag <0.10 2021 08:11 AM    Hepatitis B surface Ab <3.10 2021 08:11 AM    Hep B Core Ab, total Negative 2021 08:11 AM    Hep C Virus Ab <0.1 2021 08:45 AM     21: , AFP 1.4, b-hCG 476 mIU/mL  21: uric acid 4.2.  21: AFP 3.4, , b-hCG 2  10/11/21: , AFP pending, b-hCG < 1    Imagin/21/21 CT abd/p without contrast:  IMPRESSION  1. Right retroperitoneal brain mass measuring 7.5 x 5.2 x 5.8 cm as above, with  narrowing of the IVC and likely encasement of the bilateral renal veins, though  not well evaluated without the use of IV contrast. Findings are highly  suspicious for either lymphoma or brain metastasis. 2. Few borderline enlarged inguinal lymph nodes are indeterminant. 3. Hepatic steatosis.     21 CT ch/abd/p with contrast:  IMPRESSION  Chest:  1. No evidence of primary malignancy or metastatic disease in the chest.  2. Severe aortic valvular calcifications.  Correlate for aortic valvular stenosis. Abdomen/pelvis:   1. Redemonstrated right retroperitoneal mass measuring 7.4 x 4.9 x 6.7 cm as  above, including encasement and narrowing of the IVC and central aspects of the  bilateral renal veins. This favored to represent malignancy, with lymphoma  favored over brain metastasis. 2. No other pathologically enlarged lymph nodes within the abdomen or pelvis. Few prominent inguinal lymph nodes are favored to be reactive. 3. Hepatic steatosis.     8/24/21 Ultrasound scrotum/testicles:  IMPRESSION  1.  Study limited by patient's increased body habitus   2.  No testicular mass identified. Incidental right-sided testicular microlithiasis, a debatable risk factor for malignancy. Pursue and follow-up as  per clinical concern. 3.  Small left hydrocele. Procedures:     7/20/21 PFTs:  FVC 4.83, 80% of predicted. JHJ44.17, 89% of predicted. FEV1% is 86%. TLC is 6.89, which is 86% of perdicted. DLCO is 70% of predicted and when considering alveolar volume is 83% of predicted. Airway resistance is 58% of predicted. Impression:  The patient's spirometry, lung volumes and airway resistance are all normal. The patient's diffuse capacity is mildly decreased, but corrects when considering alveolar volume. 9/21/21 PFTs:  Spirometry reveals an FEV1-TO-FVC ratio is normal at 85% predicted with normal FVC and FEV1. Diffusion capacity is moderately reduced at 55% predicted, but does correct when taking into consideration alveolar volume. 10/8/21 PFTs:  Spirometry reveals a normal FEV1-to-FVC ratio of 81% predicted. FVC and FEV1 are both within normal limits with an FVC of 4.82 L which is 85% predicted and an FEV1 of 3.89 L which is 95% predicted. Diffusion capacity is moderately decreased at 45% predicted. Assessment & Plan:   Janes Fay is a 48 y.o. male comes in for evaluation and management of germ cell tumor. 1. Testicular germ cell tumor:  Tumor markers show elevated  and b-. Need to distinguish pure seminoma from non-seminomatous germ cell tumor (NSGCT) or mixed germ cell tumor. Usually, scrotal ultrasound is not a replacement for radical inguinal orchiectomy as surgery will provide accurate histologic diagnosis. However, no testicular mass seen on scrotal ultrasound. Testicular microlithiasis, seen on ultrasound for this patient, has a strong association with testicular cancer. So, I am interested to know whether R orchiectomy is advised by Urology. b-HCG is usually not elevated in pure seminoma, but can be elevated in up to 20% of seminomas. I am unsure of the significance of the prominent inguinal LNs. Risk stratification for advanced testicular germ cell tumors: Good risk NSGCT based on: Retroperitoneal primary tumor, no mets to other organs, serum AFP < 1000, b-hCG < 5000 rylee-international units/ml, LDH < 3 times upper limit of normal. However, accurate staging requires orchiectomy and measurement of tumor markers AFTER surgery. Orchiectomy not advised at this time given lack of testicular mass. I advise chemotherapy treatment with BEP regimen x 3 cycles. Chemotherapy is given on days 1-5, 8, 15 of a 21-day cycle. We discussed the risks and benefits of BEP chemotherapy. Potential side effects include, but are not limited to: nausea, vomiting, diarrhea, taste changes, myelosuppression, infection, fatigue, alopecia, neuropathy, hearing loss, renal failure, pulmonary toxicity, cardiac toxicity, allergic reactions, infertility, and rarely, death. The patient has consented to beginning chemotherapy. Supportive care medications include: topical lidocaine, zofran, compazine, docusate. -- Previously discussed cryopreservation of sperm - referred to [facility]. Prior auth form completed per pt request.  -- Proceed with C3D1 of BEP regimen. Will hold D8 and D15 of Bleomycin given 25% drop in DLCO.   -- IVF on D8  -- CT in 2 weeks  -- Refer to Urology at [facility]  -- Return in 3 weeks for follow up    2. Severe Aortic stenosis, non-rheumatic:  8/22 ECHO: LVEF 55-60% and severe aortic stenosis. Previously scheduled aortic valve replacement with mechanical valve by Dr. Robin Dias on 9/20/21 is now hold given cancer diagnosis. Discussed with Dr. Georganna Lanes, covering for Dr. Seble Richard and with Dr. Cassandra Park: Severe AS has mortality rate of 50% at 2 yrs, but cannot proceed with surgery if untreated cancer. Reviewed Cr and will proceed with Lasix and cut down on oral fluids at this time. -- Use Lasix 20mg oral, post treatment on Days 1, 3, 5 of each 21-day cycle. -- Lasix 20mg PO prn at home if weight gain > 5 lbs in 3 days.      3. Neoplasm related pain, acute:  Has Oxycodone for prn use, but not taking currently. Can use Tylenol and Advil, prn, take with food. 4. DM / HTN:   Continue home meds: Metformin, Victoza (or Ozempic). Will monitor for steroid-induced hyperglycemia. BG of 274 from 9/23 is elevated. -- Decreased dose of Dex premeds from 12mg to 6mg for remainder of C2 and then for Days 2-5 of C3 (left 12mg for C3D1.) His BG was 274.     5. FEN / GI:   Constipation was 2/2 opioids during hospital stay and now improved. Developed several days of diarrhea but now having firm stools. -- Lomotil TID prn sent to pharmacy if diarrhea occurs. 6. 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 He has concerns about how to afford not working and how to have pain controlled while driving (no opioids while driving.) Discussed with CM and clinical . Mendel Kayser in  to meet with patient. 7. Right shoulder pain:  Atraumatic shoulder pain, but decreased ROM - likely arthritis. X-ray normal.   -- Advised tylenol/ibuprofen prn shoulder pain and supportive care with heat and rest.     8. Port insertion site erythema / Right leg cellulitis:  Mild but recurrent and treating given neutropenia. -- Reflex Keflex 500mg QHS for cellulitis.  Add yogurt with probiotics. 9. Chemotherapy induced neutropenia:  Not an indication to hold treatment today - should improve this week. Will recheck CBC Thursday. Emotional well being: Pt is coping well with his/her disease and has excellent support. I appreciate the opportunity to participate in . Hanna Fernandez mark.         Signed By: Mary Ann Acuña MD     October 11, 2021

## 2021-09-22 ENCOUNTER — HOSPITAL ENCOUNTER (OUTPATIENT)
Dept: INFUSION THERAPY | Age: 46
Discharge: HOME | End: 2021-09-22
Payer: COMMERCIAL

## 2021-09-22 VITALS
SYSTOLIC BLOOD PRESSURE: 166 MMHG | HEIGHT: 75 IN | DIASTOLIC BLOOD PRESSURE: 91 MMHG | BODY MASS INDEX: 39.17 KG/M2 | HEART RATE: 67 BPM | RESPIRATION RATE: 16 BRPM | WEIGHT: 315 LBS | OXYGEN SATURATION: 98 % | TEMPERATURE: 98.5 F

## 2021-09-22 DIAGNOSIS — C48.0 GERM CELL TUMOR OF RETROPERITONEUM (HCC): Primary | ICD-10-CM

## 2021-09-22 LAB
ANION GAP SERPL CALC-SCNC: 4 MMOL/L (ref 5–15)
BUN SERPL-MCNC: 14 MG/DL (ref 6–20)
BUN/CREAT SERPL: 17 (ref 12–20)
CALCIUM SERPL-MCNC: 8.2 MG/DL (ref 8.5–10.1)
CHLORIDE SERPL-SCNC: 106 MMOL/L (ref 97–108)
CO2 SERPL-SCNC: 26 MMOL/L (ref 21–32)
CREAT SERPL-MCNC: 0.82 MG/DL (ref 0.7–1.3)
GLUCOSE SERPL-MCNC: 274 MG/DL (ref 65–100)
LDH SERPL L TO P-CCNC: 235 U/L (ref 85–241)
POTASSIUM SERPL-SCNC: 4.4 MMOL/L (ref 3.5–5.1)
SODIUM SERPL-SCNC: 136 MMOL/L (ref 136–145)

## 2021-09-22 PROCEDURE — 74011250636 HC RX REV CODE- 250/636: Performed by: INTERNAL MEDICINE

## 2021-09-22 PROCEDURE — 84702 CHORIONIC GONADOTROPIN TEST: CPT

## 2021-09-22 PROCEDURE — 80048 BASIC METABOLIC PNL TOTAL CA: CPT

## 2021-09-22 PROCEDURE — 96413 CHEMO IV INFUSION 1 HR: CPT

## 2021-09-22 PROCEDURE — 36415 COLL VENOUS BLD VENIPUNCTURE: CPT

## 2021-09-22 PROCEDURE — 74011000258 HC RX REV CODE- 258: Performed by: INTERNAL MEDICINE

## 2021-09-22 PROCEDURE — 96417 CHEMO IV INFUS EACH ADDL SEQ: CPT

## 2021-09-22 PROCEDURE — 96375 TX/PRO/DX INJ NEW DRUG ADDON: CPT

## 2021-09-22 PROCEDURE — 83615 LACTATE (LD) (LDH) ENZYME: CPT

## 2021-09-22 PROCEDURE — 96361 HYDRATE IV INFUSION ADD-ON: CPT

## 2021-09-22 RX ORDER — ONDANSETRON 2 MG/ML
8 INJECTION INTRAMUSCULAR; INTRAVENOUS ONCE
Status: COMPLETED | OUTPATIENT
Start: 2021-09-22 | End: 2021-09-22

## 2021-09-22 RX ORDER — HEPARIN 100 UNIT/ML
300-500 SYRINGE INTRAVENOUS AS NEEDED
Status: DISCONTINUED | OUTPATIENT
Start: 2021-09-22 | End: 2021-09-23 | Stop reason: HOSPADM

## 2021-09-22 RX ORDER — SODIUM CHLORIDE 9 MG/ML
10 INJECTION INTRAMUSCULAR; INTRAVENOUS; SUBCUTANEOUS AS NEEDED
Status: DISCONTINUED | OUTPATIENT
Start: 2021-09-22 | End: 2021-09-23 | Stop reason: HOSPADM

## 2021-09-22 RX ORDER — SODIUM CHLORIDE 9 MG/ML
25 INJECTION, SOLUTION INTRAVENOUS CONTINUOUS
Status: DISCONTINUED | OUTPATIENT
Start: 2021-09-22 | End: 2021-09-23 | Stop reason: HOSPADM

## 2021-09-22 RX ORDER — SODIUM CHLORIDE 0.9 % (FLUSH) 0.9 %
10 SYRINGE (ML) INJECTION AS NEEDED
Status: DISCONTINUED | OUTPATIENT
Start: 2021-09-22 | End: 2021-09-23 | Stop reason: HOSPADM

## 2021-09-22 RX ADMIN — HEPARIN 500 UNITS: 100 SYRINGE at 17:40

## 2021-09-22 RX ADMIN — POTASSIUM CHLORIDE: 2 INJECTION, SOLUTION, CONCENTRATE INTRAVENOUS at 14:22

## 2021-09-22 RX ADMIN — SODIUM CHLORIDE 25 ML/HR: 9 INJECTION, SOLUTION INTRAVENOUS at 14:06

## 2021-09-22 RX ADMIN — ONDANSETRON 8 MG: 2 INJECTION INTRAMUSCULAR; INTRAVENOUS at 14:01

## 2021-09-22 RX ADMIN — CISPLATIN 63.2 MG: 1 INJECTION INTRAVENOUS at 16:35

## 2021-09-22 RX ADMIN — Medication 10 ML: at 17:40

## 2021-09-22 RX ADMIN — DEXAMETHASONE SODIUM PHOSPHATE 12 MG: 10 INJECTION, SOLUTION INTRAMUSCULAR; INTRAVENOUS at 14:06

## 2021-09-22 RX ADMIN — ETOPOSIDE 316 MG: 20 INJECTION INTRAVENOUS at 15:22

## 2021-09-22 NOTE — PROGRESS NOTES
[facility] Progress Note    Date: 2021    Name: Radha Estes    MRN: [id number]         : 1975    Mr. Joselyn Smiley Arrived ambulatory and in no distress for C2D3 of Cisplatin + Etoposide Regimen. Assessment & labs drawn by ISAI Das RN. OK to proceed without lab results received. Patient Vitals for the past 12 hrs:   Temp Pulse Resp BP SpO2   21 1257 98.5 °F (36.9 °C) 88 20 (!) 141/79 98 %       Lab results were obtained and reviewed.   Recent Results (from the past 12 hour(s))   METABOLIC PANEL, BASIC    Collection Time: 21  1:06 PM   Result Value Ref Range    Sodium 136 136 - 145 mmol/L    Potassium 4.4 3.5 - 5.1 mmol/L    Chloride 106 97 - 108 mmol/L    CO2 26 21 - 32 mmol/L    Anion gap 4 (L) 5 - 15 mmol/L    Glucose 274 (H) 65 - 100 mg/dL    BUN 14 6 - 20 MG/DL    Creatinine 0.82 0.70 - 1.30 MG/DL    BUN/Creatinine ratio 17 12 - 20      GFR est AA >60 >60 ml/min/1.73m2    GFR est non-AA >60 >60 ml/min/1.73m2    Calcium 8.2 (L) 8.5 - 10.1 MG/DL   LD    Collection Time: 21  1:06 PM   Result Value Ref Range     85 - 241 U/L       Medications:    Medications Administered     0.9% sodium chloride 1,000 mL with potassium chloride 10 mEq, magnesium sulfate 2 g infusion     Admin Date  2021 Action  New Bag Dose   Rate  1,000 mL/hr Route  IntraVENous Administered By  Alexandria Nj RN          0.9% sodium chloride infusion     Admin Date  2021 Action  New Bag Dose  25 mL/hr Rate  25 mL/hr Route  IntraVENous Administered By  Alexandria Nj RN          CISplatin (PLATINOL) 63.2 mg in 0.9% sodium chloride 500 mL, overfill volume 50 mL chemo infusion     Admin Date  2021 Action  New Bag Dose  63.2 mg Rate  613.2 mL/hr Route  IntraVENous Administered By  Alexandria Nj RN          dexamethasone (DECADRON) 12 mg in 0.9% sodium chloride 50 mL IVPB     Admin Date  2021 Action  New Bag Dose  12 mg Route  IntraVENous Administered By  Franchesca June RN          etoposide (VEPESID) 316 mg in 0.9% sodium chloride 1,000 mL, overfill volume 100 mL chemo infusion     Admin Date  09/22/2021 Action  New Bag Dose  316 mg Rate  1,115.8 mL/hr Route  IntraVENous Administered By  Anders Reardon RN          heparin (porcine) pf 300-500 Units     Admin Date  09/22/2021 Action  Given Dose  500 Units Route  InterCATHeter Administered By  Franchesca June RN          ondansetron [facility]) injection 8 mg     Admin Date  09/22/2021 Action  Given Dose  8 mg Route  IntraVENous Administered By  Franchesca June RN          sodium chloride (NS) flush 10 mL     Admin Date  09/22/2021 Action  Given Dose  10 mL Route  IntraVENous Administered By  Franchesca June RN                  Mr. Armin Warner tolerated treatment well and was discharged from [place] [id number] in stable condition. He is to return on 9/23/21 for his next appointment.     Dilan Rojo RN  September 22, 2021

## 2021-09-23 ENCOUNTER — HOSPITAL ENCOUNTER (OUTPATIENT)
Dept: INFUSION THERAPY | Age: 46
Discharge: HOME | End: 2021-09-23
Payer: COMMERCIAL

## 2021-09-23 VITALS
RESPIRATION RATE: 18 BRPM | SYSTOLIC BLOOD PRESSURE: 159 MMHG | TEMPERATURE: 97.2 F | HEART RATE: 64 BPM | BODY MASS INDEX: 39.17 KG/M2 | DIASTOLIC BLOOD PRESSURE: 95 MMHG | OXYGEN SATURATION: 94 % | HEIGHT: 75 IN | WEIGHT: 315 LBS

## 2021-09-23 DIAGNOSIS — C48.0 GERM CELL TUMOR OF RETROPERITONEUM (HCC): Primary | ICD-10-CM

## 2021-09-23 LAB — HCG INTACT+B SERPL-ACNC: 2 MIU/ML (ref 0–3)

## 2021-09-23 PROCEDURE — 96413 CHEMO IV INFUSION 1 HR: CPT

## 2021-09-23 PROCEDURE — 96375 TX/PRO/DX INJ NEW DRUG ADDON: CPT

## 2021-09-23 PROCEDURE — 96417 CHEMO IV INFUS EACH ADDL SEQ: CPT

## 2021-09-23 PROCEDURE — 74011250636 HC RX REV CODE- 250/636: Performed by: INTERNAL MEDICINE

## 2021-09-23 PROCEDURE — 96361 HYDRATE IV INFUSION ADD-ON: CPT

## 2021-09-23 RX ORDER — SODIUM CHLORIDE 0.9 % (FLUSH) 0.9 %
10 SYRINGE (ML) INJECTION AS NEEDED
Status: DISCONTINUED | OUTPATIENT
Start: 2021-09-23 | End: 2021-09-24 | Stop reason: HOSPADM

## 2021-09-23 RX ORDER — SODIUM CHLORIDE 9 MG/ML
25 INJECTION, SOLUTION INTRAVENOUS CONTINUOUS
Status: DISCONTINUED | OUTPATIENT
Start: 2021-09-23 | End: 2021-09-24 | Stop reason: HOSPADM

## 2021-09-23 RX ORDER — FUROSEMIDE 10 MG/ML
20 INJECTION INTRAMUSCULAR; INTRAVENOUS ONCE
Status: DISCONTINUED | OUTPATIENT
Start: 2021-09-23 | End: 2021-09-23

## 2021-09-23 RX ORDER — DEXAMETHASONE SODIUM PHOSPHATE 100 MG/10ML
6 INJECTION INTRAMUSCULAR; INTRAVENOUS ONCE
Status: COMPLETED | OUTPATIENT
Start: 2021-09-23 | End: 2021-09-23

## 2021-09-23 RX ORDER — HEPARIN 100 UNIT/ML
300-500 SYRINGE INTRAVENOUS AS NEEDED
Status: DISCONTINUED | OUTPATIENT
Start: 2021-09-23 | End: 2021-09-24 | Stop reason: HOSPADM

## 2021-09-23 RX ORDER — SODIUM CHLORIDE 9 MG/ML
10 INJECTION INTRAMUSCULAR; INTRAVENOUS; SUBCUTANEOUS AS NEEDED
Status: DISCONTINUED | OUTPATIENT
Start: 2021-09-23 | End: 2021-09-24 | Stop reason: HOSPADM

## 2021-09-23 RX ORDER — ONDANSETRON 2 MG/ML
8 INJECTION INTRAMUSCULAR; INTRAVENOUS ONCE
Status: COMPLETED | OUTPATIENT
Start: 2021-09-23 | End: 2021-09-23

## 2021-09-23 RX ADMIN — Medication 500 UNITS: at 17:01

## 2021-09-23 RX ADMIN — ETOPOSIDE 316 MG: 20 INJECTION INTRAVENOUS at 14:40

## 2021-09-23 RX ADMIN — CISPLATIN 63.2 MG: 1 INJECTION INTRAVENOUS at 15:52

## 2021-09-23 RX ADMIN — Medication 10 ML: at 17:01

## 2021-09-23 RX ADMIN — ONDANSETRON 8 MG: 2 INJECTION INTRAMUSCULAR; INTRAVENOUS at 13:26

## 2021-09-23 RX ADMIN — SODIUM CHLORIDE 10 ML: 9 INJECTION INTRAMUSCULAR; INTRAVENOUS; SUBCUTANEOUS at 13:15

## 2021-09-23 RX ADMIN — DEXAMETHASONE SODIUM PHOSPHATE 6 MG: 10 INJECTION INTRAMUSCULAR; INTRAVENOUS at 13:23

## 2021-09-23 RX ADMIN — POTASSIUM CHLORIDE: 2 INJECTION, SOLUTION, CONCENTRATE INTRAVENOUS at 13:29

## 2021-09-23 RX ADMIN — SODIUM CHLORIDE 25 ML/HR: 9 INJECTION, SOLUTION INTRAVENOUS at 13:20

## 2021-09-23 NOTE — PROGRESS NOTES
[facility] - Chemotherapy Progress Note    1300 Pt admit to [facility] for C2D4 Cisplatin/Etoposide in stable condition. Assessment completed. No new concerns voiced. PAC (accessed at previous visit) with positive blood return. Pt states that he believes he is not getting IV lasix at the end of today's visit, but is supposed to take PO lasix once he gets home. Verified with provider; pt is correct. Pt verbalized understanding that he is to take PO lasix today at home.      Chemotherapy Flowsheet 9/23/2021   Cycle C2D4   Date 9/23/2021   Drug / Regimen Cisplatin/Etoposide   Pre Hydration given   Pre Meds -   Notes PO lasix at home         VS  Patient Vitals for the past 12 hrs:   Temp Pulse Resp BP SpO2   09/23/21 1308 97.8 °F (36.6 °C) 71 18 (!) 158/85 95 %         Medications:  Medications Administered     0.9% sodium chloride 1,000 mL with potassium chloride 10 mEq, magnesium sulfate 2 g infusion     Admin Date  09/23/2021 Action  New Bag Dose   Rate  1,000 mL/hr Route  IntraVENous Administered By  Ines Crouch, SILVIANO          0.9% sodium chloride infusion     Admin Date  09/23/2021 Action  New Bag Dose  25 mL/hr Rate  25 mL/hr Route  IntraVENous Administered By  Ines Crouch, SILVIANO          0.9% sodium chloride injection 10 mL     Admin Date  09/23/2021 Action  Given Dose  10 mL Route  IntraVENous Administered By  Ines Crouch, SILVIANO          CISplatin (PLATINOL) 63.2 mg in 0.9% sodium chloride 500 mL, overfill volume 50 mL chemo infusion     Admin Date  09/23/2021 Action  New Bag Dose  63.2 mg Rate  613.2 mL/hr Route  IntraVENous Administered By  Ines Crouch, SILVIANO          dexamethasone (DECADRON) 10 mg/mL injection 6 mg     Admin Date  09/23/2021 Action  Given Dose  6 mg Route  IntraVENous Administered By  Ines Crouch, SILVIANO          etoposide (VEPESID) 316 mg in 0.9% sodium chloride 1,000 mL, overfill volume 100 mL chemo infusion     Admin Date  09/23/2021 Action  New Bag Dose  316 mg Rate  1,115.8 mL/hr Route  IntraVENous Administered By  Francoise Pappas, SILVIANO          ondansetron [facility]) injection 8 mg     Admin Date  09/23/2021 Action  Given Dose  8 mg Route  IntraVENous Administered By  Francoise Pappas, RN                   Pt aware of next appointment scheduled for tomorrow.     SBAR report given to [employer]

## 2021-09-24 ENCOUNTER — HOSPITAL ENCOUNTER (OUTPATIENT)
Dept: INFUSION THERAPY | Age: 46
Discharge: HOME | End: 2021-09-24
Payer: COMMERCIAL

## 2021-09-24 VITALS
TEMPERATURE: 98.6 F | OXYGEN SATURATION: 96 % | DIASTOLIC BLOOD PRESSURE: 87 MMHG | HEART RATE: 78 BPM | SYSTOLIC BLOOD PRESSURE: 153 MMHG | RESPIRATION RATE: 18 BRPM

## 2021-09-24 DIAGNOSIS — C48.0 GERM CELL TUMOR OF RETROPERITONEUM (HCC): Primary | ICD-10-CM

## 2021-09-24 PROCEDURE — 74011250636 HC RX REV CODE- 250/636: Performed by: NURSE PRACTITIONER

## 2021-09-24 PROCEDURE — 96375 TX/PRO/DX INJ NEW DRUG ADDON: CPT

## 2021-09-24 PROCEDURE — 96417 CHEMO IV INFUS EACH ADDL SEQ: CPT

## 2021-09-24 PROCEDURE — 74011250636 HC RX REV CODE- 250/636: Performed by: INTERNAL MEDICINE

## 2021-09-24 PROCEDURE — 96361 HYDRATE IV INFUSION ADD-ON: CPT

## 2021-09-24 PROCEDURE — 96413 CHEMO IV INFUSION 1 HR: CPT

## 2021-09-24 RX ORDER — HEPARIN 100 UNIT/ML
300-500 SYRINGE INTRAVENOUS AS NEEDED
Status: DISCONTINUED | OUTPATIENT
Start: 2021-09-24 | End: 2021-09-25 | Stop reason: HOSPADM

## 2021-09-24 RX ORDER — SODIUM CHLORIDE 9 MG/ML
25 INJECTION, SOLUTION INTRAVENOUS CONTINUOUS
Status: DISCONTINUED | OUTPATIENT
Start: 2021-09-24 | End: 2021-09-25 | Stop reason: HOSPADM

## 2021-09-24 RX ORDER — ONDANSETRON 2 MG/ML
8 INJECTION INTRAMUSCULAR; INTRAVENOUS ONCE
Status: COMPLETED | OUTPATIENT
Start: 2021-09-24 | End: 2021-09-24

## 2021-09-24 RX ORDER — DEXAMETHASONE SODIUM PHOSPHATE 100 MG/10ML
6 INJECTION INTRAMUSCULAR; INTRAVENOUS ONCE
Status: CANCELLED
Start: 2021-09-24 | End: 2021-09-24

## 2021-09-24 RX ORDER — SODIUM CHLORIDE 0.9 % (FLUSH) 0.9 %
10 SYRINGE (ML) INJECTION AS NEEDED
Status: DISCONTINUED | OUTPATIENT
Start: 2021-09-24 | End: 2021-09-25 | Stop reason: HOSPADM

## 2021-09-24 RX ORDER — SODIUM CHLORIDE 9 MG/ML
10 INJECTION INTRAMUSCULAR; INTRAVENOUS; SUBCUTANEOUS AS NEEDED
Status: DISCONTINUED | OUTPATIENT
Start: 2021-09-24 | End: 2021-09-25 | Stop reason: HOSPADM

## 2021-09-24 RX ORDER — DEXAMETHASONE SODIUM PHOSPHATE 100 MG/10ML
6 INJECTION INTRAMUSCULAR; INTRAVENOUS ONCE
Status: COMPLETED | OUTPATIENT
Start: 2021-09-24 | End: 2021-09-24

## 2021-09-24 RX ADMIN — POTASSIUM CHLORIDE: 2 INJECTION, SOLUTION, CONCENTRATE INTRAVENOUS at 13:33

## 2021-09-24 RX ADMIN — CISPLATIN 63.2 MG: 1 INJECTION INTRAVENOUS at 15:59

## 2021-09-24 RX ADMIN — ONDANSETRON 8 MG: 2 INJECTION INTRAMUSCULAR; INTRAVENOUS at 13:26

## 2021-09-24 RX ADMIN — Medication 500 UNITS: at 17:03

## 2021-09-24 RX ADMIN — DEXAMETHASONE SODIUM PHOSPHATE 6 MG: 10 INJECTION INTRAMUSCULAR; INTRAVENOUS at 13:27

## 2021-09-24 RX ADMIN — ETOPOSIDE 316 MG: 20 INJECTION INTRAVENOUS at 14:49

## 2021-09-24 RX ADMIN — SODIUM CHLORIDE 10 ML: 9 INJECTION INTRAMUSCULAR; INTRAVENOUS; SUBCUTANEOUS at 17:03

## 2021-09-24 RX ADMIN — SODIUM CHLORIDE 25 ML/HR: 9 INJECTION, SOLUTION INTRAVENOUS at 13:24

## 2021-09-24 NOTE — PROGRESS NOTES
[facility] Progress Note    Date: 2021    Name: Lavelle Peterson    MRN: [id number]         : 1975    Mr. Deepak Gamez Arrived ambulatory and in no distress for C2D5 of Cisplatin/Etoposide Regimen. Assessment was completed, no acute issues at this time, no new complaints voiced. R chest wall port already accessed, positive blood return, dressing CDI. Covid Questionnaire completed. 1. Do you have any symptoms of covid 19? SOB, coughing, fever, or generally not feeling well? - no  2. Have you been exposed to covid 19 recently? - no  3. Have you had any recent contact with family/friend that has a pending covid test? no      Chemotherapy Flowsheet 2021   Cycle C2D5   Date 2021   Drug / Regimen Cisplatin/Etoposide   Pre Hydration given   Pre Meds given   Notes given           Mr. Rao Wooten vitals were reviewed.   Visit Vitals  /88   Pulse 80   Temp 97.7 °F (36.5 °C)   Resp 18   SpO2 96%       Medications:  Medications Administered     0.9% sodium chloride 1,000 mL with potassium chloride 10 mEq, magnesium sulfate 2 g infusion     Admin Date  2021 Action  New Bag Dose   Rate  1,000 mL/hr Route  IntraVENous Administered By  Krunal Allen RN          0.9% sodium chloride infusion     Admin Date  2021 Action  New Bag Dose  25 mL/hr Rate  25 mL/hr Route  IntraVENous Administered By  Krunal Allen RN          CISplatin (PLATINOL) 63.2 mg in 0.9% sodium chloride 500 mL, overfill volume 50 mL chemo infusion     Admin Date  2021 Action  New Bag Dose  63.2 mg Rate  613.2 mL/hr Route  IntraVENous Administered By  Krunal Allen RN          dexamethasone (DECADRON) 10 mg/mL injection 6 mg     Admin Date  2021 Action  Given Dose  6 mg Route  IntraVENous Administered By  Krunal Allen RN          etoposide (VEPESID) 316 mg in 0.9% sodium chloride 1,000 mL, overfill volume 100 mL chemo infusion     Admin Date  2021 Action  New Bag Dose  316 mg Rate  1,115.8 mL/hr Route  IntraVENous Administered By  Abby Cruz RN          ondansetron [facility]) injection 8 mg     Admin Date  09/24/2021 Action  Given Dose  8 mg Route  IntraVENous Administered By  Abby Cruz RN                  SBAR given to Linda Leger RN to discharge patient at completion of treatment.     Sid Puckett RN  September 24, 2021

## 2021-09-27 ENCOUNTER — HOSPITAL ENCOUNTER (OUTPATIENT)
Dept: INFUSION THERAPY | Age: 46
Discharge: HOME | End: 2021-09-27
Payer: COMMERCIAL

## 2021-09-27 ENCOUNTER — TELEPHONE (OUTPATIENT)
Dept: ONCOLOGY | Age: 46
End: 2021-09-27

## 2021-09-27 VITALS
HEART RATE: 76 BPM | SYSTOLIC BLOOD PRESSURE: 103 MMHG | WEIGHT: 315 LBS | RESPIRATION RATE: 22 BRPM | TEMPERATURE: 97.2 F | OXYGEN SATURATION: 99 % | BODY MASS INDEX: 39.17 KG/M2 | HEIGHT: 75 IN | DIASTOLIC BLOOD PRESSURE: 67 MMHG

## 2021-09-27 DIAGNOSIS — C48.0 GERM CELL TUMOR OF RETROPERITONEUM (HCC): Primary | ICD-10-CM

## 2021-09-27 LAB
ANION GAP SERPL CALC-SCNC: 9 MMOL/L (ref 5–15)
BASOPHILS # BLD: 0 K/UL (ref 0–0.1)
BASOPHILS NFR BLD: 1 % (ref 0–1)
BUN SERPL-MCNC: 18 MG/DL (ref 6–20)
BUN/CREAT SERPL: 19 (ref 12–20)
CALCIUM SERPL-MCNC: 8.9 MG/DL (ref 8.5–10.1)
CHLORIDE SERPL-SCNC: 99 MMOL/L (ref 97–108)
CO2 SERPL-SCNC: 27 MMOL/L (ref 21–32)
CREAT SERPL-MCNC: 0.95 MG/DL (ref 0.7–1.3)
DIFFERENTIAL METHOD BLD: ABNORMAL
EOSINOPHIL # BLD: 0 K/UL (ref 0–0.4)
EOSINOPHIL NFR BLD: 0 % (ref 0–7)
ERYTHROCYTE [DISTWIDTH] IN BLOOD BY AUTOMATED COUNT: 15.9 % (ref 11.5–14.5)
FERRITIN SERPL-MCNC: 750 NG/ML (ref 26–388)
GLUCOSE SERPL-MCNC: 150 MG/DL (ref 65–100)
HCT VFR BLD AUTO: 34.6 % (ref 36.6–50.3)
HGB BLD-MCNC: 11.3 G/DL (ref 12.1–17)
IMM GRANULOCYTES # BLD AUTO: 0 K/UL (ref 0–0.04)
IMM GRANULOCYTES NFR BLD AUTO: 1 % (ref 0–0.5)
IRON SATN MFR SERPL: 88 % (ref 20–50)
IRON SERPL-MCNC: 224 UG/DL (ref 35–150)
LYMPHOCYTES # BLD: 1.6 K/UL (ref 0.8–3.5)
LYMPHOCYTES NFR BLD: 34 % (ref 12–49)
MCH RBC QN AUTO: 25.2 PG (ref 26–34)
MCHC RBC AUTO-ENTMCNC: 32.7 G/DL (ref 30–36.5)
MCV RBC AUTO: 77.1 FL (ref 80–99)
MONOCYTES # BLD: 0.1 K/UL (ref 0–1)
MONOCYTES NFR BLD: 3 % (ref 5–13)
NEUTS SEG # BLD: 2.7 K/UL (ref 1.8–8)
NEUTS SEG NFR BLD: 61 % (ref 32–75)
NRBC # BLD: 0 K/UL (ref 0–0.01)
NRBC BLD-RTO: 0 PER 100 WBC
PLATELET # BLD AUTO: 354 K/UL (ref 150–400)
PMV BLD AUTO: 11.7 FL (ref 8.9–12.9)
POTASSIUM SERPL-SCNC: 3.5 MMOL/L (ref 3.5–5.1)
RBC # BLD AUTO: 4.49 M/UL (ref 4.1–5.7)
SODIUM SERPL-SCNC: 135 MMOL/L (ref 136–145)
TIBC SERPL-MCNC: 256 UG/DL (ref 250–450)
WBC # BLD AUTO: 4.5 K/UL (ref 4.1–11.1)

## 2021-09-27 PROCEDURE — 96409 CHEMO IV PUSH SNGL DRUG: CPT

## 2021-09-27 PROCEDURE — 74011250637 HC RX REV CODE- 250/637: Performed by: INTERNAL MEDICINE

## 2021-09-27 PROCEDURE — 96375 TX/PRO/DX INJ NEW DRUG ADDON: CPT

## 2021-09-27 PROCEDURE — 74011000258 HC RX REV CODE- 258: Performed by: INTERNAL MEDICINE

## 2021-09-27 PROCEDURE — 80048 BASIC METABOLIC PNL TOTAL CA: CPT

## 2021-09-27 PROCEDURE — 36415 COLL VENOUS BLD VENIPUNCTURE: CPT

## 2021-09-27 PROCEDURE — 82728 ASSAY OF FERRITIN: CPT

## 2021-09-27 PROCEDURE — 83540 ASSAY OF IRON: CPT

## 2021-09-27 PROCEDURE — 77030012965 HC NDL HUBR BBMI -A

## 2021-09-27 PROCEDURE — 74011250636 HC RX REV CODE- 250/636: Performed by: INTERNAL MEDICINE

## 2021-09-27 PROCEDURE — 85025 COMPLETE CBC W/AUTO DIFF WBC: CPT

## 2021-09-27 RX ORDER — SODIUM CHLORIDE 9 MG/ML
25 INJECTION, SOLUTION INTRAVENOUS CONTINUOUS
Status: DISCONTINUED | OUTPATIENT
Start: 2021-09-27 | End: 2021-09-29 | Stop reason: HOSPADM

## 2021-09-27 RX ORDER — ACETAMINOPHEN 325 MG/1
650 TABLET ORAL ONCE
Status: COMPLETED | OUTPATIENT
Start: 2021-09-27 | End: 2021-09-27

## 2021-09-27 RX ORDER — SODIUM CHLORIDE 9 MG/ML
10 INJECTION INTRAMUSCULAR; INTRAVENOUS; SUBCUTANEOUS AS NEEDED
Status: ACTIVE | OUTPATIENT
Start: 2021-09-27 | End: 2021-09-27

## 2021-09-27 RX ORDER — SODIUM CHLORIDE 0.9 % (FLUSH) 0.9 %
10 SYRINGE (ML) INJECTION AS NEEDED
Status: DISPENSED | OUTPATIENT
Start: 2021-09-27 | End: 2021-09-27

## 2021-09-27 RX ORDER — DIPHENHYDRAMINE HYDROCHLORIDE 50 MG/ML
25 INJECTION, SOLUTION INTRAMUSCULAR; INTRAVENOUS ONCE
Status: COMPLETED | OUTPATIENT
Start: 2021-09-27 | End: 2021-09-27

## 2021-09-27 RX ORDER — HEPARIN 100 UNIT/ML
300-500 SYRINGE INTRAVENOUS AS NEEDED
Status: ACTIVE | OUTPATIENT
Start: 2021-09-27 | End: 2021-09-27

## 2021-09-27 RX ADMIN — SODIUM CHLORIDE 30 UNITS: 900 INJECTION, SOLUTION INTRAVENOUS at 10:27

## 2021-09-27 RX ADMIN — SODIUM CHLORIDE 10 ML: 9 INJECTION INTRAMUSCULAR; INTRAVENOUS; SUBCUTANEOUS at 10:50

## 2021-09-27 RX ADMIN — SODIUM CHLORIDE 25 ML/HR: 9 INJECTION, SOLUTION INTRAVENOUS at 09:22

## 2021-09-27 RX ADMIN — DIPHENHYDRAMINE HYDROCHLORIDE 25 MG: 50 INJECTION INTRAMUSCULAR; INTRAVENOUS at 09:22

## 2021-09-27 RX ADMIN — ACETAMINOPHEN 650 MG: 325 TABLET ORAL at 09:22

## 2021-09-27 RX ADMIN — Medication 500 UNITS: at 10:57

## 2021-09-27 RX ADMIN — Medication 10 ML: at 08:00

## 2021-09-27 NOTE — TELEPHONE ENCOUNTER
Called to check on patient given report from infusion center that patient is feeling \"terrible. \" He denies n/v/d, SOB, cough, body aches, fevers, chills, sweats. He states he just feels very fatigued. He is forcing himself to eat and drink despite low appetite and taste changes. I discussed that labs are overall normal, but he is slightly hyponatremic. Discussed this could be from decreased food intake. I offered pt to call us later this week if he feels lightheaded or unable to take in PO so that we can add him on for IVF if needed. Pt voiced understanding.

## 2021-09-27 NOTE — PROGRESS NOTES
[facility] - Chemotherapy Progress Note    0800 Pt admit to [facility] for C2D8 Bleomycin ambulatory in stable condition. Assessment completed. Patient states that he has \"feels terrible,\" with reports of fatigue. Denies nausea, vomiting and pain, but states \"I only got out of the bed for 3 hours all weekend. \"  Port with positive blood return. Labs drawn per order and sent. NP notified of the change in his condition, additional labs ordered and sent for processing. Line flushed, clamped, Curos Cap applied to end clave. Per NP, wait for lab results prior to ordering. Prior to treatment, patient was screened for COVID 19. Denies any signs or symptoms of COVID. Denies any known physical contact with anyone exposed to, diagnosed with or with pending or positive COVID test. Denies any pending or positive COVID test themself. Recent Results (from the past 12 hour(s))   CBC WITH AUTOMATED DIFF    Collection Time: 09/27/21  8:03 AM   Result Value Ref Range    WBC 4.5 4.1 - 11.1 K/uL    RBC 4.49 4. 10 - 5.70 M/uL    HGB 11.3 (L) 12.1 - 17.0 g/dL    HCT 34.6 (L) 36.6 - 50.3 %    MCV 77.1 (L) 80.0 - 99.0 FL    MCH 25.2 (L) 26.0 - 34.0 PG    MCHC 32.7 30.0 - 36.5 g/dL    RDW 15.9 (H) 11.5 - 14.5 %    PLATELET 120 700 - 092 K/uL    MPV 11.7 8.9 - 12.9 FL    NRBC 0.0 0  WBC    ABSOLUTE NRBC 0.00 0.00 - 0.01 K/uL    NEUTROPHILS 61 32 - 75 %    LYMPHOCYTES 34 12 - 49 %    MONOCYTES 3 (L) 5 - 13 %    EOSINOPHILS 0 0 - 7 %    BASOPHILS 1 0 - 1 %    IMMATURE GRANULOCYTES 1 (H) 0.0 - 0.5 %    ABS. NEUTROPHILS 2.7 1.8 - 8.0 K/UL    ABS. LYMPHOCYTES 1.6 0.8 - 3.5 K/UL    ABS. MONOCYTES 0.1 0.0 - 1.0 K/UL    ABS. EOSINOPHILS 0.0 0.0 - 0.4 K/UL    ABS. BASOPHILS 0.0 0.0 - 0.1 K/UL    ABS. IMM.  GRANS. 0.0 0.00 - 0.04 K/UL    DF AUTOMATED     METABOLIC PANEL, BASIC    Collection Time: 09/27/21  8:32 AM   Result Value Ref Range    Sodium 135 (L) 136 - 145 mmol/L    Potassium 3.5 3.5 - 5.1 mmol/L    Chloride 99 97 - 108 mmol/L    CO2 27 21 - 32 mmol/L    Anion gap 9 5 - 15 mmol/L    Glucose 150 (H) 65 - 100 mg/dL    BUN 18 6 - 20 MG/DL    Creatinine 0.95 0.70 - 1.30 MG/DL    BUN/Creatinine ratio 19 12 - 20      GFR est AA >60 >60 ml/min/1.73m2    GFR est non-AA >60 >60 ml/min/1.73m2    Calcium 8.9 8.5 - 10.1 MG/DL         Visit Vitals  /67 (BP 1 Location: Right arm, BP Patient Position: Sitting)   Pulse 76   Temp 97.2 °F (36.2 °C)   Resp 22   Ht 6' 3\" (1.905 m)   Wt (!) 176.3 kg (388 lb 11.2 oz)   SpO2 99%   BMI 48.58 kg/m²       Medications Administered     0.9% sodium chloride infusion     Admin Date  09/27/2021 Action  New Bag Dose  25 mL/hr Rate  25 mL/hr Route  IntraVENous Administered By  Carolin Rendon          0.9% sodium chloride injection 10 mL     Admin Date  09/27/2021 Action  Given Dose  10 mL Route  IntraVENous Administered By  Carolin Rendon          acetaminophen (TYLENOL) tablet 650 mg     Admin Date  09/27/2021 Action  Given Dose  650 mg Route  Oral Administered By  Carolin Rendon          bleomycin [facility]) 30 Units in 0.9% sodium chloride 50 mL, overfill volume 5 mL chemo infusion     Admin Date  09/27/2021 Action  New Bag Dose  30 Units Rate  390 mL/hr Route  IntraVENous Administered By  Carolin Rendon          diphenhydrAMINE (BENADRYL) injection 25 mg     Admin Date  09/27/2021 Action  Given Dose  25 mg Route  IntraVENous Administered By  Carolin Rendon          heparin (porcine) pf 300-500 Units     Admin Date  09/27/2021 Action  Given Dose  500 Units Route  InterCATHeter Administered By  Carolin Rendon          sodium chloride (NS) flush 10 mL     Admin Date  09/27/2021 Action  Given Dose  10 mL Route  IntraVENous Administered By  Carolin Rendon                1055 Pt tolerated treatment well. Port maintained positive blood return throughout treatment, flushed with positive blood return at conclusion and de-accessed. D/c home ambulatory in no distress.  Pt aware of next [facility] appointment scheduled.     Neyda Lazar RN

## 2021-09-30 ENCOUNTER — TELEPHONE (OUTPATIENT)
Dept: ONCOLOGY | Age: 46
End: 2021-09-30

## 2021-09-30 NOTE — TELEPHONE ENCOUNTER
[address] at [facility]  [phone]      09/30/21 10:30 AM Attempted to call patient via home/cell number listed to check on him. No answer, left message of above and requested return call. Provided office phone number as well. 11:13 AM Received return call from patient. He states symptoms of fatigue and decreased appetite have improved, stating he \"feels pretty good. \" No complaints of nausea/vomiting, diarrhea, and fevers. Advised that this nurse would update Dr. Elva Bailey of above. No further questions or concerns at this time.

## 2021-09-30 NOTE — TELEPHONE ENCOUNTER
[employer] at [facility]  [phone]        09/30/21 1:30 PM Attempted to call pulmonary function lab. No answer, left message requesting return call. Provided office phone number as well. Need to confirm if Dr. Chestine Merlin interpretation noted on 09/20/21 is regarding pulmonary function test performed on 09/17/21. Or, if patient had two separate pulmonary function tests (on 09/17 and 09/20). If patient has only had PFT performed on 09/17, then will need repeat testing prior to cycle 3 of chemotherapy on 10/11/21. Will continue to monitor. 10/01/21 9:34 AM Received return call from Augusta Gunderson with pulmonary lab. He confirmed that patient has only had one pulmonary function test within [employer] and that interpretation noted on 09/20/21 was regarding PFT on 09/17. Dharmesh Henry for clarification. This nurse to call patient today to remind him of need to schedule PFT appointment. 10:44 AM Called patient to discuss need for repeat PFTs. Patient requesting further clarification since he had PFTs performed prior to C2 of chemotherapy. Patient also inquiring when he would be due for repeat imaging. Advised this nurse would clarify with Dr. Hilda Somers and call him back. Patient voiced understanding. 1:24 PM Called patient and advised of MD response and recommendations. Patient voiced understanding. He inquired if he could have repeat imaging done now, prior to cycle 3 of treatment. Patient explained he wants to ensure treatment is working before proceeding with next cycle due to the way the chemotherapy makes him feel. Per Dr. Hilda Somers, advised his LDH and bHCG (which were both elevated prior to treatment) have both come down to normal range. Reassured patient that based on these labs, Dr. Hilda Somers knows the treatment is working. Discussed that it would be best to wait until a few weeks after C3 to see how much the tumor has shrunk if not completely gone.  Advised that if Dr. Hilda Somers ordered CT imaging before C3, patient will still need to repeat imaging again after C3. Patient voiced understanding. No further questions or concerns at this time.  [employer] message sent to patient with  phone number per his request.

## 2021-10-04 ENCOUNTER — HOSPITAL ENCOUNTER (OUTPATIENT)
Dept: INFUSION THERAPY | Age: 46
Discharge: HOME | End: 2021-10-04
Payer: COMMERCIAL

## 2021-10-04 VITALS
HEART RATE: 87 BPM | RESPIRATION RATE: 20 BRPM | DIASTOLIC BLOOD PRESSURE: 85 MMHG | BODY MASS INDEX: 39.17 KG/M2 | HEIGHT: 75 IN | OXYGEN SATURATION: 98 % | SYSTOLIC BLOOD PRESSURE: 120 MMHG | WEIGHT: 315 LBS | TEMPERATURE: 97.7 F

## 2021-10-04 DIAGNOSIS — C48.0 GERM CELL TUMOR OF RETROPERITONEUM (HCC): Primary | ICD-10-CM

## 2021-10-04 LAB
BASOPHILS # BLD: 0 K/UL (ref 0–0.1)
BASOPHILS NFR BLD: 1 % (ref 0–1)
DIFFERENTIAL METHOD BLD: ABNORMAL
EOSINOPHIL # BLD: 0.1 K/UL (ref 0–0.4)
EOSINOPHIL NFR BLD: 4 % (ref 0–7)
ERYTHROCYTE [DISTWIDTH] IN BLOOD BY AUTOMATED COUNT: 15.5 % (ref 11.5–14.5)
HCT VFR BLD AUTO: 30.5 % (ref 36.6–50.3)
HGB BLD-MCNC: 9.7 G/DL (ref 12.1–17)
IMM GRANULOCYTES # BLD AUTO: 0 K/UL (ref 0–0.04)
IMM GRANULOCYTES NFR BLD AUTO: 0 % (ref 0–0.5)
LYMPHOCYTES # BLD: 1.3 K/UL (ref 0.8–3.5)
LYMPHOCYTES NFR BLD: 48 % (ref 12–49)
MCH RBC QN AUTO: 25.2 PG (ref 26–34)
MCHC RBC AUTO-ENTMCNC: 31.8 G/DL (ref 30–36.5)
MCV RBC AUTO: 79.2 FL (ref 80–99)
MONOCYTES # BLD: 0.3 K/UL (ref 0–1)
MONOCYTES NFR BLD: 11 % (ref 5–13)
NEUTS SEG # BLD: 0.9 K/UL (ref 1.8–8)
NEUTS SEG NFR BLD: 36 % (ref 32–75)
NRBC # BLD: 0 K/UL (ref 0–0.01)
NRBC BLD-RTO: 0 PER 100 WBC
PLATELET # BLD AUTO: 96 K/UL (ref 150–400)
PMV BLD AUTO: 12.6 FL (ref 8.9–12.9)
RBC # BLD AUTO: 3.85 M/UL (ref 4.1–5.7)
RBC MORPH BLD: ABNORMAL
WBC # BLD AUTO: 2.6 K/UL (ref 4.1–11.1)

## 2021-10-04 PROCEDURE — 74011250637 HC RX REV CODE- 250/637: Performed by: INTERNAL MEDICINE

## 2021-10-04 PROCEDURE — 96409 CHEMO IV PUSH SNGL DRUG: CPT

## 2021-10-04 PROCEDURE — 96413 CHEMO IV INFUSION 1 HR: CPT

## 2021-10-04 PROCEDURE — 74011000258 HC RX REV CODE- 258: Performed by: INTERNAL MEDICINE

## 2021-10-04 PROCEDURE — 36415 COLL VENOUS BLD VENIPUNCTURE: CPT

## 2021-10-04 PROCEDURE — 74011250636 HC RX REV CODE- 250/636: Performed by: INTERNAL MEDICINE

## 2021-10-04 PROCEDURE — 77030016057 HC NDL HUBR APOL -B

## 2021-10-04 PROCEDURE — 85025 COMPLETE CBC W/AUTO DIFF WBC: CPT

## 2021-10-04 PROCEDURE — 96375 TX/PRO/DX INJ NEW DRUG ADDON: CPT

## 2021-10-04 RX ORDER — SODIUM CHLORIDE 9 MG/ML
10 INJECTION INTRAMUSCULAR; INTRAVENOUS; SUBCUTANEOUS AS NEEDED
Status: ACTIVE | OUTPATIENT
Start: 2021-10-04 | End: 2021-10-04

## 2021-10-04 RX ORDER — ACETAMINOPHEN 325 MG/1
650 TABLET ORAL ONCE
Status: COMPLETED | OUTPATIENT
Start: 2021-10-04 | End: 2021-10-04

## 2021-10-04 RX ORDER — SODIUM CHLORIDE 9 MG/ML
25 INJECTION, SOLUTION INTRAVENOUS CONTINUOUS
Status: DISCONTINUED | OUTPATIENT
Start: 2021-10-04 | End: 2021-10-06 | Stop reason: HOSPADM

## 2021-10-04 RX ORDER — DIPHENHYDRAMINE HYDROCHLORIDE 50 MG/ML
25 INJECTION, SOLUTION INTRAMUSCULAR; INTRAVENOUS ONCE
Status: COMPLETED | OUTPATIENT
Start: 2021-10-04 | End: 2021-10-04

## 2021-10-04 RX ORDER — SODIUM CHLORIDE 0.9 % (FLUSH) 0.9 %
10 SYRINGE (ML) INJECTION AS NEEDED
Status: DISPENSED | OUTPATIENT
Start: 2021-10-04 | End: 2021-10-04

## 2021-10-04 RX ORDER — HEPARIN 100 UNIT/ML
300-500 SYRINGE INTRAVENOUS AS NEEDED
Status: ACTIVE | OUTPATIENT
Start: 2021-10-04 | End: 2021-10-04

## 2021-10-04 RX ADMIN — SODIUM CHLORIDE 25 ML/HR: 9 INJECTION, SOLUTION INTRAVENOUS at 09:11

## 2021-10-04 RX ADMIN — ACETAMINOPHEN 650 MG: 325 TABLET ORAL at 09:11

## 2021-10-04 RX ADMIN — DIPHENHYDRAMINE HYDROCHLORIDE 25 MG: 50 INJECTION INTRAMUSCULAR; INTRAVENOUS at 09:11

## 2021-10-04 RX ADMIN — Medication 500 UNITS: at 10:14

## 2021-10-04 RX ADMIN — BLEOMYCIN SULFATE 30 UNITS: 30 POWDER, FOR SOLUTION INTRAMUSCULAR; INTRAPLEURAL; INTRAVENOUS; SUBCUTANEOUS at 09:52

## 2021-10-04 RX ADMIN — Medication 10 ML: at 10:14

## 2021-10-08 ENCOUNTER — HOSPITAL ENCOUNTER (OUTPATIENT)
Dept: PULMONOLOGY | Age: 46
Discharge: HOME | End: 2021-10-08
Attending: INTERNAL MEDICINE
Payer: COMMERCIAL

## 2021-10-08 DIAGNOSIS — Z51.11 CHEMOTHERAPY MANAGEMENT, ENCOUNTER FOR: ICD-10-CM

## 2021-10-08 DIAGNOSIS — D49.9 EXTRAGONADAL GERM CELL TUMOR: ICD-10-CM

## 2021-10-08 PROCEDURE — 94375 RESPIRATORY FLOW VOLUME LOOP: CPT

## 2021-10-08 PROCEDURE — 94729 DIFFUSING CAPACITY: CPT

## 2021-10-08 NOTE — PROCEDURES
[facility] 77  PULMONARY FUNCTION TEST    Name:  Naseem Horowitz  MR#:  [id number]  :  1975  ACCOUNT #:  [de-identified]  DATE OF SERVICE:  10/08/2021      Spirometry reveals a normal FEV1-to-FVC ratio of 81% predicted. FVC and FEV1 are both within normal limits with an FVC of 4.82 L which is 85% predicted and an FEV1 of 3.89 L which is 95% predicted. Diffusion capacity is moderately decreased at 45% predicted.       Alice Landers MD      [email or web address]/  D:  10/08/2021 15:32  T:  10/08/2021 17:56  JOB #:  [id number]

## 2021-10-11 ENCOUNTER — OFFICE VISIT (OUTPATIENT)
Dept: ONCOLOGY | Age: 46
End: 2021-10-11
Payer: COMMERCIAL

## 2021-10-11 ENCOUNTER — HOSPITAL ENCOUNTER (OUTPATIENT)
Dept: INFUSION THERAPY | Age: 46
Discharge: HOME | End: 2021-10-11
Payer: COMMERCIAL

## 2021-10-11 VITALS
TEMPERATURE: 98.2 F | HEART RATE: 92 BPM | WEIGHT: 315 LBS | HEIGHT: 75 IN | DIASTOLIC BLOOD PRESSURE: 86 MMHG | BODY MASS INDEX: 39.17 KG/M2 | SYSTOLIC BLOOD PRESSURE: 138 MMHG | OXYGEN SATURATION: 98 %

## 2021-10-11 VITALS
DIASTOLIC BLOOD PRESSURE: 90 MMHG | SYSTOLIC BLOOD PRESSURE: 128 MMHG | HEIGHT: 75 IN | BODY MASS INDEX: 39.17 KG/M2 | HEART RATE: 82 BPM | OXYGEN SATURATION: 98 % | RESPIRATION RATE: 18 BRPM | WEIGHT: 315 LBS | TEMPERATURE: 97.7 F

## 2021-10-11 DIAGNOSIS — L03.115 CELLULITIS OF RIGHT LEG: ICD-10-CM

## 2021-10-11 DIAGNOSIS — C48.0 GERM CELL TUMOR OF RETROPERITONEUM (HCC): Primary | ICD-10-CM

## 2021-10-11 DIAGNOSIS — L03.115 CELLULITIS OF RIGHT LOWER EXTREMITY: ICD-10-CM

## 2021-10-11 DIAGNOSIS — D49.9 EXTRAGONADAL GERM CELL TUMOR: Primary | ICD-10-CM

## 2021-10-11 DIAGNOSIS — Z51.11 CHEMOTHERAPY MANAGEMENT, ENCOUNTER FOR: ICD-10-CM

## 2021-10-11 LAB
ALBUMIN SERPL-MCNC: 3.1 G/DL (ref 3.5–5)
ALBUMIN/GLOB SERPL: 0.8 {RATIO} (ref 1.1–2.2)
ALP SERPL-CCNC: 79 U/L (ref 45–117)
ALT SERPL-CCNC: 30 U/L (ref 12–78)
ANION GAP SERPL CALC-SCNC: 6 MMOL/L (ref 5–15)
AST SERPL-CCNC: 21 U/L (ref 15–37)
BASOPHILS # BLD: 0 K/UL (ref 0–0.1)
BASOPHILS NFR BLD: 0 % (ref 0–1)
BILIRUB SERPL-MCNC: 0.4 MG/DL (ref 0.2–1)
BUN SERPL-MCNC: 9 MG/DL (ref 6–20)
BUN/CREAT SERPL: 8 (ref 12–20)
CALCIUM SERPL-MCNC: 8.9 MG/DL (ref 8.5–10.1)
CHLORIDE SERPL-SCNC: 104 MMOL/L (ref 97–108)
CO2 SERPL-SCNC: 27 MMOL/L (ref 21–32)
CREAT SERPL-MCNC: 1.06 MG/DL (ref 0.7–1.3)
DIFFERENTIAL METHOD BLD: ABNORMAL
EOSINOPHIL # BLD: 0.2 K/UL (ref 0–0.4)
EOSINOPHIL NFR BLD: 6 % (ref 0–7)
ERYTHROCYTE [DISTWIDTH] IN BLOOD BY AUTOMATED COUNT: 17.6 % (ref 11.5–14.5)
GLOBULIN SER CALC-MCNC: 4.1 G/DL (ref 2–4)
GLUCOSE SERPL-MCNC: 222 MG/DL (ref 65–100)
HCG SERPL-ACNC: <1 MIU/ML
HCT VFR BLD AUTO: 31.5 % (ref 36.6–50.3)
HGB BLD-MCNC: 10.1 G/DL (ref 12.1–17)
IMM GRANULOCYTES # BLD AUTO: 0.1 K/UL (ref 0–0.04)
IMM GRANULOCYTES NFR BLD AUTO: 2 % (ref 0–0.5)
LDH SERPL L TO P-CCNC: 273 U/L (ref 85–241)
LYMPHOCYTES # BLD: 1.3 K/UL (ref 0.8–3.5)
LYMPHOCYTES NFR BLD: 51 % (ref 12–49)
MAGNESIUM SERPL-MCNC: 1.7 MG/DL (ref 1.6–2.4)
MCH RBC QN AUTO: 26.1 PG (ref 26–34)
MCHC RBC AUTO-ENTMCNC: 32.1 G/DL (ref 30–36.5)
MCV RBC AUTO: 81.4 FL (ref 80–99)
MONOCYTES # BLD: 0.7 K/UL (ref 0–1)
MONOCYTES NFR BLD: 27 % (ref 5–13)
NEUTS SEG # BLD: 0.4 K/UL (ref 1.8–8)
NEUTS SEG NFR BLD: 14 % (ref 32–75)
NRBC # BLD: 0.02 K/UL (ref 0–0.01)
NRBC BLD-RTO: 0.7 PER 100 WBC
PLATELET # BLD AUTO: 226 K/UL (ref 150–400)
PLATELET COMMENTS,PCOM: ABNORMAL
PMV BLD AUTO: 11.2 FL (ref 8.9–12.9)
POTASSIUM SERPL-SCNC: 3.9 MMOL/L (ref 3.5–5.1)
PROT SERPL-MCNC: 7.2 G/DL (ref 6.4–8.2)
RBC # BLD AUTO: 3.87 M/UL (ref 4.1–5.7)
RBC MORPH BLD: ABNORMAL
RBC MORPH BLD: ABNORMAL
SODIUM SERPL-SCNC: 137 MMOL/L (ref 136–145)
WBC # BLD AUTO: 2.7 K/UL (ref 4.1–11.1)
WBC MORPH BLD: ABNORMAL

## 2021-10-11 PROCEDURE — 96375 TX/PRO/DX INJ NEW DRUG ADDON: CPT

## 2021-10-11 PROCEDURE — 83735 ASSAY OF MAGNESIUM: CPT

## 2021-10-11 PROCEDURE — 82105 ALPHA-FETOPROTEIN SERUM: CPT

## 2021-10-11 PROCEDURE — 84702 CHORIONIC GONADOTROPIN TEST: CPT

## 2021-10-11 PROCEDURE — 83615 LACTATE (LD) (LDH) ENZYME: CPT

## 2021-10-11 PROCEDURE — 77030012965 HC NDL HUBR BBMI -A

## 2021-10-11 PROCEDURE — 74011250637 HC RX REV CODE- 250/637: Performed by: INTERNAL MEDICINE

## 2021-10-11 PROCEDURE — 80053 COMPREHEN METABOLIC PANEL: CPT

## 2021-10-11 PROCEDURE — 96417 CHEMO IV INFUS EACH ADDL SEQ: CPT

## 2021-10-11 PROCEDURE — 74011250636 HC RX REV CODE- 250/636: Performed by: INTERNAL MEDICINE

## 2021-10-11 PROCEDURE — 96411 CHEMO IV PUSH ADDL DRUG: CPT

## 2021-10-11 PROCEDURE — 96366 THER/PROPH/DIAG IV INF ADDON: CPT

## 2021-10-11 PROCEDURE — 74011000258 HC RX REV CODE- 258: Performed by: INTERNAL MEDICINE

## 2021-10-11 PROCEDURE — 99215 OFFICE O/P EST HI 40 MIN: CPT | Performed by: INTERNAL MEDICINE

## 2021-10-11 PROCEDURE — 85025 COMPLETE CBC W/AUTO DIFF WBC: CPT

## 2021-10-11 PROCEDURE — 36415 COLL VENOUS BLD VENIPUNCTURE: CPT

## 2021-10-11 PROCEDURE — 96367 TX/PROPH/DG ADDL SEQ IV INF: CPT

## 2021-10-11 PROCEDURE — 96413 CHEMO IV INFUSION 1 HR: CPT

## 2021-10-11 RX ORDER — DIPHENHYDRAMINE HYDROCHLORIDE 50 MG/ML
25 INJECTION, SOLUTION INTRAMUSCULAR; INTRAVENOUS ONCE
Status: COMPLETED | OUTPATIENT
Start: 2021-10-11 | End: 2021-10-11

## 2021-10-11 RX ORDER — SODIUM CHLORIDE 0.9 % (FLUSH) 0.9 %
10 SYRINGE (ML) INJECTION AS NEEDED
Status: DISPENSED | OUTPATIENT
Start: 2021-10-11 | End: 2021-10-11

## 2021-10-11 RX ORDER — CEPHALEXIN 500 MG/1
500 CAPSULE ORAL 4 TIMES DAILY
Qty: 28 CAPSULE | Refills: 0 | Status: SHIPPED | OUTPATIENT
Start: 2021-10-11 | End: 2021-10-18

## 2021-10-11 RX ORDER — SODIUM CHLORIDE 9 MG/ML
1000 INJECTION, SOLUTION INTRAVENOUS ONCE
Status: CANCELLED
Start: 2021-10-18 | End: 2021-10-18

## 2021-10-11 RX ORDER — PALONOSETRON 0.05 MG/ML
0.25 INJECTION, SOLUTION INTRAVENOUS ONCE
Status: COMPLETED | OUTPATIENT
Start: 2021-10-11 | End: 2021-10-11

## 2021-10-11 RX ORDER — SODIUM CHLORIDE 9 MG/ML
10 INJECTION INTRAMUSCULAR; INTRAVENOUS; SUBCUTANEOUS AS NEEDED
Status: ACTIVE | OUTPATIENT
Start: 2021-10-11 | End: 2021-10-11

## 2021-10-11 RX ORDER — ACETAMINOPHEN 325 MG/1
650 TABLET ORAL ONCE
Status: COMPLETED | OUTPATIENT
Start: 2021-10-11 | End: 2021-10-11

## 2021-10-11 RX ORDER — DEXAMETHASONE SODIUM PHOSPHATE 100 MG/10ML
6 INJECTION INTRAMUSCULAR; INTRAVENOUS ONCE
Status: COMPLETED | OUTPATIENT
Start: 2021-10-11 | End: 2021-10-11

## 2021-10-11 RX ORDER — HEPARIN 100 UNIT/ML
300-500 SYRINGE INTRAVENOUS AS NEEDED
Status: DISPENSED | OUTPATIENT
Start: 2021-10-11 | End: 2021-10-11

## 2021-10-11 RX ORDER — SODIUM CHLORIDE 9 MG/ML
25 INJECTION, SOLUTION INTRAVENOUS CONTINUOUS
Status: DISPENSED | OUTPATIENT
Start: 2021-10-11 | End: 2021-10-11

## 2021-10-11 RX ADMIN — SODIUM CHLORIDE 10 ML: 9 INJECTION INTRAMUSCULAR; INTRAVENOUS; SUBCUTANEOUS at 15:00

## 2021-10-11 RX ADMIN — Medication 500 UNITS: at 15:00

## 2021-10-11 RX ADMIN — Medication 10 ML: at 08:16

## 2021-10-11 RX ADMIN — SODIUM CHLORIDE 10 ML: 9 INJECTION INTRAMUSCULAR; INTRAVENOUS; SUBCUTANEOUS at 08:42

## 2021-10-11 RX ADMIN — SODIUM CHLORIDE 25 ML/HR: 9 INJECTION, SOLUTION INTRAVENOUS at 09:42

## 2021-10-11 RX ADMIN — SODIUM CHLORIDE 10 ML: 9 INJECTION INTRAMUSCULAR; INTRAVENOUS; SUBCUTANEOUS at 08:39

## 2021-10-11 RX ADMIN — DIPHENHYDRAMINE HYDROCHLORIDE 25 MG: 50 INJECTION INTRAMUSCULAR; INTRAVENOUS at 09:38

## 2021-10-11 RX ADMIN — POTASSIUM CHLORIDE: 2 INJECTION, SOLUTION, CONCENTRATE INTRAVENOUS at 10:10

## 2021-10-11 RX ADMIN — ACETAMINOPHEN 650 MG: 325 TABLET ORAL at 09:38

## 2021-10-11 RX ADMIN — SODIUM CHLORIDE 10 ML: 9 INJECTION INTRAMUSCULAR; INTRAVENOUS; SUBCUTANEOUS at 08:16

## 2021-10-11 RX ADMIN — CISPLATIN 63.2 MG: 1 INJECTION INTRAVENOUS at 13:04

## 2021-10-11 RX ADMIN — PALONOSETRON 0.25 MG: 0.25 INJECTION, SOLUTION INTRAVENOUS at 09:49

## 2021-10-11 RX ADMIN — BLEOMYCIN SULFATE 30 UNITS: 30 POWDER, FOR SOLUTION INTRAMUSCULAR; INTRAPLEURAL; INTRAVENOUS; SUBCUTANEOUS at 11:26

## 2021-10-11 RX ADMIN — DEXAMETHASONE SODIUM PHOSPHATE 6 MG: 10 INJECTION INTRAMUSCULAR; INTRAVENOUS at 09:38

## 2021-10-11 RX ADMIN — SODIUM CHLORIDE 10 ML: 9 INJECTION INTRAMUSCULAR; INTRAVENOUS; SUBCUTANEOUS at 08:43

## 2021-10-11 RX ADMIN — FOSAPREPITANT 150 MG: 150 INJECTION, POWDER, LYOPHILIZED, FOR SOLUTION INTRAVENOUS at 09:44

## 2021-10-11 RX ADMIN — ETOPOSIDE 316 MG: 20 INJECTION INTRAVENOUS at 11:44

## 2021-10-11 NOTE — PROGRESS NOTES
Yahir Gandara is a 48 y.o. 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 Patient with no complaints of pain at this time.

## 2021-10-11 NOTE — PROGRESS NOTES
Outpatient Infusion Center - Chemotherapy Progress Note    0830 Pt admit to [facility] for C 3 D 1 Bleomycin/Etoposide/Cisplatin/hydration ambulatory in stable condition. Assessment completed. No new concerns voiced. PAC with positive blood return.   Chemotherapy Flowsheet 10/11/2021   Cycle C3D1   Date 10/11/2021   Drug / Regimen Bleomycin, Cisplatin, Etoposide   Dosage -   Pre Hydration -   Post Hydration -   Pre Meds -   Notes -       Visit Vitals  /86   Pulse 92   Temp 98.2 °F (36.8 °C)   Resp 20   Ht 6' 3\" (1.905 m)   Wt (!) 179.2 kg (395 lb)   SpO2 98%   BMI 49.37 kg/m²   Assessment and PAC assess-Sunita Alvarenga  Medications:  Medications Administered     0.9% sodium chloride 1,000 mL with potassium chloride 10 mEq, magnesium sulfate 2 g infusion     Admin Date  10/11/2021 Action  New Bag Dose   Rate  1,000 mL/hr Route  IntraVENous Administered By  Josh Wells RN          0.9% sodium chloride infusion     Admin Date  10/11/2021 Action  New Bag Dose  25 mL/hr Rate  25 mL/hr Route  IntraVENous Administered By  Josh Wells RN          0.9% sodium chloride injection 10 mL     Admin Date  10/11/2021 Action  Given Dose  10 mL Route  IntraVENous Administered By  Stef Mckeon RN           Admin Date  10/11/2021 Action  Given Dose  10 mL Route  IntraVENous Administered By  Josh Wells RN           Admin Date  10/11/2021 Action  Given Dose  10 mL Route  IntraVENous Administered By  Josh Wells RN           Admin Date  10/11/2021 Action  Given Dose  10 mL Route  IntraVENous Administered By  Josh Wells RN           Admin Date  10/11/2021 Action  Given Dose  10 mL Route  IntraVENous Administered By  Josh Wells RN          acetaminophen (TYLENOL) tablet 650 mg     Admin Date  10/11/2021 Action  Given Dose  650 mg Route  Oral Administered By  Josh Wells RN          bleomycin (BLEOCIN) 30 Units in 0.9% sodium chloride 50 mL, overfill volume 5 mL chemo infusion     Admin Date  10/11/2021 Action  New Bag Dose  30 Units Rate  390 mL/hr Route  IntraVENous Administered By  Charley Mcgrath RN          CISplatin (PLATINOL) 63.2 mg in 0.9% sodium chloride 500 mL, overfill volume 50 mL chemo infusion     Admin Date  10/11/2021 Action  New Bag Dose  63.2 mg Rate  613.2 mL/hr Route  IntraVENous Administered By  Charley Mcgrath RN          dexamethasone (DECADRON) 10 mg/mL injection 6 mg     Admin Date  10/11/2021 Action  Given Dose  6 mg Route  IntraVENous Administered By  Charley Mcgrath RN          diphenhydrAMINE (BENADRYL) injection 25 mg     Admin Date  10/11/2021 Action  Given Dose  25 mg Route  IntraVENous Administered By  Charley Mcgrath RN          etoposide (VEPESID) 316 mg in 0.9% sodium chloride 1,000 mL, overfill volume 100 mL chemo infusion     Admin Date  10/11/2021 Action  New Bag Dose  316 mg Rate  1,115.8 mL/hr Route  IntraVENous Administered By  Charley Mcgrath RN          fosaprepitant (EMEND) 150 mg in 0.9% sodium chloride 150 mL IVPB     Admin Date  10/11/2021 Action  New Bag Dose  150 mg Rate  450 mL/hr Route  IntraVENous Administered By  Charley Mcgrath RN          heparin (porcine) pf 300-500 Units     Admin Date  10/11/2021 Action  Given Dose  500 Units Route  InterCATHeter Administered By  Charley Mcgrath RN          palonosetron HCl (ALOXI) injection 0.25 mg     Admin Date  10/11/2021 Action  Given Dose  0.25 mg Route  IntraVENous Administered By  Charley Mcgrath RN          sodium chloride (NS) flush 10 mL     Admin Date  10/11/2021 Action  Given Dose  10 mL Route  IntraVENous Administered By  Candy Escalante RN                1500 Pt tolerated treatment well. PAC maintained positive blood return throughout treatment, flushed with positive blood return at conclusion and throughout treatment. Acosta needle remains in place fpr tomorrow use, capped and secured. D/c home ambulatory in no distress. Pt aware of next appointment scheduled for 9/12/21.     Recent Results (from the past 12 hour(s))   CBC WITH AUTOMATED DIFF    Collection Time: 10/11/21  8:07 AM   Result Value Ref Range    WBC 2.7 (L) 4.1 - 11.1 K/uL    RBC 3.87 (L) 4.10 - 5.70 M/uL    HGB 10.1 (L) 12.1 - 17.0 g/dL    HCT 31.5 (L) 36.6 - 50.3 %    MCV 81.4 80.0 - 99.0 FL    MCH 26.1 26.0 - 34.0 PG    MCHC 32.1 30.0 - 36.5 g/dL    RDW 17.6 (H) 11.5 - 14.5 %    PLATELET 286 649 - 929 K/uL    MPV 11.2 8.9 - 12.9 FL    NRBC 0.7 (H) 0  WBC    ABSOLUTE NRBC 0.02 (H) 0.00 - 0.01 K/uL    NEUTROPHILS 14 (L) 32 - 75 %    LYMPHOCYTES 51 (H) 12 - 49 %    MONOCYTES 27 (H) 5 - 13 %    EOSINOPHILS 6 0 - 7 %    BASOPHILS 0 0 - 1 %    IMMATURE GRANULOCYTES 2 (H) 0.0 - 0.5 %    ABS. NEUTROPHILS 0.4 (L) 1.8 - 8.0 K/UL    ABS. LYMPHOCYTES 1.3 0.8 - 3.5 K/UL    ABS. MONOCYTES 0.7 0.0 - 1.0 K/UL    ABS. EOSINOPHILS 0.2 0.0 - 0.4 K/UL    ABS. BASOPHILS 0.0 0.0 - 0.1 K/UL    ABS. IMM. GRANS. 0.1 (H) 0.00 - 0.04 K/UL    DF SMEAR SCANNED      PLATELET COMMENTS Large Platelets      RBC COMMENTS ANISOCYTOSIS  1+        RBC COMMENTS POLYCHROMASIA  PRESENT        WBC COMMENTS ATYPICAL LYMPHOCYTES PRESENT     METABOLIC PANEL, COMPREHENSIVE    Collection Time: 10/11/21  8:07 AM   Result Value Ref Range    Sodium 137 136 - 145 mmol/L    Potassium 3.9 3.5 - 5.1 mmol/L    Chloride 104 97 - 108 mmol/L    CO2 27 21 - 32 mmol/L    Anion gap 6 5 - 15 mmol/L    Glucose 222 (H) 65 - 100 mg/dL    BUN 9 6 - 20 MG/DL    Creatinine 1.06 0.70 - 1.30 MG/DL    BUN/Creatinine ratio 8 (L) 12 - 20      GFR est AA >60 >60 ml/min/1.73m2    GFR est non-AA >60 >60 ml/min/1.73m2    Calcium 8.9 8.5 - 10.1 MG/DL    Bilirubin, total 0.4 0.2 - 1.0 MG/DL    ALT (SGPT) 30 12 - 78 U/L    AST (SGOT) 21 15 - 37 U/L    Alk.  phosphatase 79 45 - 117 U/L    Protein, total 7.2 6.4 - 8.2 g/dL    Albumin 3.1 (L) 3.5 - 5.0 g/dL    Globulin 4.1 (H) 2.0 - 4.0 g/dL    A-G Ratio 0.8 (L) 1.1 - 2.2     MAGNESIUM    Collection Time: 10/11/21  8:07 AM   Result Value Ref Range    Magnesium 1.7 1.6 - 2.4 mg/dL   LD    Collection Time: 10/11/21  8:07 AM   Result Value Ref Range     (H) 85 - 241 U/L   BETA HCG, QT    Collection Time: 10/11/21  8:07 AM   Result Value Ref Range    Beta HCG, QT <1 MIU/ML

## 2021-10-12 ENCOUNTER — HOSPITAL ENCOUNTER (OUTPATIENT)
Dept: INFUSION THERAPY | Age: 46
Discharge: HOME | End: 2021-10-12
Payer: COMMERCIAL

## 2021-10-12 VITALS
OXYGEN SATURATION: 99 % | RESPIRATION RATE: 18 BRPM | HEART RATE: 89 BPM | DIASTOLIC BLOOD PRESSURE: 80 MMHG | TEMPERATURE: 97.8 F | SYSTOLIC BLOOD PRESSURE: 129 MMHG

## 2021-10-12 DIAGNOSIS — C48.0 GERM CELL TUMOR OF RETROPERITONEUM (HCC): Primary | ICD-10-CM

## 2021-10-12 LAB — AFP-TM SERPL-MCNC: 4.1 NG/ML (ref 0–8.3)

## 2021-10-12 PROCEDURE — 77030016057 HC NDL HUBR APOL -B

## 2021-10-12 PROCEDURE — 96372 THER/PROPH/DIAG INJ SC/IM: CPT

## 2021-10-12 PROCEDURE — 96413 CHEMO IV INFUSION 1 HR: CPT

## 2021-10-12 PROCEDURE — 74011250636 HC RX REV CODE- 250/636: Performed by: INTERNAL MEDICINE

## 2021-10-12 PROCEDURE — 96417 CHEMO IV INFUS EACH ADDL SEQ: CPT

## 2021-10-12 PROCEDURE — 96367 TX/PROPH/DG ADDL SEQ IV INF: CPT

## 2021-10-12 RX ORDER — DEXAMETHASONE SODIUM PHOSPHATE 100 MG/10ML
6 INJECTION INTRAMUSCULAR; INTRAVENOUS ONCE
Status: COMPLETED | OUTPATIENT
Start: 2021-10-12 | End: 2021-10-12

## 2021-10-12 RX ORDER — SODIUM CHLORIDE 9 MG/ML
25 INJECTION, SOLUTION INTRAVENOUS CONTINUOUS
Status: DISPENSED | OUTPATIENT
Start: 2021-10-12 | End: 2021-10-12

## 2021-10-12 RX ORDER — SODIUM CHLORIDE 9 MG/ML
10 INJECTION INTRAMUSCULAR; INTRAVENOUS; SUBCUTANEOUS AS NEEDED
Status: ACTIVE | OUTPATIENT
Start: 2021-10-12 | End: 2021-10-12

## 2021-10-12 RX ORDER — ONDANSETRON 2 MG/ML
8 INJECTION INTRAMUSCULAR; INTRAVENOUS ONCE
Status: COMPLETED | OUTPATIENT
Start: 2021-10-12 | End: 2021-10-12

## 2021-10-12 RX ORDER — HEPARIN 100 UNIT/ML
300-500 SYRINGE INTRAVENOUS AS NEEDED
Status: ACTIVE | OUTPATIENT
Start: 2021-10-12 | End: 2021-10-12

## 2021-10-12 RX ORDER — SODIUM CHLORIDE 0.9 % (FLUSH) 0.9 %
10 SYRINGE (ML) INJECTION AS NEEDED
Status: DISPENSED | OUTPATIENT
Start: 2021-10-12 | End: 2021-10-12

## 2021-10-12 RX ADMIN — CISPLATIN 63.2 MG: 1 INJECTION INTRAVENOUS at 12:53

## 2021-10-12 RX ADMIN — ONDANSETRON 8 MG: 2 INJECTION INTRAMUSCULAR; INTRAVENOUS at 10:00

## 2021-10-12 RX ADMIN — SODIUM CHLORIDE 25 ML/HR: 9 INJECTION, SOLUTION INTRAVENOUS at 09:58

## 2021-10-12 RX ADMIN — Medication 500 UNITS: at 14:00

## 2021-10-12 RX ADMIN — Medication 10 ML: at 14:00

## 2021-10-12 RX ADMIN — POTASSIUM CHLORIDE: 2 INJECTION, SOLUTION, CONCENTRATE INTRAVENOUS at 10:35

## 2021-10-12 RX ADMIN — ETOPOSIDE 316 MG: 20 INJECTION INTRAVENOUS at 11:39

## 2021-10-12 RX ADMIN — DEXAMETHASONE SODIUM PHOSPHATE 6 MG: 10 INJECTION INTRAMUSCULAR; INTRAVENOUS at 10:05

## 2021-10-12 NOTE — PROGRESS NOTES
[facility] Progress Note    Date: 2021    Name: Juan Calero    MRN: [id number]         : 1975    Mr. Ekaterina Saleh Arrived ambulatory and in no distress for C3D2 of Cisplatin + Etoposide Regimen. Assessment was completed, patient reports feeling well today. Chemotherapy Flowsheet 10/12/2021   Cycle C3D2   Date 10/12/2021   Drug / Regimen Cisplatin + Etoposide    Dosage -   Pre Hydration -   Post Hydration -   Pre Meds -   Notes -         Mr. James Hunt vitals were reviewed.     Patient Vitals for the past 12 hrs:   Temp Pulse Resp BP SpO2   10/12/21 0955 97.8 °F (36.6 °C) 89 18 129/80 99 %         Medications:    Medications Administered     0.9% sodium chloride 1,000 mL with potassium chloride 10 mEq, magnesium sulfate 2 g infusion     Admin Date  10/12/2021 Action  New Bag Dose   Rate  1,000 mL/hr Route  IntraVENous Administered By  Aleksandr Ch RN          0.9% sodium chloride infusion     Admin Date  10/12/2021 Action  New Bag Dose  25 mL/hr Rate  25 mL/hr Route  IntraVENous Administered By  Aleksandr Ch RN          CISplatin (PLATINOL) 63.2 mg in 0.9% sodium chloride 500 mL, overfill volume 50 mL chemo infusion     Admin Date  10/12/2021 Action  New Bag Dose  63.2 mg Rate  613.2 mL/hr Route  IntraVENous Administered By  Aleksandr Ch RN          dexamethasone (DECADRON) 10 mg/mL injection 6 mg     Admin Date  10/12/2021 Action  Given Dose  6 mg Route  IntraVENous Administered By  Aleksandr Ch RN          etoposide (VEPESID) 316 mg in 0.9% sodium chloride 1,000 mL, overfill volume 100 mL chemo infusion     Admin Date  10/12/2021 Action  New Bag Dose  316 mg Rate  1,115.8 mL/hr Route  IntraVENous Administered By  Aleksandr Ch RN          heparin (porcine) pf 300-500 Units     Admin Date  10/12/2021 Action  Given Dose  500 Units Route  InterCATHeter Administered By  Aleksandr Ch RN          ondansetron [facility]) injection 8 mg     Admin Date  10/12/2021 Action  Given Dose  8 mg Route  IntraVENous Administered By  Andre Darnell, SILVIANO          sodium chloride (NS) flush 10 mL     Admin Date  10/12/2021 Action  Given Dose  10 mL Route  IntraVENous Administered By  Andre Darnell RN                  Mr. Krystle Vasquez tolerated treatment well and was discharged from [place] [address] in stable condition. Port capped & secured for appointment tomorrow. He is to return on 10/13/21 for his next appointment.     Sarai Ly RN  October 12, 2021

## 2021-10-13 ENCOUNTER — HOSPITAL ENCOUNTER (OUTPATIENT)
Dept: INFUSION THERAPY | Age: 46
Discharge: HOME | End: 2021-10-13
Payer: COMMERCIAL

## 2021-10-13 VITALS
WEIGHT: 315 LBS | HEART RATE: 80 BPM | HEIGHT: 75 IN | OXYGEN SATURATION: 97 % | SYSTOLIC BLOOD PRESSURE: 130 MMHG | TEMPERATURE: 97.8 F | RESPIRATION RATE: 18 BRPM | BODY MASS INDEX: 39.17 KG/M2 | DIASTOLIC BLOOD PRESSURE: 80 MMHG

## 2021-10-13 DIAGNOSIS — C48.0 GERM CELL TUMOR OF RETROPERITONEUM (HCC): Primary | ICD-10-CM

## 2021-10-13 LAB
ANION GAP SERPL CALC-SCNC: 5 MMOL/L (ref 5–15)
BUN SERPL-MCNC: 15 MG/DL (ref 6–20)
BUN/CREAT SERPL: 13 (ref 12–20)
CALCIUM SERPL-MCNC: 7.9 MG/DL (ref 8.5–10.1)
CHLORIDE SERPL-SCNC: 105 MMOL/L (ref 97–108)
CO2 SERPL-SCNC: 27 MMOL/L (ref 21–32)
CREAT SERPL-MCNC: 1.13 MG/DL (ref 0.7–1.3)
GLUCOSE SERPL-MCNC: 285 MG/DL (ref 65–100)
POTASSIUM SERPL-SCNC: 3.9 MMOL/L (ref 3.5–5.1)
SODIUM SERPL-SCNC: 137 MMOL/L (ref 136–145)

## 2021-10-13 PROCEDURE — 74011250636 HC RX REV CODE- 250/636: Performed by: INTERNAL MEDICINE

## 2021-10-13 PROCEDURE — 80048 BASIC METABOLIC PNL TOTAL CA: CPT

## 2021-10-13 PROCEDURE — 77030012965 HC NDL HUBR BBMI -A

## 2021-10-13 PROCEDURE — 96360 HYDRATION IV INFUSION INIT: CPT

## 2021-10-13 PROCEDURE — 96417 CHEMO IV INFUS EACH ADDL SEQ: CPT

## 2021-10-13 PROCEDURE — 36415 COLL VENOUS BLD VENIPUNCTURE: CPT

## 2021-10-13 PROCEDURE — 96413 CHEMO IV INFUSION 1 HR: CPT

## 2021-10-13 PROCEDURE — 96375 TX/PRO/DX INJ NEW DRUG ADDON: CPT

## 2021-10-13 RX ORDER — SODIUM CHLORIDE 0.9 % (FLUSH) 0.9 %
10 SYRINGE (ML) INJECTION AS NEEDED
Status: DISCONTINUED | OUTPATIENT
Start: 2021-10-13 | End: 2021-10-14 | Stop reason: HOSPADM

## 2021-10-13 RX ORDER — SODIUM CHLORIDE 9 MG/ML
25 INJECTION, SOLUTION INTRAVENOUS CONTINUOUS
Status: DISCONTINUED | OUTPATIENT
Start: 2021-10-13 | End: 2021-10-14 | Stop reason: HOSPADM

## 2021-10-13 RX ORDER — DEXAMETHASONE SODIUM PHOSPHATE 100 MG/10ML
6 INJECTION INTRAMUSCULAR; INTRAVENOUS ONCE
Status: COMPLETED | OUTPATIENT
Start: 2021-10-13 | End: 2021-10-13

## 2021-10-13 RX ORDER — SODIUM CHLORIDE 9 MG/ML
10 INJECTION INTRAMUSCULAR; INTRAVENOUS; SUBCUTANEOUS AS NEEDED
Status: DISCONTINUED | OUTPATIENT
Start: 2021-10-13 | End: 2021-10-14 | Stop reason: HOSPADM

## 2021-10-13 RX ORDER — ONDANSETRON 2 MG/ML
8 INJECTION INTRAMUSCULAR; INTRAVENOUS ONCE
Status: COMPLETED | OUTPATIENT
Start: 2021-10-13 | End: 2021-10-13

## 2021-10-13 RX ORDER — HEPARIN 100 UNIT/ML
300-500 SYRINGE INTRAVENOUS AS NEEDED
Status: DISCONTINUED | OUTPATIENT
Start: 2021-10-13 | End: 2021-10-14 | Stop reason: HOSPADM

## 2021-10-13 RX ADMIN — Medication 500 UNITS: at 18:07

## 2021-10-13 RX ADMIN — ETOPOSIDE 316 MG: 20 INJECTION INTRAVENOUS at 15:55

## 2021-10-13 RX ADMIN — DEXAMETHASONE SODIUM PHOSPHATE 6 MG: 10 INJECTION INTRAMUSCULAR; INTRAVENOUS at 15:00

## 2021-10-13 RX ADMIN — SODIUM CHLORIDE 10 ML: 9 INJECTION INTRAMUSCULAR; INTRAVENOUS; SUBCUTANEOUS at 18:07

## 2021-10-13 RX ADMIN — SODIUM CHLORIDE 25 ML/HR: 9 INJECTION, SOLUTION INTRAVENOUS at 13:50

## 2021-10-13 RX ADMIN — POTASSIUM CHLORIDE: 2 INJECTION, SOLUTION, CONCENTRATE INTRAVENOUS at 13:49

## 2021-10-13 RX ADMIN — ONDANSETRON 8 MG: 2 INJECTION INTRAMUSCULAR; INTRAVENOUS at 15:00

## 2021-10-13 RX ADMIN — CISPLATIN 63.2 MG: 1 INJECTION INTRAVENOUS at 17:06

## 2021-10-13 NOTE — PROGRESS NOTES
[facility] Progress Note    Date: 2021    Name: Juan Calero    MRN: [id number]         : 1975    Mr. Ekaterina Saleh Arrived ambulatory and in no distress for C3D3 of Cisplatin and Etoposide Regimen. Assessment was completed, no acute issues at this time, no new complaints voiced. Right chest wall port accessed without difficulty, labs drawn & sent for processing. Chemotherapy Flowsheet 10/13/2021   Cycle C3D3   Date 10/13/2021   Drug / Regimen Cisplatin+ Etoposide   Dosage -   Pre Hydration yes   Post Hydration -   Pre Meds -   Notes -        Patient proceed to appointment with Dr. Ronald Smith. Mr. James Hunt vitals were reviewed. Visit Vitals  /77   Pulse 90   Temp 97.8 °F (36.6 °C)   Resp 18   Ht 6' 3\" (1.905 m)   Wt (!) 177.8 kg (392 lb)   SpO2 98%   BMI 49.00 kg/m²       Lab results were obtained and reviewed. Recent Results (from the past 12 hour(s))   METABOLIC PANEL, BASIC    Collection Time: 10/13/21  1:10 PM   Result Value Ref Range    Sodium 137 136 - 145 mmol/L    Potassium 3.9 3.5 - 5.1 mmol/L    Chloride 105 97 - 108 mmol/L    CO2 27 21 - 32 mmol/L    Anion gap 5 5 - 15 mmol/L    Glucose 285 (H) 65 - 100 mg/dL    BUN 15 6 - 20 MG/DL    Creatinine 1.13 0.70 - 1.30 MG/DL    BUN/Creatinine ratio 13 12 - 20      GFR est AA >60 >60 ml/min/1.73m2    GFR est non-AA >60 >60 ml/min/1.73m2    Calcium 7.9 (L) 8.5 - 10.1 MG/DL       Medications:      Mr. Ekaterina Saleh tolerated treatment well and was discharged from [place] [address] in stable condition. Port de-accessed, flushed & heparinized per protocol. He is to return on  at 1000 for his next appointment.     Margoth Pavon RN  2021

## 2021-10-14 ENCOUNTER — HOSPITAL ENCOUNTER (OUTPATIENT)
Dept: INFUSION THERAPY | Age: 46
Discharge: HOME | End: 2021-10-14
Payer: COMMERCIAL

## 2021-10-14 VITALS
DIASTOLIC BLOOD PRESSURE: 88 MMHG | SYSTOLIC BLOOD PRESSURE: 139 MMHG | RESPIRATION RATE: 18 BRPM | WEIGHT: 315 LBS | TEMPERATURE: 98 F | OXYGEN SATURATION: 96 % | HEIGHT: 75 IN | HEART RATE: 82 BPM | BODY MASS INDEX: 39.17 KG/M2

## 2021-10-14 DIAGNOSIS — C48.0 GERM CELL TUMOR OF RETROPERITONEUM (HCC): Primary | ICD-10-CM

## 2021-10-14 LAB
BASOPHILS # BLD: 0 K/UL (ref 0–0.1)
BASOPHILS NFR BLD: 0 % (ref 0–1)
DIFFERENTIAL METHOD BLD: ABNORMAL
EOSINOPHIL # BLD: 0 K/UL (ref 0–0.4)
EOSINOPHIL NFR BLD: 0 % (ref 0–7)
ERYTHROCYTE [DISTWIDTH] IN BLOOD BY AUTOMATED COUNT: 17.9 % (ref 11.5–14.5)
HCT VFR BLD AUTO: 30.6 % (ref 36.6–50.3)
HGB BLD-MCNC: 9.9 G/DL (ref 12.1–17)
IMM GRANULOCYTES # BLD AUTO: 0 K/UL (ref 0–0.04)
IMM GRANULOCYTES NFR BLD AUTO: 1 % (ref 0–0.5)
LYMPHOCYTES # BLD: 1.4 K/UL (ref 0.8–3.5)
LYMPHOCYTES NFR BLD: 33 % (ref 12–49)
MCH RBC QN AUTO: 25.6 PG (ref 26–34)
MCHC RBC AUTO-ENTMCNC: 32.4 G/DL (ref 30–36.5)
MCV RBC AUTO: 79.3 FL (ref 80–99)
MONOCYTES # BLD: 0.6 K/UL (ref 0–1)
MONOCYTES NFR BLD: 14 % (ref 5–13)
NEUTS SEG # BLD: 2.2 K/UL (ref 1.8–8)
NEUTS SEG NFR BLD: 52 % (ref 32–75)
NRBC # BLD: 0 K/UL (ref 0–0.01)
NRBC BLD-RTO: 0 PER 100 WBC
PLATELET # BLD AUTO: 301 K/UL (ref 150–400)
PMV BLD AUTO: 12 FL (ref 8.9–12.9)
RBC # BLD AUTO: 3.86 M/UL (ref 4.1–5.7)
WBC # BLD AUTO: 4.2 K/UL (ref 4.1–11.1)

## 2021-10-14 PROCEDURE — 96366 THER/PROPH/DIAG IV INF ADDON: CPT

## 2021-10-14 PROCEDURE — 74011250636 HC RX REV CODE- 250/636: Performed by: INTERNAL MEDICINE

## 2021-10-14 PROCEDURE — 96413 CHEMO IV INFUSION 1 HR: CPT

## 2021-10-14 PROCEDURE — 85025 COMPLETE CBC W/AUTO DIFF WBC: CPT

## 2021-10-14 PROCEDURE — 96375 TX/PRO/DX INJ NEW DRUG ADDON: CPT

## 2021-10-14 PROCEDURE — 96417 CHEMO IV INFUS EACH ADDL SEQ: CPT

## 2021-10-14 PROCEDURE — 36415 COLL VENOUS BLD VENIPUNCTURE: CPT

## 2021-10-14 RX ORDER — HEPARIN 100 UNIT/ML
300-500 SYRINGE INTRAVENOUS AS NEEDED
Status: ACTIVE | OUTPATIENT
Start: 2021-10-14 | End: 2021-10-14

## 2021-10-14 RX ORDER — DEXAMETHASONE SODIUM PHOSPHATE 100 MG/10ML
6 INJECTION INTRAMUSCULAR; INTRAVENOUS ONCE
Status: COMPLETED | OUTPATIENT
Start: 2021-10-14 | End: 2021-10-14

## 2021-10-14 RX ORDER — SODIUM CHLORIDE 9 MG/ML
25 INJECTION, SOLUTION INTRAVENOUS CONTINUOUS
Status: DISPENSED | OUTPATIENT
Start: 2021-10-14 | End: 2021-10-14

## 2021-10-14 RX ORDER — SODIUM CHLORIDE 0.9 % (FLUSH) 0.9 %
10 SYRINGE (ML) INJECTION AS NEEDED
Status: DISPENSED | OUTPATIENT
Start: 2021-10-14 | End: 2021-10-14

## 2021-10-14 RX ORDER — ONDANSETRON 2 MG/ML
8 INJECTION INTRAMUSCULAR; INTRAVENOUS ONCE
Status: COMPLETED | OUTPATIENT
Start: 2021-10-14 | End: 2021-10-14

## 2021-10-14 RX ORDER — SODIUM CHLORIDE 9 MG/ML
10 INJECTION INTRAMUSCULAR; INTRAVENOUS; SUBCUTANEOUS AS NEEDED
Status: ACTIVE | OUTPATIENT
Start: 2021-10-14 | End: 2021-10-14

## 2021-10-14 RX ADMIN — POTASSIUM CHLORIDE: 2 INJECTION, SOLUTION, CONCENTRATE INTRAVENOUS at 11:15

## 2021-10-14 RX ADMIN — ONDANSETRON 8 MG: 2 INJECTION INTRAMUSCULAR; INTRAVENOUS at 11:04

## 2021-10-14 RX ADMIN — Medication 500 UNITS: at 15:01

## 2021-10-14 RX ADMIN — CISPLATIN 63.2 MG: 1 INJECTION INTRAVENOUS at 13:51

## 2021-10-14 RX ADMIN — SODIUM CHLORIDE 10 ML: 9 INJECTION INTRAMUSCULAR; INTRAVENOUS; SUBCUTANEOUS at 15:01

## 2021-10-14 RX ADMIN — Medication 10 ML: at 11:00

## 2021-10-14 RX ADMIN — DEXAMETHASONE SODIUM PHOSPHATE 6 MG: 10 INJECTION INTRAMUSCULAR; INTRAVENOUS at 11:04

## 2021-10-14 RX ADMIN — SODIUM CHLORIDE 10 ML: 9 INJECTION INTRAMUSCULAR; INTRAVENOUS; SUBCUTANEOUS at 11:03

## 2021-10-14 RX ADMIN — SODIUM CHLORIDE 25 ML/HR: 9 INJECTION, SOLUTION INTRAVENOUS at 11:02

## 2021-10-14 RX ADMIN — ETOPOSIDE 316 MG: 20 INJECTION INTRAVENOUS at 12:30

## 2021-10-14 RX ADMIN — SODIUM CHLORIDE 10 ML: 9 INJECTION INTRAMUSCULAR; INTRAVENOUS; SUBCUTANEOUS at 11:01

## 2021-10-14 NOTE — PROGRESS NOTES
[facility]fusion Center - Chemotherapy Progress Note    1000 Pt admit to [facility] for C 3 D 4 Cisplatin/Etoposide ambulatory in stable condition. Assessment completed. No new concerns voiced. PAC with positive blood return.   Chemotherapy Flowsheet 10/14/2021   Cycle C 3 D 4   Date 10/14/2021   Drug / Regimen Cisplatin+ Etoposide   Dosage -   Pre Hydration -   Post Hydration -   Pre Meds -   Notes -       Visit Vitals  /70   Pulse 86   Temp 98 °F (36.7 °C)   Resp 18   Ht 6' 3\" (1.905 m)   Wt (!) 176.9 kg (390 lb)   SpO2 96%   BMI 48.75 kg/m²       Medications:  Medications Administered     0.9% sodium chloride 1,000 mL with potassium chloride 10 mEq, magnesium sulfate 2 g infusion     Admin Date  10/14/2021 Action  New Bag Dose   Rate  1,000 mL/hr Route  IntraVENous Administered By  Isabella Rutherford RN          0.9% sodium chloride infusion     Admin Date  10/14/2021 Action  New Bag Dose  25 mL/hr Rate  25 mL/hr Route  IntraVENous Administered By  Isabella Rutherford RN          0.9% sodium chloride injection 10 mL     Admin Date  10/14/2021 Action  Given Dose  10 mL Route  IntraVENous Administered By  Isabella Rutherford RN           [address] Date  10/14/2021 Action  Given Dose  10 mL Route  IntraVENous Administered By  Isabella Rutherford RN           Admin Date  10/14/2021 Action  Given Dose  10 mL Route  IntraVENous Administered By  Isabella Rutherford RN          CISplatin (PLATINOL) 63.2 mg in 0.9% sodium chloride 500 mL, overfill volume 50 mL chemo infusion     Admin Date  10/14/2021 Action  New Bag Dose  63.2 mg Rate  613.2 mL/hr Route  IntraVENous Administered By  Isabella Rutherford RN          dexamethasone (DECADRON) 10 mg/mL injection 6 mg     Admin Date  10/14/2021 Action  Given Dose  6 mg Route  IntraVENous Administered By  Isabella Rutherford RN          etoposide (VEPESID) 316 mg in 0.9% sodium chloride 1,000 mL, overfill volume 100 mL chemo infusion     Admin Date  10/14/2021 Action  New Bag Dose  316 mg Rate  1,115.8 mL/hr Route  IntraVENous Administered By  Joshua Kendall RN          heparin (porcine) pf 300-500 Units     Admin Date  10/14/2021 Action  Given Dose  500 Units Route  InterCATHeter Administered By  Joshua Kendall RN          ondansetron [facility]) injection 8 mg     Admin Date  10/14/2021 Action  Given Dose  8 mg Route  IntraVENous Administered By  Joshua Kendall RN          sodium chloride (NS) flush 10 mL     Admin Date  10/14/2021 Action  Given Dose  10 mL Route  IntraVENous Administered By  Joshua Kendall RN                1516 Pt tolerated treatment well. PAC maintained positive blood return throughout treatment, flushed with positive blood return at conclusion and throughout treatment Evonnie Loud flushed,capped and secured for tomorrow tx. D/c home ambulatory in no distress. Pt aware of next appointment scheduled for 10/15/21. Recent Results (from the past 12 hour(s))   CBC WITH AUTOMATED DIFF    Collection Time: 10/14/21  9:56 AM   Result Value Ref Range    WBC 4.2 4.1 - 11.1 K/uL    RBC 3.86 (L) 4.10 - 5.70 M/uL    HGB 9.9 (L) 12.1 - 17.0 g/dL    HCT 30.6 (L) 36.6 - 50.3 %    MCV 79.3 (L) 80.0 - 99.0 FL    MCH 25.6 (L) 26.0 - 34.0 PG    MCHC 32.4 30.0 - 36.5 g/dL    RDW 17.9 (H) 11.5 - 14.5 %    PLATELET 357 984 - 826 K/uL    MPV 12.0 8.9 - 12.9 FL    NRBC 0.0 0  WBC    ABSOLUTE NRBC 0.00 0.00 - 0.01 K/uL    NEUTROPHILS 52 32 - 75 %    LYMPHOCYTES 33 12 - 49 %    MONOCYTES 14 (H) 5 - 13 %    EOSINOPHILS 0 0 - 7 %    BASOPHILS 0 0 - 1 %    IMMATURE GRANULOCYTES 1 (H) 0.0 - 0.5 %    ABS. NEUTROPHILS 2.2 1.8 - 8.0 K/UL    ABS. LYMPHOCYTES 1.4 0.8 - 3.5 K/UL    ABS. MONOCYTES 0.6 0.0 - 1.0 K/UL    ABS. EOSINOPHILS 0.0 0.0 - 0.4 K/UL    ABS. BASOPHILS 0.0 0.0 - 0.1 K/UL    ABS. IMM.  GRANS. 0.0 0.00 - 0.04 K/UL    DF AUTOMATED

## 2021-10-15 ENCOUNTER — HOSPITAL ENCOUNTER (OUTPATIENT)
Dept: INFUSION THERAPY | Age: 46
End: 2021-10-15

## 2021-10-15 ENCOUNTER — HOSPITAL ENCOUNTER (OUTPATIENT)
Dept: INFUSION THERAPY | Age: 46
Discharge: HOME | End: 2021-10-15
Payer: COMMERCIAL

## 2021-10-15 VITALS
WEIGHT: 315 LBS | SYSTOLIC BLOOD PRESSURE: 131 MMHG | TEMPERATURE: 97.9 F | HEIGHT: 75 IN | DIASTOLIC BLOOD PRESSURE: 74 MMHG | BODY MASS INDEX: 39.17 KG/M2 | RESPIRATION RATE: 18 BRPM | HEART RATE: 87 BPM

## 2021-10-15 DIAGNOSIS — C48.0 GERM CELL TUMOR OF RETROPERITONEUM (HCC): Primary | ICD-10-CM

## 2021-10-15 PROCEDURE — 74011250636 HC RX REV CODE- 250/636: Performed by: INTERNAL MEDICINE

## 2021-10-15 PROCEDURE — 96413 CHEMO IV INFUSION 1 HR: CPT

## 2021-10-15 PROCEDURE — 96366 THER/PROPH/DIAG IV INF ADDON: CPT

## 2021-10-15 PROCEDURE — 96375 TX/PRO/DX INJ NEW DRUG ADDON: CPT

## 2021-10-15 PROCEDURE — 96417 CHEMO IV INFUS EACH ADDL SEQ: CPT

## 2021-10-15 RX ORDER — SODIUM CHLORIDE 9 MG/ML
25 INJECTION, SOLUTION INTRAVENOUS CONTINUOUS
Status: DISCONTINUED | OUTPATIENT
Start: 2021-10-15 | End: 2021-10-16 | Stop reason: HOSPADM

## 2021-10-15 RX ORDER — ONDANSETRON 2 MG/ML
8 INJECTION INTRAMUSCULAR; INTRAVENOUS ONCE
Status: COMPLETED | OUTPATIENT
Start: 2021-10-15 | End: 2021-10-15

## 2021-10-15 RX ORDER — DEXAMETHASONE SODIUM PHOSPHATE 100 MG/10ML
6 INJECTION INTRAMUSCULAR; INTRAVENOUS ONCE
Status: COMPLETED | OUTPATIENT
Start: 2021-10-15 | End: 2021-10-15

## 2021-10-15 RX ADMIN — POTASSIUM CHLORIDE: 2 INJECTION, SOLUTION, CONCENTRATE INTRAVENOUS at 13:59

## 2021-10-15 RX ADMIN — ONDANSETRON 8 MG: 2 INJECTION INTRAMUSCULAR; INTRAVENOUS at 13:53

## 2021-10-15 RX ADMIN — DEXAMETHASONE SODIUM PHOSPHATE 6 MG: 10 INJECTION INTRAMUSCULAR; INTRAVENOUS at 13:47

## 2021-10-15 RX ADMIN — SODIUM CHLORIDE 25 ML/HR: 9 INJECTION, SOLUTION INTRAVENOUS at 13:45

## 2021-10-15 RX ADMIN — ETOPOSIDE 316 MG: 20 INJECTION INTRAVENOUS at 15:13

## 2021-10-15 RX ADMIN — CISPLATIN 63.2 MG: 1 INJECTION INTRAVENOUS at 16:27

## 2021-10-15 NOTE — PROGRESS NOTES
[facility] VISIT NOTE  Date: October 15, 2021    Name: Edelmira Justice    MRN: [id number]         : 1975    1255  Mr. Jory Rodriguez Arrived ambulatory and in no distress for C3D5 of Etoposide and Cisplatin Regimen. Assessment was completed, no acute issues at this time, no new complaints voiced. Left chest wall port accessed prior to admission, port flushed and positive for blood return. 1. Do you have any symptoms of COVID-19? SOB, coughing, fever, or generally not feeling well NO    2. Have you been exposed to COVID-19 recently? NO    3. Have you had any recent contact with family/friend that has a pending COVID test? NO       Chemotherapy Flowsheet 10/15/2021   Cycle C3D5   Date 10/15/2021   Drug / Regimen Etoposide + Cisplatin   Dosage -   Pre Hydration given   Post Hydration -   Pre Meds given   Notes given           Mr. Chaya Heredia vitals were reviewed. Patient Vitals for the past 12 hrs:   Temp Pulse Resp BP   10/15/21 1728 97.9 °F (36.6 °C) 87 18 131/74   10/15/21 1258 97.5 °F (36.4 °C) 88 18 125/82         Lab results were obtained and reviewed. No results found for this or any previous visit (from the past 12 hour(s)).     Medications received:  Medications Administered     0.9% sodium chloride 1,000 mL with potassium chloride 10 mEq, magnesium sulfate 2 g infusion     Admin Date  10/15/2021 Action  New Bag Dose   Rate  1,000 mL/hr Route  IntraVENous Administered By  Asha Handley RN          0.9% sodium chloride infusion     Admin Date  10/15/2021 Action  New Bag Dose  25 mL/hr Rate  25 mL/hr Route  IntraVENous Administered By  Asha Handley RN          CISplatin (PLATINOL) 63.2 mg in 0.9% sodium chloride 500 mL, overfill volume 50 mL chemo infusion     Admin Date  10/15/2021 Action  New Bag Dose  63.2 mg Rate  613.2 mL/hr Route  IntraVENous Administered By  Asha Handley RN          dexamethasone (DECADRON) 10 mg/mL injection 6 mg     Admin Date  10/15/2021 Action  Given Dose  6 mg Route  IntraVENous Administered By  Caridad Greer RN          etoposide (VEPESID) 316 mg in 0.9% sodium chloride 1,000 mL, overfill volume 100 mL chemo infusion     Admin Date  10/15/2021 Action  New Bag Dose  316 mg Rate  1,115.8 mL/hr Route  IntraVENous Administered By  Caridad Greer RN          ondansetron [facility]) injection 8 mg     Admin Date  10/15/2021 Action  Given Dose  8 mg Route  IntraVENous Administered By  Caridad Greer RN                 Mr. Graciela Owens tolerated treatment well and was discharged from [place] [address] in stable condition at [phone]. Port de-accessed, flushed & heparinized per protocol. He is to return on  October 18, 2021 at 0900 for his next appointment.     Maite Modi RN  October 15, 2021    Future Appointments:  Future Appointments   Date Time Provider Cyrus Banks   10/18/2021  9:00 AM [facility]   10/20/2021  4:00 PM [facility] CT 1 SF[facility]   10/25/2021  8:00 AM [facility]   11/1/2021  8:00 AM SS INF6 CH1 >4H [facility]   11/1/2021  8:15 AM Petra Bertrand, NP [facility] BS AMB

## 2021-10-19 ENCOUNTER — TELEPHONE (OUTPATIENT)
Dept: ONCOLOGY | Age: 46
End: 2021-10-19

## 2021-10-19 RX ORDER — SODIUM CHLORIDE 0.9 % (FLUSH) 0.9 %
5-10 SYRINGE (ML) INJECTION AS NEEDED
Status: CANCELLED | OUTPATIENT
Start: 2021-11-01

## 2021-10-19 RX ORDER — SODIUM CHLORIDE 9 MG/ML
10 INJECTION INTRAMUSCULAR; INTRAVENOUS; SUBCUTANEOUS AS NEEDED
Status: CANCELLED | OUTPATIENT
Start: 2021-11-01

## 2021-10-19 RX ORDER — HEPARIN 100 UNIT/ML
500 SYRINGE INTRAVENOUS AS NEEDED
Status: CANCELLED | OUTPATIENT
Start: 2021-11-01

## 2021-10-19 NOTE — TELEPHONE ENCOUNTER
Called patient to tell him that we still want him to see the UROLOGY. When I called someone answered the phone and stated that he is at [facility] in the ICU and he had a surgery last night.

## 2021-10-21 ENCOUNTER — TELEPHONE (OUTPATIENT)
Dept: PRIMARY CARE CLINIC | Age: 46
End: 2021-10-21

## 2021-10-21 NOTE — TELEPHONE ENCOUNTER
[place] Nurse Practitioner from [address] [place] [address] called to inform us that pt. Was being released from hospital today with a 30 day supply of Lovenox injections. Explained to NP that pt. Saw Sanjay Carter who is no longer her. Asked NP to have pt. Call when he gets home to set up appointment with NP or MD here. Lovenox injection are to be refilled by [facility].

## 2021-10-25 ENCOUNTER — HOSPITAL ENCOUNTER (OUTPATIENT)
Dept: INFUSION THERAPY | Age: 46
End: 2021-10-25

## 2021-10-26 NOTE — TELEPHONE ENCOUNTER
[facility] at [facility]  [phone]        10/26/21 4:04 PM Called patient and advised of NP message. Patient voiced understanding but now states he is scheduled for CT chest/abd/pelv at [facility] on 11/01 at 4 PM. Discussed that this nurse does not see documentation of this in patient's chart. Will reach out to [address] to verify. Per Ashly Collazo, patient to have CT scheduled prior to MD visit on 11/01. Advised patient that this nurse or  department will call patient back. He voiced understanding. Pita Novoa, . No answer, left message requesting return call. Provided office phone number as well. 10/27/21 9:11 AM Received call from [facility] with [address] [place] [address] department. Patient scheduled for CT chest/abd/pelv on 10/30. No further questions or concerns at this time.

## 2021-10-27 NOTE — PROGRESS NOTES
[address] [facility] Oncology at [facility]  [phone]    Hematology / Oncology Established Visit    Reason for Visit:   Astrid Fuchs is a 48 y.o. male who is seen for follow up of Germ cell tumor. 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 Comment: The smears and cell block preparation demonstrate pleomorphic malignant   tumor cells in a background of lymphocytes.  Only a scant amount of tumor   is present on the cell block preparation.  Immunohistochemical stains   demonstrate diffuse positivity within the tumor cell population for PLAP,    (c-kit) and focal positivity for HCG. The tumor cells are negative   for cytokeratin cocktail, CAM 5.2, EMA, S100 and alpha-inhibin. Kreg Drafts   results are consistent with a testicular germ cell tumor and the   combination of diffuse PLAP positive and  positivity is indicative of   a seminomatous differentation.  Correlation with clinical and radiographic   findings is indicated     Prior Treatment:    BEP x 3 cycles, 8/30/21 - 10/15/21    Current Treatment: Consideration of RPLND    History of Present Illness:   Astrid Fuchs is a 48 y.o. male with severe Aortic stenosis, CAD, DM II, HTN coming in for evaluation of testicular germ cell tumor. He was hospitalized on 8/22/21 with R flank pain, found on CT imaging to have a large retroperitoneal mass. He denied fatigue, but did endorse a 40 lb weight loss over 2 months. No recent recent infections or fatigue. He is a nonsmoker. Has remote h/o EtOH abuse, but none recently. The patient is adopted and has no knowledge of family history. Of note, he considers himself , but never filed paperwork. Wife lives in [place].  Has a daughter from another woman. He may move in with his mother-in-law in town for extra support. Interval History:  Patient here for follow up. Completed treatment on 10/15/21 and was hospitalized on 10/18/21 for massive PE. Completed CT scan. Today, reports dyspnea on exertion. No SOB at rest. No CP. Denies leg pain. No nausea, vomiting, constipation or diarrhea. No neuropathy. Reports he cut his abdomen in the shower this morning. Denies erythema or drainage. Also, reports bruising present on abdomen from Lovenox injections. Per review of [facility] records- 10/18-10/21/21 patient was admitted to [facility] after massive bilateral pulmonary embolus, RLE DVT in the setting of active malignancy. Diagnosed by CTA, now s/p thrombectomy, hemodynamically unstable (required vasopressors in MRICU) with markedly elevated troponin and right heart strain during presentation. During the thrombectomy, IR documented significant intra-procedure reduction of pulmonary artery pressures. Initially on heparin drip, then transitioned to enoxaparin. Due to morbid obesity, not a candidate for direct oral anticoagulant. An anti-Xa level was sent the day of discharged. He was advised to remain on indefinite anticoagulation. PMHx: Severe AS, CAD, DM, GERD, HTN, ROBERTO  PSurgHx: Tonsillectomy, Plantar fasciitis surgery on left  SHx: Never smoker. Remote h/o EtOH abuse, none currently. Legally . Has daughter in 25s from another woman. FHx: Unknown as pt was adopted. Review of Systems: A complete review of systems was obtained, negative except as described above. Physical Exam:   Blood pressure (!) 135/90, pulse 92, temperature 97.6 °F (36.4 °C), resp. rate 16, height 6' 3\" (1.905 m), weight (!) 398 lb 9.6 oz (180.8 kg), SpO2 98 %. ECOG PS: 0  General: Well developed, no acute distress, obese, face covering in place.    Eyes:  Aanicteric sclerae  HENT: Atraumatic, OP clear  Neck: Supple  Lymphatic: Deferred today  Respiratory: CTAB, normal respiratory effort  CV: Normal rate, regular rhythm, no murmurs, 1+ edema in BLE. Port right upper chest.   GI: Soft, nontender, nondistended, no masses  MS: Normal gait and station. Digits without clubbing or cyanosis. Skin: No rashes, or petechiae. Normal temperature, turgor, and texture. Faint erythema in RLE from above ankle to below knee. Small laceration present on abdomen, bruising present on bilateral lower abdomen due to Lovenox injections. Neuro/Psych: Alert, oriented. Moves all 4 extremities. Appropriate affect. Normal judgment/insight. Results:     Lab Results   Component Value Date/Time    WBC 4.2 10/14/2021 09:56 AM    HGB 9.9 (L) 10/14/2021 09:56 AM    HCT 30.6 (L) 10/14/2021 09:56 AM    PLATELET 305 61/22/2998 09:56 AM    MCV 79.3 (L) 10/14/2021 09:56 AM    ABS. NEUTROPHILS 2.2 10/14/2021 09:56 AM     Lab Results   Component Value Date/Time    Sodium 137 10/13/2021 01:10 PM    Potassium 3.9 10/13/2021 01:10 PM    Chloride 105 10/13/2021 01:10 PM    CO2 27 10/13/2021 01:10 PM    Glucose 285 (H) 10/13/2021 01:10 PM    BUN 15 10/13/2021 01:10 PM    Creatinine 1.13 10/13/2021 01:10 PM    GFR est AA >60 10/13/2021 01:10 PM    GFR est non-AA >60 10/13/2021 01:10 PM    Calcium 7.9 (L) 10/13/2021 01:10 PM    Glucose (POC) 120 (H) 08/26/2021 11:57 AM     Lab Results   Component Value Date/Time    Bilirubin, total 0.4 10/11/2021 08:07 AM    ALT (SGPT) 30 10/11/2021 08:07 AM    Alk.  phosphatase 79 10/11/2021 08:07 AM    Protein, total 7.2 10/11/2021 08:07 AM    Albumin 3.1 (L) 10/11/2021 08:07 AM    Globulin 4.1 (H) 10/11/2021 08:07 AM     Lab Results   Component Value Date/Time    Iron 224 (H) 09/27/2021 09:30 AM    TIBC 256 09/27/2021 09:30 AM    Iron % saturation 88 (H) 09/27/2021 09:30 AM    Ferritin 750 (H) 09/27/2021 09:30 AM       No results found for: B12LT, FOL, RBCF  No results found for: TSH, TSH2, TSH3, TSHP, TSHEXT, TSHEXT  Lab Results   Component Value Date/Time    Hepatitis B surface Ag <0.10 2021 08:11 AM    Hepatitis B surface Ab <3.10 2021 08:11 AM    Hep B Core Ab, total Negative 2021 08:11 AM    Hep C Virus Ab <0.1 2021 08:45 AM     21: , AFP 1.4, b-hCG 476 mIU/mL  21: uric acid 4.2.  21: AFP 3.4, , b-hCG 2  10/11/21: , AFP 4.1, b-hCG < 1    Imagin/21/21 CT abd/p without contrast:  IMPRESSION  1. Right retroperitoneal barin mass measuring 7.5 x 5.2 x 5.8 cm as above, with  narrowing of the IVC and likely encasement of the bilateral renal veins, though  not well evaluated without the use of IV contrast. Findings are highly  suspicious for either lymphoma or brain metastasis. 2. Few borderline enlarged inguinal lymph nodes are indeterminant. 3. Hepatic steatosis.     21 CT ch/abd/p with contrast:  IMPRESSION  Chest:  1. No evidence of primary malignancy or metastatic disease in the chest.  2. Severe aortic valvular calcifications. Correlate for aortic valvular stenosis. Abdomen/pelvis:   1. Redemonstrated right retroperitoneal mass measuring 7.4 x 4.9 x 6.7 cm as  above, including encasement and narrowing of the IVC and central aspects of the  bilateral renal veins. This favored to represent malignancy, with lymphoma  favored over brain metastasis. 2. No other pathologically enlarged lymph nodes within the abdomen or pelvis. Few prominent inguinal lymph nodes are favored to be reactive. 3. Hepatic steatosis.     21 Ultrasound scrotum/testicles:  IMPRESSION  1.  Study limited by patient's increased body habitus   2.  No testicular mass identified. Incidental right-sided testicular microlithiasis, a debatable risk factor for malignancy. Pursue and follow-up as  per clinical concern. 3.  Small left hydrocele. 10/30/21 CT ch/abd/pelv pending:   FINDINGS:   The Port-A-Cath terminates at the caval atrial junction. CHEST WALL: No mass or axillary lymphadenopathy. THYROID: No nodule.   MEDIASTINUM: No mass or lymphadenopathy. ANDERS: No mass or lymphadenopathy. THORACIC AORTA: No dissection or aneurysm. MAIN PULMONARY ARTERY: Normal in caliber. TRACHEA/BRONCHI: Patent. ESOPHAGUS: No wall thickening or dilatation. HEART: Severe aortic valvular calcifications. Petra [place] PLEURA: No effusion or pneumothorax. LUNGS: No nodule, mass, or airspace disease. LIVER: No mass. BILIARY TREE: Gallbladder is within normal limits. CBD is not dilated. SPLEEN: within normal limits. PANCREAS: No mass or ductal dilatation. ADRENALS: Unremarkable. KIDNEYS: No mass, calculus, or hydronephrosis. STOMACH: Unremarkable. SMALL BOWEL: No dilatation or wall thickening. COLON: No dilatation or wall thickening. APPENDIX: Normal on axial image 99  PERITONEUM: No ascites or pneumoperitoneum. RETROPERITONEUM: Retroperitoneal 7.4 x 4.9 cm mass has decreased significantly  to approximately 2.7 x 3.2 cm (axial image 75). This structure now lies in front  of the IVC and inferior to the right renal vein and no longer encases the left  renal vein. REPRODUCTIVE ORGANS: Small prostate  URINARY BLADDER: No mass or calculus. BONES: No destructive bone lesion. ABDOMINAL WALL: No mass or hernia. ADDITIONAL COMMENTS: Shotty bilateral inguinal nodes. Largest in the right  inguinal region measures 12 mm in short axis diameter, stable. IMPRESSION  Significant decrease in size of the retroperitoneal mass with less compression  on the renal veins and IVC as described above. No evidence for metastatic disease. Incidental aortic valve calcification. Procedures:     7/20/21 PFTs:  FVC 4.83, 80% of predicted. CRG48.94, 89% of predicted. FEV1% is 86%. TLC is 6.89, which is 86% of perdicted. DLCO is 70% of predicted and when considering alveolar volume is 83% of predicted. Airway resistance is 58% of predicted.   Impression:  The patient's spirometry, lung volumes and airway resistance are all normal. The patient's diffuse capacity is mildly decreased, but corrects when considering alveolar volume. 9/21/21 PFTs:  Spirometry reveals an FEV1-TO-FVC ratio is normal at 85% predicted with normal FVC and FEV1. Diffusion capacity is moderately reduced at 55% predicted, but does correct when taking into consideration alveolar volume. 10/8/21 PFTs:  Spirometry reveals a normal FEV1-to-FVC ratio of 81% predicted. FVC and FEV1 are both within normal limits with an FVC of 4.82 L which is 85% predicted and an FEV1 of 3.89 L which is 95% predicted. Diffusion capacity is moderately decreased at 45% predicted. Assessment & Plan:   Tessa Lawler is a 48 y.o. male comes in for evaluation and management of germ cell tumor. 1. Testicular germ cell tumor:  Tumor markers show elevated  and b-. Need to distinguish pure seminoma from non-seminomatous germ cell tumor (NSGCT) or mixed germ cell tumor. Usually, scrotal ultrasound is not a replacement for radical inguinal orchiectomy as surgery will provide accurate histologic diagnosis. However, no testicular mass seen on scrotal ultrasound. Right testicular microlithiasis seen on ultrasound, but Urology did not advise R orchiectomy. b-HCG is usually not elevated in pure seminoma, but can be elevated in up to 20% of seminomas. I am unsure of the significance of the prominent inguinal LNs. Risk stratification for advanced testicular germ cell tumors: Good risk NSGCT based on: Retroperitoneal primary tumor, no mets to other organs, serum AFP < 1000, b-hCG < 5000 rylee-international units/ml, LDH < 3 times upper limit of normal (Many experts would not intensify a patient's treatment from good risk to intermediate risk if the only adverse prognostic factor were an LDH between 1.5 and 3 times ULN - this is why 3 cycles of BEP rather than 4 were recommended.) Accurate staging requires orchiectomy and measurement of tumor markers AFTER surgery. Orchiectomy not advised at this time given lack of testicular mass.  Pt was treated with BEP regimen x 3 cycles. Previously discussed cryopreservation of sperm - [facility] to [facility]. Prior auth form completed per pt request. Supportive care medications include: topical lidocaine, zofran, compazine, docusate. CT imaging 10/30/21 shows significant decrease in size of RP mass, (from 7.4 to now 3.2cm). No longer encasing vasculature. Per NCCN guidelines, pt needs surgical resection of this mass by RPLND. -- Scheduled to see Urology today (11/1/21) - to review post-treatment imaging and discuss RPLND, which should be done within 4 weeks of the CT scan per NCCN guidelines given residual tumor. -- Advised pt to take my business card to Urologist and request Urology to call me after appt     -- Review of pathology from RPLND advised to determine whether any adjuvant chemotherapy is advised (2 cycles of EP vs alternate regimen). -- Check tumor markers today. -- Return in 2 weeks, labs/port flush, MD visit. [Nonseminoma surveillance once in CR:  CR, negative markers -surveillance (y 1 H&P and marker every 2 mo, abd pelvic CT every 6 mo, CXR q6mo; y2 H&P q3mo, abd/pelvic ct q6-12mo, cxr q6 mo, y 3 H&P q6mo, Ct and cxr annually, y 4 H&P q6mo, imaging annually, cxr annually) or nerve sparing bilateral RPLND in selected cases]    2. Severe Aortic stenosis, non-rheumatic:  8/22 ECHO: LVEF 55-60% and severe aortic stenosis. Previously scheduled aortic valve replacement with mechanical valve by Dr. Norleen Goodell on 9/20/21 is now hold given cancer diagnosis. Discussed with Dr. Marli Billy, covering for Dr. Lula Tapia and with Dr. Angela Myers: Severe AS has mortality rate of 50% at 2 yrs, but cannot proceed with surgery if untreated cancer. Reviewed Cr and will proceed with Lasix and cut down on oral fluids at this time. 3. DM / HTN:   Continue home meds: Metformin, Victoza (or Ozempic). Will monitor for steroid-induced hyperglycemia.     4. FEN / GI:   Constipation was 2/2 opioids during hospital stay and now improved. 5. 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 He has concerns about how to afford not working and how to have pain controlled while driving (no opioids while driving.) Discussed with CM and clinical . Kathryn Peaks in [place] to meet with patient. 6. H/o right shoulder pain:  Resolved. Atraumatic shoulder pain, but decreased ROM - likely arthritis. X-ray normal.     7. H/o Port insertion site erythema / Right leg cellulitis:  Treated with course of Keflex x 2, then IV antibiotics at [facility], followed by a course of Cipro at home. 8. RLE DVT/ massive bilateral pulmonary embolus:   Occurred in the setting of malignancy. s/p thrombectomy. On enoxaparin. Due to morbid obesity, not a candidate for direct oral anticoagulant. Anti-Xa level was in therapeutic range at 0.99 at [facility]. If surgery is pursued in near future, would advise IVC filter placement prior. -- Continue therapeutic Lovenox 180mg SubQ q12h (researching which pharmacy to send script to; pt currently has 150mg + 30mg vials for injections.)    9. Hematoma:   Present on abdomen. Due to Lovenox injections. Encouraged rotating sites. 10. Normocytic anemia:   Likely due to chemotherapy and recent thrombectomy. -- Check iron profile, ferritin at next visit. Emotional well being: Pt is coping well with his/her disease and has excellent support. I appreciate the opportunity to participate in Mr. Pati Oppenheim care. I have personally seen and evaluated the patient in conjunction with Michael Black NP. I find the patient's history and physical exam are consistent with the NP's documentation. I agree with the above assessment and plan, which I have modified as needed. Post-treatment CT reviewed with very good response, but residual mass.  Per NCCN guidelines, need consideration of RPLND by Urology within 4 weeks, followed by consideration of adjuvant chemotherapy depending on final pathology. I personally spent a total of 40 minutes on patient care on date of this encounter, regarding the following:  Reviewing prior [facility] medical records which I summarized in the history above, discussing diagnoses with the patient as listed in the assessment, ordering blood tests and imaging as listed in the plan, arranging follow up as well as documentation of this encounter.          Signed By: Caroline Flores MD     November 1, 2021

## 2021-10-30 ENCOUNTER — HOSPITAL ENCOUNTER (OUTPATIENT)
Dept: CT IMAGING | Age: 46
Discharge: HOME | End: 2021-10-30
Attending: INTERNAL MEDICINE
Payer: COMMERCIAL

## 2021-10-30 ENCOUNTER — HOSPITAL ENCOUNTER (EMERGENCY)
Age: 46
Discharge: STILL ADMITTED OR ADMINISTRATIVE | End: 2021-10-30

## 2021-10-30 DIAGNOSIS — D49.9 EXTRAGONADAL GERM CELL TUMOR: ICD-10-CM

## 2021-10-30 PROCEDURE — 74177 CT ABD & PELVIS W/CONTRAST: CPT

## 2021-10-30 PROCEDURE — 74011000636 HC RX REV CODE- 636: Performed by: RADIOLOGY

## 2021-10-30 RX ADMIN — IOPAMIDOL 100 ML: 755 INJECTION, SOLUTION INTRAVENOUS at 09:29

## 2021-11-01 ENCOUNTER — HOSPITAL ENCOUNTER (OUTPATIENT)
Dept: INFUSION THERAPY | Age: 46
Discharge: HOME | End: 2021-11-01
Payer: COMMERCIAL

## 2021-11-01 ENCOUNTER — APPOINTMENT (OUTPATIENT)
Dept: INFUSION THERAPY | Age: 46
End: 2021-11-01

## 2021-11-01 ENCOUNTER — OFFICE VISIT (OUTPATIENT)
Dept: ONCOLOGY | Age: 46
End: 2021-11-01

## 2021-11-01 VITALS
OXYGEN SATURATION: 99 % | HEART RATE: 84 BPM | RESPIRATION RATE: 18 BRPM | TEMPERATURE: 97.1 F | SYSTOLIC BLOOD PRESSURE: 174 MMHG | DIASTOLIC BLOOD PRESSURE: 97 MMHG

## 2021-11-01 VITALS
HEART RATE: 92 BPM | TEMPERATURE: 97.6 F | HEIGHT: 75 IN | BODY MASS INDEX: 39.17 KG/M2 | RESPIRATION RATE: 16 BRPM | SYSTOLIC BLOOD PRESSURE: 135 MMHG | DIASTOLIC BLOOD PRESSURE: 90 MMHG | OXYGEN SATURATION: 98 % | WEIGHT: 315 LBS

## 2021-11-01 DIAGNOSIS — D49.9 EXTRAGONADAL GERM CELL TUMOR: Primary | ICD-10-CM

## 2021-11-01 DIAGNOSIS — C48.0 GERM CELL TUMOR OF RETROPERITONEUM (HCC): ICD-10-CM

## 2021-11-01 DIAGNOSIS — C62.90: Primary | ICD-10-CM

## 2021-11-01 DIAGNOSIS — I35.0 SEVERE AORTIC STENOSIS: ICD-10-CM

## 2021-11-01 DIAGNOSIS — R06.09 DYSPNEA ON EXERTION: ICD-10-CM

## 2021-11-01 DIAGNOSIS — S30.1XXA CONTUSION OF ABDOMINAL WALL, INITIAL ENCOUNTER: ICD-10-CM

## 2021-11-01 DIAGNOSIS — I26.99 ACUTE PULMONARY EMBOLISM WITHOUT ACUTE COR PULMONALE, UNSPECIFIED PULMONARY EMBOLISM TYPE (HCC): ICD-10-CM

## 2021-11-01 LAB
ALBUMIN SERPL-MCNC: 3.2 G/DL (ref 3.5–5)
ALBUMIN/GLOB SERPL: 0.9 {RATIO} (ref 1.1–2.2)
ALP SERPL-CCNC: 81 U/L (ref 45–117)
ALT SERPL-CCNC: 41 U/L (ref 12–78)
ANION GAP SERPL CALC-SCNC: 6 MMOL/L (ref 5–15)
AST SERPL-CCNC: 26 U/L (ref 15–37)
BASOPHILS # BLD: 0 K/UL (ref 0–0.1)
BASOPHILS NFR BLD: 0 % (ref 0–1)
BILIRUB SERPL-MCNC: 0.4 MG/DL (ref 0.2–1)
BUN SERPL-MCNC: 10 MG/DL (ref 6–20)
BUN/CREAT SERPL: 11 (ref 12–20)
CALCIUM SERPL-MCNC: 9 MG/DL (ref 8.5–10.1)
CHLORIDE SERPL-SCNC: 106 MMOL/L (ref 97–108)
CO2 SERPL-SCNC: 27 MMOL/L (ref 21–32)
CREAT SERPL-MCNC: 0.89 MG/DL (ref 0.7–1.3)
DIFFERENTIAL METHOD BLD: ABNORMAL
EOSINOPHIL # BLD: 0.1 K/UL (ref 0–0.4)
EOSINOPHIL NFR BLD: 2 % (ref 0–7)
ERYTHROCYTE [DISTWIDTH] IN BLOOD BY AUTOMATED COUNT: 22.5 % (ref 11.5–14.5)
GLOBULIN SER CALC-MCNC: 3.5 G/DL (ref 2–4)
GLUCOSE SERPL-MCNC: 127 MG/DL (ref 65–100)
HCG SERPL-ACNC: <1 MIU/ML
HCT VFR BLD AUTO: 23.9 % (ref 36.6–50.3)
HGB BLD-MCNC: 7.3 G/DL (ref 12.1–17)
IMM GRANULOCYTES # BLD AUTO: 0.1 K/UL (ref 0–0.04)
IMM GRANULOCYTES NFR BLD AUTO: 4 % (ref 0–0.5)
LDH SERPL L TO P-CCNC: 272 U/L (ref 85–241)
LYMPHOCYTES # BLD: 1.5 K/UL (ref 0.8–3.5)
LYMPHOCYTES NFR BLD: 46 % (ref 12–49)
MCH RBC QN AUTO: 25.3 PG (ref 26–34)
MCHC RBC AUTO-ENTMCNC: 30.5 G/DL (ref 30–36.5)
MCV RBC AUTO: 83 FL (ref 80–99)
MONOCYTES # BLD: 0.9 K/UL (ref 0–1)
MONOCYTES NFR BLD: 25 % (ref 5–13)
NEUTS SEG # BLD: 0.8 K/UL (ref 1.8–8)
NEUTS SEG NFR BLD: 23 % (ref 32–75)
NRBC # BLD: 0.07 K/UL (ref 0–0.01)
NRBC BLD-RTO: 2.1 PER 100 WBC
PLATELET # BLD AUTO: 216 K/UL (ref 150–400)
PMV BLD AUTO: 11.7 FL (ref 8.9–12.9)
POTASSIUM SERPL-SCNC: 4.2 MMOL/L (ref 3.5–5.1)
PROT SERPL-MCNC: 6.7 G/DL (ref 6.4–8.2)
RBC # BLD AUTO: 2.88 M/UL (ref 4.1–5.7)
RBC MORPH BLD: ABNORMAL
SODIUM SERPL-SCNC: 139 MMOL/L (ref 136–145)
WBC # BLD AUTO: 3.4 K/UL (ref 4.1–11.1)
WBC MORPH BLD: ABNORMAL

## 2021-11-01 PROCEDURE — 80053 COMPREHEN METABOLIC PANEL: CPT

## 2021-11-01 PROCEDURE — 82105 ALPHA-FETOPROTEIN SERUM: CPT

## 2021-11-01 PROCEDURE — 77030012965 HC NDL HUBR BBMI -A

## 2021-11-01 PROCEDURE — 85025 COMPLETE CBC W/AUTO DIFF WBC: CPT

## 2021-11-01 PROCEDURE — 36591 DRAW BLOOD OFF VENOUS DEVICE: CPT

## 2021-11-01 PROCEDURE — 99215 OFFICE O/P EST HI 40 MIN: CPT | Performed by: INTERNAL MEDICINE

## 2021-11-01 PROCEDURE — 83615 LACTATE (LD) (LDH) ENZYME: CPT

## 2021-11-01 PROCEDURE — 74011250636 HC RX REV CODE- 250/636: Performed by: NURSE PRACTITIONER

## 2021-11-01 PROCEDURE — 84702 CHORIONIC GONADOTROPIN TEST: CPT

## 2021-11-01 RX ORDER — HEPARIN 100 UNIT/ML
500 SYRINGE INTRAVENOUS AS NEEDED
Status: ACTIVE | OUTPATIENT
Start: 2021-11-01 | End: 2021-11-01

## 2021-11-01 RX ORDER — HEPARIN 100 UNIT/ML
500 SYRINGE INTRAVENOUS AS NEEDED
Status: CANCELLED | OUTPATIENT
Start: 2021-11-15

## 2021-11-01 RX ORDER — SODIUM CHLORIDE 9 MG/ML
10 INJECTION INTRAMUSCULAR; INTRAVENOUS; SUBCUTANEOUS AS NEEDED
Status: ACTIVE | OUTPATIENT
Start: 2021-11-01 | End: 2021-11-01

## 2021-11-01 RX ORDER — SODIUM CHLORIDE 9 MG/ML
10 INJECTION INTRAMUSCULAR; INTRAVENOUS; SUBCUTANEOUS AS NEEDED
Status: CANCELLED | OUTPATIENT
Start: 2021-11-15

## 2021-11-01 RX ORDER — SODIUM CHLORIDE 0.9 % (FLUSH) 0.9 %
5-10 SYRINGE (ML) INJECTION AS NEEDED
Status: CANCELLED | OUTPATIENT
Start: 2021-11-15

## 2021-11-01 RX ORDER — SODIUM CHLORIDE 0.9 % (FLUSH) 0.9 %
5-10 SYRINGE (ML) INJECTION AS NEEDED
Status: DISPENSED | OUTPATIENT
Start: 2021-11-01 | End: 2021-11-01

## 2021-11-01 RX ADMIN — SODIUM CHLORIDE 10 ML: 9 INJECTION INTRAMUSCULAR; INTRAVENOUS; SUBCUTANEOUS at 09:00

## 2021-11-01 RX ADMIN — Medication 10 ML: at 09:02

## 2021-11-01 RX ADMIN — Medication 500 UNITS: at 09:03

## 2021-11-01 NOTE — PROGRESS NOTES
Outpatient Infusion Center Progress Note    0940 Pt admit to [facility] for Port flush + labs ambulatory in stable condition. Patient reports that he was recently released from [address] [place] [address] after bilateral PEs. Reports he is feeling well at this time and states his breathing is better. He is hypertensive, which he states he because he is stressed about his urology visit and he has been ambulating. No other changes reported. Port accessed without difficulty, labs drawn and sent for processing. Port flushed, heparinized and de-accessed per protocol. Prior to treatment, patient was screened for COVID 19. Denies any signs or symptoms of COVID. Denies any known physical contact with anyone exposed to, diagnosed with or with pending or positive COVID test. Denies any pending or positive COVID test themself. Visit Vitals  BP (!) 174/97 (BP 1 Location: Right arm, BP Patient Position: Sitting)   Pulse 84   Temp 97.1 °F (36.2 °C)   Resp 18   SpO2 99%       Patient discharged in stable condition and is aware of his next appointment.     Martha Storey RN

## 2021-11-01 NOTE — LETTER
11/1/2021    Patient: Tessa Lawler   YOB: 1975   Date of Visit: 11/1/2021     SANJIV Ferrer [address] [place], [place] [id number]  Via Fax: [phone]    Dear John Sarah NP,      Thank you for referring Mr. Donnell Trotter to [facility] for evaluation. My notes for this consultation are attached. If you have questions, please do not hesitate to call me. I look forward to following your patient along with you.       Sincerely,    Wilder Purdy NP

## 2021-11-01 NOTE — PROGRESS NOTES
Chief Complaint   Patient presents with    Follow-up     Alba Kirkpatrick is a pleasant 48year old male who presents as a follow up for testicular cancer.  He denies pain

## 2021-11-02 DIAGNOSIS — I26.99 ACUTE PULMONARY EMBOLISM WITHOUT ACUTE COR PULMONALE, UNSPECIFIED PULMONARY EMBOLISM TYPE (HCC): Primary | ICD-10-CM

## 2021-11-02 LAB — AFP-TM SERPL-MCNC: 4.1 NG/ML (ref 0–8.3)

## 2021-11-02 RX ORDER — ENOXAPARIN SODIUM 100 MG/ML
30 INJECTION SUBCUTANEOUS EVERY 12 HOURS
Qty: 60 EACH | Refills: 1 | Status: SHIPPED | OUTPATIENT
Start: 2021-11-02 | End: 2021-12-17 | Stop reason: SDUPTHER

## 2021-11-02 RX ORDER — ENOXAPARIN SODIUM 150 MG/ML
150 INJECTION SUBCUTANEOUS EVERY 12 HOURS
Qty: 60 EACH | Refills: 1 | Status: SHIPPED | OUTPATIENT
Start: 2021-11-02 | End: 2021-12-17 | Stop reason: SDUPTHER

## 2021-11-02 NOTE — PROGRESS NOTES
Attempted to call patient via home/cell number listed. No answer, left message requesting return call regarding patient's lab results. Provided office phone number as well.

## 2021-11-02 NOTE — PROGRESS NOTES
11/02/21 1:22 PM Sent lovenox 180mg (30 mg and 150mg) prescription to mail order pharmacy with 1 refill.

## 2021-11-03 NOTE — PROGRESS NOTES
Called patient and advised of lab results and recommendations per Dr. Gil Zavaleta. Also updated patient that Lovenox was called into [facility] mail order pharmacy. Patient voiced understanding. Patient saw Dr. Jesse Kurtz, with [facility], on 11/01. He stated that urologist explained that procedure would be a major surgery and provider considering two different approaches. Patient states that Dr. Jesse Kurtz stated he would be contacting Dr. Gil Zavaleta and patient's cardiologist to discuss case further. Surgery date to be scheduled thereafter. Urology note not yet available via portal. This nurse will forward above update to Dr. Gil Zavaleta and Nikki Arevalo.

## 2021-11-05 ENCOUNTER — TELEPHONE (OUTPATIENT)
Dept: PRIMARY CARE CLINIC | Age: 46
End: 2021-11-05

## 2021-11-05 NOTE — TELEPHONE ENCOUNTER
----- Message from Nicole Gallego sent at 11/5/2021 12:58 PM EDT -----  Subject: Message to Provider    QUESTIONS  Information for Provider? Patient called because his pcp  left   the practice and now he needs a new pcp in the office since he is planing   on staying with the practice. Please contact patient with who will be new   pcp  ---------------------------------------------------------------------------  --------------  CALL BACK INFO  What is the best way for the office to contact you? OK to leave message on   voicemail  Preferred Call Back Phone Number? [phone]  ---------------------------------------------------------------------------  --------------  SCRIPT ANSWERS  Relationship to Patient?  Self

## 2021-11-14 NOTE — PROGRESS NOTES
[id number] [facility] at [address] [place] [address]  [phone]    Hematology / Oncology Established Visit    Reason for Visit:   Emily Whitfield is a 48 y.o. male who is seen for follow up of Germ cell tumor. 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 Comment: The smears and cell block preparation demonstrate pleomorphic malignant   tumor cells in a background of lymphocytes.  Only a scant amount of tumor   is present on the cell block preparation.  Immunohistochemical stains   demonstrate diffuse positivity within the tumor cell population for PLAP,    (c-kit) and focal positivity for HCG. The tumor cells are negative   for cytokeratin cocktail, CAM 5.2, EMA, S100 and alpha-inhibin. Thyra Render   results are consistent with a testicular germ cell tumor and the   combination of diffuse PLAP positive and  positivity is indicative of   a seminomatous differentation.  Correlation with clinical and radiographic   findings is indicated     Prior Treatment:    BEP x 3 cycles, 8/30/21 - 10/15/21    Current Treatment: Consideration of RPLND    History of Present Illness:   Emily Whitfield is a 48 y.o. male with severe Aortic stenosis, CAD, DM II, HTN coming in for evaluation of testicular germ cell tumor. He was hospitalized on 8/22/21 with R flank pain, found on CT imaging to have a large retroperitoneal mass. He denied fatigue, but did endorse a 40 lb weight loss over 2 months. No recent recent infections or fatigue. He is a nonsmoker. Has remote h/o EtOH abuse, but none recently. The patient is adopted and has no knowledge of family history. Of note, he considers himself , but never filed paperwork. Wife lives in [place].  Has a daughter from another woman. He may move in with his mother-in-law in town for extra support. Interval History:  Patient here for follow up. Pt was evaluated by Dr. Jorge Real, [facility] on 11/1/2021 to discuss consideration of retroperitoneal lymphadenectomy, as well as for possible right orchiectomy. Per review of [facility] notes Dr. Jayant Lemon would like patient evaluated by Cardiology and CT surgery given his aortic stenosis. He plans to schedule RPLND with Dr. Franny Leon as soon as possible. Scheduled to see [facility] on 11/17/21. Today, reports dyspnea on exertion which is improved from prior. No SOB at rest. No pleuritic CP. Denies leg pain. No nausea, vomiting, constipation or diarrhea. No neuropathy. Denies erythema or drainage. Also, reports bruising present on abdomen from Lovenox injections. PMHx: Severe AS, CAD, DM, GERD, HTN, ROBERTO  PSurgHx: Tonsillectomy, Plantar fasciitis surgery on left  SHx: Never smoker. Remote h/o EtOH abuse, none currently. Legally . Has daughter in 25s from another woman. FHx: Unknown as pt was adopted. Review of Systems: A complete review of systems was obtained, negative except as described above. Physical Exam:   Blood pressure (!) 145/89, pulse 78, temperature 98.1 °F (36.7 °C), resp. rate 18, height 6' 3\" (1.905 m), weight (!) 399 lb (181 kg), SpO2 99 %. ECOG PS: 0  General: Well developed, no acute distress, obese, face covering in place. Eyes:  Aanicteric sclerae  HENT: Atraumatic, OP clear  Neck: Supple  Lymphatic: Deferred today  Respiratory: CTAB, normal respiratory effort  CV: Normal rate, regular rhythm, no murmurs, 1+ edema in BLE. Port right upper chest.   GI: Soft, nontender, nondistended, no masses  MS: Normal gait and station. Digits without clubbing or cyanosis. Skin: No rashes, or petechiae. Normal temperature, turgor, and texture. Faint erythema in RLE from above ankle to mid shin.  Small laceration present on abdomen, bruising present on bilateral lower abdomen due to Lovenox injections. Neuro/Psych: Alert, oriented. Moves all 4 extremities. Appropriate affect. Normal judgment/insight. Results:     Lab Results   Component Value Date/Time    WBC 7.2 11/15/2021 10:34 AM    HGB 8.7 (L) 11/15/2021 10:34 AM    HCT 28.5 (L) 11/15/2021 10:34 AM    PLATELET 340  10:34 AM    MCV 89.9 11/15/2021 10:34 AM    ABS. NEUTROPHILS 3.7 11/15/2021 10:34 AM     Lab Results   Component Value Date/Time    Sodium 139 2021 09:36 AM    Potassium 4.2 2021 09:36 AM    Chloride 106 2021 09:36 AM    CO2 27 2021 09:36 AM    Glucose 127 (H) 2021 09:36 AM    BUN 10 2021 09:36 AM    Creatinine 0.89 2021 09:36 AM    GFR est AA >60 2021 09:36 AM    GFR est non-AA >60 2021 09:36 AM    Calcium 9.0 2021 09:36 AM    Glucose (POC) 120 (H) 2021 11:57 AM     Lab Results   Component Value Date/Time    Bilirubin, total 0.4 2021 09:36 AM    ALT (SGPT) 41 2021 09:36 AM    Alk.  phosphatase 81 2021 09:36 AM    Protein, total 6.7 2021 09:36 AM    Albumin 3.2 (L) 2021 09:36 AM    Globulin 3.5 2021 09:36 AM     Lab Results   Component Value Date/Time    Iron 224 (H) 2021 09:30 AM    TIBC 256 2021 09:30 AM    Iron % saturation 88 (H) 2021 09:30 AM    Ferritin 750 (H) 2021 09:30 AM       No results found for: B12LT, FOL, RBCF  No results found for: TSH, TSH2, TSH3, TSHP, TSHEXT, TSHEXT  Lab Results   Component Value Date/Time    Hepatitis B surface Ag <0.10 2021 08:11 AM    Hepatitis B surface Ab <3.10 2021 08:11 AM    Hep B Core Ab, total Negative 2021 08:11 AM    Hep C Virus Ab <0.1 2021 08:45 AM     21: , AFP 1.4, b-hCG 476 mIU/mL  21: uric acid 4.2.  21: AFP 3.4, , b-hCG 2  10/11/21: , AFP 4.1, b-hCG < 1  11/15/21: , AFP pending, b-HCG <1    Imagin/21/21 CT abd/p without contrast:  IMPRESSION  1. Right retroperitoneal brain mass measuring 7.5 x 5.2 x 5.8 cm as above, with  narrowing of the IVC and likely encasement of the bilateral renal veins, though  not well evaluated without the use of IV contrast. Findings are highly  suspicious for either lymphoma or brain metastasis. 2. Few borderline enlarged inguinal lymph nodes are indeterminant. 3. Hepatic steatosis.     8/22/21 CT ch/abd/p with contrast:  IMPRESSION  Chest:  1. No evidence of primary malignancy or metastatic disease in the chest.  2. Severe aortic valvular calcifications. Correlate for aortic valvular stenosis. Abdomen/pelvis:   1. Redemonstrated right retroperitoneal mass measuring 7.4 x 4.9 x 6.7 cm as  above, including encasement and narrowing of the IVC and central aspects of the  bilateral renal veins. This favored to represent malignancy, with lymphoma  favored over brain metastasis. 2. No other pathologically enlarged lymph nodes within the abdomen or pelvis. Few prominent inguinal lymph nodes are favored to be reactive. 3. Hepatic steatosis.     8/24/21 Ultrasound scrotum/testicles:  IMPRESSION  1.  Study limited by patient's increased body habitus   2.  No testicular mass identified. Incidental right-sided testicular microlithiasis, a debatable risk factor for malignancy. Pursue and follow-up as  per clinical concern. 3.  Small left hydrocele. 10/30/21 CT ch/abd/pelv pending:   FINDINGS:   The Port-A-Cath terminates at the caval atrial junction. CHEST WALL: No mass or axillary lymphadenopathy. THYROID: No nodule. MEDIASTINUM: No mass or lymphadenopathy. ANDERS: No mass or lymphadenopathy. THORACIC AORTA: No dissection or aneurysm. MAIN PULMONARY ARTERY: Normal in caliber. TRACHEA/BRONCHI: Patent. ESOPHAGUS: No wall thickening or dilatation. HEART: Severe aortic valvular calcifications. Crystal Cesilia PLEURA: No effusion or pneumothorax. LUNGS: No nodule, mass, or airspace disease. LIVER: No mass.   BILIARY TREE: Gallbladder is within normal limits. CBD is not dilated. SPLEEN: within normal limits. PANCREAS: No mass or ductal dilatation. ADRENALS: Unremarkable. KIDNEYS: No mass, calculus, or hydronephrosis. STOMACH: Unremarkable. SMALL BOWEL: No dilatation or wall thickening. COLON: No dilatation or wall thickening. APPENDIX: Normal on axial image 99  PERITONEUM: No ascites or pneumoperitoneum. RETROPERITONEUM: Retroperitoneal 7.4 x 4.9 cm mass has decreased significantly  to approximately 2.7 x 3.2 cm (axial image 75). This structure now lies in front  of the IVC and inferior to the right renal vein and no longer encases the left  renal vein. REPRODUCTIVE ORGANS: Small prostate  URINARY BLADDER: No mass or calculus. BONES: No destructive bone lesion. ABDOMINAL WALL: No mass or hernia. ADDITIONAL COMMENTS: Shotty bilateral inguinal nodes. Largest in the right  inguinal region measures 12 mm in short axis diameter, stable. IMPRESSION  Significant decrease in size of the retroperitoneal mass with less compression  on the renal veins and IVC as described above. No evidence for metastatic disease. Incidental aortic valve calcification. Procedures:     7/20/21 PFTs:  FVC 4.83, 80% of predicted. GEO88.28, 89% of predicted. FEV1% is 86%. TLC is 6.89, which is 86% of perdicted. DLCO is 70% of predicted and when considering alveolar volume is 83% of predicted. Airway resistance is 58% of predicted. Impression:  The patient's spirometry, lung volumes and airway resistance are all normal. The patient's diffuse capacity is mildly decreased, but corrects when considering alveolar volume. 9/21/21 PFTs:  Spirometry reveals an FEV1-TO-FVC ratio is normal at 85% predicted with normal FVC and FEV1. Diffusion capacity is moderately reduced at 55% predicted, but does correct when taking into consideration alveolar volume. 10/8/21 PFTs:  Spirometry reveals a normal FEV1-to-FVC ratio of 81% predicted.   FVC and FEV1 are both within normal limits with an FVC of 4.82 L which is 85% predicted and an FEV1 of 3.89 L which is 95% predicted. Diffusion capacity is moderately decreased at 45% predicted. Assessment & Plan:   Benny García is a 48 y.o. male comes in for evaluation and management of germ cell tumor. 1. Testicular germ cell tumor:  Tumor markers show elevated  and b-. Need to distinguish pure seminoma from non-seminomatous germ cell tumor (NSGCT) or mixed germ cell tumor. Usually, scrotal ultrasound is not a replacement for radical inguinal orchiectomy as surgery will provide accurate histologic diagnosis. However, no testicular mass seen on scrotal ultrasound. Right testicular microlithiasis seen on ultrasound, but Urology did not advise R orchiectomy. b-HCG is usually not elevated in pure seminoma, but can be elevated in up to 20% of seminomas. I am unsure of the significance of the prominent inguinal LNs. Risk stratification for advanced testicular germ cell tumors: Good risk NSGCT based on: Retroperitoneal primary tumor, no mets to other organs, serum AFP < 1000, b-hCG < 5000 rylee-international units/ml, LDH < 3 times upper limit of normal (Many experts would not intensify a patient's treatment from good risk to intermediate risk if the only adverse prognostic factor were an LDH between 1.5 and 3 times ULN - this is why 3 cycles of BEP rather than 4 were recommended.) Accurate staging requires orchiectomy and measurement of tumor markers AFTER surgery. Orchiectomy not advised at this time given lack of testicular mass. Pt was treated with BEP regimen x 3 cycles. Previously discussed cryopreservation of sperm - referred to [facility]. Prior auth form completed per pt request. Supportive care medications include: topical lidocaine, zofran, compazine, docusate. CT imaging 10/30/21 shows significant decrease in size of RP mass, (from 7.4 to now 3.2cm).  No longer encasing vasculature. Per NCCN guidelines, pt needs surgical resection of this mass by RPLND. -- Plans to have RPLND surgery with Dr. Trav Carrlol and Dr. Xin Mike, [facility] upon receiving Cardiology  [place]; RPLND should be done within 4 weeks of the CT scan per NCCN guidelines given residual tumor. -- Review of pathology from RPLND needed after surgery to determine whether any adjuvant chemotherapy is advised (2 cycles of EP vs alternate regimen). -- Follow up with [facility] cardiothoracic surgery on 11/17/21 for cardiac clearance prior to surgery. -- Check tumor markers today. -- Return in 8 weeks, labs/port flush, MD visit. [Nonseminoma surveillance once in CR:  CR, negative markers -surveillance (y 1 H&P and marker every 2 mo, abd pelvic CT every 6 mo, CXR q6mo; y2 H&P q3mo, abd/pelvic ct q6-12mo, cxr q6 mo, y 3 H&P q6mo, Ct and cxr annually, y 4 H&P q6mo, imaging annually, cxr annually) or nerve sparing bilateral RPLND in selected cases]    2. Severe Aortic stenosis, non-rheumatic:  8/22 ECHO: LVEF 55-60% and severe aortic stenosis. Previously scheduled aortic valve replacement with mechanical valve by Dr. Tao Quintero on 9/20/21 is now hold given cancer diagnosis. Discussed with Dr. Ole Phoenix, covering for Dr. Rubens Esteves and with Dr. Maverick Adame: Severe AS has mortality rate of 50% at 2 yrs, but cannot proceed with surgery if untreated cancer. Reviewed Cr and will proceed with Lasix and cut down on oral fluids at this time. 3. DM / HTN:   Continue home meds: Metformin, Victoza (or Ozempic). Will monitor for steroid-induced hyperglycemia. 4. 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 He has concerns about how to afford not working and how to have pain controlled while driving (no opioids while driving.) Discussed with CM and clinical . Estrellita Rothman in [address] met with patient. 5. RLE DVT/ massive bilateral pulmonary embolus:   Occurred in the setting of malignancy.  s/p thrombectomy. On enoxaparin. Due to morbid obesity, not a candidate for direct oral anticoagulant. Anti-Xa level was in therapeutic range at 0.99 at [facility]. If surgery is pursued in near future, would advise IVC filter placement prior. -- Continue therapeutic Lovenox 180mg SubQ q12h (Sending prescriptions to [facility] mail order pharmacy; pt currently has 150mg + 30mg vials for injections. )  -- Referral to lymphedema for fitted compression stockings. 6. Normocytic anemia:   Likely due to chemotherapy and recent thrombectomy. -- Check iron profile, ferritin today - call with results. Emotional well being: Pt is coping well with his/her disease and has excellent support. I appreciate the opportunity to participate in Mr. Erika Mcclure care. I have personally seen and evaluated the patient in conjunction with Christine Jhaveri NP. I find the patient's history and physical exam are consistent with the NP's documentation. I agree with the above assessment and plan, which I have modified as needed. Pt was evaluated by Dr. Jonny Moore with [facility] who advised Cardiology evaluation prior to RPLND surgery - Cardiology evaluation 11/17/21. Advised pt call us once he has a surgery date. Will continue on therapeutic Lovenox given need for upcoming surgery. I personally spent a total of 35 minutes on patient care on date of this encounter, regarding the following:  Reviewing prior medical records ([facility] records) which I summarized in the history above, discussing diagnoses with the patient as listed in the assessment, ordering blood tests and imaging as listed in the plan, arranging follow up as well as documentation of this encounter.        Signed By: Patricia Jacques MD     November 15, 2021

## 2021-11-15 ENCOUNTER — HOSPITAL ENCOUNTER (OUTPATIENT)
Dept: INFUSION THERAPY | Age: 46
Discharge: HOME | End: 2021-11-15
Payer: COMMERCIAL

## 2021-11-15 ENCOUNTER — OFFICE VISIT (OUTPATIENT)
Dept: ONCOLOGY | Age: 46
End: 2021-11-15

## 2021-11-15 VITALS
BODY MASS INDEX: 39.17 KG/M2 | SYSTOLIC BLOOD PRESSURE: 145 MMHG | WEIGHT: 315 LBS | DIASTOLIC BLOOD PRESSURE: 89 MMHG | TEMPERATURE: 98.1 F | HEIGHT: 75 IN | HEART RATE: 78 BPM | RESPIRATION RATE: 18 BRPM | OXYGEN SATURATION: 99 %

## 2021-11-15 VITALS
HEART RATE: 80 BPM | BODY MASS INDEX: 39.17 KG/M2 | SYSTOLIC BLOOD PRESSURE: 149 MMHG | DIASTOLIC BLOOD PRESSURE: 97 MMHG | HEIGHT: 75 IN | TEMPERATURE: 98.1 F | OXYGEN SATURATION: 99 % | RESPIRATION RATE: 18 BRPM | WEIGHT: 315 LBS

## 2021-11-15 DIAGNOSIS — I82.90 VTE (VENOUS THROMBOEMBOLISM): ICD-10-CM

## 2021-11-15 DIAGNOSIS — C48.0 GERM CELL TUMOR OF RETROPERITONEUM (HCC): Primary | ICD-10-CM

## 2021-11-15 DIAGNOSIS — C62.90: ICD-10-CM

## 2021-11-15 DIAGNOSIS — I35.0 SEVERE AORTIC STENOSIS: ICD-10-CM

## 2021-11-15 DIAGNOSIS — D64.9 NORMOCYTIC ANEMIA, NOT DUE TO BLOOD LOSS: ICD-10-CM

## 2021-11-15 DIAGNOSIS — M79.89 LEG SWELLING: ICD-10-CM

## 2021-11-15 DIAGNOSIS — D49.9 EXTRAGONADAL GERM CELL TUMOR: Primary | ICD-10-CM

## 2021-11-15 LAB
ALBUMIN SERPL-MCNC: 3.2 G/DL (ref 3.5–5)
ALBUMIN/GLOB SERPL: 0.8 {RATIO} (ref 1.1–2.2)
ALP SERPL-CCNC: 80 U/L (ref 45–117)
ALT SERPL-CCNC: 35 U/L (ref 12–78)
ANION GAP SERPL CALC-SCNC: 7 MMOL/L (ref 5–15)
AST SERPL-CCNC: 20 U/L (ref 15–37)
BASOPHILS # BLD: 0.1 K/UL (ref 0–0.1)
BASOPHILS NFR BLD: 1 % (ref 0–1)
BILIRUB SERPL-MCNC: 0.4 MG/DL (ref 0.2–1)
BUN SERPL-MCNC: 13 MG/DL (ref 6–20)
BUN/CREAT SERPL: 15 (ref 12–20)
CALCIUM SERPL-MCNC: 9.4 MG/DL (ref 8.5–10.1)
CHLORIDE SERPL-SCNC: 108 MMOL/L (ref 97–108)
CO2 SERPL-SCNC: 25 MMOL/L (ref 21–32)
CREAT SERPL-MCNC: 0.89 MG/DL (ref 0.7–1.3)
DIFFERENTIAL METHOD BLD: ABNORMAL
EOSINOPHIL # BLD: 0.1 K/UL (ref 0–0.4)
EOSINOPHIL NFR BLD: 1 % (ref 0–7)
ERYTHROCYTE [DISTWIDTH] IN BLOOD BY AUTOMATED COUNT: 23.3 % (ref 11.5–14.5)
FERRITIN SERPL-MCNC: 218 NG/ML (ref 26–388)
GLOBULIN SER CALC-MCNC: 4.1 G/DL (ref 2–4)
GLUCOSE SERPL-MCNC: 106 MG/DL (ref 65–100)
HCG SERPL-ACNC: <1 MIU/ML
HCT VFR BLD AUTO: 28.5 % (ref 36.6–50.3)
HGB BLD-MCNC: 8.7 G/DL (ref 12.1–17)
IMM GRANULOCYTES # BLD AUTO: 0 K/UL (ref 0–0.04)
IMM GRANULOCYTES NFR BLD AUTO: 0 % (ref 0–0.5)
IRON SATN MFR SERPL: 19 % (ref 20–50)
IRON SERPL-MCNC: 51 UG/DL (ref 35–150)
LDH SERPL L TO P-CCNC: 248 U/L (ref 85–241)
LYMPHOCYTES # BLD: 2.1 K/UL (ref 0.8–3.5)
LYMPHOCYTES NFR BLD: 29 % (ref 12–49)
MCH RBC QN AUTO: 27.4 PG (ref 26–34)
MCHC RBC AUTO-ENTMCNC: 30.5 G/DL (ref 30–36.5)
MCV RBC AUTO: 89.9 FL (ref 80–99)
MONOCYTES # BLD: 1.2 K/UL (ref 0–1)
MONOCYTES NFR BLD: 16 % (ref 5–13)
NEUTS SEG # BLD: 3.7 K/UL (ref 1.8–8)
NEUTS SEG NFR BLD: 53 % (ref 32–75)
NRBC # BLD: 0 K/UL (ref 0–0.01)
NRBC BLD-RTO: 0 PER 100 WBC
PLATELET # BLD AUTO: 161 K/UL (ref 150–400)
POTASSIUM SERPL-SCNC: 4.1 MMOL/L (ref 3.5–5.1)
PROT SERPL-MCNC: 7.3 G/DL (ref 6.4–8.2)
RBC # BLD AUTO: 3.17 M/UL (ref 4.1–5.7)
RBC MORPH BLD: ABNORMAL
RBC MORPH BLD: ABNORMAL
SODIUM SERPL-SCNC: 140 MMOL/L (ref 136–145)
TIBC SERPL-MCNC: 270 UG/DL (ref 250–450)
WBC # BLD AUTO: 7.2 K/UL (ref 4.1–11.1)

## 2021-11-15 PROCEDURE — 84702 CHORIONIC GONADOTROPIN TEST: CPT

## 2021-11-15 PROCEDURE — 82728 ASSAY OF FERRITIN: CPT

## 2021-11-15 PROCEDURE — 83550 IRON BINDING TEST: CPT

## 2021-11-15 PROCEDURE — 82105 ALPHA-FETOPROTEIN SERUM: CPT

## 2021-11-15 PROCEDURE — 36591 DRAW BLOOD OFF VENOUS DEVICE: CPT

## 2021-11-15 PROCEDURE — 99214 OFFICE O/P EST MOD 30 MIN: CPT | Performed by: INTERNAL MEDICINE

## 2021-11-15 PROCEDURE — 83615 LACTATE (LD) (LDH) ENZYME: CPT

## 2021-11-15 PROCEDURE — 85025 COMPLETE CBC W/AUTO DIFF WBC: CPT

## 2021-11-15 PROCEDURE — 74011250636 HC RX REV CODE- 250/636: Performed by: NURSE PRACTITIONER

## 2021-11-15 PROCEDURE — 77030012965 HC NDL HUBR BBMI -A

## 2021-11-15 PROCEDURE — 80053 COMPREHEN METABOLIC PANEL: CPT

## 2021-11-15 RX ORDER — SODIUM CHLORIDE 0.9 % (FLUSH) 0.9 %
5-10 SYRINGE (ML) INJECTION AS NEEDED
Status: CANCELLED | OUTPATIENT
Start: 2022-01-10

## 2021-11-15 RX ORDER — HEPARIN 100 UNIT/ML
500 SYRINGE INTRAVENOUS AS NEEDED
Status: CANCELLED | OUTPATIENT
Start: 2022-01-10

## 2021-11-15 RX ORDER — SODIUM CHLORIDE 9 MG/ML
10 INJECTION INTRAMUSCULAR; INTRAVENOUS; SUBCUTANEOUS AS NEEDED
Status: ACTIVE | OUTPATIENT
Start: 2021-11-15 | End: 2021-11-15

## 2021-11-15 RX ORDER — HEPARIN 100 UNIT/ML
500 SYRINGE INTRAVENOUS AS NEEDED
Status: ACTIVE | OUTPATIENT
Start: 2021-11-15 | End: 2021-11-15

## 2021-11-15 RX ORDER — SODIUM CHLORIDE 0.9 % (FLUSH) 0.9 %
5-10 SYRINGE (ML) INJECTION AS NEEDED
Status: DISPENSED | OUTPATIENT
Start: 2021-11-15 | End: 2021-11-15

## 2021-11-15 RX ORDER — SODIUM CHLORIDE 9 MG/ML
10 INJECTION INTRAMUSCULAR; INTRAVENOUS; SUBCUTANEOUS AS NEEDED
Status: CANCELLED | OUTPATIENT
Start: 2022-01-10

## 2021-11-15 RX ADMIN — Medication 10 ML: at 10:44

## 2021-11-15 RX ADMIN — SODIUM CHLORIDE 10 ML: 9 INJECTION INTRAMUSCULAR; INTRAVENOUS; SUBCUTANEOUS at 10:45

## 2021-11-15 RX ADMIN — Medication 10 ML: at 10:46

## 2021-11-15 RX ADMIN — HEPARIN 500 UNITS: 100 SYRINGE at 10:47

## 2021-11-15 NOTE — LETTER
11/15/2021    Patient: Carmelina Nice   YOB: 1975   Date of Visit: 11/15/2021     Tony Du NP  [place] [id number]  [address] [facility] [address]    Dear Tony Du NP,      Thank you for referring Mr. Gracie Cowden to "[employer]  [facility] for evaluation. My notes for this consultation are attached. If you have questions, please do not hesitate to call me. I look forward to following your patient along with you.       Sincerely,    Fredis Hung NP

## 2021-11-15 NOTE — PROGRESS NOTES
Chief Complaint   Patient presents with    Follow-up     2 week    Cancer     Extragonadal germ cell tumor     Vitals 11/15/2021   Blood Pressure 149/97   Pulse 80   Temp 98.1   Resp 18   Height 6' 3\"   Weight 399 lb 6.4 oz   SpO2 99   BSA 3.1 m2   BMI 49.92 kg/m2

## 2021-11-15 NOTE — PROGRESS NOTES
Outpatient Infusion [facility] Short Visit Progress Note    [id number] Patient admitted to [facility] for labs/PF ambulatory in stable condition. Assessment completed. No new concerns voiced. Covid Screening      1. Do you have any symptoms of COVID-19? SOB, coughing, fever, or generally not feeling well ? no  2. Have you been exposed to COVID-19 recently? no  3. Have you had any recent contact with family/friend that has a pending COVID test? no    Vital Signs:  Visit Vitals  BP (!) 149/97   Pulse 80   Temp 98.1 °F (36.7 °C)   Resp 18   Ht 6' 3\" (1.905 m)   Wt (!) 181.2 kg (399 lb 6.4 oz)   SpO2 99%   BMI 49.92 kg/m²         PAC with positive blood return. Lab Results: To be resulted        Medications:  Medications Administered     0.9% sodium chloride injection 10 mL     Admin Date  11/15/2021 Action  Given Dose  10 mL Route  IntraVENous Administered By  Mireya Gutierrez RN          heparin (porcine) pf 500 Units     Admin Date  11/15/2021 Action  Given Dose  500 Units Route  IntraVENous Administered By  Mireya Gutierrez RN          sodium chloride (NS) flush 5-10 mL     Admin Date  11/15/2021 Action  Given Dose  10 mL Route  IntraVENous Administered By  Mireya Gutierrez RN           Admin Date  11/15/2021 Action  Given Dose  10 mL Route  IntraVENous Administered By  Mireya Gutierrez RN                0044 Patient tolerated treatment well. Patient discharged from [facility] ambulatory in no distress at 1045 Patient aware of next appointment will be scheduled at MD appointment. .    Future Appointments   Date Time Provider Cyrus Banks   12/2/2021  3:00 PM Manjeet Fowler NP [facility] AMB

## 2021-11-16 LAB — AFP-TM SERPL-MCNC: 3 NG/ML (ref 0–8.3)

## 2021-12-02 ENCOUNTER — OFFICE VISIT (OUTPATIENT)
Dept: PRIMARY CARE CLINIC | Age: 46
End: 2021-12-02
Payer: MEDICAID

## 2021-12-02 VITALS
DIASTOLIC BLOOD PRESSURE: 85 MMHG | OXYGEN SATURATION: 99 % | SYSTOLIC BLOOD PRESSURE: 144 MMHG | TEMPERATURE: 97.4 F | HEART RATE: 97 BPM | RESPIRATION RATE: 18 BRPM | WEIGHT: 315 LBS | HEIGHT: 75 IN | BODY MASS INDEX: 39.17 KG/M2

## 2021-12-02 DIAGNOSIS — E11.9 CONTROLLED TYPE 2 DIABETES MELLITUS WITHOUT COMPLICATION, WITHOUT LONG-TERM CURRENT USE OF INSULIN (HCC): ICD-10-CM

## 2021-12-02 DIAGNOSIS — I10 ESSENTIAL HYPERTENSION: Primary | ICD-10-CM

## 2021-12-02 PROCEDURE — 99214 OFFICE O/P EST MOD 30 MIN: CPT

## 2021-12-02 RX ORDER — METFORMIN HYDROCHLORIDE 500 MG/1
1000 TABLET, EXTENDED RELEASE ORAL 2 TIMES DAILY
Qty: 360 TABLET | Refills: 1 | Status: SHIPPED | OUTPATIENT
Start: 2021-12-02

## 2021-12-02 RX ORDER — SEMAGLUTIDE 1.34 MG/ML
1 INJECTION, SOLUTION SUBCUTANEOUS
Qty: 1 BOX | Refills: 1 | Status: SHIPPED | OUTPATIENT
Start: 2021-12-02 | End: 2022-02-08

## 2021-12-02 RX ORDER — ACETAMINOPHEN 500 MG
TABLET ORAL
Qty: 1 KIT | Refills: 0 | Status: SHIPPED | OUTPATIENT
Start: 2021-12-02

## 2021-12-02 NOTE — PROGRESS NOTES
HPI     Chief Complaint   Patient presents with    Follow Up Chronic Condition     pt is asking for refills on omzepic metformin. pt states for the last week he has been having chest tightness   [facility] Follow Up     Pt states he is following up after being in the hospital for blod clots in both lungs and pt was newly diagnosed with cancer         HPI:  Luciano Edwards is a 48 y.o. male who is here to establish care with a new provider. Patient in need of medication refill. Patient diagnosed with Germ Cell CA. Recently patient had several episodes of dizziness followed by LOC. Patient was transported to [facility] and diagnosed with multiple pulmonary emboli and DVT. Post surgery for PE, patient was placed on SQ Lovenox. Patient is being fallowed by oncology. Controlled type 2 diabetes mellitus without complication, without long-term current use of insulin ([address]): well controlled with Metformin and Ozempic blood glucose 110 AVG    Allergies   Allergen Reactions    Other Food Angioedema     pickle    Cucumber Fruit Extract Angioedema       Current Outpatient Medications   Medication Sig    metFORMIN ER (GLUCOPHAGE XR) 500 mg tablet Take 2 Tablets by mouth two (2) times a day.  semaglutide (Ozempic) 1 mg/dose (2 mg/1.5 mL) sub-q pen 1 mg by SubCUTAneous route every seven (7) days. 0.75 mg SC every 7 days x 4 weeks then 1 mg per week    Blood Pressure Monitor kit Used monitor to check bp two times in the morning and two times in the evening    enoxaparin (Lovenox) injection 150 mg by SubCUTAneous route every twelve (12) hours for 30 days. In addition to 30mg lovenox prescription.  enoxaparin (Lovenox) 30 mg/0.3 mL injection 0.3 mL by SubCUTAneous route every twelve (12) hours for 30 days. In addition to 150mg lovenox prescription.  furosemide (LASIX) 20 mg tablet Take 1 Tablet by mouth daily as needed (fluid retention).     diphenoxylate-atropine (LomotiL) 2.5-0.025 mg per tablet Take 1 Tablet by mouth three (3) times daily as needed for Diarrhea. Max Daily Amount: 3 Tablets.  ondansetron hcl (ZOFRAN) 8 mg tablet Take 1 Tablet by mouth every eight (8) hours as needed for Nausea or Vomiting.  prochlorperazine (Compazine) 10 mg tablet Take 1 Tablet by mouth every six (6) hours as needed for Nausea or Vomiting. No current facility-administered medications for this visit. Review of Systems   Constitutional: Negative for malaise/fatigue and weight loss. Eyes: Negative for blurred vision and double vision. Respiratory: Negative for cough and shortness of breath. Cardiovascular: Negative for chest pain, palpitations and leg swelling. Gastrointestinal: Negative for heartburn and nausea. Musculoskeletal: Negative for joint pain and myalgias. Skin: Negative for itching and rash. Neurological: Negative for dizziness, tingling, loss of consciousness, weakness and headaches. Endo/Heme/Allergies: Does not bruise/bleed easily. Psychiatric/Behavioral: Negative for depression. The patient is not nervous/anxious. All other systems reviewed and are negative. Reviewed PmHx, FmHx, SocHx as well as meds and allergies, updated and dated in the chart. Objective     Visit Vitals  BP (!) 144/85   Pulse 97   Temp 97.4 °F (36.3 °C) (Temporal)   Resp 18   Ht 6' 3\" (1.905 m)   Wt (!) 401 lb (181.9 kg)   SpO2 99%   BMI 50.12 kg/m²     Physical Exam  Constitutional:       General: He is not in acute distress. Appearance: Normal appearance. He is normal weight. HENT:      Head: Normocephalic and atraumatic. Neck:      Thyroid: No thyroid mass or thyromegaly. Cardiovascular:      Rate and Rhythm: Normal rate and regular rhythm. Heart sounds: No murmur heard. Pulmonary:      Effort: Pulmonary effort is normal. No respiratory distress. Breath sounds: Normal breath sounds. No wheezing. Musculoskeletal:      Cervical back: Neck supple. No muscular tenderness. Right lower leg: No edema. Left lower leg: No edema. Lymphadenopathy:      Cervical: No cervical adenopathy. Skin:     General: Skin is warm and dry. Neurological:      Mental Status: He is alert and oriented to person, place, and time. Mental status is at baseline. Psychiatric:         Attention and Perception: Attention and perception normal.         Mood and Affect: Mood and affect normal.         Speech: Speech normal.         Behavior: Behavior normal.             Assessment and Plan     Diagnoses and all orders for this visit:    1. Essential hypertension  -    Patient is hypertensive in the office today. BP noted at 151/82. Patient does not currently monitor BP at home. No associated symptoms. No CP, dizziness, or dyspnea. Plan:  Blood Pressure Monitor kit; Used monitor to check bp two times in the morning and two times in the evening    2. Controlled type 2 diabetes mellitus without complication, without long-term current use of insulin ([facility])  Comments:  ozempic metformin in hospital not havingbad blood sugars at all   Orders:  -     metFORMIN ER (GLUCOPHAGE XR) 500 mg tablet; Take 2 Tablets by mouth two (2) times a day. -     semaglutide (Ozempic) 1 mg/dose (2 mg/1.5 mL) sub-q pen; 1 mg by SubCUTAneous route every seven (7) days. 0.75 mg SC every 7 days x 4 weeks then 1 mg per week    3. Controlled type 2 diabetes mellitus without complication, without long-term current use of insulin ([facility])  -     metFORMIN ER (GLUCOPHAGE XR) 500 mg tablet; Take 2 Tablets by mouth two (2) times a day. -     semaglutide (Ozempic) 1 mg/dose (2 mg/1.5 mL) sub-q pen; 1 mg by SubCUTAneous route every seven (7) days. 0.75 mg SC every 7 days x 4 weeks then 1 mg per week         Medication Side Effects and Warnings were discussed with patient. Patient Labs were reviewed and or requested. Patient Past Records were reviewed and or requested.     Follow-up and Dispositions    · Return in about 3 months (around 3/2/2022) for Lipids, hypertension. I have discussed the diagnosis with the patient and the intended plan as seen in the above orders. The patient has received an after-visit summary and questions were answered concerning future plans. I have discussed medication side effects and warnings with the patient as well. Adal Cordova, [facility]  [address]

## 2021-12-02 NOTE — PATIENT INSTRUCTIONS
High Blood Pressure: Care Instructions  Overview     It's normal for blood pressure to go up and down throughout the day. But if it stays up, you have high blood pressure. Another name for high blood pressure is hypertension. Despite what a lot of people think, high blood pressure usually doesn't cause headaches or make you feel dizzy or lightheaded. It usually has no symptoms. But it does increase your risk of stroke, heart attack, and other problems. You and your doctor will talk about your risks of these problems based on your blood pressure. Your doctor will give you a goal for your blood pressure. Your goal will be based on your health and your age. Lifestyle changes, such as eating healthy and being active, are always important to help lower blood pressure. You might also take medicine to reach your blood pressure goal.  Follow-up care is a key part of your treatment and safety. Be sure to make and go to all appointments, and call your doctor if you are having problems. It's also a good idea to know your test results and keep a list of the medicines you take. How can you care for yourself at home? Medical treatment  · If you stop taking your medicine, your blood pressure will go back up. You may take one or more types of medicine to lower your blood pressure. Be safe with medicines. Take your medicine exactly as prescribed. Call your doctor if you think you are having a problem with your medicine. · Talk to your doctor before you start taking aspirin every day. Aspirin can help certain people lower their risk of a heart attack or stroke. But taking aspirin isn't right for everyone, because it can cause serious bleeding. · See your doctor regularly. You may need to see the doctor more often at first or until your blood pressure comes down. · If you are taking blood pressure medicine, talk to your doctor before you take decongestants or anti-inflammatory medicine, such as ibuprofen.  Some of these medicines can raise blood pressure. · Learn how to check your blood pressure at home. Lifestyle changes  · Stay at a healthy weight. This is especially important if you put on weight around the waist. Losing even 10 pounds can help you lower your blood pressure. · If your doctor recommends it, get more exercise. Walking is a good choice. Bit by bit, increase the amount you walk every day. Try for at least 30 minutes on most days of the week. You also may want to swim, bike, or do other activities. · Avoid or limit alcohol. Talk to your doctor about whether you can drink any alcohol. · Try to limit how much sodium you eat to less than 2,300 milligrams (mg) a day. Your doctor may ask you to try to eat less than 1,500 mg a day. · Eat plenty of fruits (such as bananas and oranges), vegetables, legumes, whole grains, and low-fat dairy products. · Lower the amount of saturated fat in your diet. Saturated fat is found in animal products such as milk, cheese, and meat. Limiting these foods may help you lose weight and also lower your risk for heart disease. · Do not smoke. Smoking increases your risk for heart attack and stroke. If you need help quitting, talk to your doctor about stop-smoking programs and medicines. These can increase your chances of quitting for good. When should you call for help? Call 911  anytime you think you may need emergency care. This may mean having symptoms that suggest that your blood pressure is causing a serious heart or blood vessel problem. Your blood pressure may be over 180/120. For example, call 911 if:    · You have symptoms of a heart attack. These may include:  ? Chest pain or pressure, or a strange feeling in the chest.  ? Sweating. ? Shortness of breath. ? Nausea or vomiting. ? Pain, pressure, or a strange feeling in the back, neck, jaw, or upper belly or in one or both shoulders or arms. ? Lightheadedness or sudden weakness.   ? A fast or irregular heartbeat.     · You have symptoms of a stroke. These may include:  ? Sudden numbness, tingling, weakness, or loss of movement in your face, arm, or leg, especially on only one side of your body. ? Sudden vision changes. ? Sudden trouble speaking. ? Sudden confusion or trouble understanding simple statements. ? Sudden problems with walking or balance. ? A sudden, severe headache that is different from past headaches.     · You have severe back or belly pain. Do not wait until your blood pressure comes down on its own. Get help right away. Call your doctor now or seek immediate care if:    · Your blood pressure is much higher than normal (such as 180/120 or higher), but you don't have symptoms.     · You think high blood pressure is causing symptoms, such as:  ? Severe headache.  ? Blurry vision. Watch closely for changes in your health, and be sure to contact your doctor if:    · Your blood pressure measures higher than your doctor recommends at least 2 times. That means the top number is higher or the bottom number is higher, or both.     · You think you may be having side effects from your blood pressure medicine. Where can you learn more? Go to http://www.gray.com/  Enter [id number] in the search box to learn more about \"High Blood Pressure: Care Instructions. \"  Current as of: April 29, 2021               Content Version: 13.0  © 2006-2021 [employer]wise[employer]. Care instructions adapted under license by [employer] (which disclaims liability or warranty for this information). If you have questions about a medical condition or this instruction, always ask your healthcare professional. [place] [address] any warranty or liability for your use of this information.

## 2021-12-02 NOTE — PROGRESS NOTES
Chief Complaint   Patient presents with    Follow Up Chronic Condition     pt is asking for refills on omzepic metformin. pt states for the last week he has been having chest tightness   [facility] Follow Up     Pt states he is following up after being in the hospital for blod clots in both lungs and pt was newly diagnosed with cancer      1. Have you been to the ER, urgent care clinic since your last visit? Hospitalized since your last visit?no    2. Have you seen or consulted any other health care providers outside of the [address] [place] [address] since your last visit? Include any pap smears or colon screening. no  Visit Vitals  BP (!) 151/82 (BP 1 Location: Right arm, BP Patient Position: At rest, BP Cuff Size: Adult)   Pulse 97   Temp 97.4 °F (36.3 °C) (Temporal)   Resp 18   Ht 6' 3\" (1.905 m)   Wt (!) 401 lb (181.9 kg)   SpO2 99%   BMI 50.12 kg/m²

## 2021-12-14 ENCOUNTER — TELEPHONE (OUTPATIENT)
Dept: ONCOLOGY | Age: 46
End: 2021-12-14

## 2021-12-14 DIAGNOSIS — I82.90 VTE (VENOUS THROMBOEMBOLISM): ICD-10-CM

## 2021-12-14 DIAGNOSIS — D49.9 EXTRAGONADAL GERM CELL TUMOR: Primary | ICD-10-CM

## 2021-12-14 NOTE — TELEPHONE ENCOUNTER
12/14/21 4:04 PM Called patient and advised he is scheduled for IVC filter placement on 12/17/21 - must arrive to outpatient registration at 7 am, needs , NPO at midnight. 12/14/21 4:06 PM Called patient back and advised  was not able to run insurance authorization. Called patient and he gave me new number for [facility]- [phone].    12/14/21 4:16 PM Supplied this number to scheduling - they were still having issues getting authorization. Advised his case will go under urgent review and patient will be contacted to get updated information.

## 2021-12-15 ENCOUNTER — TELEPHONE (OUTPATIENT)
Dept: ONCOLOGY | Age: 46
End: 2021-12-15

## 2021-12-15 NOTE — TELEPHONE ENCOUNTER
[address] at [facility]  [phone]      12/15/21 9:44 AM Called patient. Per Dr. Denae Fregoso, advised that patient hold any Lovenox doses after 6 PM the night before and morning of his procedure on 12/17. Patient can restart Lovenox the next time he is due to take Lovenox after the procedure is completed. Patient performed read back of instructions and voiced understanding. No further questions or concerns at this time.

## 2021-12-16 ENCOUNTER — TELEPHONE (OUTPATIENT)
Dept: ONCOLOGY | Age: 46
End: 2021-12-16

## 2021-12-16 NOTE — TELEPHONE ENCOUNTER
[address] at [facility]  [phone]        12/16/21 3:32 PM Received incoming call from patient. Patient states he just spoke with urologist who advised that surgery is being cancelled next week as anesthesiologist does not feel comfortable administering anesthesia to patient. Patient also states that he declined having procedure done at [facility]. Patient voiced frustration as he states he took off and coordinated his schedule to accommodate for upcoming appointments. Patient stated urology advised that patient follow up with Dr. Lucho Alcala, cardiac surgeon at [facility]. Patient wanted to confirm that it is okay to cancel IVC filter placement tomorrow. Per Adam Frye, advised that she will discuss above further with Dr. Angeles Ganrica before cancelling IVC filter placement. Clinical team to call patient back with update. He voiced understanding. 4:11 PM Called patient and advised of NP response. Patient inquiring why he would have IVC filter placed if no provider recommends proceeding with surgery prior to addressing patient's cardiac issues. Per Dr. Angeles Garnica, discussed that she recommends proceeding with IVC filter placement so that it is one less procedure that patient would have to undergo prior to future surgeries. However, if patient prefers, he can cancel procedure. Provided him with scheduling phone number. Patient also requests refills of his Lovenox. Will defer to NP. No further questions or concerns at this time.

## 2021-12-17 DIAGNOSIS — I26.99 ACUTE PULMONARY EMBOLISM WITHOUT ACUTE COR PULMONALE, UNSPECIFIED PULMONARY EMBOLISM TYPE (HCC): ICD-10-CM

## 2021-12-17 RX ORDER — ENOXAPARIN SODIUM 150 MG/ML
150 INJECTION SUBCUTANEOUS EVERY 12 HOURS
Qty: 60 EACH | Refills: 1 | Status: SHIPPED | OUTPATIENT
Start: 2021-12-17 | End: 2022-01-16

## 2021-12-17 RX ORDER — ENOXAPARIN SODIUM 100 MG/ML
30 INJECTION SUBCUTANEOUS EVERY 12 HOURS
Qty: 60 EACH | Refills: 1 | Status: SHIPPED | OUTPATIENT
Start: 2021-12-17 | End: 2022-01-16

## 2021-12-20 NOTE — TELEPHONE ENCOUNTER
12/20/21- Received a fax from patient [address] [place] [address] stating patient does not have active coverage with [facility] and the Lovenox cannot be filled. Called patients insurance to check status of patients coverage. Per representative patients current plan termed on 11/30/21 however per the representative patient has a new plan that was effective 12/1/21 with ID # [id number]  Group # [id number] [place] # [id number]  N # [id number]    [address] [place] [address] and provided the updated information. Per representative the Lovenox prescription will need to be filled at the [facility]. Provider transferred this user to the [facility]. This user provided the updated insurance information to the representative. Representative ran both prescription thru with new insurance information and received a paid claim for both and stated the pharmacy will contact the patient to schedule delivery.

## 2021-12-22 ENCOUNTER — TELEPHONE (OUTPATIENT)
Dept: ONCOLOGY | Age: 46
End: 2021-12-22

## 2021-12-22 NOTE — TELEPHONE ENCOUNTER
[address] at [place] [address]  [phone]        12/22/21 12:52 PM Called patient. He states he saw heart surgeon yesterday and is now scheduled for heart surgery on 01/07. Surgeon has recommended IVC filter placement prior to surgery. Patient inquiring if this should be coordinated with this office or cardiology. Per chart review, patient has active order for IVC filter placement. Advised this nurse would confirm with Dr. Cedrick Camilo and call patient back. He voiced understanding. 1:35 PM Called patient and advised of NP response. Provided patient with contact number for [facility] department. No further questions or concerns at this time.

## 2021-12-22 NOTE — TELEPHONE ENCOUNTER
Patient called stating that he spoke with Cathryn Dale about his IVC filter placement- He would like a call back as soon as possible it's in regards to his open heart surgery - please advise CB # [phone]

## 2021-12-30 ENCOUNTER — HOSPITAL ENCOUNTER (OUTPATIENT)
Dept: INTERVENTIONAL RADIOLOGY/VASCULAR | Age: 46
Discharge: HOME | End: 2021-12-30
Attending: INTERNAL MEDICINE | Admitting: STUDENT IN AN ORGANIZED HEALTH CARE EDUCATION/TRAINING PROGRAM
Payer: MEDICAID

## 2021-12-30 VITALS
SYSTOLIC BLOOD PRESSURE: 137 MMHG | RESPIRATION RATE: 14 BRPM | HEART RATE: 81 BPM | OXYGEN SATURATION: 100 % | BODY MASS INDEX: 39.17 KG/M2 | WEIGHT: 315 LBS | DIASTOLIC BLOOD PRESSURE: 85 MMHG | HEIGHT: 75 IN

## 2021-12-30 DIAGNOSIS — D49.9 EXTRAGONADAL GERM CELL TUMOR: ICD-10-CM

## 2021-12-30 DIAGNOSIS — I82.90 VTE (VENOUS THROMBOEMBOLISM): ICD-10-CM

## 2021-12-30 PROCEDURE — 77030029065 HC DRSG HEMO QCLOT ZMED -B

## 2021-12-30 PROCEDURE — 74011000636 HC RX REV CODE- 636: Performed by: RADIOLOGY

## 2021-12-30 PROCEDURE — C1769 GUIDE WIRE: HCPCS

## 2021-12-30 PROCEDURE — C1894 INTRO/SHEATH, NON-LASER: HCPCS

## 2021-12-30 PROCEDURE — 74011250636 HC RX REV CODE- 250/636: Performed by: RADIOLOGY

## 2021-12-30 PROCEDURE — 74011000250 HC RX REV CODE- 250: Performed by: RADIOLOGY

## 2021-12-30 PROCEDURE — 77030011229 HC DIL VESL COON COOK -A

## 2021-12-30 PROCEDURE — C1880 VENA CAVA FILTER: HCPCS

## 2021-12-30 PROCEDURE — 2709999900 HC NON-CHARGEABLE SUPPLY

## 2021-12-30 PROCEDURE — 37191 INS ENDOVAS VENA CAVA FILTR: CPT

## 2021-12-30 RX ORDER — FENTANYL CITRATE 50 UG/ML
25-200 INJECTION, SOLUTION INTRAMUSCULAR; INTRAVENOUS
Status: DISCONTINUED | OUTPATIENT
Start: 2021-12-30 | End: 2021-12-30 | Stop reason: HOSPADM

## 2021-12-30 RX ORDER — LIDOCAINE HYDROCHLORIDE 10 MG/ML
10 INJECTION INFILTRATION; PERINEURAL ONCE
Status: COMPLETED | OUTPATIENT
Start: 2021-12-30 | End: 2021-12-30

## 2021-12-30 RX ADMIN — FENTANYL CITRATE 50 MCG: 50 INJECTION, SOLUTION INTRAMUSCULAR; INTRAVENOUS at 08:56

## 2021-12-30 RX ADMIN — LIDOCAINE HYDROCHLORIDE 10 ML: 10 INJECTION, SOLUTION INFILTRATION; PERINEURAL at 09:04

## 2021-12-30 RX ADMIN — IOPAMIDOL 20 ML: 612 INJECTION, SOLUTION INTRAVENOUS at 09:10

## 2021-12-30 NOTE — PROGRESS NOTES
0740  Patient arrived. ID and allergies verified verbally with patient. Pt voices understanding of procedure to be performed. Consent obtained. Pt prepped for procedure. 2722  TRANSFER - OUT REPORT:    Verbal report given to [facility], RN(name) on Brendan Palomino  being transferred to interventional radiology lab(unit) for ordered procedure       Report consisted of patients Situation, Background, Assessment and   Recommendations(SBAR). Information from the following report(s) SBAR was reviewed with the receiving nurse. Lines:   Venous Access Device Vas-cath Power Injectable Implantable Port 08/26/21 Upper chest (subclavicular area, right (Active)       Peripheral IV 12/30/21 Anterior;Right Forearm (Active)   Site Assessment Clean, dry, & intact 12/30/21 0847   Phlebitis Assessment 0 12/30/21 0847   Dressing Status Clean, dry, & intact 12/30/21 0847   Dressing Type Transparent 12/30/21 0847   Hub Color/Line Status Blue 12/30/21 0847        Opportunity for questions and clarification was provided. Patient transported with:   Registered Nurse       [address] REPORT:    Verbal report received from [facility], RN(name) on Brendan Bur  being received from interventional radiology(unit) for routine post - op      Report consisted of patients Situation, Background, Assessment and   Recommendations(SBAR). Information from the following report(s) SBAR was reviewed with the receiving nurse. Opportunity for questions and clarification was provided. Assessment completed upon patients arrival to unit and care assumed. 0930  Discharge instructions reviewed with patient and family. Voiced understanding. Patient given copy of discharge instructions to take home. 0940  Pt discharged via wheelchair with sister. Personal belongings with patient upon discharge.

## 2021-12-30 NOTE — PROGRESS NOTES
TRANSFER - OUT REPORT:    Verbal report given to SILVIANO Bolanos (name) on Prem Barrow being transferred to [facility] (unit) for routine post - op       Report consisted of patient's Situation, Background, Assessment and   Recommendations(SBAR). Information from the following report(s) SBAR was reviewed with the receiving nurse. Opportunity for questions and clarification was provided.       Patient transported with:   Registered Nurse  Tech

## 2021-12-30 NOTE — H&P
Interventional and Vascular Radiology History and Physical    Patient: June Wheeler 48 y.o. male       Chief Complaint: Port     History of Present Illness: 48year old male presents for port placement. History:    Past Medical History:   Diagnosis Date    Aortic stenosis, severe     Arrhythmia     heart murmur    CAD (coronary artery disease)     Diabetes (HCC)     GERD (gastroesophageal reflux disease)     Germ cell cancer (HCC)     stage 3     Hypertension     Pulmonary embolism (HCC)     both lungs    Sleep apnea      Family History   Adopted: Yes     Social History     Socioeconomic History    Marital status: SINGLE     Spouse name: Not on file    Number of children: Not on file    Years of education: Not on file    Highest education level: Not on file   Occupational History    Not on file   Tobacco Use    Smoking status: Never Smoker    Smokeless tobacco: Never Used   Vaping Use    Vaping Use: Never used   Substance and Sexual Activity    Alcohol use: No     Comment:      Drug use: No    Sexual activity: Not on file   Other Topics Concern    Not on file   Social History Narrative    Not on file     Social Determinants of Health     Financial Resource Strain:     Difficulty of Paying Living Expenses: Not on file   Food Insecurity:     Worried About Running Out of Food in the Last Year: Not on file    Jani of Food in the Last Year: Not on file   Transportation Needs:     Lack of Transportation (Medical): Not on file    Lack of Transportation (Non-Medical):  Not on file   Physical Activity:     Days of Exercise per Week: Not on file    Minutes of Exercise per Session: Not on file   Stress:     Feeling of Stress : Not on file   Social Connections:     Frequency of Communication with Friends and Family: Not on file    Frequency of Social Gatherings with Friends and Family: Not on file    Attends Oriental orthodox Services: Not on file    Active Member of Clubs or Organizations: Not on file    Attends Club or Organization Meetings: Not on file    Marital Status: Not on file   Intimate Partner Violence:     Fear of Current or Ex-Partner: Not on file    Emotionally Abused: Not on file    Physically Abused: Not on file    Sexually Abused: Not on file   Housing Stability:     Unable to Pay for Housing in the Last Year: Not on file    Number of Evamonorris in the Last Year: Not on file    Unstable Housing in the Last Year: Not on file       Allergies: Allergies   Allergen Reactions    Other Food Angioedema     pickle    Cucumber Fruit Extract Angioedema       Current Medications:  Current Facility-Administered Medications   Medication Dose Route Frequency    fentaNYL citrate (PF) injection  mcg   mcg IntraVENous Multiple    lidocaine (XYLOCAINE) 10 mg/mL (1 %) injection 10 mL  10 mL SubCUTAneous ONCE    iopamidoL (ISOVUE 300) 61 % contrast injection 10-50 mL  10-50 mL IntraVENous ONCE        Physical Exam:  Height 6' 3\" (1.905 m), weight (!) 176.7 kg (389 lb 8 oz). No acute distress  Good peripheral perfusion  Nonlabored respirations  Abdomen nondistended  Procedure site right chest without abnormalities       Alerts:    Hospital Problems  Date Reviewed: 12/2/2021    None          Laboratory:    No results for input(s): HGB, HCT, WBC, PLT, INR, BUN, CREA, K, CRCLT, HGBEXT, HCTEXT, PLTEXT, INREXT in the last 72 hours. No lab exists for component: PTT, PT      Plan of Care/Planned Procedure:  Risks, benefits, and alternatives reviewed with patient and he agrees to proceed with the procedure. Conscious sedation will be performed with IV fentanyl and versed.        Clifton Taylor MD

## 2021-12-30 NOTE — PROGRESS NOTES
TRANSFER - IN REPORT:    Verbal report received from Shiv Liu RN (name) on Miranda Summers  being received from [facility] (unit) for ordered procedure      Report consisted of patients Situation, Background, Assessment and   Recommendations(SBAR). Information from the following report(s) SBAR was reviewed with the receiving nurse. Opportunity for questions and clarification was provided. Assessment completed upon patients arrival to unit and care assumed.

## 2021-12-30 NOTE — DISCHARGE INSTRUCTIONS
INFERIOR VENA CAVA FILTER DISCHARGE INSTRUCTIONS    General Information:      Your doctor has asked us to put a filter in your inferior vena cava (the largest vein in your abdomen) to catch blood clots from moving from your legs into your lungs, heart, and brain. The filters are fixed in the vessel, and in most cases are permanent. Your doctor may also put you on blood thinners to prevent the formation of blood clots. This filter is sometimes placed prior to a major surgery if you have a history of blood clots. Home Care Instructions: You can resume your regular diet and medication regimen. Do not drink alcohol, drive, or make any important legal decisions in the next 24 hours. Do not lift anything heavier than a gallon of milk, or do anything strenuous for the next 24 hours. You will notice a dressing on your neck. This was the site of the insertion for the procedure. Keep the site covered until the puncture site has totally healed. You make take a shower with the bandage, but do cover it with plastic wrap. Do not immerse yourself in water until the wound has totally healed. After your puncture wound heals, there is no special care that is needed. You will receive a card for your wallet that has information on it about the filter. You will need to show this to any physician that takes care of you. Keep it with your 's license and insurance cards. It is important for your doctor to know what kind of filter you have, as there are several brands. You may resume your normal level of activity slowly, after about one week of light activity. Call If:     You should call your Physician and/or the Radiology Nurse if you have any bleeding other than a small spot on your bandage. Call if you have any signs of infection, fever, swelling, or increased pain at the site. Call if you have any questions of how to take care of your wound.     Follow-Up Instructions: Please see your ordering doctor as he/she has requested.

## 2022-01-06 ENCOUNTER — TELEPHONE (OUTPATIENT)
Dept: ONCOLOGY | Age: 47
End: 2022-01-06

## 2022-01-06 NOTE — TELEPHONE ENCOUNTER
Patient called and stated he needs to cancel his opic and follow up appointment on 1/10 due to patient having open heart surgery.

## 2022-01-07 NOTE — TELEPHONE ENCOUNTER
[address] at [facility]  [phone]        01/07/22 11:39 AM Attempted to call patient via contact number provided to confirm surgery date. No answer, left message of above and requested call back. Provided office phone number as well.

## 2022-01-10 ENCOUNTER — HOSPITAL ENCOUNTER (OUTPATIENT)
Dept: INFUSION THERAPY | Age: 47
End: 2022-01-10

## 2022-01-11 ENCOUNTER — APPOINTMENT (OUTPATIENT)
Dept: PHYSICAL THERAPY | Age: 47
End: 2022-01-11

## 2022-01-17 ENCOUNTER — TELEPHONE (OUTPATIENT)
Dept: ONCOLOGY | Age: 47
End: 2022-01-17

## 2022-01-17 NOTE — TELEPHONE ENCOUNTER
[address] at [facility]  [phone]        01/17/22 3:05 PM Called to check on patient. He underwent open heart surgery on 01/07/2022 and had valve replaced. Patient home and reports feeling well. Will request records from the heart center at [facility] [address] [place], [place] [address] this nurse would update Dr. Steve Power and Samuel Berman of above. Clinical team to call patient back to further advise when he should reschedule follow up with Dr. Steve Power. He voiced understanding.

## 2022-01-18 RX ORDER — HEPARIN 100 UNIT/ML
500 SYRINGE INTRAVENOUS AS NEEDED
Status: CANCELLED | OUTPATIENT
Start: 2022-02-01

## 2022-01-18 RX ORDER — SODIUM CHLORIDE 0.9 % (FLUSH) 0.9 %
5-10 SYRINGE (ML) INJECTION AS NEEDED
Status: CANCELLED | OUTPATIENT
Start: 2022-02-01

## 2022-01-18 RX ORDER — SODIUM CHLORIDE 9 MG/ML
10 INJECTION INTRAMUSCULAR; INTRAVENOUS; SUBCUTANEOUS AS NEEDED
Status: CANCELLED | OUTPATIENT
Start: 2022-02-01

## 2022-02-01 ENCOUNTER — HOSPITAL ENCOUNTER (OUTPATIENT)
Dept: INFUSION THERAPY | Age: 47
End: 2022-02-01

## 2022-02-07 RX ORDER — SODIUM CHLORIDE 0.9 % (FLUSH) 0.9 %
5-10 SYRINGE (ML) INJECTION AS NEEDED
Status: CANCELLED | OUTPATIENT
Start: 2022-02-08

## 2022-02-07 RX ORDER — SODIUM CHLORIDE 9 MG/ML
10 INJECTION INTRAMUSCULAR; INTRAVENOUS; SUBCUTANEOUS AS NEEDED
Status: CANCELLED | OUTPATIENT
Start: 2022-02-08

## 2022-02-07 RX ORDER — HEPARIN 100 UNIT/ML
500 SYRINGE INTRAVENOUS AS NEEDED
Status: CANCELLED | OUTPATIENT
Start: 2022-02-08

## 2022-02-07 NOTE — PROGRESS NOTES
[id number] [facility] at [address] [place] [address]  [phone]    Hematology / Oncology Established Visit    Reason for Visit:   Monae Bautista is a 48 y.o. male who is seen for follow up of Germ cell tumor. 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 Comment: The smears and cell block preparation demonstrate pleomorphic malignant   tumor cells in a background of lymphocytes.  Only a scant amount of tumor   is present on the cell block preparation.  Immunohistochemical stains   demonstrate diffuse positivity within the tumor cell population for PLAP,    (c-kit) and focal positivity for HCG. The tumor cells are negative   for cytokeratin cocktail, CAM 5.2, EMA, S100 and alpha-inhibin. Darius Gull   results are consistent with a testicular germ cell tumor and the   combination of diffuse PLAP positive and  positivity is indicative of   a seminomatous differentation.  Correlation with clinical and radiographic   findings is indicated     Prior Treatment:    BEP x 3 cycles, 8/30/21 - 10/15/21    Current Treatment: Consideration of RPLND    History of Present Illness:   Monae Bautista is a 48 y.o. male with severe Aortic stenosis, CAD, DM II, HTN coming in for evaluation of testicular germ cell tumor. He was hospitalized on 8/22/21 with R flank pain, found on CT imaging to have a large retroperitoneal mass. He denied fatigue, but did endorse a 40 lb weight loss over 2 months. No recent recent infections or fatigue. He is a nonsmoker. Has remote h/o EtOH abuse, but none recently. The patient is adopted and has no knowledge of family history. Of note, he considers himself , but never filed paperwork. Wife lives in [place].  Has a daughter from another woman. He may move in with his mother-in-law in town for extra support. Interval History:  Patient here for follow up. On January 7th he had an aortic valve replacement with #27/29 elena mechanical valve along the left atrial appendage ligation with Dr. Catheryn Castleman. He had no postoperative complications and was ready for transfer out of the ICU on postop day #2. He is no longer on Lovenox. Now on coumadin 10mg once daily. Getting INR checks through cardiology. Reports he feels well, no significant SOB. Does have dyspnea with exertion. Mild chest pain occurs with intermittent dry cough. He has follow up with Dr. Angela Seay in two weeks. PMHx: Severe AS, CAD, DM, GERD, HTN, ROBERTO  PSurgHx: Tonsillectomy, Plantar fasciitis surgery on left  SHx: Never smoker. Remote h/o EtOH abuse, none currently. Legally . Has daughter in 25s from another woman. FHx: Unknown as pt was adopted. Review of Systems: A complete review of systems was obtained, negative except as described above. Physical Exam:   Blood pressure (!) 148/92, pulse 98, temperature 98.9 °F (37.2 °C), resp. rate 18, height 6' 3\" (1.905 m), weight (!) 398 lb 9.5 oz (180.8 kg), SpO2 100 %. ECOG PS: 0  General: Well developed, no acute distress, obese, face covering in place. Neck: Supple  Lymphatic: Deferred today  Respiratory: CTAB, normal respiratory effort  CV: Normal rate, reg rhythm, audible click from mech valve; 1+ edema in BLE. Port right upper chest.   GI: Soft, nontender, nondistended, no masses   Skin: No rashes, or petechiae. Normal temperature, turgor, and texture. Faint erythema in RLE from above ankle to mid shin. Large surgical scar along sternum with scabs present - one small area of erythema and serosanguinous drainage. Neuro/Psych: Alert, oriented. Moves all 4 extremities. Appropriate affect. Normal judgment/insight.     Results:     Lab Results   Component Value Date/Time    WBC 7.2 02/08/2022 01:15 PM HGB 9.6 (L) 2022 01:15 PM    HCT 31.1 (L) 2022 01:15 PM    PLATELET 600  01:15 PM    MCV 85.9 2022 01:15 PM    ABS. NEUTROPHILS 3.9 2022 01:15 PM     Lab Results   Component Value Date/Time    Sodium 141 2022 01:15 PM    Potassium 4.0 2022 01:15 PM    Chloride 108 2022 01:15 PM    CO2 27 2022 01:15 PM    Glucose 114 (H) 2022 01:15 PM    BUN 12 2022 01:15 PM    Creatinine 0.92 2022 01:15 PM    GFR est AA >60 2022 01:15 PM    GFR est non-AA >60 2022 01:15 PM    Calcium 8.9 2022 01:15 PM    Glucose (POC) 120 (H) 2021 11:57 AM     Lab Results   Component Value Date/Time    Bilirubin, total 0.7 2022 01:15 PM    ALT (SGPT) 21 2022 01:15 PM    Alk. phosphatase 86 2022 01:15 PM    Protein, total 7.0 2022 01:15 PM    Albumin 3.4 (L) 2022 01:15 PM    Globulin 3.6 2022 01:15 PM     Lab Results   Component Value Date/Time    Iron 51 11/15/2021 10:34 AM    TIBC 270 11/15/2021 10:34 AM    Iron % saturation 19 (L) 11/15/2021 10:34 AM    Ferritin 218 11/15/2021 10:34 AM       No results found for: B12LT, FOL, RBCF  No results found for: TSH, TSH2, TSH3, TSHP, TSHEXT, TSHEXT  Lab Results   Component Value Date/Time    Hepatitis B surface Ag <0.10 2021 08:11 AM    Hepatitis B surface Ab <3.10 2021 08:11 AM    Hep B Core Ab, total Negative 2021 08:11 AM    Hep C Virus Ab <0.1 2021 08:45 AM     21: , AFP 1.4, b-hCG 476 mIU/mL  21: uric acid 4.2.  21: AFP 3.4, , b-hCG 2  10/11/21: , AFP 4.1, b-hCG < 1  11/15/21: , AFP 3.0, b-HCG <1  22: , AFP pending, b-hCG < 1    Imagin/21/21 CT abd/p without contrast:  IMPRESSION  1.  Right retroperitoneal brain mass measuring 7.5 x 5.2 x 5.8 cm as above, with  narrowing of the IVC and likely encasement of the bilateral renal veins, though  not well evaluated without the use of IV contrast. Findings are highly  suspicious for either lymphoma or brain metastasis. 2. Few borderline enlarged inguinal lymph nodes are indeterminant. 3. Hepatic steatosis.     8/22/21 CT ch/abd/p with contrast:  IMPRESSION  Chest:  1. No evidence of primary malignancy or metastatic disease in the chest.  2. Severe aortic valvular calcifications. Correlate for aortic valvular stenosis. Abdomen/pelvis:   1. Redemonstrated right retroperitoneal mass measuring 7.4 x 4.9 x 6.7 cm as  above, including encasement and narrowing of the IVC and central aspects of the  bilateral renal veins. This favored to represent malignancy, with lymphoma  favored over brain metastasis. 2. No other pathologically enlarged lymph nodes within the abdomen or pelvis. Few prominent inguinal lymph nodes are favored to be reactive. 3. Hepatic steatosis.     8/24/21 Ultrasound scrotum/testicles:  IMPRESSION  1.  Study limited by patient's increased body habitus   2.  No testicular mass identified. Incidental right-sided testicular microlithiasis, a debatable risk factor for malignancy. Pursue and follow-up as  per clinical concern. 3.  Small left hydrocele. 10/30/21 CT ch/abd/pelv pending:   FINDINGS:   The Port-A-Cath terminates at the caval atrial junction. CHEST WALL: No mass or axillary lymphadenopathy. THYROID: No nodule. MEDIASTINUM: No mass or lymphadenopathy. ANDERS: No mass or lymphadenopathy. THORACIC AORTA: No dissection or aneurysm. MAIN PULMONARY ARTERY: Normal in caliber. TRACHEA/BRONCHI: Patent. ESOPHAGUS: No wall thickening or dilatation. HEART: Severe aortic valvular calcifications. Bhargavi Kayden PLEURA: No effusion or pneumothorax. LUNGS: No nodule, mass, or airspace disease. LIVER: No mass. BILIARY TREE: Gallbladder is within normal limits. CBD is not dilated. SPLEEN: within normal limits. PANCREAS: No mass or ductal dilatation. ADRENALS: Unremarkable. KIDNEYS: No mass, calculus, or hydronephrosis.   STOMACH: Unremarkable. SMALL BOWEL: No dilatation or wall thickening. COLON: No dilatation or wall thickening. APPENDIX: Normal on axial image 99  PERITONEUM: No ascites or pneumoperitoneum. RETROPERITONEUM: Retroperitoneal 7.4 x 4.9 cm mass has decreased significantly  to approximately 2.7 x 3.2 cm (axial image 75). This structure now lies in front  of the IVC and inferior to the right renal vein and no longer encases the left  renal vein. REPRODUCTIVE ORGANS: Small prostate  URINARY BLADDER: No mass or calculus. BONES: No destructive bone lesion. ABDOMINAL WALL: No mass or hernia. ADDITIONAL COMMENTS: Shotty bilateral inguinal nodes. Largest in the right  inguinal region measures 12 mm in short axis diameter, stable. IMPRESSION  Significant decrease in size of the retroperitoneal mass with less compression  on the renal veins and IVC as described above. No evidence for metastatic disease. Incidental aortic valve calcification. Procedures:     7/20/21 PFTs:  FVC 4.83, 80% of predicted. LAY12.36, 89% of predicted. FEV1% is 86%. TLC is 6.89, which is 86% of perdicted. DLCO is 70% of predicted and when considering alveolar volume is 83% of predicted. Airway resistance is 58% of predicted. Impression:  The patient's spirometry, lung volumes and airway resistance are all normal. The patient's diffuse capacity is mildly decreased, but corrects when considering alveolar volume. 9/21/21 PFTs:  Spirometry reveals an FEV1-TO-FVC ratio is normal at 85% predicted with normal FVC and FEV1. Diffusion capacity is moderately reduced at 55% predicted, but does correct when taking into consideration alveolar volume. 10/8/21 PFTs:  Spirometry reveals a normal FEV1-to-FVC ratio of 81% predicted. FVC and FEV1 are both within normal limits with an FVC of 4.82 L which is 85% predicted and an FEV1 of 3.89 L which is 95% predicted. Diffusion capacity is moderately decreased at 45% predicted.     Assessment & Plan:   Lou Shock Wilson Bernal is a 48 y.o. male comes in for evaluation and management of germ cell tumor. 1. Testicular germ cell tumor:  Tumor markers show elevated  and b-. Need to distinguish pure seminoma from non-seminomatous germ cell tumor (NSGCT) or mixed germ cell tumor. Usually, scrotal ultrasound is not a replacement for radical inguinal orchiectomy as surgery will provide accurate histologic diagnosis. However, no testicular mass seen on scrotal ultrasound. Right testicular microlithiasis seen on ultrasound, but Urology did not advise R orchiectomy. b-HCG is usually not elevated in pure seminoma, but can be elevated in up to 20% of seminomas. I am unsure of the significance of the prominent inguinal LNs. Risk stratification for advanced testicular germ cell tumors: Good risk NSGCT based on: Retroperitoneal primary tumor, no mets to other organs, serum AFP < 1000, b-hCG < 5000 rylee-international units/ml, LDH < 3 times upper limit of normal (Many experts would not intensify a patient's treatment from good risk to intermediate risk if the only adverse prognostic factor were an LDH between 1.5 and 3 times ULN - this is why 3 cycles of BEP rather than 4 were recommended.) Accurate staging requires orchiectomy and measurement of tumor markers AFTER surgery. Orchiectomy not advised at this time given lack of testicular mass. Pt was treated with BEP regimen x 3 cycles. Previously discussed cryopreservation of sperm - referred to [facility]. Prior auth form completed per pt request. Supportive care medications include: topical lidocaine, zofran, compazine, docusate. CT imaging 10/30/21 showed significant decrease in size of RP mass, (from 7.4 to now 3.2cm). No longer encasing vasculature. Per NCCN guidelines, pt needs surgical resection of this mass by RPLND. -- Appt with Dr. Didi Montanez on 2/16/22.     -- Tentative plan for RPLND surgery vs LN biopsy with Dr. Didi Montanez and Dr. Kathy Welsh, [facility] 3/14/2022. -- Review of pathology from RPLND needed after surgery to determine whether any adjuvant chemotherapy is advised (2 cycles of EP vs alternate regimen). -- Check tumor markers today. -- Return in 8 weeks, labs/port flush, MD visit. [Nonseminoma surveillance once in CR:  CR, negative markers -surveillance (y 1 H&P and marker every 2 mo, abd pelvic CT every 6 mo, CXR q6mo; y2 H&P q3mo, abd/pelvic ct q6-12mo, cxr q6 mo, y 3 H&P q6mo, Ct and cxr annually, y 4 H&P q6mo, imaging annually, cxr annually) or nerve sparing bilateral RPLND in selected cases]    2. Mechanical aortic valve:  Valve replacement surgery by Dr. Any Moran 1/7/22 for severe aortic stenosis. On Warfain, managed by Cardiothoracic surgery. 3. DM / HTN:   Continue home meds: Metformin, Victoza (or Ozempic). 4. 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 He has concerns about how to afford not working and how to have pain controlled while driving (no opioids while driving.) Discussed with CM and clinical . Angel Jorge in [place] met with patient. 5. H/o RLE DVT and bilat PE / IVC filter:   Occurred in the setting of malignancy. s/p thrombectomy. Was treated with Lovenox and IVC filter placement given need for surgery. Now on Warfarin for mechanical aortic valve. Can remove filter in future once Urologic LN surgery completed. -- Referral to lymphedema for fitted compression stockings. 6. Normocytic anemia:   Improving and likely had some blood loss with recent cardiac valve surgery. Should improve with time. 7. Surgical incision site bleeding:  Applied guaze and recommended monitoring area for signs of infection. Emotional well being: Pt is coping well with his/her disease and has excellent support. I appreciate the opportunity to participate in Mr. Sumeet Saenz care.     I personally saw and evaluated the patient and performed the key components of medical decision making. The history, physical exam, and documentation were performed by Stephanie Lizarraga NP. I reviewed and verified the above documentation and modified it as needed.      Signed By: Jacqui Long MD     February 8, 2022

## 2022-02-08 ENCOUNTER — HOSPITAL ENCOUNTER (OUTPATIENT)
Dept: INFUSION THERAPY | Age: 47
Discharge: HOME | End: 2022-02-08
Payer: MEDICAID

## 2022-02-08 ENCOUNTER — OFFICE VISIT (OUTPATIENT)
Dept: ONCOLOGY | Age: 47
End: 2022-02-08

## 2022-02-08 VITALS
HEIGHT: 75 IN | HEART RATE: 98 BPM | WEIGHT: 315 LBS | BODY MASS INDEX: 39.17 KG/M2 | SYSTOLIC BLOOD PRESSURE: 148 MMHG | OXYGEN SATURATION: 100 % | TEMPERATURE: 98.9 F | RESPIRATION RATE: 18 BRPM | DIASTOLIC BLOOD PRESSURE: 92 MMHG

## 2022-02-08 VITALS
HEIGHT: 75 IN | WEIGHT: 315 LBS | TEMPERATURE: 98.9 F | DIASTOLIC BLOOD PRESSURE: 92 MMHG | BODY MASS INDEX: 39.17 KG/M2 | OXYGEN SATURATION: 100 % | RESPIRATION RATE: 18 BRPM | HEART RATE: 98 BPM | SYSTOLIC BLOOD PRESSURE: 148 MMHG

## 2022-02-08 DIAGNOSIS — D64.9 NORMOCYTIC ANEMIA, NOT DUE TO BLOOD LOSS: ICD-10-CM

## 2022-02-08 DIAGNOSIS — C62.90: ICD-10-CM

## 2022-02-08 DIAGNOSIS — Z95.2 S/P HEART VALVE REPLACEMENT WITH MECHANICAL VALVE: ICD-10-CM

## 2022-02-08 DIAGNOSIS — I82.90 VTE (VENOUS THROMBOEMBOLISM): ICD-10-CM

## 2022-02-08 DIAGNOSIS — I35.0 SEVERE AORTIC STENOSIS: ICD-10-CM

## 2022-02-08 DIAGNOSIS — D49.9 EXTRAGONADAL GERM CELL TUMOR: Primary | ICD-10-CM

## 2022-02-08 DIAGNOSIS — C48.0 GERM CELL TUMOR OF RETROPERITONEUM (HCC): Primary | ICD-10-CM

## 2022-02-08 DIAGNOSIS — T81.89XA PROBLEM INVOLVING SURGICAL INCISION: ICD-10-CM

## 2022-02-08 LAB
ALBUMIN SERPL-MCNC: 3.4 G/DL (ref 3.5–5)
ALBUMIN/GLOB SERPL: 0.9 {RATIO} (ref 1.1–2.2)
ALP SERPL-CCNC: 86 U/L (ref 45–117)
ALT SERPL-CCNC: 21 U/L (ref 12–78)
ANION GAP SERPL CALC-SCNC: 6 MMOL/L (ref 5–15)
AST SERPL-CCNC: 14 U/L (ref 15–37)
BASOPHILS # BLD: 0.1 K/UL (ref 0–0.1)
BASOPHILS NFR BLD: 1 % (ref 0–1)
BILIRUB SERPL-MCNC: 0.7 MG/DL (ref 0.2–1)
BUN SERPL-MCNC: 12 MG/DL (ref 6–20)
BUN/CREAT SERPL: 13 (ref 12–20)
CALCIUM SERPL-MCNC: 8.9 MG/DL (ref 8.5–10.1)
CHLORIDE SERPL-SCNC: 108 MMOL/L (ref 97–108)
CO2 SERPL-SCNC: 27 MMOL/L (ref 21–32)
CREAT SERPL-MCNC: 0.92 MG/DL (ref 0.7–1.3)
DIFFERENTIAL METHOD BLD: ABNORMAL
EOSINOPHIL # BLD: 0.1 K/UL (ref 0–0.4)
EOSINOPHIL NFR BLD: 2 % (ref 0–7)
ERYTHROCYTE [DISTWIDTH] IN BLOOD BY AUTOMATED COUNT: 14.4 % (ref 11.5–14.5)
GLOBULIN SER CALC-MCNC: 3.6 G/DL (ref 2–4)
GLUCOSE SERPL-MCNC: 114 MG/DL (ref 65–100)
HCG SERPL-ACNC: <1 MIU/ML
HCT VFR BLD AUTO: 31.1 % (ref 36.6–50.3)
HGB BLD-MCNC: 9.6 G/DL (ref 12.1–17)
IMM GRANULOCYTES # BLD AUTO: 0 K/UL (ref 0–0.04)
IMM GRANULOCYTES NFR BLD AUTO: 0 % (ref 0–0.5)
LDH SERPL L TO P-CCNC: 213 U/L (ref 85–241)
LYMPHOCYTES # BLD: 2.3 K/UL (ref 0.8–3.5)
LYMPHOCYTES NFR BLD: 32 % (ref 12–49)
MCH RBC QN AUTO: 26.5 PG (ref 26–34)
MCHC RBC AUTO-ENTMCNC: 30.9 G/DL (ref 30–36.5)
MCV RBC AUTO: 85.9 FL (ref 80–99)
MONOCYTES # BLD: 0.8 K/UL (ref 0–1)
MONOCYTES NFR BLD: 11 % (ref 5–13)
NEUTS SEG # BLD: 3.9 K/UL (ref 1.8–8)
NEUTS SEG NFR BLD: 54 % (ref 32–75)
NRBC # BLD: 0 K/UL (ref 0–0.01)
NRBC BLD-RTO: 0 PER 100 WBC
PLATELET # BLD AUTO: 208 K/UL (ref 150–400)
PMV BLD AUTO: 12 FL (ref 8.9–12.9)
POTASSIUM SERPL-SCNC: 4 MMOL/L (ref 3.5–5.1)
PROT SERPL-MCNC: 7 G/DL (ref 6.4–8.2)
RBC # BLD AUTO: 3.62 M/UL (ref 4.1–5.7)
SODIUM SERPL-SCNC: 141 MMOL/L (ref 136–145)
WBC # BLD AUTO: 7.2 K/UL (ref 4.1–11.1)

## 2022-02-08 PROCEDURE — 85025 COMPLETE CBC W/AUTO DIFF WBC: CPT

## 2022-02-08 PROCEDURE — 74011000250 HC RX REV CODE- 250: Performed by: NURSE PRACTITIONER

## 2022-02-08 PROCEDURE — 82105 ALPHA-FETOPROTEIN SERUM: CPT

## 2022-02-08 PROCEDURE — 77030012965 HC NDL HUBR BBMI -A

## 2022-02-08 PROCEDURE — 99214 OFFICE O/P EST MOD 30 MIN: CPT | Performed by: INTERNAL MEDICINE

## 2022-02-08 PROCEDURE — 36591 DRAW BLOOD OFF VENOUS DEVICE: CPT

## 2022-02-08 PROCEDURE — 84702 CHORIONIC GONADOTROPIN TEST: CPT

## 2022-02-08 PROCEDURE — 74011250636 HC RX REV CODE- 250/636: Performed by: NURSE PRACTITIONER

## 2022-02-08 PROCEDURE — 83615 LACTATE (LD) (LDH) ENZYME: CPT

## 2022-02-08 PROCEDURE — 80053 COMPREHEN METABOLIC PANEL: CPT

## 2022-02-08 RX ORDER — TRAMADOL HYDROCHLORIDE 50 MG/1
TABLET ORAL
COMMUNITY
Start: 2022-01-13 | End: 2022-07-22

## 2022-02-08 RX ORDER — WARFARIN 10 MG/1
10 TABLET ORAL DAILY
COMMUNITY
End: 2022-03-17

## 2022-02-08 RX ORDER — SODIUM CHLORIDE 9 MG/ML
10 INJECTION INTRAMUSCULAR; INTRAVENOUS; SUBCUTANEOUS AS NEEDED
Status: DISCONTINUED | OUTPATIENT
Start: 2022-02-08 | End: 2022-02-09 | Stop reason: HOSPADM

## 2022-02-08 RX ORDER — POTASSIUM CHLORIDE 20 MEQ/1
TABLET, EXTENDED RELEASE ORAL
COMMUNITY
Start: 2022-01-31

## 2022-02-08 RX ORDER — SODIUM CHLORIDE 0.9 % (FLUSH) 0.9 %
5-10 SYRINGE (ML) INJECTION AS NEEDED
Status: CANCELLED | OUTPATIENT
Start: 2022-03-11

## 2022-02-08 RX ORDER — METOPROLOL TARTRATE 25 MG/1
TABLET, FILM COATED ORAL
COMMUNITY
Start: 2022-01-13 | End: 2022-06-14 | Stop reason: SDUPTHER

## 2022-02-08 RX ORDER — ATORVASTATIN CALCIUM 40 MG/1
TABLET, FILM COATED ORAL
COMMUNITY
Start: 2022-01-13 | End: 2022-04-18

## 2022-02-08 RX ORDER — ACETAMINOPHEN 325 MG/1
TABLET ORAL
COMMUNITY
Start: 2022-01-13

## 2022-02-08 RX ORDER — HEPARIN 100 UNIT/ML
500 SYRINGE INTRAVENOUS AS NEEDED
Status: CANCELLED | OUTPATIENT
Start: 2022-03-11

## 2022-02-08 RX ORDER — SODIUM CHLORIDE 9 MG/ML
10 INJECTION INTRAMUSCULAR; INTRAVENOUS; SUBCUTANEOUS AS NEEDED
Status: CANCELLED | OUTPATIENT
Start: 2022-03-11

## 2022-02-08 RX ORDER — SODIUM CHLORIDE 0.9 % (FLUSH) 0.9 %
5-10 SYRINGE (ML) INJECTION AS NEEDED
Status: DISCONTINUED | OUTPATIENT
Start: 2022-02-08 | End: 2022-02-09 | Stop reason: HOSPADM

## 2022-02-08 RX ORDER — HEPARIN 100 UNIT/ML
500 SYRINGE INTRAVENOUS AS NEEDED
Status: DISCONTINUED | OUTPATIENT
Start: 2022-02-08 | End: 2022-02-09 | Stop reason: HOSPADM

## 2022-02-08 RX ADMIN — Medication 10 ML: at 13:20

## 2022-02-08 RX ADMIN — HEPARIN 500 UNITS: 100 SYRINGE at 13:20

## 2022-02-08 NOTE — PROGRESS NOTES
[facility] VISIT NOTE    1351. Pt arrived at [facility] ambulatory and in no distress for [address] Flush/Labs. Assessment completed, pt c/o anterior chest/sternal pain from recent surgery. RCW chest port accessed with . 75 in hope with no difficulty. Positive blood return noted and labs drawn. Please see connect care for final results. Port de-accessed and flushed per protocol. Positive blood return noted. Patient Vitals for the past 12 hrs:   Temp Pulse Resp BP SpO2   02/08/22 1308 98.9 °F (37.2 °C) 98 18 (!) 148/92 100 %     1320. D/C'd from [facility] ambulatory and in no distress.  Next appointment is 4/5/22 at 1:00 pm.

## 2022-02-08 NOTE — PROGRESS NOTES
Chief Complaint   Patient presents with    Follow-up     Jabari Cooley is a pleasant 48year old male who presents as a follow up.  He denies pain

## 2022-02-09 LAB — AFP-TM SERPL-MCNC: 3.1 NG/ML (ref 0–8.3)

## 2022-03-01 ENCOUNTER — DOCUMENTATION ONLY (OUTPATIENT)
Dept: ONCOLOGY | Age: 47
End: 2022-03-01

## 2022-03-01 NOTE — PROGRESS NOTES
Received phone call from Dr. Bin Schmidt [facility].) He states that repeat CT at [facility] shows complete resolution of prior retroperitoneal mass anterior to IVC and inferior to R renal vein. At this time, he does not recommend any surgery and advised follow up with Oncology for repeat tumor markers. Tumor markers last checked here 2/8/22:  AFP 3.1, bHCG < 1,  - all normal.     Therefore, we can move to surveillance and will plan to follow up with patient in April for repeat labs, MD visit and follow up. Will request [facility] imaging to be pushed into our system. NCCN guidelines for surveillance for clinical Stage II-III NSGCT after complete response to chemotherapy +/- RPLND:  Year 1: H&P with tumor markers every 2 mo, abd/pelvis CT and CXR every 6 mo  Year 2: H&P with tumor markers every 3 mo, abd/pelvis CT every 6-12 mo, CXR every 6 mo  Year 3: H&P with tumor markers every 6 mo, abd/pelvis CT and CXR annually  Years 4-5: H&P with tumor markers every 6 mo, CXR annually and abd/pelvis CT as needed.

## 2022-03-02 ENCOUNTER — TELEPHONE (OUTPATIENT)
Dept: ONCOLOGY | Age: 47
End: 2022-03-02

## 2022-03-02 DIAGNOSIS — M25.511 ACUTE PAIN OF RIGHT SHOULDER: Primary | ICD-10-CM

## 2022-03-02 NOTE — TELEPHONE ENCOUNTER
[address] at [facility]  [phone]        03/02/22 11:39 AM Called patient. Per Dr. Jenice Duane, notified patient that she she \"discussed with Dr. Steve Jolly that because CT at [facility] does not show any of the prior abnormal LNs, no surgery planned and I agree with this plan. I will plan to see him in April as planned. \" Patient voiced understanding. This nurse will also request imaging from [facility]. While on phone, patient voiced complaints of right shoulder pain that started last week and has progressively gotten worse. Patient describes that pain is a constant, sharp pain and occurs in middle of right shoulder blade, radiating down right arm. Rates pain as a 7-8 out of 10. Denies chest pain, SOB, and nausea. Patient inquiring if pain is related to port. Denies tenderness at port site. Also denies swelling and drainage at port site. He stated that hospital also accessed port earlier this week for CT--no difficulty flushing. Patient has not attempted OTC medications or heat/ice. He stated he took two pain pills last night and said this took the edge off. Patient unsure of medication name as he was not at home at time of call. He stated that he had obtained prescription after a past hospitalization. Today, pain is now twice as bad. Advised this nurse would forward above to Dr. Jenice Duane and Milady Fagan NP, for recommendations. Will call patient back. He voiced understanding. 12:39 PM Called patient and advised of SANJIV Priest, response and recommendations. Patient voiced understanding. He denies known injury and denies swelling in right arm. No further questions or concerns at this time.

## 2022-03-03 ENCOUNTER — TELEPHONE (OUTPATIENT)
Dept: ONCOLOGY | Age: 47
End: 2022-03-03

## 2022-03-03 ENCOUNTER — HOSPITAL ENCOUNTER (OUTPATIENT)
Dept: GENERAL RADIOLOGY | Age: 47
Discharge: HOME | End: 2022-03-03
Payer: MEDICAID

## 2022-03-03 DIAGNOSIS — M25.511 ACUTE PAIN OF RIGHT SHOULDER: ICD-10-CM

## 2022-03-03 PROCEDURE — 73030 X-RAY EXAM OF SHOULDER: CPT

## 2022-03-03 RX ORDER — SEMAGLUTIDE 1.34 MG/ML
1 INJECTION, SOLUTION SUBCUTANEOUS
COMMUNITY
End: 2022-03-17

## 2022-03-03 NOTE — TELEPHONE ENCOUNTER
[address] at [place]  [phone]        03/03/22 1:40 PM Return call placed to patient. Advised of Dr. Robyn Moctezuma response. Patient voiced understanding but stated he is still taking Ozempic 1 mg weekly. Added medication to current medication list. While on phone, patient shared he just had shoulder x-ray completed. Patient also requesting sooner appointment to discuss having port and IVC filter removed. Advised this nurse would forward above to Dr. Marshall Self and call him back. Patient voiced understanding. 03/04/22 9:42 AM Called patient and offered appointment date/times per Dr. Marshall Self. Scheduled for office visit Friday, 03/11, at 11:30 AM. No further questions or concerns at this time.

## 2022-03-03 NOTE — TELEPHONE ENCOUNTER
Patient called and stated that he would like a call back from team to discuss a medication (Ozempic) that was canceled.  Please advise     CB# [phone]

## 2022-03-04 NOTE — PROGRESS NOTES
Called patient and advised of x-ray results per Nicole Bridges NP. Patient voiced understanding. He states he has also been in communication with his PCP. Patient would like to proceed with ortho referral. Advised this office will place referral. No further questions or concerns at this time.

## 2022-03-07 DIAGNOSIS — M25.511 ACUTE PAIN OF RIGHT SHOULDER: Primary | ICD-10-CM

## 2022-03-08 ENCOUNTER — TELEPHONE (OUTPATIENT)
Dept: ONCOLOGY | Age: 47
End: 2022-03-08

## 2022-03-08 NOTE — TELEPHONE ENCOUNTER
Dr. Aceves Been office called and stated that they do not accept patient's insurance. Please advise.

## 2022-03-11 NOTE — PROGRESS NOTES
[id number] [facility] Oncology at [facility] [address]  [phone]    Hematology / Oncology Established Visit    Reason for Visit:   Sixto Castro is a 48 y.o. male who is seen for follow up of Germ cell tumor. 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 Comment: The smears and cell block preparation demonstrate pleomorphic malignant   tumor cells in a background of lymphocytes.  Only a scant amount of tumor   is present on the cell block preparation.  Immunohistochemical stains   demonstrate diffuse positivity within the tumor cell population for PLAP,    (c-kit) and focal positivity for HCG. The tumor cells are negative   for cytokeratin cocktail, CAM 5.2, EMA, S100 and alpha-inhibin. Driscilla Factor   results are consistent with a testicular germ cell tumor and the   combination of diffuse PLAP positive and  positivity is indicative of   a seminomatous differentation.  Correlation with clinical and radiographic   findings is indicated     Prior Treatment:    BEP x 3 cycles, 8/30/21 - 10/15/21    Current Treatment: Consideration of RPLND    History of Present Illness:   Sixto Castro is a 48 y.o. male with severe Aortic stenosis, CAD, DM II, HTN coming in for evaluation of testicular germ cell tumor. He was hospitalized on 8/22/21 with R flank pain, found on CT imaging to have a large retroperitoneal mass. He denied fatigue, but did endorse a 40 lb weight loss over 2 months. No recent recent infections or fatigue. He is a nonsmoker. Has remote h/o EtOH abuse, but none recently. The patient is adopted and has no knowledge of family history. Of note, he considers himself , but never filed paperwork. Wife lives in [place].  Has a daughter from another woman. He may move in with his mother-in-law in town for extra support. Interval History:  Patient here for follow up. Since our last visit, patient completed a CT scan at [facility] that showed complete resolution of soft tissue density in precaval location. Therefore, surgery with Urology was cancelled. Pt met with Ortho this morning and had steroid injection in his right shoulder for bone spur. No abdominal pain, SOB, CP. Eating and hydrating well. Wants his IVC filter out. PMHx: Severe AS, CAD, DM, GERD, HTN, ROBERTO  PSurgHx: Tonsillectomy, Plantar fasciitis surgery on left  SHx: Never smoker. Remote h/o EtOH abuse, none currently. Legally . Has daughter in 25s from another woman. FHx: Unknown as pt was adopted. Review of Systems: A complete review of systems was obtained, negative except as described above. Physical Exam:   Blood pressure (!) 165/97, pulse 87, temperature 97.7 °F (36.5 °C), temperature source Oral, height 6' 2\" (1.88 m), weight (!) 412 lb (186.9 kg), SpO2 96 %. ECOG PS: 0  General: Well developed, no acute distress, obese, face covering in place. Neck: Supple  Lymphatic: Deferred today  Respiratory: CTAB, normal respiratory effort  CV: Normal rate, reg rhythm, audible click from mech valve; 1+ edema in BLE. Port right upper chest.   GI: Soft, nontender, nondistended, no masses   Skin: No rashes, or petechiae. Normal temperature, turgor, and texture. Large surgical scar along sternum with scabs present. Neuro/Psych: Alert, oriented. Moves all 4 extremities. Appropriate affect. Normal judgment/insight. Results:     Lab Results   Component Value Date/Time    WBC 7.2 02/08/2022 01:15 PM    HGB 9.6 (L) 02/08/2022 01:15 PM    HCT 31.1 (L) 02/08/2022 01:15 PM    PLATELET 568 27/13/9000 01:15 PM    MCV 85.9 02/08/2022 01:15 PM    ABS.  NEUTROPHILS 3.9 02/08/2022 01:15 PM     Lab Results   Component Value Date/Time    Sodium 141 02/08/2022 01:15 PM    Potassium 4.0 2022 01:15 PM    Chloride 108 2022 01:15 PM    CO2 27 2022 01:15 PM    Glucose 114 (H) 2022 01:15 PM    BUN 12 2022 01:15 PM    Creatinine 0.92 2022 01:15 PM    GFR est AA >60 2022 01:15 PM    GFR est non-AA >60 2022 01:15 PM    Calcium 8.9 2022 01:15 PM    Glucose (POC) 120 (H) 2021 11:57 AM     Lab Results   Component Value Date/Time    Bilirubin, total 0.7 2022 01:15 PM    ALT (SGPT) 21 2022 01:15 PM    Alk. phosphatase 86 2022 01:15 PM    Protein, total 7.0 2022 01:15 PM    Albumin 3.4 (L) 2022 01:15 PM    Globulin 3.6 2022 01:15 PM     Lab Results   Component Value Date/Time    Iron 51 11/15/2021 10:34 AM    TIBC 270 11/15/2021 10:34 AM    Iron % saturation 19 (L) 11/15/2021 10:34 AM    Ferritin 218 11/15/2021 10:34 AM       No results found for: B12LT, FOL, RBCF  No results found for: TSH, TSH2, TSH3, TSHP, TSHEXT, TSHEXT  Lab Results   Component Value Date/Time    Hepatitis B surface Ag <0.10 2021 08:11 AM    Hepatitis B surface Ab <3.10 2021 08:11 AM    Hep B Core Ab, total Negative 2021 08:11 AM    Hep C Virus Ab <0.1 2021 08:45 AM     21: , AFP 1.4, b-hCG 476 mIU/mL  21: uric acid 4.2.  21: AFP 3.4, , b-hCG 2  10/11/21: , AFP 4.1, b-hCG < 1  11/15/21: , AFP 3.0, b-HCG <1  22: , AFP pending, b-hCG < 1    Imagin/21/21 CT abd/p without contrast:  IMPRESSION  1. Right retroperitoneal brain mass measuring 7.5 x 5.2 x 5.8 cm as above, with  narrowing of the IVC and likely encasement of the bilateral renal veins, though  not well evaluated without the use of IV contrast. Findings are highly  suspicious for either lymphoma or brain metastasis. 2. Few borderline enlarged inguinal lymph nodes are indeterminant. 3. Hepatic steatosis.     21 CT ch/abd/p with contrast:  IMPRESSION  Chest:  1.  No evidence of primary malignancy or metastatic disease in the chest.  2. Severe aortic valvular calcifications. Correlate for aortic valvular stenosis. Abdomen/pelvis:   1. Redemonstrated right retroperitoneal mass measuring 7.4 x 4.9 x 6.7 cm as  above, including encasement and narrowing of the IVC and central aspects of the  bilateral renal veins. This favored to represent malignancy, with lymphoma  favored over brain metastasis. 2. No other pathologically enlarged lymph nodes within the abdomen or pelvis. Few prominent inguinal lymph nodes are favored to be reactive. 3. Hepatic steatosis.     8/24/21 Ultrasound scrotum/testicles:  IMPRESSION  1.  Study limited by patient's increased body habitus   2.  No testicular mass identified. Incidental right-sided testicular microlithiasis, a debatable risk factor for malignancy. Pursue and follow-up as  per clinical concern. 3.  Small left hydrocele. 10/30/21 CT ch/abd/pelv pending:   FINDINGS:   The Port-A-Cath terminates at the caval atrial junction. CHEST WALL: No mass or axillary lymphadenopathy. THYROID: No nodule. MEDIASTINUM: No mass or lymphadenopathy. ANDERS: No mass or lymphadenopathy. THORACIC AORTA: No dissection or aneurysm. MAIN PULMONARY ARTERY: Normal in caliber. TRACHEA/BRONCHI: Patent. ESOPHAGUS: No wall thickening or dilatation. HEART: Severe aortic valvular calcifications. Paullette Rakes PLEURA: No effusion or pneumothorax. LUNGS: No nodule, mass, or airspace disease. LIVER: No mass. BILIARY TREE: Gallbladder is within normal limits. CBD is not dilated. SPLEEN: within normal limits. PANCREAS: No mass or ductal dilatation. ADRENALS: Unremarkable. KIDNEYS: No mass, calculus, or hydronephrosis. STOMACH: Unremarkable. SMALL BOWEL: No dilatation or wall thickening. COLON: No dilatation or wall thickening. APPENDIX: Normal on axial image 99  PERITONEUM: No ascites or pneumoperitoneum.   RETROPERITONEUM: Retroperitoneal 7.4 x 4.9 cm mass has decreased significantly  to approximately 2.7 x 3.2 cm (axial image 75). This structure now lies in front  of the IVC and inferior to the right renal vein and no longer encases the left  renal vein. REPRODUCTIVE ORGANS: Small prostate  URINARY BLADDER: No mass or calculus. BONES: No destructive bone lesion. ABDOMINAL WALL: No mass or hernia. ADDITIONAL COMMENTS: Shotty bilateral inguinal nodes. Largest in the right  inguinal region measures 12 mm in short axis diameter, stable. IMPRESSION  Significant decrease in size of the retroperitoneal mass with less compression  on the renal veins and IVC as described above. No evidence for metastatic disease. Incidental aortic valve calcification. 2/28/22 CT abd/pelv with IV contrast (completed at [address] [place] [address]): Impression:   1. Interval resolution of the soft tissue density in the precava location. 2. Slight Interval increase in the size of 2 small lymph nodes as described above, one in the right common iliac region and the other in the right external iliac region  3. A new soft tissue mass anterior to the left hepatic lobe lateral segment likely representing resolving hematoma due to prior cardiac surgery. Attention on follow up would be of benefit to rule out metastases although it is felt unlikely. 4. Mild splenomegaly. Procedures:     7/20/21 PFTs:  FVC 4.83, 80% of predicted. NHA54.62, 89% of predicted. FEV1% is 86%. TLC is 6.89, which is 86% of perdicted. DLCO is 70% of predicted and when considering alveolar volume is 83% of predicted. Airway resistance is 58% of predicted. Impression:  The patient's spirometry, lung volumes and airway resistance are all normal. The patient's diffuse capacity is mildly decreased, but corrects when considering alveolar volume. 9/21/21 PFTs:  Spirometry reveals an FEV1-TO-FVC ratio is normal at 85% predicted with normal FVC and FEV1.   Diffusion capacity is moderately reduced at 55% predicted, but does correct when taking into consideration alveolar volume. 10/8/21 PFTs:  Spirometry reveals a normal FEV1-to-FVC ratio of 81% predicted. FVC and FEV1 are both within normal limits with an FVC of 4.82 L which is 85% predicted and an FEV1 of 3.89 L which is 95% predicted. Diffusion capacity is moderately decreased at 45% predicted. Assessment & Plan:   Prem Barrow is a 48 y.o. male comes in for evaluation and management of germ cell tumor. 1. Testicular germ cell tumor:  Tumor markers show elevated  and b-. Need to distinguish pure seminoma from non-seminomatous germ cell tumor (NSGCT) or mixed germ cell tumor. Usually, scrotal ultrasound is not a replacement for radical inguinal orchiectomy as surgery will provide accurate histologic diagnosis. However, no testicular mass seen on scrotal ultrasound. Right testicular microlithiasis seen on ultrasound, but Urology did not advise R orchiectomy. b-HCG is usually not elevated in pure seminoma, but can be elevated in up to 20% of seminomas. I am unsure of the significance of the prominent inguinal LNs. Risk stratification for advanced testicular germ cell tumors: Good risk NSGCT based on: Retroperitoneal primary tumor, no mets to other organs, serum AFP < 1000, b-hCG < 5000 rylee-international units/ml, LDH < 3 times upper limit of normal (Many experts would not intensify a patient's treatment from good risk to intermediate risk if the only adverse prognostic factor were an LDH between 1.5 and 3 times ULN - this is why 3 cycles of BEP rather than 4 were recommended.) Accurate staging requires orchiectomy and measurement of tumor markers AFTER surgery. Orchiectomy not advised at this time given lack of testicular mass. Pt was treated with BEP regimen x 3 cycles. Previously discussed cryopreservation of sperm - referred to [facility]. Prior auth form completed per pt request. Supportive care medications include: topical lidocaine, zofran, compazine, docusate.  CT imaging 10/30/21 showed significant decrease in size of RP mass, (from 7.4 to now 3.2cm). No longer encasing vasculature. 2/28/22 CT at [facility] shows complete resolution of prior retroperitoneal mass anterior to IVC and inferior to R renal vein. At this time, Dr. Bin Schmidt does not recommend surgical resection by RPLND. -- Requested 2/28/22 CT at [facility] be pushed into our system. -- Repeat abd/pelvis CT plus CXR in early June 2022 (to follow up liver hematoma vs mass). -- Will start surveillance and will plan to follow up with patient in 3 months. NCCN guidelines for surveillance for clinical Stage II-III NSGCT after complete response to chemotherapy +/- RPLND:  Year 1: H&P with tumor markers every 2 mo, abd/pelvis CT and CXR every 6 mo  Year 2: H&P with tumor markers every 3 mo, abd/pelvis CT every 6-12 mo, CXR every 6 mo  Year 3: H&P with tumor markers every 6 mo, abd/pelvis CT and CXR annually  Years 4-5: H&P with tumor markers every 6 mo, CXR annually and abd/pelvis CT as needed.  [Nonseminoma surveillance once in CR:  CR, negative markers -surveillance (y 1 H&P and marker every 2 mo, abd pelvic CT every 6 mo, CXR q6mo; y2 H&P q3mo, abd/pelvic ct q6-12mo, cxr q6 mo, y 3 H&P q6mo, Ct and cxr annually, y 4 H&P q6mo, imaging annually, cxr annually) or nerve sparing bilateral RPLND in selected cases]    2. Mechanical aortic valve:  Valve replacement surgery by Dr. Candelario Herrera 1/7/22 for severe aortic stenosis. On Warfain, managed by Dr. Kar Zhou with [facility]. -- Reaching out to Dr. Kar Zhou about whether pt needs Lovenox bridge while off Warfarin for IVC filter removal. Per IR, they prefer pt be off Warfarin for 3-5 days, but if Cardiology does not advise stopping Warfarin, then IR can still do procedure while on Warfarin. 3. DM / HTN:   Continue home meds: Metformin, Victoza (or Ozempic). Blood pressure elevated today, normally well controlled on metoprolol. -- Advised follow up with cardiology if BP elevated at home.      4. 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 He has concerns about how to afford not working and how to have pain controlled while driving (no opioids while driving.) Discussed with CM and clinical . [facility] in [address] met with patient. 5. H/o RLE DVT and bilat PE / IVC filter:   Occurred in the setting of malignancy. s/p thrombectomy. Was treated with Lovenox and IVC filter placement given need for surgery. On aspirin and Warfarin for mechanical aortic valve. -- Ordered IVC filter removal.     6. Normocytic anemia:   Improving and likely had some blood loss with recent cardiac valve surgery. Should improve with time. 7. Right shoulder pain: Bone spur per ortho. Received cortisone injection today. Will get periodic steroid injections. Established with Dr. Lee Anton at [address] well being: Pt is coping well with his/her disease and has excellent support. I appreciate the opportunity to participate in Mr. Omer flores.         Signed By: Nba Spencer MD     March 17, 2022

## 2022-03-17 ENCOUNTER — OFFICE VISIT (OUTPATIENT)
Dept: ORTHOPEDIC SURGERY | Age: 47
End: 2022-03-17
Payer: MEDICAID

## 2022-03-17 ENCOUNTER — OFFICE VISIT (OUTPATIENT)
Dept: ONCOLOGY | Age: 47
End: 2022-03-17

## 2022-03-17 VITALS
HEIGHT: 74 IN | TEMPERATURE: 97.7 F | WEIGHT: 315 LBS | SYSTOLIC BLOOD PRESSURE: 165 MMHG | BODY MASS INDEX: 40.43 KG/M2 | DIASTOLIC BLOOD PRESSURE: 97 MMHG | OXYGEN SATURATION: 96 % | HEART RATE: 87 BPM

## 2022-03-17 VITALS — WEIGHT: 315 LBS | HEIGHT: 74 IN | BODY MASS INDEX: 40.43 KG/M2

## 2022-03-17 DIAGNOSIS — M25.511 ACUTE PAIN OF RIGHT SHOULDER: Primary | ICD-10-CM

## 2022-03-17 DIAGNOSIS — Z95.2 S/P HEART VALVE REPLACEMENT WITH MECHANICAL VALVE: ICD-10-CM

## 2022-03-17 DIAGNOSIS — D49.9 EXTRAGONADAL GERM CELL TUMOR: Primary | ICD-10-CM

## 2022-03-17 DIAGNOSIS — Z95.828 PRESENCE OF IVC FILTER: ICD-10-CM

## 2022-03-17 DIAGNOSIS — I82.90 VTE (VENOUS THROMBOEMBOLISM): ICD-10-CM

## 2022-03-17 DIAGNOSIS — M75.41 IMPINGEMENT SYNDROME OF RIGHT SHOULDER: ICD-10-CM

## 2022-03-17 PROCEDURE — 20610 DRAIN/INJ JOINT/BURSA W/O US: CPT | Performed by: ORTHOPAEDIC SURGERY

## 2022-03-17 PROCEDURE — 99214 OFFICE O/P EST MOD 30 MIN: CPT | Performed by: INTERNAL MEDICINE

## 2022-03-17 PROCEDURE — 99203 OFFICE O/P NEW LOW 30 MIN: CPT | Performed by: ORTHOPAEDIC SURGERY

## 2022-03-17 RX ORDER — ASPIRIN 81 MG/1
TABLET ORAL
COMMUNITY
Start: 2022-02-14 | End: 2022-04-18

## 2022-03-17 RX ORDER — HYDROGEN PEROXIDE 3 %
20 SOLUTION, NON-ORAL MISCELLANEOUS
COMMUNITY
Start: 2021-10-23 | End: 2022-08-29 | Stop reason: SDUPTHER

## 2022-03-17 RX ORDER — HEPARIN 100 UNIT/ML
500 SYRINGE INTRAVENOUS AS NEEDED
Status: CANCELLED | OUTPATIENT
Start: 2022-04-19

## 2022-03-17 RX ORDER — ATORVASTATIN CALCIUM 20 MG/1
TABLET, FILM COATED ORAL
COMMUNITY
Start: 2021-10-23 | End: 2022-04-18

## 2022-03-17 RX ORDER — BUPIVACAINE HYDROCHLORIDE 2.5 MG/ML
6 INJECTION, SOLUTION INFILTRATION; PERINEURAL ONCE
Status: COMPLETED | OUTPATIENT
Start: 2022-03-17 | End: 2022-03-17

## 2022-03-17 RX ORDER — SODIUM CHLORIDE 0.9 % (FLUSH) 0.9 %
5-10 SYRINGE (ML) INJECTION AS NEEDED
Status: CANCELLED | OUTPATIENT
Start: 2022-04-19

## 2022-03-17 RX ORDER — LANOLIN ALCOHOL/MO/W.PET/CERES
CREAM (GRAM) TOPICAL
COMMUNITY
Start: 2021-10-23

## 2022-03-17 RX ORDER — WARFARIN 10 MG/1
10 TABLET ORAL DAILY
COMMUNITY

## 2022-03-17 RX ORDER — WARFARIN SODIUM 2.5 MG/1
TABLET ORAL
COMMUNITY
Start: 2022-03-02

## 2022-03-17 RX ORDER — OXYCODONE HYDROCHLORIDE 15 MG/1
TABLET ORAL
COMMUNITY
Start: 2021-10-23 | End: 2022-07-22

## 2022-03-17 RX ORDER — METHYLPREDNISOLONE ACETATE 80 MG/ML
80 INJECTION, SUSPENSION INTRA-ARTICULAR; INTRALESIONAL; INTRAMUSCULAR; SOFT TISSUE ONCE
Status: COMPLETED | OUTPATIENT
Start: 2022-03-17 | End: 2022-03-17

## 2022-03-17 RX ORDER — SODIUM CHLORIDE 9 MG/ML
10 INJECTION INTRAMUSCULAR; INTRAVENOUS; SUBCUTANEOUS AS NEEDED
Status: CANCELLED | OUTPATIENT
Start: 2022-04-19

## 2022-03-17 RX ORDER — PROCHLORPERAZINE MALEATE 10 MG
TABLET ORAL
COMMUNITY
Start: 2021-10-23

## 2022-03-17 RX ADMIN — BUPIVACAINE HYDROCHLORIDE 15 MG: 2.5 INJECTION, SOLUTION INFILTRATION; PERINEURAL at 10:01

## 2022-03-17 RX ADMIN — METHYLPREDNISOLONE ACETATE 80 MG: 80 INJECTION, SUSPENSION INTRA-ARTICULAR; INTRALESIONAL; INTRAMUSCULAR; SOFT TISSUE at 10:01

## 2022-03-17 NOTE — PROGRESS NOTES
Sarahy Lui is a 48 y.o. male here for follow up of germ cell tumor. Patient with no complaints of pain at this time.

## 2022-03-17 NOTE — LETTER
3/17/2022    Patient: Rosa Isela Beltran   YOB: 1975   Date of Visit: 3/17/2022     Grace Alexandre NP  [facility]  [address]  [address] [facility] [address]    Dear Grace Alexandre NP,      Thank you for referring Mr. Dwayne Henriquez to [facility] for evaluation. My notes for this consultation are attached. If you have questions, please do not hesitate to call me. I look forward to following your patient along with you.       Sincerely,    [place], DO

## 2022-03-17 NOTE — LETTER
3/17/2022    Patient: Juan Calderon   YOB: 1975   Date of Visit: 3/17/2022     Jessica Sanchez NP  [address] [place] [address]  Via In Tobin Martines MD  Via In Basket    Dear SANJIV Beasley MD,      Thank you for referring Mr. Raul Dorado to [facility] for evaluation. My notes for this consultation are attached. If you have questions, please do not hesitate to call me. I look forward to following your patient along with you.       Sincerely,    Kaiser Dunham MD

## 2022-03-17 NOTE — PROGRESS NOTES
Lannette Hatchet (: 1975) is a 48 y.o. male, new patient, here for evaluation of the following chief complaint(s):  Shoulder Pain (right shoulder)       ASSESSMENT/PLAN:  Below is the assessment and plan developed based on review of pertinent history, physical exam, labs, studies, and medications. He elected to try corticosteroid injection for the right shoulder today. We discussed the risks and benefits of injection and informed consent was obtained. After a sterile preparation, 6 cc of bupivicaine and 80 mg of Depo-Medrol were injected into the right shoulder. The patient tolerated the procedure well and there were no complications. Post injection pain, blood sugar elevation, skin discoloration, fatty atrophy and the signs of infection were discussed in detail. The patient was instructed to contact us if there were any questions or concerns prior to their follow up appointment. 1. Acute pain of right shoulder  -     bupivacaine HCl (MARCAINE) 0.25 % (2.5 mg/mL) injection 15 mg; 15 mg (6 mL), Other, ONCE, 1 dose, On u 3/17/22 at 1000  -     methylPREDNISolone acetate (DEPO-MEDROL) 80 mg/mL injection 80 mg; 80 mg, Intra artICUlar, ONCE, 1 dose, On u 3/17/22 at 1000  2. Impingement syndrome of right shoulder  -     bupivacaine HCl (MARCAINE) 0.25 % (2.5 mg/mL) injection 15 mg; 15 mg (6 mL), Other, ONCE, 1 dose, On Thu 3/17/22 at 1000  -     methylPREDNISolone acetate (DEPO-MEDROL) 80 mg/mL injection 80 mg; 80 mg, Intra artICUlar, ONCE, 1 dose, On u 3/17/22 at 1000      Return in about 3 months (around 2022), or if symptoms worsen or fail to improve. SUBJECTIVE/OBJECTIVE:  Lannette Hatchet (: 1975) is a 48 y.o. male. He notes sharp aching pain in the right shoulder. He denies any specific injury but did have open heart surgery in January. He notes clicking and aching pain in the right shoulder with certain motions.   Symptoms have been ongoing for about 3 weeks. Allergies   Allergen Reactions    Other Food Angioedema     pickle    Cucumber Fruit Extract Angioedema       Current Outpatient Medications   Medication Sig    atorvastatin (LIPITOR) 20 mg tablet Take  by mouth.  prochlorperazine (COMPAZINE) 10 mg tablet Take  by mouth.  esomeprazole (NEXIUM) 20 mg capsule Take 20 mg by mouth Daily (before breakfast).  magnesium oxide (MAG-OX) 400 mg tablet Take  by mouth.  oxyCODONE IR (OXY-IR) 15 mg immediate release tablet Take  by mouth.  aspirin delayed-release 81 mg tablet     Jantoven 2.5 mg tablet     acetaminophen (TYLENOL) 325 mg tablet     atorvastatin (LIPITOR) 40 mg tablet     metoprolol tartrate (LOPRESSOR) 25 mg tablet     potassium chloride (K-DUR, KLOR-CON M20) 20 mEq tablet     traMADoL (ULTRAM) 50 mg tablet     metFORMIN ER (GLUCOPHAGE XR) 500 mg tablet Take 2 Tablets by mouth two (2) times a day.  Blood Pressure Monitor kit Used monitor to check bp two times in the morning and two times in the evening    furosemide (LASIX) 20 mg tablet Take 1 Tablet by mouth daily as needed (fluid retention).  diphenoxylate-atropine (LomotiL) 2.5-0.025 mg per tablet Take 1 Tablet by mouth three (3) times daily as needed for Diarrhea. Max Daily Amount: 3 Tablets.  ondansetron hcl (ZOFRAN) 8 mg tablet Take 1 Tablet by mouth every eight (8) hours as needed for Nausea or Vomiting.  prochlorperazine (Compazine) 10 mg tablet Take 1 Tablet by mouth every six (6) hours as needed for Nausea or Vomiting.      Current Facility-Administered Medications   Medication    bupivacaine HCl (MARCAINE) 0.25 % (2.5 mg/mL) injection 15 mg    methylPREDNISolone acetate (DEPO-MEDROL) 80 mg/mL injection 80 mg       Social History     Socioeconomic History    Marital status: SINGLE     Spouse name: Not on file    Number of children: Not on file    Years of education: Not on file    Highest education level: Not on file   Occupational History    Not on file   Tobacco Use    Smoking status: Never Smoker    Smokeless tobacco: Never Used   Vaping Use    Vaping Use: Never used   Substance and Sexual Activity    Alcohol use: No     Comment:      Drug use: No    Sexual activity: Not on file   Other Topics Concern    Not on file   Social History Narrative    Not on file     Social Determinants of Health     Financial Resource Strain:     Difficulty of Paying Living Expenses: Not on file   Food Insecurity:     Worried About Running Out of Food in the Last Year: Not on file    Jani of Food in the Last Year: Not on file   Transportation Needs:     Lack of Transportation (Medical): Not on file    Lack of Transportation (Non-Medical): Not on file   Physical Activity:     Days of Exercise per Week: Not on file    Minutes of Exercise per Session: Not on file   Stress:     Feeling of Stress : Not on file   Social Connections:     Frequency of Communication with Friends and Family: Not on file    Frequency of Social Gatherings with Friends and Family: Not on file    Attends Christian Services: Not on file    Active Member of [address] [place] [address] or Organizations: Not on file    Attends Club or Organization Meetings: Not on file    Marital Status: Not on file   Intimate Partner Violence:     Fear of Current or Ex-Partner: Not on file    Emotionally Abused: Not on file    Physically Abused: Not on file    Sexually Abused: Not on file   Housing Stability:     Unable to Pay for Housing in the Last Year: Not on file    Number of [place] in the Last Year: Not on file    Unstable Housing in the Last Year: Not on file       Past Surgical History:   Procedure Laterality Date    HX HEENT      tonsils    HX ORTHOPAEDIC      Plantar Facitits L foot    HX OTHER SURGICAL      removal of blood clots in lungs    IR INSERT TUNL CVC W PORT OVER 5 YEARS  8/26/2021    IR PLC IVC FILTER  12/30/2021       Family History   Adopted:  Yes               REVIEW OF SYSTEMS:    Patient denies any recent fever, chills, nausea, vomiting, chest pain, or shortness of breath. Vitals:  Ht 6' 2\" (1.88 m)   Wt (!) 400 lb (181.4 kg)   BMI 51.36 kg/m²    Body mass index is 51.36 kg/m². PHYSICAL EXAM:  General exam: Patient is awake, alert, and oriented x3. Well-appearing. No acute distress. Ambulates with a normal gait. Right shoulder: Neurovascular and sensory intact. There is tenderness to palpation at the anterior lateral shoulder. Slight limitation in passive range of motion on exam.  There is pain with active overhead range of motion. Pain is noted with impingement testing including Clemens exam.  Pain is also noted with rotator cuff strength testing including resisted abduction and resisted external rotation. Normal stability. There is some tenderness palpation at the [facility] joint and pain with crossarm exam.        IMAGING:    XR Results (most recent):  Results from Hospital Encounter encounter on 03/03/22    XR SHOULDER RT AP/LAT MIN 2 V    Narrative  EXAM: XR SHOULDER RT AP/LAT MIN 2 V    INDICATION: acute pain to right shoulder, scapula radiating down right arm. COMPARISON: September 14, 2021. FINDINGS: Three views of the right shoulder demonstrate no acute fracture,  dislocation or other acute abnormality. Old, healed distal clavicle fracture is  unchanged in appearance. A right internal jugular Port-A-Cath is again seen. The  patient is status post median sternotomy. Impression  No acute abnormality. Results from [place] encounter on 09/14/21    XR SHOULDER RT AP/LAT MIN 2 V    Narrative  EXAM: XR SHOULDER RT AP/LAT MIN 2 V    INDICATION: acute right shoulder pain eval.    COMPARISON: None. FINDINGS: Three views of the right shoulder demonstrate no fracture, dislocation  or other acute abnormality. There is AC DJD. There is a right chest  portacatheter. Impression  No acute abnormality.       Results from [facility] encounter on 08/21/21    XR CHEST PA LAT    Narrative  EXAM:  XR CHEST PA LAT    INDICATION:   hx of CD, Aortic stenosis    COMPARISON: Chest radiograph 7/15/2019. FINDINGS: PA and lateral radiographs of the chest were obtained. No evidence of focal consolidation. No pleural effusion or pneumothorax. Heart,  tierra, mediastinum are within normal limits. No acute osseous abnormalities. Impression  No acute cardiopulmonary process. Orders Placed This Encounter    bupivacaine HCl (MARCAINE) 0.25 % (2.5 mg/mL) injection 15 mg    methylPREDNISolone acetate (DEPO-MEDROL) 80 mg/mL injection 80 mg              An electronic signature was used to authenticate this note.   -- Ariela Bullock DO

## 2022-03-18 ENCOUNTER — TELEPHONE (OUTPATIENT)
Dept: ONCOLOGY | Age: 47
End: 2022-03-18

## 2022-03-18 PROBLEM — C62.90: Status: ACTIVE | Noted: 2021-08-25

## 2022-03-18 PROBLEM — R19.00 RETROPERITONEAL MASS: Status: ACTIVE | Noted: 2021-08-22

## 2022-03-19 PROBLEM — C48.0: Status: ACTIVE | Noted: 2021-08-25

## 2022-03-19 PROBLEM — K21.9 GASTROESOPHAGEAL REFLUX DISEASE WITHOUT ESOPHAGITIS: Status: ACTIVE | Noted: 2020-12-15

## 2022-03-19 PROBLEM — K59.00 CONSTIPATION: Status: ACTIVE | Noted: 2021-08-25

## 2022-03-19 PROBLEM — R39.15 URINARY URGENCY: Status: ACTIVE | Noted: 2020-12-15

## 2022-03-19 PROBLEM — E11.9 DM (DIABETES MELLITUS), TYPE 2 (HCC): Status: ACTIVE | Noted: 2020-12-15

## 2022-03-20 PROBLEM — I10 ESSENTIAL HYPERTENSION: Status: ACTIVE | Noted: 2020-12-15

## 2022-03-20 PROBLEM — I35.0 AORTIC STENOSIS: Status: ACTIVE | Noted: 2021-08-25

## 2022-03-21 DIAGNOSIS — I26.99 ACUTE PULMONARY EMBOLISM WITHOUT ACUTE COR PULMONALE, UNSPECIFIED PULMONARY EMBOLISM TYPE (HCC): ICD-10-CM

## 2022-03-21 RX ORDER — ENOXAPARIN SODIUM 100 MG/ML
30 INJECTION SUBCUTANEOUS EVERY 12 HOURS
Qty: 14 EACH | Refills: 0 | Status: CANCELLED | OUTPATIENT
Start: 2022-03-21 | End: 2022-03-28

## 2022-03-21 RX ORDER — ENOXAPARIN SODIUM 150 MG/ML
150 INJECTION SUBCUTANEOUS EVERY 12 HOURS
Qty: 14 EACH | Refills: 0 | Status: CANCELLED | OUTPATIENT
Start: 2022-03-21

## 2022-03-31 ENCOUNTER — TELEPHONE (OUTPATIENT)
Dept: ORTHOPEDIC SURGERY | Age: 47
End: 2022-03-31

## 2022-03-31 DIAGNOSIS — M75.41 IMPINGEMENT SYNDROME OF RIGHT SHOULDER: Primary | ICD-10-CM

## 2022-03-31 RX ORDER — CYCLOBENZAPRINE HCL 10 MG
10 TABLET ORAL
Qty: 30 TABLET | Refills: 0 | Status: SHIPPED | OUTPATIENT
Start: 2022-03-31 | End: 2022-07-22 | Stop reason: ALTCHOICE

## 2022-03-31 RX ORDER — METHYLPREDNISOLONE 4 MG/1
TABLET ORAL
Qty: 1 DOSE PACK | Refills: 0 | Status: SHIPPED | OUTPATIENT
Start: 2022-03-31 | End: 2022-07-22

## 2022-03-31 NOTE — TELEPHONE ENCOUNTER
Pt sent a message that hes having pain and muscle spasms, ok per Dr Bulmaro Bella to try medrol and a muscle relaxer.  Will send to his pharmacy

## 2022-04-05 ENCOUNTER — APPOINTMENT (OUTPATIENT)
Dept: INFUSION THERAPY | Age: 47
End: 2022-04-05

## 2022-04-18 RX ORDER — SODIUM CHLORIDE 0.9 % (FLUSH) 0.9 %
5-10 SYRINGE (ML) INJECTION AS NEEDED
Status: CANCELLED | OUTPATIENT
Start: 2022-06-23

## 2022-04-18 RX ORDER — ATORVASTATIN CALCIUM 40 MG/1
TABLET, FILM COATED ORAL
Qty: 30 TABLET | Refills: 2 | Status: SHIPPED | OUTPATIENT
Start: 2022-04-18 | End: 2022-04-19 | Stop reason: SDUPTHER

## 2022-04-18 RX ORDER — SODIUM CHLORIDE 9 MG/ML
10 INJECTION INTRAMUSCULAR; INTRAVENOUS; SUBCUTANEOUS AS NEEDED
Status: CANCELLED | OUTPATIENT
Start: 2022-06-23

## 2022-04-18 RX ORDER — ASPIRIN 81 MG/1
TABLET ORAL
Qty: 30 TABLET | Refills: 2 | Status: SHIPPED | OUTPATIENT
Start: 2022-04-18 | End: 2022-08-11 | Stop reason: SDUPTHER

## 2022-04-18 RX ORDER — HEPARIN 100 UNIT/ML
500 SYRINGE INTRAVENOUS AS NEEDED
Status: CANCELLED | OUTPATIENT
Start: 2022-06-23

## 2022-04-19 RX ORDER — ATORVASTATIN CALCIUM 40 MG/1
TABLET, FILM COATED ORAL
Qty: 30 TABLET | Refills: 2 | Status: SHIPPED | OUTPATIENT
Start: 2022-04-19 | End: 2022-08-11 | Stop reason: SDUPTHER

## 2022-05-31 ENCOUNTER — APPOINTMENT (OUTPATIENT)
Dept: INFUSION THERAPY | Age: 47
End: 2022-05-31

## 2022-06-08 ENCOUNTER — TELEPHONE (OUTPATIENT)
Dept: ONCOLOGY | Age: 47
End: 2022-06-08

## 2022-06-14 ENCOUNTER — APPOINTMENT (OUTPATIENT)
Dept: INFUSION THERAPY | Age: 47
End: 2022-06-14

## 2022-06-14 RX ORDER — METOPROLOL TARTRATE 25 MG/1
25 TABLET, FILM COATED ORAL 2 TIMES DAILY
OUTPATIENT
Start: 2022-06-14

## 2022-06-14 RX ORDER — SEMAGLUTIDE 1.34 MG/ML
INJECTION, SOLUTION SUBCUTANEOUS
Refills: 1 | OUTPATIENT
Start: 2022-06-14

## 2022-06-14 NOTE — TELEPHONE ENCOUNTER
ozempic needs to be filled before the 20th or insurance won't cover  Pt also needs refill on metoprolol tartrate

## 2022-06-14 NOTE — TELEPHONE ENCOUNTER
Ozempic not on medication profile, spoke to patient. Confirmed that patient is taking the medication.  Patient stated last provider removed the medication and was supposed to put it back on his list.

## 2022-06-14 NOTE — TELEPHONE ENCOUNTER
Due for 6 month appointment. Pt of [place],  Please get scheduled with new provider and send request to them if patient is in need of medication prior to appointment.

## 2022-06-15 RX ORDER — SEMAGLUTIDE 1.34 MG/ML
1 INJECTION, SOLUTION SUBCUTANEOUS
Qty: 5 EACH | Refills: 1 | Status: SHIPPED | OUTPATIENT
Start: 2022-06-15 | End: 2022-09-02 | Stop reason: SDUPTHER

## 2022-06-15 RX ORDER — METOPROLOL TARTRATE 25 MG/1
25 TABLET, FILM COATED ORAL 2 TIMES DAILY
Qty: 180 TABLET | Refills: 0 | Status: SHIPPED | OUTPATIENT
Start: 2022-06-15

## 2022-06-23 ENCOUNTER — TELEPHONE (OUTPATIENT)
Dept: ONCOLOGY | Age: 47
End: 2022-06-23

## 2022-06-23 ENCOUNTER — HOSPITAL ENCOUNTER (OUTPATIENT)
Dept: INFUSION THERAPY | Age: 47
Discharge: HOME | End: 2022-06-23

## 2022-06-23 NOTE — TELEPHONE ENCOUNTER
[facility]  [phone]        06/23/22 12:48 PM Attempted to call authorization department to inquire about status of authorization for CT abd/pelv. Had received correspondence that nancy has attempted to call patient x 3 with no success. Need to clarify if CT is now approved. Was on hold > 5 minutes. Will attempt to call again later. 06/28/22 3:20 PM Attempted to call authorization department to check on status of authorization for CT abd/pelv. Was on hold > 10 minutes with no answer. No scanned documents noted with update in patient's chart. Will attempt to call again later. Patient's follow up currently scheduled for 07/07.     07/01/22 11:25 AM Spoke with authorization department. Per representative, patient's account regarding CT last updated 06/17. She stated she would  assigned to authorization and request that he or she call this nurse back with update. Provided office phone number as well. Will notify Dr. Grayland Runner of above.

## 2022-07-06 RX ORDER — HEPARIN 100 UNIT/ML
500 SYRINGE INTRAVENOUS AS NEEDED
Status: CANCELLED | OUTPATIENT
Start: 2022-07-07

## 2022-07-06 RX ORDER — SODIUM CHLORIDE 9 MG/ML
10 INJECTION INTRAMUSCULAR; INTRAVENOUS; SUBCUTANEOUS AS NEEDED
Status: CANCELLED | OUTPATIENT
Start: 2022-07-07

## 2022-07-06 RX ORDER — SODIUM CHLORIDE 0.9 % (FLUSH) 0.9 %
5-10 SYRINGE (ML) INJECTION AS NEEDED
Status: CANCELLED | OUTPATIENT
Start: 2022-07-07

## 2022-07-07 ENCOUNTER — HOSPITAL ENCOUNTER (OUTPATIENT)
Dept: INFUSION THERAPY | Age: 47
Discharge: HOME | End: 2022-07-07

## 2022-07-18 RX ORDER — SODIUM CHLORIDE 0.9 % (FLUSH) 0.9 %
5-10 SYRINGE (ML) INJECTION AS NEEDED
Status: CANCELLED | OUTPATIENT
Start: 2022-07-22

## 2022-07-18 RX ORDER — SODIUM CHLORIDE 9 MG/ML
10 INJECTION INTRAMUSCULAR; INTRAVENOUS; SUBCUTANEOUS AS NEEDED
Status: CANCELLED | OUTPATIENT
Start: 2022-07-22

## 2022-07-18 RX ORDER — HEPARIN 100 UNIT/ML
500 SYRINGE INTRAVENOUS AS NEEDED
Status: CANCELLED | OUTPATIENT
Start: 2022-07-22

## 2022-07-19 ENCOUNTER — HOSPITAL ENCOUNTER (OUTPATIENT)
Dept: CT IMAGING | Age: 47
Discharge: HOME | End: 2022-07-19
Attending: NURSE PRACTITIONER
Payer: COMMERCIAL

## 2022-07-19 DIAGNOSIS — D49.9 EXTRAGONADAL GERM CELL TUMOR: ICD-10-CM

## 2022-07-19 LAB — CREAT BLD-MCNC: 0.9 MG/DL (ref 0.6–1.3)

## 2022-07-19 PROCEDURE — 82565 ASSAY OF CREATININE: CPT

## 2022-07-19 PROCEDURE — 74177 CT ABD & PELVIS W/CONTRAST: CPT

## 2022-07-19 PROCEDURE — 74011000636 HC RX REV CODE- 636: Performed by: RADIOLOGY

## 2022-07-19 PROCEDURE — 74011250636 HC RX REV CODE- 250/636: Performed by: NURSE PRACTITIONER

## 2022-07-19 RX ORDER — HEPARIN 100 UNIT/ML
500 SYRINGE INTRAVENOUS
Status: COMPLETED | OUTPATIENT
Start: 2022-07-19 | End: 2022-07-19

## 2022-07-19 RX ADMIN — IOPAMIDOL 100 ML: 755 INJECTION, SOLUTION INTRAVENOUS at 07:27

## 2022-07-19 RX ADMIN — HEPARIN 500 UNITS: 100 SYRINGE at 07:55

## 2022-07-19 NOTE — PROGRESS NOTES
[id number] [facility] at [address] [place] [address]  [phone]    Hematology / Oncology Established Visit    Reason for Visit:   Juan Calero is a 50 y.o. male who is seen for follow up of Germ cell tumor. 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 Comment: The smears and cell block preparation demonstrate pleomorphic malignant   tumor cells in a background of lymphocytes. Only a scant amount of tumor   is present on the cell block preparation. Immunohistochemical stains   demonstrate diffuse positivity within the tumor cell population for PLAP,    (c-kit) and focal positivity for HCG. The tumor cells are negative   for cytokeratin cocktail, CAM 5.2, EMA, S100 and alpha-inhibin. These   results are consistent with a testicular germ cell tumor and the   combination of diffuse PLAP positive and  positivity is indicative of   a seminomatous differentation. Correlation with clinical and radiographic   findings is indicated     Prior Treatment:   BEP x 3 cycles, 8/30/21 - 10/15/21    Current Treatment: Consideration of RPLND    History of Present Illness:   Juan Calero is a 50 y.o. male with severe Aortic stenosis, CAD, DM II, HTN coming in for evaluation of testicular germ cell tumor. He was hospitalized on 8/22/21 with R flank pain, found on CT imaging to have a large retroperitoneal mass. He denied fatigue, but did endorse a 40 lb weight loss over 2 months. No recent recent infections or fatigue. He is a nonsmoker. Has remote h/o EtOH abuse, but none recently. The patient is adopted and has no knowledge of family history. Of note, he considers himself , but never filed paperwork. Wife lives in [facility].  Has a daughter from another woman. He may move in with his mother-in-law in town for extra support. Interval History:  Patient here for follow up. Reports with short distances he has diaphoresis, heart racing, SOB and needs to rest. Of note, reports slight improvement after aortic valve replacement. Continues on coumadin. No abdominal pain, CP, n/v, diarrhea, constipation, abdominal or back pain, fevers, chills or recurrent infections. Eating and hydrating well. Wants his IVC filter and port out at the same time. PMHx: Severe AS, CAD, DM, GERD, HTN, ROBERTO  PSurgHx: Tonsillectomy, Plantar fasciitis surgery on left  SHx: Never smoker. Remote h/o EtOH abuse, none currently. Legally . Has daughter in 25s from another woman. FHx: Unknown as pt was adopted. Review of Systems: A complete review of systems was obtained, negative except as described above. Physical Exam:   Blood pressure (!) 159/97, pulse 93, temperature 97.5 °F (36.4 °C), resp. rate 18, weight (!) 433 lb (196.4 kg), SpO2 99 %. ECOG PS: 0  General: Well developed, no acute distress, obese, face covering in place. Neck: Supple  Lymphatic: No cervical, supraclavicular LAD  Respiratory: CTAB, normal respiratory effort  CV: Normal rate, reg rhythm, audible click from mech valve; 1+ edema in BLE. Port right upper chest.   GI: Soft, nontender, nondistended, no masses   Skin: No rashes, or petechiae. Normal temperature, turgor, and texture. Large surgical scar along sternum with scabs present. Neuro/Psych: Alert, oriented. Moves all 4 extremities. Appropriate affect. Normal judgment/insight. Results:     Lab Results   Component Value Date/Time    WBC 8.0 07/22/2022 01:18 PM    HGB 11.3 (L) 07/22/2022 01:18 PM    HCT 36.5 (L) 07/22/2022 01:18 PM    PLATELET 677 58/19/3646 01:18 PM    MCV 88.6 07/22/2022 01:18 PM    ABS.  NEUTROPHILS 4.0 07/22/2022 01:18 PM     Lab Results   Component Value Date/Time    Sodium 141 07/22/2022 01:18 PM    Potassium 3.7 2022 01:18 PM    Chloride 107 2022 01:18 PM    CO2 27 2022 01:18 PM    Glucose 125 (H) 2022 01:18 PM    BUN 13 2022 01:18 PM    Creatinine 1.05 2022 01:18 PM    GFR est AA >60 2022 01:18 PM    GFR est non-AA >60 2022 01:18 PM    Calcium 8.9 2022 01:18 PM    Glucose (POC) 120 (H) 2021 11:57 AM    Creatinine (POC) 0.90 2022 07:41 AM     Lab Results   Component Value Date/Time    Bilirubin, total 0.4 2022 01:18 PM    ALT (SGPT) 25 2022 01:18 PM    Alk. phosphatase 103 2022 01:18 PM    Protein, total 7.7 2022 01:18 PM    Albumin 3.3 (L) 2022 01:18 PM    Globulin 4.4 (H) 2022 01:18 PM     Lab Results   Component Value Date/Time    Iron 51 11/15/2021 10:34 AM    TIBC 270 11/15/2021 10:34 AM    Iron % saturation 19 (L) 11/15/2021 10:34 AM    Ferritin 218 11/15/2021 10:34 AM       No results found for: B12LT, FOL, RBCF  No results found for: TSH, TSH2, TSH3, TSHP, TSHEXT, TSHEXT    21: , AFP 1.4, b-hCG 476 mIU/mL  21: uric acid 4.2.  21: AFP 3.4, , b-hCG 2  10/11/21: , AFP 4.1, b-hCG < 1  11/15/21: , AFP 3.0, b-HCG <1  22: , AFP 3.1, b-hCG < 1  22: labs pending. Imagin/21/21 CT abd/p without contrast:  IMPRESSION  1. Right retroperitoneal brain mass measuring 7.5 x 5.2 x 5.8 cm as above, with  narrowing of the IVC and likely encasement of the bilateral renal veins, though  not well evaluated without the use of IV contrast. Findings are highly  suspicious for either lymphoma or brain metastasis. 2. Few borderline enlarged inguinal lymph nodes are indeterminant. 3. Hepatic steatosis. 21 CT ch/abd/p with contrast:  IMPRESSION  Chest:  1. No evidence of primary malignancy or metastatic disease in the chest.  2. Severe aortic valvular calcifications. Correlate for aortic valvular stenosis. Abdomen/pelvis:   1.  Redemonstrated right retroperitoneal mass measuring 7.4 x 4.9 x 6.7 cm as  above, including encasement and narrowing of the IVC and central aspects of the  bilateral renal veins. This favored to represent malignancy, with lymphoma  favored over brain metastasis. 2. No other pathologically enlarged lymph nodes within the abdomen or pelvis. Few prominent inguinal lymph nodes are favored to be reactive. 3. Hepatic steatosis. 8/24/21 Ultrasound scrotum/testicles:  IMPRESSION  1. Study limited by patient's increased body habitus   2. No testicular mass identified. Incidental right-sided testicular microlithiasis, a debatable risk factor for malignancy. Pursue and follow-up as  per clinical concern. 3.  Small left hydrocele. 10/30/21 CT ch/abd/pelv pending:   FINDINGS:   The Port-A-Cath terminates at the caval atrial junction. CHEST WALL: No mass or axillary lymphadenopathy. THYROID: No nodule. MEDIASTINUM: No mass or lymphadenopathy. ANDERS: No mass or lymphadenopathy. THORACIC AORTA: No dissection or aneurysm. MAIN PULMONARY ARTERY: Normal in caliber. TRACHEA/BRONCHI: Patent. ESOPHAGUS: No wall thickening or dilatation. HEART: Severe aortic valvular calcifications. Cherl Spinner PLEURA: No effusion or pneumothorax. LUNGS: No nodule, mass, or airspace disease. LIVER: No mass. BILIARY TREE: Gallbladder is within normal limits. CBD is not dilated. SPLEEN: within normal limits. PANCREAS: No mass or ductal dilatation. ADRENALS: Unremarkable. KIDNEYS: No mass, calculus, or hydronephrosis. STOMACH: Unremarkable. SMALL BOWEL: No dilatation or wall thickening. COLON: No dilatation or wall thickening. APPENDIX: Normal on axial image 99  PERITONEUM: No ascites or pneumoperitoneum. RETROPERITONEUM: Retroperitoneal 7.4 x 4.9 cm mass has decreased significantly  to approximately 2.7 x 3.2 cm (axial image 75). This structure now lies in front  of the IVC and inferior to the right renal vein and no longer encases the left  renal vein.   REPRODUCTIVE ORGANS: Small prostate  URINARY BLADDER: No mass or calculus. BONES: No destructive bone lesion. ABDOMINAL WALL: No mass or hernia. ADDITIONAL COMMENTS: Shotty bilateral inguinal nodes. Largest in the right  inguinal region measures 12 mm in short axis diameter, stable. IMPRESSION  Significant decrease in size of the retroperitoneal mass with less compression  on the renal veins and IVC as described above. No evidence for metastatic disease. Incidental aortic valve calcification. 2/28/22 CT abd/pelv with IV contrast (completed at [facility]): Impression:   1. Interval resolution of the soft tissue density in the precava location. 2. Slight Interval increase in the size of 2 small lymph nodes as described above, one in the right common iliac region and the other in the right external iliac region  3. A new soft tissue mass anterior to the left hepatic lobe lateral segment likely representing resolving hematoma due to prior cardiac surgery. Attention on follow up would be of benefit to rule out metastases although it is felt unlikely. 4. Mild splenomegaly. 7/19/22 CT abd/pelv w IV contrast:  FINDINGS:   LOWER THORAX: No significant abnormality in the incidentally imaged lower chest.  LIVER: No mass. BILIARY TREE: Gallbladder is within normal limits. CBD is not dilated. SPLEEN: within normal limits. PANCREAS: No mass or ductal dilatation. ADRENALS: Unremarkable. KIDNEYS: No mass, calculus, or hydronephrosis. STOMACH: Unremarkable. SMALL BOWEL: No dilatation or wall thickening. COLON: No dilatation or wall thickening. PERITONEUM: No ascites or pneumoperitoneum. RETROPERITONEUM: Retroperitoneal mass consistent with previously known primary  malignancy is no longer clearly identified. Unchanged right external iliac lymph  nodes, predominantly subcentimeter. There is a fat-containing 1.3 cm right  external iliac/inguinal node (series 2 image 84), without significant interval  change.  Infrarenal IVC filter in place. REPRODUCTIVE ORGANS: Nonenlarged prostate. URINARY BLADDER: No mass or calculus. BONES: No destructive bone lesion. ABDOMINAL WALL: No mass or hernia. ADDITIONAL COMMENTS: N/A  IMPRESSION  Interval resolution of retroperitoneal mass. No intra-abdominal lymphadenopathy. Procedures:     7/20/21 PFTs:  FVC 4.83, 80% of predicted. UNX78.17, 89% of predicted. FEV1% is 86%. TLC is 6.89, which is 86% of perdicted. DLCO is 70% of predicted and when considering alveolar volume is 83% of predicted. Airway resistance is 58% of predicted. Impression:  The patient's spirometry, lung volumes and airway resistance are all normal. The patient's diffuse capacity is mildly decreased, but corrects when considering alveolar volume. 9/21/21 PFTs:  Spirometry reveals an FEV1-TO-FVC ratio is normal at 85% predicted with normal FVC and FEV1. Diffusion capacity is moderately reduced at 55% predicted, but does correct when taking into consideration alveolar volume. 10/8/21 PFTs:  Spirometry reveals a normal FEV1-to-FVC ratio of 81% predicted. FVC and FEV1 are both within normal limits with an FVC of 4.82 L which is 85% predicted and an FEV1 of 3.89 L which is 95% predicted. Diffusion capacity is moderately decreased at 45% predicted. Assessment & Plan:   Migel Levin is a 50 y.o. male comes in for evaluation and management of germ cell tumor. 1. Testicular germ cell tumor:  Tumor markers show elevated  and b-. Need to distinguish pure seminoma from non-seminomatous germ cell tumor (NSGCT) or mixed germ cell tumor. Usually, scrotal ultrasound is not a replacement for radical inguinal orchiectomy as surgery will provide accurate histologic diagnosis. However, no testicular mass seen on scrotal ultrasound. Right testicular microlithiasis seen on ultrasound, but Urology did not advise R orchiectomy.  b-HCG is usually not elevated in pure seminoma, but can be elevated in up to 20% of seminomas. I am unsure of the significance of the prominent inguinal LNs. Risk stratification for advanced testicular germ cell tumors: Good risk NSGCT based on: Retroperitoneal primary tumor, no mets to other organs, serum AFP < 1000, b-hCG < 5000 rylee-international units/ml, LDH < 3 times upper limit of normal (Many experts would not intensify a patient's treatment from good risk to intermediate risk if the only adverse prognostic factor were an LDH between 1.5 and 3 times ULN - this is why 3 cycles of BEP rather than 4 were recommended.) Accurate staging requires orchiectomy and measurement of tumor markers AFTER surgery. Orchiectomy not advised at this time given lack of testicular mass. Pt was treated with BEP regimen x 3 cycles. Previously discussed cryopreservation of sperm - referred to [facility]. Prior auth form completed per pt request. Supportive care medications include: topical lidocaine, zofran, compazine, docusate. CT imaging 10/30/21 showed significant decrease in size of RP mass, (from 7.4 to now 3.2cm). No longer encasing vasculature. 2/28/22 CT at [facility] showed complete resolution of prior retroperitoneal mass anterior to IVC and inferior to R renal vein. At this time, Dr. Xander Ramirez does not recommend surgical resection by RPLND. 7/19/22 CT shows resolution of prior retroperitoneal mass and stable iliac lymph nodes, IVC filter. No hematoma or mass seen on liver. -- Requested 2/28/22 CT at [facility] be pushed into our system. -- Repeat CT abd/pelv and CXR due 6 months, 1/2023. -- Repeat CXR now  -- Port removal ordered. Advised pt can request to have port and IVC filter at the same. -- Continue surveillance and will plan to follow up with patient in 3 months with labs one week prior.      NCCN guidelines for surveillance for clinical Stage II-III NSGCT after complete response to chemotherapy +/- RPLND:  Year 1: H&P with tumor markers every 2 mo, abd/pelvis CT and CXR every 6 mo  Year 2: H&P with tumor markers every 3 mo, abd/pelvis CT every 6-12 mo, CXR every 6 mo  Year 3: H&P with tumor markers every 6 mo, abd/pelvis CT and CXR annually  Years 4-5: H&P with tumor markers every 6 mo, CXR annually and abd/pelvis CT as needed. [Nonseminoma surveillance once in CR:  CR, negative markers -surveillance (y 1 H&P and marker every 2 mo, abd pelvic CT every 6 mo, CXR q6mo; y2 H&P q3mo, abd/pelvic ct q6-12mo, cxr q6 mo, y 3 H&P q6mo, Ct and cxr annually, y 4 H&P q6mo, imaging annually, cxr annually) or nerve sparing bilateral RPLND in selected cases]    2. Mechanical aortic valve:  Valve replacement surgery by Dr. Dre Alejandro 1/7/22 for severe aortic stenosis. On Warfain, managed by [facility] (Dr. Chester Quiroz retired)   -- Reaching out to Dr. Connie Sauceda office about holding Warfarin for port and IVC filter removal. Will likely need Lovenox bridge. Per IR, they prefer pt be off Warfarin for 3-5 days, but if Cardiology does not advise stopping Warfarin, then IR can still do procedure while on Warfarin. 3. DM / HTN:   Continue home meds: Metformin, Victoza (or Ozempic). Blood pressure elevated today, normally well controlled on metoprolol. -- Advised follow up with cardiology if BP elevated at home. 4. H/o RLE DVT and bilat PE / IVC filter:   Occurred in the setting of malignancy. s/p thrombectomy. Was treated with Lovenox and IVC filter placement given need for surgery. On aspirin and Warfarin for mechanical aortic valve. -- Ordered IVC filter removal.     6. Normocytic anemia:   Improving and likely had some blood loss with recent cardiac valve surgery. Should improve with time. 7. Chronic NICHOLAS:  With minimal exertion and associated sweating, tachycardia. Likely multifactorial due to weight gain, activity intolerance. -- Referral to Pulmonology to consider repeat PFTs.    -- Advised follow up with Cardiology  -- Advised walking program to improve exercise tolerance    Emotional well being: Pt is coping well with his/her disease and has excellent support. I appreciate the opportunity to participate in Mr. Ethan Blair care. I personally saw and evaluated the patient and performed the key components of medical decision making. The history, physical exam, and documentation were performed by Mark Castaneda NP. I reviewed and verified the above documentation and modified it as needed. Continue on surveillance per NCCN guidelines. CT imaging shows no evidence of germ cell tumor. Tumor markers normal. Will need to discuss anticoagulation with Cardiology.       Signed By: Willem Umanzor MD     July 22, 2022

## 2022-07-22 ENCOUNTER — HOSPITAL ENCOUNTER (OUTPATIENT)
Dept: INFUSION THERAPY | Age: 47
Discharge: HOME | End: 2022-07-22
Payer: COMMERCIAL

## 2022-07-22 ENCOUNTER — TRANSCRIBE ORDER (OUTPATIENT)
Dept: REGISTRATION | Age: 47
End: 2022-07-22

## 2022-07-22 ENCOUNTER — HOSPITAL ENCOUNTER (OUTPATIENT)
Dept: GENERAL RADIOLOGY | Age: 47
Discharge: HOME | End: 2022-07-22
Payer: COMMERCIAL

## 2022-07-22 ENCOUNTER — OFFICE VISIT (OUTPATIENT)
Dept: ONCOLOGY | Age: 47
End: 2022-07-22
Payer: COMMERCIAL

## 2022-07-22 VITALS
OXYGEN SATURATION: 99 % | HEART RATE: 93 BPM | SYSTOLIC BLOOD PRESSURE: 159 MMHG | RESPIRATION RATE: 18 BRPM | WEIGHT: 315 LBS | DIASTOLIC BLOOD PRESSURE: 97 MMHG | TEMPERATURE: 97.5 F | BODY MASS INDEX: 55.59 KG/M2

## 2022-07-22 VITALS
OXYGEN SATURATION: 99 % | DIASTOLIC BLOOD PRESSURE: 94 MMHG | RESPIRATION RATE: 18 BRPM | TEMPERATURE: 97.5 F | SYSTOLIC BLOOD PRESSURE: 150 MMHG | HEART RATE: 93 BPM

## 2022-07-22 DIAGNOSIS — Z95.828 PRESENCE OF IVC FILTER: ICD-10-CM

## 2022-07-22 DIAGNOSIS — C48.0 GERM CELL TUMOR OF RETROPERITONEUM (HCC): ICD-10-CM

## 2022-07-22 DIAGNOSIS — C62.90: Primary | ICD-10-CM

## 2022-07-22 DIAGNOSIS — I82.90 VTE (VENOUS THROMBOEMBOLISM): ICD-10-CM

## 2022-07-22 DIAGNOSIS — D49.9 EXTRAGONADAL GERM CELL TUMOR: Primary | ICD-10-CM

## 2022-07-22 DIAGNOSIS — R06.09 DOE (DYSPNEA ON EXERTION): ICD-10-CM

## 2022-07-22 DIAGNOSIS — Z95.2 S/P HEART VALVE REPLACEMENT WITH MECHANICAL VALVE: ICD-10-CM

## 2022-07-22 DIAGNOSIS — I35.0 SEVERE AORTIC STENOSIS: ICD-10-CM

## 2022-07-22 DIAGNOSIS — D49.9 EXTRAGONADAL GERM CELL TUMOR: ICD-10-CM

## 2022-07-22 LAB
ALBUMIN SERPL-MCNC: 3.3 G/DL (ref 3.5–5)
ALBUMIN/GLOB SERPL: 0.8 {RATIO} (ref 1.1–2.2)
ALP SERPL-CCNC: 103 U/L (ref 45–117)
ALT SERPL-CCNC: 25 U/L (ref 12–78)
ANION GAP SERPL CALC-SCNC: 7 MMOL/L (ref 5–15)
AST SERPL-CCNC: 20 U/L (ref 15–37)
BASOPHILS # BLD: 0 K/UL (ref 0–0.1)
BASOPHILS NFR BLD: 1 % (ref 0–1)
BILIRUB SERPL-MCNC: 0.4 MG/DL (ref 0.2–1)
BUN SERPL-MCNC: 13 MG/DL (ref 6–20)
BUN/CREAT SERPL: 12 (ref 12–20)
CALCIUM SERPL-MCNC: 8.9 MG/DL (ref 8.5–10.1)
CHLORIDE SERPL-SCNC: 107 MMOL/L (ref 97–108)
CO2 SERPL-SCNC: 27 MMOL/L (ref 21–32)
CREAT SERPL-MCNC: 1.05 MG/DL (ref 0.7–1.3)
DIFFERENTIAL METHOD BLD: ABNORMAL
EOSINOPHIL # BLD: 0.1 K/UL (ref 0–0.4)
EOSINOPHIL NFR BLD: 2 % (ref 0–7)
ERYTHROCYTE [DISTWIDTH] IN BLOOD BY AUTOMATED COUNT: 13.7 % (ref 11.5–14.5)
GLOBULIN SER CALC-MCNC: 4.4 G/DL (ref 2–4)
GLUCOSE SERPL-MCNC: 125 MG/DL (ref 65–100)
HCG SERPL-ACNC: <1 MIU/ML
HCT VFR BLD AUTO: 36.5 % (ref 36.6–50.3)
HGB BLD-MCNC: 11.3 G/DL (ref 12.1–17)
IMM GRANULOCYTES # BLD AUTO: 0 K/UL (ref 0–0.04)
IMM GRANULOCYTES NFR BLD AUTO: 0 % (ref 0–0.5)
LDH SERPL L TO P-CCNC: 261 U/L (ref 85–241)
LYMPHOCYTES # BLD: 3.1 K/UL (ref 0.8–3.5)
LYMPHOCYTES NFR BLD: 38 % (ref 12–49)
MCH RBC QN AUTO: 27.4 PG (ref 26–34)
MCHC RBC AUTO-ENTMCNC: 31 G/DL (ref 30–36.5)
MCV RBC AUTO: 88.6 FL (ref 80–99)
MONOCYTES # BLD: 0.7 K/UL (ref 0–1)
MONOCYTES NFR BLD: 9 % (ref 5–13)
NEUTS SEG # BLD: 4 K/UL (ref 1.8–8)
NEUTS SEG NFR BLD: 50 % (ref 32–75)
NRBC # BLD: 0 K/UL (ref 0–0.01)
NRBC BLD-RTO: 0 PER 100 WBC
PLATELET # BLD AUTO: 192 K/UL (ref 150–400)
PMV BLD AUTO: 12.3 FL (ref 8.9–12.9)
POTASSIUM SERPL-SCNC: 3.7 MMOL/L (ref 3.5–5.1)
PROT SERPL-MCNC: 7.7 G/DL (ref 6.4–8.2)
RBC # BLD AUTO: 4.12 M/UL (ref 4.1–5.7)
SODIUM SERPL-SCNC: 141 MMOL/L (ref 136–145)
WBC # BLD AUTO: 8 K/UL (ref 4.1–11.1)

## 2022-07-22 PROCEDURE — 85025 COMPLETE CBC W/AUTO DIFF WBC: CPT

## 2022-07-22 PROCEDURE — 83615 LACTATE (LD) (LDH) ENZYME: CPT

## 2022-07-22 PROCEDURE — 80053 COMPREHEN METABOLIC PANEL: CPT

## 2022-07-22 PROCEDURE — 71046 X-RAY EXAM CHEST 2 VIEWS: CPT

## 2022-07-22 PROCEDURE — 77030016057 HC NDL HUBR APOL -B

## 2022-07-22 PROCEDURE — 99214 OFFICE O/P EST MOD 30 MIN: CPT | Performed by: INTERNAL MEDICINE

## 2022-07-22 PROCEDURE — 36591 DRAW BLOOD OFF VENOUS DEVICE: CPT

## 2022-07-22 PROCEDURE — 82105 ALPHA-FETOPROTEIN SERUM: CPT

## 2022-07-22 PROCEDURE — 84702 CHORIONIC GONADOTROPIN TEST: CPT

## 2022-07-22 RX ORDER — SODIUM CHLORIDE 9 MG/ML
10 INJECTION INTRAMUSCULAR; INTRAVENOUS; SUBCUTANEOUS AS NEEDED
Status: DISCONTINUED | OUTPATIENT
Start: 2022-07-22 | End: 2022-07-23 | Stop reason: HOSPADM

## 2022-07-22 RX ORDER — HEPARIN 100 UNIT/ML
500 SYRINGE INTRAVENOUS AS NEEDED
Status: DISCONTINUED | OUTPATIENT
Start: 2022-07-22 | End: 2022-07-23 | Stop reason: HOSPADM

## 2022-07-22 RX ORDER — SODIUM CHLORIDE 0.9 % (FLUSH) 0.9 %
5-10 SYRINGE (ML) INJECTION AS NEEDED
Status: DISCONTINUED | OUTPATIENT
Start: 2022-07-22 | End: 2022-07-23 | Stop reason: HOSPADM

## 2022-07-22 NOTE — PROGRESS NOTES
Identified pt with two pt identifiers(name and ). Reviewed record in preparation for visit and have obtained necessary documentation. Chief Complaint   Patient presents with    Follow-up     8 wk, labs, germ cell ca          Vitals:    22 1336 22 1342   BP: (!) 150/94 (!) 159/97   Pulse: 93    Resp: 18    Temp: 97.5 °F (36.4 °C)    SpO2: 99%    Weight: (!) 433 lb (196.4 kg)    PainSc:   0 - No pain        Pain Scale: 0 - No pain/10        Coordination of Care Questionnaire:  :     1. Have you been to the ER, urgent care clinic since your last visit? Hospitalized since your last visit? No    2. Have you seen or consulted any other health care providers outside of the [address] [place] [address] since your last visit? Include any pap smears or colon screening.  No

## 2022-07-23 LAB — AFP-TM SERPL-MCNC: 3.2 NG/ML (ref 0–6.9)

## 2022-07-26 ENCOUNTER — TELEPHONE (OUTPATIENT)
Dept: ONCOLOGY | Age: 47
End: 2022-07-26

## 2022-07-26 NOTE — TELEPHONE ENCOUNTER
[address] at [place]  [phone]        07/26/22 2:39 PM Call placed to [facility]. Spoke with Eloisa Desai. Patient will be transferring care to Dr. Prince Billingsley. Requested to speak with nurse; however, Eloisa Desai took message to forward to clinical team. Per Arsenio Tavera, inquired if cardiology is ok with bridging lovenox with coumadin in preparation for IVC filter and port removal or if they prefer patient stay on coumadin for the procedure given history of mechanical aortic valvereplacement. Eloisa Desai performed read back of above. Provided office phone number as well for call back. 07/27/22 2:48 PM Received return call from Luciano Cain with [facility]. She stated she spoke with Rhoda Mandujano NP, who advised that patient can proceed with bridging with Lovenox. Cardiology office also faxing letter to this office regarding anticoagulation. Ivan Junior for assistance. 07/28/22 2:46 PM Call placed to patient. Reviewed Lovenox bridging instructions per cardiology and Dr. Beverly Posey:     Stop Warfarin 5 days prior to procedure  Start Lovenox 1mg/kg 3 days prior to procedure  Hold Warfarin and Lovenox day of procedure. Resume both Lovenox and Warfarin the next morning (after procedure) until INR is back in range. Patient voiced understanding and requests that this nurse send instructions via [facility]. Patient stated he believes he has about 5 or 6 boxes of both 150 mg and 30 mg doses of Lovenox. Patient estimates there are 8 syringes per box. Patient will verify supply once he arrives home and will contact office if refill needed. No further questions or concerns at this time.

## 2022-07-28 ENCOUNTER — TELEPHONE (OUTPATIENT)
Dept: ONCOLOGY | Age: 47
End: 2022-07-28

## 2022-07-28 NOTE — TELEPHONE ENCOUNTER
[address] at [facility]  [phone]        07/28/22 10:53 AM Referral, patient demographics, and most recent note faxed to Dr. Bailey Bennett office. Will continue to monitor.

## 2022-08-01 DIAGNOSIS — I35.0 NONRHEUMATIC AORTIC VALVE STENOSIS: Primary | ICD-10-CM

## 2022-08-11 ENCOUNTER — OFFICE VISIT (OUTPATIENT)
Dept: PRIMARY CARE CLINIC | Age: 47
End: 2022-08-11
Payer: COMMERCIAL

## 2022-08-11 VITALS
BODY MASS INDEX: 40.43 KG/M2 | HEART RATE: 91 BPM | RESPIRATION RATE: 16 BRPM | SYSTOLIC BLOOD PRESSURE: 175 MMHG | HEIGHT: 74 IN | DIASTOLIC BLOOD PRESSURE: 97 MMHG | WEIGHT: 315 LBS | OXYGEN SATURATION: 98 %

## 2022-08-11 DIAGNOSIS — C62.90: ICD-10-CM

## 2022-08-11 DIAGNOSIS — E66.01 MORBID OBESITY (HCC): ICD-10-CM

## 2022-08-11 DIAGNOSIS — Z86.711 HISTORY OF PULMONARY EMBOLUS (PE): ICD-10-CM

## 2022-08-11 DIAGNOSIS — Z95.2 S/P AVR (AORTIC VALVE REPLACEMENT): ICD-10-CM

## 2022-08-11 DIAGNOSIS — I10 ESSENTIAL HYPERTENSION: ICD-10-CM

## 2022-08-11 DIAGNOSIS — G47.33 OSA (OBSTRUCTIVE SLEEP APNEA): ICD-10-CM

## 2022-08-11 DIAGNOSIS — K21.9 GASTROESOPHAGEAL REFLUX DISEASE, UNSPECIFIED WHETHER ESOPHAGITIS PRESENT: ICD-10-CM

## 2022-08-11 DIAGNOSIS — E11.65 TYPE 2 DIABETES MELLITUS WITH HYPERGLYCEMIA, WITHOUT LONG-TERM CURRENT USE OF INSULIN (HCC): Primary | ICD-10-CM

## 2022-08-11 DIAGNOSIS — Z86.718 HISTORY OF DVT (DEEP VEIN THROMBOSIS): ICD-10-CM

## 2022-08-11 PROBLEM — D49.59 TESTICULAR TUMOR: Status: ACTIVE | Noted: 2022-08-11

## 2022-08-11 PROCEDURE — 99215 OFFICE O/P EST HI 40 MIN: CPT | Performed by: FAMILY MEDICINE

## 2022-08-11 RX ORDER — LISINOPRIL 20 MG/1
20 TABLET ORAL DAILY
Qty: 90 TABLET | Refills: 1 | Status: SHIPPED | OUTPATIENT
Start: 2022-08-11

## 2022-08-11 RX ORDER — ATORVASTATIN CALCIUM 40 MG/1
TABLET, FILM COATED ORAL
Qty: 90 TABLET | Refills: 1 | Status: SHIPPED | OUTPATIENT
Start: 2022-08-11

## 2022-08-11 RX ORDER — ASPIRIN 81 MG/1
81 TABLET ORAL DAILY
Qty: 90 TABLET | Refills: 1 | Status: SHIPPED | OUTPATIENT
Start: 2022-08-11

## 2022-08-11 NOTE — PROGRESS NOTES
Gulshan Worrell (: 1975) is a 50 y.o. male, established patient, here for evaluation of the following chief complaint(s):  Establish Care and Medication Refill       ASSESSMENT/PLAN:  Below is the assessment and plan developed based on review of pertinent history, physical exam, labs, studies, and medications. 1. Type 2 diabetes mellitus with hyperglycemia, without long-term current use of insulin (HCC)  Chronic  -     HEMOGLOBIN A1C WITH EAG  -     LIPID PANEL  -     MICROALBUMIN, UR, RAND W/ MICROALB/CREAT RATIO  Hemoglobin A1c, if under 6% will discontinue metformin as patient is not taking any longer. Continue Ozempic for diabetes and weight loss. ADA compliant diet. Physical activity as tolerated. 2. Essential hypertension  Chronic, uncontrolled  -     lisinopriL (PRINIVIL, ZESTRIL) 20 mg tablet; Take 1 Tablet by mouth in the morning., Normal, Disp-90 Tablet, R-1  Continue metoprolol, add lisinopril 20 mg, take 1/2 tablet daily and check blood pressures at home. If blood pressure still greater than 829 mmHg systolic, take a full tablet. Low-sodium diet. 3. Gastroesophageal reflux disease, unspecified whether esophagitis present  Chronic, stable  Continue PPI, avoid GERD triggers. 4. ROBERTO (obstructive sleep apnea)  Chronic  -     SLEEP MEDICINE REFERRAL  Referral to sleep medicine for CPAP. 5. S/P AVR (aortic valve replacement)  INR checks by cardiology. Normally on Coumadin and aspirin. Coumadin being held and patient is being bridged with Lovenox for port removal tomorrow. 6. History of DVT (deep vein thrombosis)  Noted  Patient will require lifelong anticoagulation. 7. History of pulmonary embolus (PE)  Noted  Patient require lifelong anticoagulation. 8. Malignant germ cell tumor of testis ([facility] [address]  Chronic  Under care of oncology, Dr. Gil Zavaleta. In remission.   9. Morbid obesity ([facility] [address]  Chronic      Return in about 4 months (around 2022) for chronic care follow up.      SUBJECTIVE/OBJECTIVE:  HPI    78-year-old male history of type 2 diabetes mellitus, non-insulin-dependent, hypertension, GERD, ROBERTO, status post AVR, history of right lower extremity DVT and PE on Coumadin, testicular tumor, and morbid obesity in the office today for routine interval follow-up for his chronic medical problems. Patient no longer taking metformin. Off metformin, blood sugars never higher than 130 mg/dL. He is just injecting Ozempic weekly. Patient states he is behind on exercise and slipped up on his diet. Patient states he has gained back most of the weight lost in the past year. Patient reports systolic blood pressures mostly elevated greater than 140 mmHg. Diastolic pressures are normal.  Denies cardiopulmonary symptoms. Patient states he has not used a CPAP in 3 to 4 years. He had a scare around that time when machine malfunctioned. He takes a PPI for GERD and denies breakthrough heartburn. He was diagnosed with extranodal germ cell tumor having completed chemotherapeutics. Imaging on 7/19/2022 showed resolution of the mass and stable iliac lymph nodes. He has an IVC filter in place. He is scheduled for port removal tomorrow. Coumadin is being held and he is on a Lovenox bridge. He has had aortic valve replacement with a mechanical valve for severe aortic stenosis on 1/7/2022. He is followed by [facility] physicians. He has a history of right lower extremity DVT and bilateral PE which occurred in the setting of malignancy status post thrombectomy. Currently on aspirin and warfarin for mechanical aortic valve. Allergies   Allergen Reactions    Other Food Angioedema     pickle    Cucumber Fruit Extract Angioedema     Current Outpatient Medications   Medication Sig    lisinopriL (PRINIVIL, ZESTRIL) 20 mg tablet Take 1 Tablet by mouth in the morning. metoprolol tartrate (LOPRESSOR) 25 mg tablet Take 1 Tablet by mouth two (2) times a day.     semaglutide (Ozempic) 1 mg/dose (4 mg/3 mL) pnij 1 mg by SubCUTAneous route every seven (7) days. prochlorperazine (COMPAZINE) 10 mg tablet Take  by mouth.    esomeprazole (NEXIUM) 20 mg capsule Take 20 mg by mouth Daily (before breakfast). magnesium oxide (MAG-OX) 400 mg tablet Take  by mouth. Jantoven 2.5 mg tablet     warfarin (COUMADIN) 10 mg tablet Take 10 mg by mouth daily. acetaminophen (TYLENOL) 325 mg tablet     potassium chloride (K-DUR, KLOR-CON M20) 20 mEq tablet     furosemide (LASIX) 20 mg tablet Take 1 Tablet by mouth daily as needed (fluid retention). atorvastatin (LIPITOR) 40 mg tablet TAKE ONE TABLET BY MOUTH ONE TIME DAILY AT 5 P. M.    aspirin delayed-release 81 mg tablet Take 1 Tablet by mouth in the morning. metFORMIN ER (GLUCOPHAGE XR) 500 mg tablet Take 2 Tablets by mouth two (2) times a day. (Patient not taking: Reported on 8/11/2022)    Blood Pressure Monitor kit Used monitor to check bp two times in the morning and two times in the evening    diphenoxylate-atropine (LomotiL) 2.5-0.025 mg per tablet Take 1 Tablet by mouth three (3) times daily as needed for Diarrhea. Max Daily Amount: 3 Tablets. No current facility-administered medications for this visit.      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[address]     stage 3     Hypertension     Pulmonary embolism ([address]     both lungs    Sleep apnea      Past Surgical History:   Procedure Laterality Date    HX HEART VALVE SURGERY  01/07/2022    HX HEENT      tonsils    HX ORTHOPAEDIC      Plantar Facitits L foot    HX OTHER SURGICAL      removal of blood clots in lungs    IR INSERT TUNL CVC W PORT OVER 5 YEARS  08/26/2021    IR PLC IVC FILTER  12/30/2021     Family History   Adopted: Yes     Social History     Tobacco Use   Smoking Status Never   Smokeless Tobacco Never Review of Systems   All other systems reviewed and are negative. BP (!) 175/97 (BP 1 Location: Left upper arm, BP Patient Position: Sitting, BP Cuff Size: Adult)   Pulse 91   Resp 16   Ht 6' 2\" (1.88 m)   Wt (!) 436 lb 6.4 oz (197.9 kg)   SpO2 98%   BMI 56.03 kg/m²    Physical Exam  Vitals reviewed. Constitutional:       Appearance: He is obese. HENT:      Head: Normocephalic and atraumatic. Neck:      Vascular: No carotid bruit. Cardiovascular:      Rate and Rhythm: Normal rate and regular rhythm. Pulses: Normal pulses. Heart sounds: Murmur heard. Pulmonary:      Effort: Pulmonary effort is normal.      Breath sounds: Normal breath sounds. Abdominal:      General: Bowel sounds are normal.   Musculoskeletal:      Cervical back: Neck supple. Right lower leg: No edema. Left lower leg: No edema. Skin:     General: Skin is warm. Capillary Refill: Capillary refill takes less than 2 seconds. Neurological:      General: No focal deficit present. Mental Status: He is alert. Psychiatric:         Mood and Affect: Mood normal.           On this date 08/11/2022 I have spent 45 minutes reviewing previous notes, test results and face to face with the patient discussing the diagnosis and importance of compliance with the treatment plan as well as documenting on the day of the visit. An electronic signature was used to authenticate this note.   -- Clay Harris MD   [facility] [address]  [address]

## 2022-08-11 NOTE — PROGRESS NOTES
Chief Complaint   Patient presents with    Establish Care    Medication Refill     Visit Vitals  BP (!) 175/97 (BP 1 Location: Left upper arm, BP Patient Position: Sitting, BP Cuff Size: Adult)   Pulse 91   Resp 16   Ht 6' 2\" (1.88 m)   Wt (!) 436 lb 6.4 oz (197.9 kg)   SpO2 98%   BMI 56.03 kg/m²     1. \"Have you been to the ER, urgent care clinic since your last visit? Hospitalized since your last visit? \" No    2. \"Have you seen or consulted any other health care providers outside of the [address] [place] [address] since your last visit? \" No     3. For patients aged 39-70: Has the patient had a colonoscopy / FIT/ Cologuard? No      If the patient is female:    4. For patients aged 41-77: Has the patient had a mammogram within the past 2 years? No       5. For patients aged 21-65: Has the patient had a pap smear?  No

## 2022-08-12 ENCOUNTER — HOSPITAL ENCOUNTER (OUTPATIENT)
Dept: INTERVENTIONAL RADIOLOGY/VASCULAR | Age: 47
Discharge: HOME | End: 2022-08-12
Attending: NURSE PRACTITIONER
Payer: COMMERCIAL

## 2022-08-12 ENCOUNTER — HOSPITAL ENCOUNTER (EMERGENCY)
Age: 47
Discharge: HOME | End: 2022-08-13
Attending: EMERGENCY MEDICINE

## 2022-08-12 ENCOUNTER — HOSPITAL ENCOUNTER (EMERGENCY)
Age: 47
Discharge: HOME | End: 2022-08-12
Attending: STUDENT IN AN ORGANIZED HEALTH CARE EDUCATION/TRAINING PROGRAM
Payer: COMMERCIAL

## 2022-08-12 ENCOUNTER — HOSPITAL ENCOUNTER (OUTPATIENT)
Dept: INTERVENTIONAL RADIOLOGY/VASCULAR | Age: 47
Discharge: HOME | End: 2022-08-12
Attending: NURSE PRACTITIONER | Admitting: STUDENT IN AN ORGANIZED HEALTH CARE EDUCATION/TRAINING PROGRAM
Payer: COMMERCIAL

## 2022-08-12 VITALS
TEMPERATURE: 97.4 F | BODY MASS INDEX: 40.43 KG/M2 | WEIGHT: 315 LBS | DIASTOLIC BLOOD PRESSURE: 112 MMHG | HEIGHT: 74 IN | SYSTOLIC BLOOD PRESSURE: 205 MMHG | HEART RATE: 104 BPM | RESPIRATION RATE: 19 BRPM | OXYGEN SATURATION: 99 %

## 2022-08-12 VITALS
BODY MASS INDEX: 40.43 KG/M2 | HEART RATE: 103 BPM | DIASTOLIC BLOOD PRESSURE: 98 MMHG | TEMPERATURE: 98 F | RESPIRATION RATE: 16 BRPM | SYSTOLIC BLOOD PRESSURE: 172 MMHG | HEIGHT: 74 IN | WEIGHT: 315 LBS | OXYGEN SATURATION: 100 %

## 2022-08-12 VITALS
HEART RATE: 79 BPM | WEIGHT: 315 LBS | BODY MASS INDEX: 40.43 KG/M2 | OXYGEN SATURATION: 96 % | SYSTOLIC BLOOD PRESSURE: 147 MMHG | DIASTOLIC BLOOD PRESSURE: 85 MMHG | HEIGHT: 74 IN | TEMPERATURE: 97.8 F | RESPIRATION RATE: 15 BRPM

## 2022-08-12 DIAGNOSIS — S11.93XA: Primary | ICD-10-CM

## 2022-08-12 DIAGNOSIS — D49.9 EXTRAGONADAL GERM CELL TUMOR: ICD-10-CM

## 2022-08-12 DIAGNOSIS — L76.21 POSTOPERATIVE HEMORRHAGE OF SKIN FOLLOWING DERMATOLOGIC PROCEDURE: Primary | ICD-10-CM

## 2022-08-12 LAB
GLUCOSE BLD STRIP.AUTO-MCNC: 129 MG/DL (ref 65–117)
GLUCOSE BLD STRIP.AUTO-MCNC: 136 MG/DL (ref 65–117)
INR BLD: 1.3 (ref 0.9–1.2)
SERVICE CMNT-IMP: ABNORMAL
SERVICE CMNT-IMP: ABNORMAL

## 2022-08-12 PROCEDURE — 77030004561 HC CATH ANGI DX COBRA ANGI -B

## 2022-08-12 PROCEDURE — 36590 REMOVAL TUNNELED CV CATH: CPT

## 2022-08-12 PROCEDURE — 74011250636 HC RX REV CODE- 250/636: Performed by: STUDENT IN AN ORGANIZED HEALTH CARE EDUCATION/TRAINING PROGRAM

## 2022-08-12 PROCEDURE — 77030031139 HC SUT VCRL2 J&J -A

## 2022-08-12 PROCEDURE — 82962 GLUCOSE BLOOD TEST: CPT

## 2022-08-12 PROCEDURE — 77030010507 HC ADH SKN DERMBND J&J -B

## 2022-08-12 PROCEDURE — 37193 REM ENDOVAS VENA CAVA FILTER: CPT

## 2022-08-12 PROCEDURE — C1773 RET DEV, INSERTABLE: HCPCS

## 2022-08-12 PROCEDURE — C1769 GUIDE WIRE: HCPCS

## 2022-08-12 PROCEDURE — 2709999900 HC NON-CHARGEABLE SUPPLY

## 2022-08-12 PROCEDURE — C1894 INTRO/SHEATH, NON-LASER: HCPCS

## 2022-08-12 PROCEDURE — 74011000250 HC RX REV CODE- 250: Performed by: STUDENT IN AN ORGANIZED HEALTH CARE EDUCATION/TRAINING PROGRAM

## 2022-08-12 PROCEDURE — 85610 PROTHROMBIN TIME: CPT

## 2022-08-12 PROCEDURE — 99152 MOD SED SAME PHYS/QHP 5/>YRS: CPT

## 2022-08-12 PROCEDURE — 74011000250 HC RX REV CODE- 250

## 2022-08-12 PROCEDURE — 99153 MOD SED SAME PHYS/QHP EA: CPT

## 2022-08-12 PROCEDURE — 99283 EMERGENCY DEPT VISIT LOW MDM: CPT

## 2022-08-12 PROCEDURE — 74011000636 HC RX REV CODE- 636: Performed by: STUDENT IN AN ORGANIZED HEALTH CARE EDUCATION/TRAINING PROGRAM

## 2022-08-12 RX ORDER — SILVER NITRATE 38.21; 12.74 MG/1; MG/1
1 STICK TOPICAL
Status: COMPLETED | OUTPATIENT
Start: 2022-08-12 | End: 2022-08-12

## 2022-08-12 RX ORDER — FENTANYL CITRATE 50 UG/ML
25-200 INJECTION, SOLUTION INTRAMUSCULAR; INTRAVENOUS
Status: DISCONTINUED | OUTPATIENT
Start: 2022-08-12 | End: 2022-08-12 | Stop reason: HOSPADM

## 2022-08-12 RX ORDER — MIDAZOLAM HYDROCHLORIDE 1 MG/ML
.5-1 INJECTION, SOLUTION INTRAMUSCULAR; INTRAVENOUS
Status: DISCONTINUED | OUTPATIENT
Start: 2022-08-12 | End: 2022-08-16 | Stop reason: HOSPADM

## 2022-08-12 RX ORDER — ENOXAPARIN SODIUM 150 MG/ML
INJECTION SUBCUTANEOUS
COMMUNITY

## 2022-08-12 RX ORDER — SILVER NITRATE 38.21; 12.74 MG/1; MG/1
STICK TOPICAL
Status: COMPLETED
Start: 2022-08-12 | End: 2022-08-12

## 2022-08-12 RX ORDER — CEFAZOLIN SODIUM 1 G/3ML
2 INJECTION, POWDER, FOR SOLUTION INTRAMUSCULAR; INTRAVENOUS ONCE
Status: DISCONTINUED | OUTPATIENT
Start: 2022-08-12 | End: 2022-08-12

## 2022-08-12 RX ORDER — LIDOCAINE HYDROCHLORIDE 10 MG/ML
10-30 INJECTION, SOLUTION EPIDURAL; INFILTRATION; INTRACAUDAL; PERINEURAL
Status: DISCONTINUED | OUTPATIENT
Start: 2022-08-12 | End: 2022-08-16 | Stop reason: HOSPADM

## 2022-08-12 RX ORDER — LIDOCAINE HYDROCHLORIDE AND EPINEPHRINE 20; 10 MG/ML; UG/ML
INJECTION, SOLUTION INFILTRATION; PERINEURAL
Status: DISCONTINUED
Start: 2022-08-12 | End: 2022-08-13 | Stop reason: HOSPADM

## 2022-08-12 RX ORDER — LIDOCAINE HYDROCHLORIDE 10 MG/ML
10-30 INJECTION, SOLUTION EPIDURAL; INFILTRATION; INTRACAUDAL; PERINEURAL
Status: DISCONTINUED | OUTPATIENT
Start: 2022-08-12 | End: 2022-08-12 | Stop reason: HOSPADM

## 2022-08-12 RX ORDER — FENTANYL CITRATE 50 UG/ML
25-200 INJECTION, SOLUTION INTRAMUSCULAR; INTRAVENOUS
Status: DISCONTINUED | OUTPATIENT
Start: 2022-08-12 | End: 2022-08-16 | Stop reason: HOSPADM

## 2022-08-12 RX ORDER — HEPARIN SODIUM 200 [USP'U]/100ML
500 INJECTION, SOLUTION INTRAVENOUS ONCE
Status: COMPLETED | OUTPATIENT
Start: 2022-08-12 | End: 2022-08-12

## 2022-08-12 RX ORDER — LIDOCAINE HYDROCHLORIDE AND EPINEPHRINE 20; 10 MG/ML; UG/ML
1.5 INJECTION, SOLUTION INFILTRATION; PERINEURAL ONCE
Status: DISCONTINUED | OUTPATIENT
Start: 2022-08-12 | End: 2022-08-13 | Stop reason: HOSPADM

## 2022-08-12 RX ORDER — LIDOCAINE HYDROCHLORIDE AND EPINEPHRINE 10; 10 MG/ML; UG/ML
1.5 INJECTION, SOLUTION INFILTRATION; PERINEURAL ONCE
Status: DISCONTINUED | OUTPATIENT
Start: 2022-08-12 | End: 2022-08-12

## 2022-08-12 RX ADMIN — LIDOCAINE HYDROCHLORIDE 20 ML: 10 INJECTION, SOLUTION EPIDURAL; INFILTRATION; INTRACAUDAL; PERINEURAL at 09:52

## 2022-08-12 RX ADMIN — LIDOCAINE HYDROCHLORIDE 15 MG: 10; .005 INJECTION, SOLUTION EPIDURAL; INFILTRATION; INTRACAUDAL; PERINEURAL at 18:13

## 2022-08-12 RX ADMIN — SILVER NITRATE 1 APPLICATOR: 38.21; 12.74 STICK TOPICAL at 18:13

## 2022-08-12 RX ADMIN — CEFAZOLIN 2 G: 1 INJECTION, POWDER, FOR SOLUTION INTRAMUSCULAR; INTRAVENOUS at 09:50

## 2022-08-12 RX ADMIN — MIDAZOLAM HYDROCHLORIDE 1 MG: 2 INJECTION, SOLUTION INTRAMUSCULAR; INTRAVENOUS at 09:53

## 2022-08-12 RX ADMIN — IOPAMIDOL 40 ML: 612 INJECTION, SOLUTION INTRAVENOUS at 09:47

## 2022-08-12 RX ADMIN — FENTANYL CITRATE 50 MCG: 50 INJECTION, SOLUTION INTRAMUSCULAR; INTRAVENOUS at 09:25

## 2022-08-12 RX ADMIN — FENTANYL CITRATE 50 MCG: 50 INJECTION, SOLUTION INTRAMUSCULAR; INTRAVENOUS at 09:53

## 2022-08-12 RX ADMIN — LIDOCAINE HYDROCHLORIDE 5 ML: 10 INJECTION, SOLUTION EPIDURAL; INFILTRATION; INTRACAUDAL; PERINEURAL at 09:29

## 2022-08-12 RX ADMIN — FENTANYL CITRATE 50 MCG: 50 INJECTION, SOLUTION INTRAMUSCULAR; INTRAVENOUS at 09:31

## 2022-08-12 RX ADMIN — HEPARIN SODIUM 1000 UNITS: 200 INJECTION, SOLUTION INTRAVENOUS at 09:27

## 2022-08-12 NOTE — ED NOTES
Patient discharged by provider. D/C instructions given. Patient educated to take all medications as instructed for management at home. Patien verbalized understanding, verbalized no questions. Patient ambulated out of ER without difficulty, NAD.   Patient Vitals for the past 4 hrs:   Temp Pulse Resp BP SpO2   08/12/22 1530 -- -- -- (!) 172/98 --   08/12/22 1515 98 °F (36.7 °C) (!) 103 16 (!) 196/117 100 %

## 2022-08-12 NOTE — PROGRESS NOTES
TRANSFER - OUT REPORT:    Verbal report given to cleopatra(name) on [facility]       Report consisted of patient's Situation, Background, Assessment and   Recommendations(SBAR). Information from the following report(s) SBAR was reviewed with the receiving nurse. Opportunity for questions and clarification was provided.       Patient transported with:   Monitor

## 2022-08-12 NOTE — ROUTINE PROCESS
7:59 AM  Patient arrived. ID and allergies verified verbally with patient. Pt voices understanding of procedure to be performed. Consent obtained. Pt prepped for procedure. 9:02 AM  TRANSFER - OUT REPORT:    Verbal report given to Ed RN(name) on [facility]  being transferred to IR(unit) for ordered procedure       Report consisted of patients Situation, Background, Assessment and   Recommendations(SBAR). Information from the following report(s) SBAR was reviewed with the receiving nurse. Lines:   Venous Access Device Vas-cath Power Injectable Implantable Port 08/26/21 Upper chest (subclavicular area, right (Active)       Peripheral IV 08/12/22 Anterior; Left Forearm (Active)   Site Assessment Clean, dry, & intact 08/12/22 0841   Phlebitis Assessment 0 08/12/22 0841   Infiltration Assessment 0 08/12/22 0841   Dressing Status Clean, dry, & intact 08/12/22 0841   Dressing Type Transparent 08/12/22 0841   Hub Color/Line Status Blue 08/12/22 0841        Opportunity for questions and clarification was provided. Patient transported with:   Registered Nurse    10:31 AM  TRANSFER - IN REPORT:    Verbal report received from Ed RN(name) on [facility]  being received from IR(unit) for routine post - op      Report consisted of patients Situation, Background, Assessment and   Recommendations(SBAR). Information from the following report(s) SBAR and Procedure Summary was reviewed with the receiving nurse. Opportunity for questions and clarification was provided. Assessment completed upon patients arrival to unit and care assumed. 10:52 AM  Discharge instructions reviewed with patient and family. Voiced understanding. Patient given copy of discharge instructions to take home. 1115 AM  Patient ambulates in hallway or on unit. 11:25 AM  Pt discharged via wheelchair with sister. Personal belongings with patient upon discharge.

## 2022-08-12 NOTE — ED TRIAGE NOTES
Discharge Instructions for Fine-Needle Thyroid Biopsy  You have had a procedure called fine-needle thyroid biopsy. This biopsy was done to learn more about a nodule or cyst in your thyroid gland or to find out what might be causing enlargement of your thyroid. During the biopsy, a very thin needle is inserted through the skin into the gland. The needle is used to remove a small amount of tissue from the gland. More than one tissue sample may be done to be sure to get cells from all parts of the nodule. Or the needle might be used to drain fluid from a cyst. Often a special ultrasound probe or transducer is used to help guide the needle to the right place. The tissue or fluid is then studied in a lab.   Home care  Here are some tips to take care of yourself at home:   · You will have a small adhesive bandage on your biopsy site. Leave the bandage on for 4 to 6 hours. After this time, you don't need to keep it covered.   · If you feel discomfort after the biopsy, take over-the-counter pain medicine. Don't take aspirin. It's normal to feel sore for a day or two.  · Ask your healthcare provider when you can return to work and normal activities. This will likely be the same day as your procedure.  Getting your results  Your biopsy results may take a few days. When the results are ready, your healthcare provider will discuss them with you and tell you what, if anything, needs to be done next.   Follow-up  Make a follow-up appointment as directed by your healthcare provider.  When to call your healthcare provider  Call your healthcare provider right away if you have any of the following:  · Bleeding that won’t stop  · Shortness of breath or trouble breathing  · Fever of 100.4°F (38°C) or higher  · Increasing pain, redness, tenderness, or drainage at the biopsy site  · Swelling of the biopsy site  Be sure you understand what problems you should watch for and know how to reach the healthcare provider after hours and on  Pt arrives to the ER for complaints of bleeding from removed IVC filter that was taken out this morning around 1000. Pt states that when they removed it, bleeding took awhile to slow down, once it did they discharged him home. Pt states that around 1430 pt noticed it bleeding again, bleeding saturated shirt as well as gauze that was already placed on site. Denies any dizziness or weakness. Denies shortness of breath. Reports he takes lovenox. weekends.   [employer] last reviewed this educational content on 6/1/2018  © 6935-1595 [email or web address]. [address] [place] [address], [place] [address]. All rights reserved. This information is not intended as a substitute for professional medical care. Always follow your healthcare professional's instructions.

## 2022-08-12 NOTE — DISCHARGE INSTRUCTIONS
[address]  Department of Interventional Radiology  [facility]  [phone] Office  [phone] Fax    DRAIN/PORT/CATHETER REMOVAL  DISCHARGE INSTRUCTIONS    General Information:     Your doctor has ordered for us to remove your drain, port, or catheter. This could be that you do not need it anymore, it is not doing its job, your physician has decided on another plan for your treatment and/or it may need replacing. Home Care Instructions: You can resume your regular diet and medication regimen. Do not drink alcohol, drive, or make any important legal decisions in the next 24 hours. Do not lift anything heavier than a gallon of milk, or do anything strenuous for the next 24 hours. You will notice a dressing over the site of the removal. This dressing should stay in place for 24 hours. Resume your normal level of activity slowly. You may shower after 24 hours, but do not take a bath, swim or immerse yourself in water until the site has healed, and keep the dressing dry with plastic wrap while showering. The site may ooze for a couple of days. This drainage should lessen with each passing day. Call If:     You should call your Physician and/or the Radiology Nurse if you have any bleeding other than a small spot on your bandage. Call if you have any signs of infection, fever, or increased pain at the site of the tube. Call if the oozing increases, if it changes color, or begins to have an odor. Follow-Up Instructions: Please see your ordering doctor as he/she has requested.      To Reach Us:        Patient Signature:  Date: 8/12/2022  Discharging Nurse: Katie Martinez

## 2022-08-12 NOTE — DISCHARGE INSTRUCTIONS
Please keep your wound clean and dry. Return to the emergency department if you have any new symptoms or concerns.   Return to the emergency department if bleeding returns, if you develop lightheadedness, dizziness, passout or any other concerns

## 2022-08-12 NOTE — H&P
Interventional and Vascular Radiology History and Physical    Patient: Emily Whitfield 50 y.o. male       Chief Complaint: IVC filter no longer needed; port no longer needed    History of Present Illness: 50year old male presents for port and IVC filter removal. No longer needed. History:    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[facility] [address]     stage 3     Hypertension     Pulmonary embolism (HCC)     both lungs    Sleep apnea      Family History   Adopted: Yes     Social History     Socioeconomic History    Marital status: SINGLE     Spouse name: Not on file    Number of children: Not on file    Years of education: Not on file    Highest education level: Not on file   Occupational History    Not on file   Tobacco Use    Smoking status: Never    Smokeless tobacco: Never   Vaping Use    Vaping Use: Never used   Substance and Sexual Activity    Alcohol use: No     Comment:      Drug use: No    Sexual activity: Not on file   Other Topics Concern    Not on file   Social History Narrative    Not on file     Social Determinants of Health     Financial Resource Strain: Not on file   Food Insecurity: Not on file   Transportation Needs: Not on file   Physical Activity: Not on file   Stress: Not on file   Social Connections: Not on file   Intimate Partner Violence: Not on file   Housing Stability: Not on file       Allergies:    Allergies   Allergen Reactions    Other Food Angioedema     pickle    Cucumber Fruit Extract Angioedema       Current Medications:  Current Facility-Administered Medications   Medication Dose Route Frequency    lidocaine (PF) (XYLOCAINE) 10 mg/mL (1 %) injection 10-30 mL  10-30 mL SubCUTAneous Multiple    fentaNYL citrate (PF) injection  mcg   mcg IntraVENous Multiple    midazolam (VERSED) injection 0.5-10 mg  0.5-10 mg IntraVENous Multiple    ceFAZolin (ANCEF) 2 g in sterile water (preservative free) 20 mL IV syringe  2 g IntraVENous ONCE     No current outpatient medications on file. Facility-Administered Medications Ordered in Other Encounters   Medication Dose Route Frequency    lidocaine (PF) (XYLOCAINE) 10 mg/mL (1 %) injection 10-30 mL  10-30 mL SubCUTAneous Multiple    iopamidoL (ISOVUE 300) 61 % contrast injection 50 mL  50 mL IntraVENous Multiple    fentaNYL citrate (PF) injection  mcg   mcg IntraVENous Multiple        Physical Exam:  Blood pressure (!) 174/94, pulse 85, temperature 97.8 °F (36.6 °C), resp. rate 12, height 6' 2\" (1.88 m), weight (!) 194.4 kg (428 lb 8 oz), SpO2 99 %. No acute distress  Good peripheral perfusion  Nonlabored respirations  Abdomen nondistended    Alerts:    Hospital Problems  Date Reviewed: 8/11/2022   None      Laboratory:      Recent Labs     08/12/22  0834   INR 1.3*         Plan of Care/Planned Procedure:  Risks, benefits, and alternatives reviewed with patient and he agrees to proceed with the procedure. Conscious sedation will be performed with IV fentanyl and versed.        Alex Lawson MD

## 2022-08-13 NOTE — ED TRIAGE NOTES
Patient arrives ambulatory to ED with complaints of bleeding to right neck     Patient takes coumadin, last dose was on Saturday, last took lovenox on Tuesday     Patient was in ED earlier for the bleeding, dermabond applied     Patient has not taken BP meds today, /112 in triage    Patient denies shortness of breath, pain, or dizziness

## 2022-08-13 NOTE — ED PROVIDER NOTES
38-year-old male with a past medical history significant for aortic stenosis, arrhythmia, coronary disease, diabetes, GERD, germ cell cancer, hypertension, PE, sleep apnea, pacemaker placement who presents to the ER for persistent bleeding in the right side of the neck at the site of the procedure that was performed this morning. The patient tried to apply pressure to the area without any success. He denies any further discomfort at this time.        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[facility] [address]     stage 3     Hypertension     Pulmonary embolism ([facility] [address]     both lungs    Sleep apnea        Past Surgical History:   Procedure Laterality Date    HX HEART VALVE SURGERY  01/07/2022    HX HEENT      tonsils    HX ORTHOPAEDIC      Plantar Facitits L foot    HX OTHER SURGICAL      removal of blood clots in lungs    IR INSERT TUNL CVC W PORT OVER 5 YEARS  08/26/2021    IR PLC IVC FILTER  12/30/2021    IR REMOVE IVC FILTER W SI  8/12/2022         Family History:   Adopted: Yes       Social History     Socioeconomic History    Marital status: SINGLE     Spouse name: Not on file    Number of children: Not on file    Years of education: Not on file    Highest education level: Not on file   Occupational History    Not on file   Tobacco Use    Smoking status: Never    Smokeless tobacco: Never   Vaping Use    Vaping Use: Never used   Substance and Sexual Activity    Alcohol use: No     Comment:      Drug use: No    Sexual activity: Not on file   Other Topics Concern    Not on file   Social History Narrative    Not on file     Social Determinants of Health     Financial Resource Strain: Not on file   Food Insecurity: Not on file   Transportation Needs: Not on file   Physical Activity: Not on file   Stress: Not on file   Social Connections: Not on file   Intimate Partner Violence: Not on file   Housing Stability: Not on file         ALLERGIES: Other food and Cucumber fruit extract    Review of Systems   All other systems reviewed and are negative. Vitals:    08/12/22 2233   BP: (!) 205/112   Pulse: (!) 104   Resp: 19   Temp: 97.4 °F (36.3 °C)   SpO2: 99%   Weight: (!) 196.8 kg (433 lb 13.8 oz)   Height: 6' 2\" (1.88 m)            Physical Exam  Vitals and nursing note reviewed. Exam conducted with a chaperone present. CONSTITUTIONAL: Well-appearing; well-nourished; in no apparent distress  HEAD: Normocephalic; atraumatic  EYES: PERRL; EOM intact; conjunctiva and sclera are clear bilaterally. ENT: No rhinorrhea; normal pharynx with no tonsillar hypertrophy; mucous membranes pink/moist, no erythema, no exudate. NECK: Supple; non-tender; no cervical lymphadenopathy  CARD: Normal S1, S2; no murmurs, rubs, or gallops. Regular rate and rhythm. RESP: Normal respiratory effort; breath sounds clear and equal bilaterally; no wheezes, rhonchi, or rales. ABD: Normal bowel sounds; non-distended; non-tender; no palpable organomegaly, no masses, no bruits. Back Exam: Normal inspection; no vertebral point tenderness, no CVA tenderness. Normal range of motion. EXT: Normal ROM in all four extremities; non-tender to palpation; no swelling or deformity; distal pulses are normal, no edema. SKIN: Warm; dry; 0.5 cm superficial surgical wound in the right neck area with bleeding at the wound site. NEURO:Alert and oriented x 3, coherent, ALBINO-XII grossly intact, sensory and motor are non-focal.        MDM  Number of Diagnoses or Management Options  Diagnosis management comments: Assessment: Profuse bleeding from surgical wound site after procedure this morning. The patient remained hemodynamically stable and well. Plan: Wound care and dressing and laceration repair/education, reassurance, symptomatic treatment/ Monitor and Reevaluate.          Amount and/or Complexity of Data Reviewed  Clinical lab tests: ordered and reviewed  Tests in the radiology section of CPT®: ordered and reviewed  Tests in the medicine section of CPT®: reviewed and ordered  Discussion of test results with the performing providers: yes  Decide to obtain previous medical records or to obtain history from someone other than the patient: yes  Obtain history from someone other than the patient: yes  Review and summarize past medical records: yes  Discuss the patient with other providers: yes  Independent visualization of images, tracings, or specimens: yes    Risk of Complications, Morbidity, and/or Mortality  Presenting problems: moderate  Diagnostic procedures: moderate  Management options: moderate    Patient Progress  Patient progress: stable         Wound Closure by Adhesive    Date/Time: 8/13/2022 12:03 AM  Performed by: Mana Welch MD  Authorized by: Mana Welch MD     Consent:     Consent obtained:  Verbal    Consent given by:  Patient    Risks discussed:  Pain    Alternatives discussed:  No treatment  Universal protocol:     Procedure explained and questions answered to patient or proxy's satisfaction: yes      Relevant documents present and verified: yes      Test results available: yes      Imaging studies available: yes      Required blood products, implants, devices, and special equipment available: yes      Site/side marked: yes      Immediately prior to procedure, a time out was called: yes      Patient identity confirmed:  Verbally with patient  Anesthesia:     Anesthesia method:  Local infiltration    Local anesthetic:  Lidocaine 2% WITH epi  Laceration details:     Location: right neck area.     Length (cm):  0.5    Laceration depth: superficial.  Pre-procedure details:     Preparation:  Patient was prepped and draped in usual sterile fashion  Exploration:     Limited defect created (wound extended): no      Hemostasis achieved with:  Direct pressure    Imaging outcome: foreign body not noted Wound exploration: wound explored through full range of motion and entire depth of wound visualized      Contaminated: no    Treatment:     Area cleansed with:  Reginald-Clens    Amount of cleaning:  Standard    Visualized foreign bodies/material removed: no      Debridement:  None    Undermining:  None    Scar revision: no    Skin repair:     Repair method:  Tissue adhesive, Steri-Strips and sutures    Suture size:  4-0    Wound skin closure material used: vicryl. Suture technique:  Simple interrupted    Number of sutures:  3    Number of Steri-Strips:  3  Approximation:     Approximation:  Close  Repair type:     Repair type: Intermediate  Post-procedure details:     Dressing:  Adhesive bandage    Procedure completion:  Tolerated well, no immediate complications      Progress Note:   Pt has been reexamined by Sangita rBady MD. Pt is feeling much better. Symptoms have improved. All available results have been reviewed with pt and any available family. Pt understands sx, dx, and tx in ED. Care plan has been outlined and questions have been answered. Pt is ready to go home. Will send home on wound repair and care instruction. Outpatient referral with PCP as needed. Written by Sangita Brady MD,12:05 AM    .   .

## 2022-08-19 RX ORDER — ASPIRIN 81 MG/1
TABLET ORAL
Qty: 30 TABLET | Refills: 2 | OUTPATIENT
Start: 2022-08-19

## 2022-08-19 RX ORDER — ATORVASTATIN CALCIUM 40 MG/1
TABLET, FILM COATED ORAL
Qty: 30 TABLET | Refills: 2 | OUTPATIENT
Start: 2022-08-19

## 2022-08-25 ENCOUNTER — TRANSCRIBE ORDER (OUTPATIENT)
Dept: SCHEDULING | Age: 47
End: 2022-08-25

## 2022-08-25 DIAGNOSIS — R06.02 SHORTNESS OF BREATH: Primary | ICD-10-CM

## 2022-08-25 LAB
ALBUMIN/CREAT UR: 3 MG/G CREAT (ref 0–29)
CHOLEST SERPL-MCNC: 134 MG/DL (ref 100–199)
CREAT UR-MCNC: 151.1 MG/DL
EST. AVERAGE GLUCOSE BLD GHB EST-MCNC: 157 MG/DL
HBA1C MFR BLD: 7.1 % (ref 4.8–5.6)
HDLC SERPL-MCNC: 39 MG/DL
LDLC SERPL CALC-MCNC: 66 MG/DL (ref 0–99)
MICROALBUMIN UR-MCNC: 3.8 UG/ML
TRIGL SERPL-MCNC: 171 MG/DL (ref 0–149)
VLDLC SERPL CALC-MCNC: 29 MG/DL (ref 5–40)

## 2022-08-29 RX ORDER — HYDROGEN PEROXIDE 3 %
20 SOLUTION, NON-ORAL MISCELLANEOUS
Qty: 90 CAPSULE | Refills: 1 | Status: SHIPPED | OUTPATIENT
Start: 2022-08-29

## 2022-08-30 DIAGNOSIS — E11.65 TYPE 2 DIABETES MELLITUS WITH HYPERGLYCEMIA, WITHOUT LONG-TERM CURRENT USE OF INSULIN (HCC): Primary | ICD-10-CM

## 2022-08-30 RX ORDER — GLIPIZIDE 2.5 MG/1
2.5 TABLET, EXTENDED RELEASE ORAL DAILY
Qty: 90 TABLET | Refills: 1 | Status: SHIPPED | OUTPATIENT
Start: 2022-08-30

## 2022-09-02 ENCOUNTER — OFFICE VISIT (OUTPATIENT)
Dept: SLEEP MEDICINE | Age: 47
End: 2022-09-02
Payer: COMMERCIAL

## 2022-09-02 VITALS
HEART RATE: 85 BPM | WEIGHT: 315 LBS | DIASTOLIC BLOOD PRESSURE: 91 MMHG | BODY MASS INDEX: 39.17 KG/M2 | HEIGHT: 75 IN | SYSTOLIC BLOOD PRESSURE: 151 MMHG | OXYGEN SATURATION: 99 %

## 2022-09-02 DIAGNOSIS — G47.19 EXCESSIVE DAYTIME SLEEPINESS: ICD-10-CM

## 2022-09-02 DIAGNOSIS — E66.01 MORBID OBESITY WITH BMI OF 50.0-59.9, ADULT (HCC): ICD-10-CM

## 2022-09-02 DIAGNOSIS — G47.33 OSA (OBSTRUCTIVE SLEEP APNEA): Primary | ICD-10-CM

## 2022-09-02 PROCEDURE — 99204 OFFICE O/P NEW MOD 45 MIN: CPT | Performed by: SPECIALIST

## 2022-09-02 NOTE — PROGRESS NOTES
[address] [facility] [place]. [place], [address]  Tel.  [phone]  Fax. [address] [facility] [address]  [place], [address] [facility]  Tel.  [phone]  Fax. [phone] [address] [facility]   Tel.  [phone]  Fax. [phone]       Chief Complaint       Chief Complaint   Patient presents with    Sleep Problem     MP refd, eval for ROBERTO       HPI      Lorriane Lamp is 50 y.o. male seen for evaluation of a sleep disorder. Reports evaluation at [facility] 10-11 years ago; was started on BiPAP. Had not used for the past 7 years after the unit apparently short of 1 night. Presents for reevaluation. Currently in bed at 8 PM; may not fall asleep until midnight-1 AM.  Does awaken during the night. Often out of bed at 6 AM.    May doze if seated in an active session when reading, watching TV, riding as a passenger. Notes prominent snoring, apparent apnea, sitting up in bed while still asleep. Denies vivid dreaming or nightmares, sleep talking or sleepwalking, bruxism or nocturnal incontinence, abnormal arm or leg movements, hypnagogue hallucinations, sleep paralysis or cataplexy. [place] Sleepiness Score: (P) 13       Allergies   Allergen Reactions    Other Food Angioedema     pickle    Cucumber Fruit Extract Angioedema       Current Outpatient Medications   Medication Sig Dispense Refill    glipiZIDE SR (GLUCOTROL XL) 2.5 mg CR tablet Take 1 Tablet by mouth daily. 90 Tablet 1    esomeprazole (NEXIUM) 20 mg capsule Take 1 Capsule by mouth Daily (before breakfast). 90 Capsule 1    enoxaparin (LOVENOX) injection by SubCUTAneous route. atorvastatin (LIPITOR) 40 mg tablet TAKE ONE TABLET BY MOUTH ONE TIME DAILY AT 5 P.M. 90 Tablet 1    aspirin delayed-release 81 mg tablet Take 1 Tablet by mouth in the morning. 90 Tablet 1    lisinopriL (PRINIVIL, ZESTRIL) 20 mg tablet Take 1 Tablet by mouth in the morning.  90 Tablet 1    metoprolol tartrate (LOPRESSOR) 25 mg tablet Take 1 Tablet by mouth two (2) times a day. 180 Tablet 0    semaglutide (Ozempic) 1 mg/dose (4 mg/3 mL) pnij 1 mg by SubCUTAneous route every seven (7) days. 5 Each 1    prochlorperazine (COMPAZINE) 10 mg tablet Take  by mouth.      magnesium oxide (MAG-OX) 400 mg tablet Take  by mouth. [place]oven 2.5 mg tablet       warfarin (COUMADIN) 10 mg tablet Take 10 mg by mouth daily. acetaminophen (TYLENOL) 325 mg tablet       potassium chloride (K-DUR, KLOR-CON M20) 20 mEq tablet       Blood Pressure Monitor kit Used monitor to check bp two times in the morning and two times in the evening 1 Kit 0    furosemide (LASIX) 20 mg tablet Take 1 Tablet by mouth daily as needed (fluid retention). 20 Tablet 0    metFORMIN ER (GLUCOPHAGE XR) 500 mg tablet Take 2 Tablets by mouth two (2) times a day. (Patient not taking: No sig reported) 360 Tablet 1    diphenoxylate-atropine (LomotiL) 2.5-0.025 mg per tablet Take 1 Tablet by mouth three (3) times daily as needed for Diarrhea. Max Daily Amount: 3 Tablets. (Patient not taking: No sig reported) 21 Tablet 0        He  has a past medical history of Aortic stenosis, severe, Arrhythmia, CAD (coronary artery disease), Diabetes ([address] GERD (gastroesophageal reflux disease), Germ cell cancer ([facility] [address] Hypertension, Pulmonary embolism ([facility] [address] and Sleep apnea. He  has a past surgical history that includes hx orthopaedic; hx heent; ir insert tunl cvc w port over 5 years (08/26/2021); hx other surgical; ir plc ivc filter (12/30/2021); hx heart valve surgery (01/07/2022); ir remove ivc filter w si (8/12/2022); and ir remove tunl cvad w port/pump (8/12/2022). He family history is not on file. He was adopted. He  reports that he has never smoked. He has never used smokeless tobacco. He reports that he does not drink alcohol and does not use drugs.      Review of Systems:  ROS      Objective:   Visit Vitals  BP (!) 151/91 (BP 1 Location: Left upper arm, BP Patient Position: Sitting)   Pulse 85   Ht 6' 3\" (1.905 m)   Wt (!) 438 lb (198.7 kg)   SpO2 99%   BMI 54.75 kg/m²     Body mass index is 54.75 kg/m². General:   Conversant, cooperative   Eyes:  Pupils equal and reactive, no nystagmus   Oropharynx:   Mallampati score II, tongue normal       Neck:   No carotid bruits; Neck circ. in \"inches\": 20.5   Chest/Lungs:  Clear on auscultation    CVS:  Normal rate, regular rhythm   Skin:  Warm to touch; no obvious rashes   Neuro:  Speech fluent, face symmetrical, tongue movement normal   Psych:  Normal affect,  normal countenance        Assessment:       ICD-10-CM ICD-9-CM    1. ROBERTO (obstructive sleep apnea)  G47.33 327.23 SPLIT CPAP/PSG      2. Morbid obesity with BMI of 50.0-59.9, adult ([facility])  E66.01 278.01 SPLIT CPAP/PSG    Z68.43 V85.43       3. Excessive daytime sleepiness  G47.19 780.54           History of sleep disordered breathing without usage for a number of years. Will be reevaluated with a sleep study, split per criteria. Plan:     Orders Placed This Encounter    SPLIT CPAP/PSG     History of sleep apnea on BIPAP. Split study; start CPAP at 10 cm-15 cm; if events persist start BIPAP     Standing Status:   Future     Standing Expiration Date:   3/5/2023     Order Specific Question:   Reason for Exam     Answer:   sleep apnea       * Patient has a history and examination consistent with the diagnosis of sleep apnea. * Sleep testing was ordered for evaluation. * He was provided information on sleep apnea including corresponding risk factors and the importance of proper treatment. * Treatment options if indicated were reviewed today. Instructions: Take  The patient would benefit from weight reduction measures. Do not engage in activities requiring a normal degree of alertness if fatigue is present. The patient understands that untreated or undertreated sleep apnea could impair judgement and the ability to function normally during the day.   Call or return if symptoms worsen or persist.          Tasia Ballard MD, FAASM  Electronically signed 09/02/22       This note was created using voice recognition software. Despite editing, there may be syntax errors. This note will not be viewable in [address]

## 2022-09-03 ENCOUNTER — HOSPITAL ENCOUNTER (OUTPATIENT)
Dept: CT IMAGING | Age: 47
Discharge: HOME | End: 2022-09-03
Attending: INTERNAL MEDICINE
Payer: COMMERCIAL

## 2022-09-03 DIAGNOSIS — R06.02 SHORTNESS OF BREATH: ICD-10-CM

## 2022-09-03 PROCEDURE — 71275 CT ANGIOGRAPHY CHEST: CPT

## 2022-09-03 PROCEDURE — 74011000636 HC RX REV CODE- 636: Performed by: INTERNAL MEDICINE

## 2022-09-03 RX ADMIN — IOPAMIDOL 100 ML: 755 INJECTION, SOLUTION INTRAVENOUS at 08:59

## 2022-09-07 RX ORDER — SEMAGLUTIDE 1.34 MG/ML
1 INJECTION, SOLUTION SUBCUTANEOUS
Qty: 5 EACH | Refills: 1 | Status: SHIPPED | OUTPATIENT
Start: 2022-09-07 | End: 2022-09-07 | Stop reason: SDUPTHER

## 2022-09-08 RX ORDER — SEMAGLUTIDE 1.34 MG/ML
1 INJECTION, SOLUTION SUBCUTANEOUS
Qty: 5 EACH | Refills: 1 | Status: SHIPPED | OUTPATIENT
Start: 2022-09-08

## 2022-09-22 ENCOUNTER — PATIENT MESSAGE (OUTPATIENT)
Dept: SLEEP MEDICINE | Age: 47
End: 2022-09-22

## 2022-09-23 ENCOUNTER — HOSPITAL ENCOUNTER (OUTPATIENT)
Dept: SLEEP MEDICINE | Age: 47
Discharge: HOME | End: 2022-09-23
Attending: SPECIALIST
Payer: COMMERCIAL

## 2022-09-23 VITALS
OXYGEN SATURATION: 97 % | TEMPERATURE: 98.3 F | HEIGHT: 74 IN | DIASTOLIC BLOOD PRESSURE: 84 MMHG | BODY MASS INDEX: 40.43 KG/M2 | SYSTOLIC BLOOD PRESSURE: 125 MMHG | WEIGHT: 315 LBS | HEART RATE: 90 BPM

## 2022-09-23 DIAGNOSIS — G47.33 OSA (OBSTRUCTIVE SLEEP APNEA): ICD-10-CM

## 2022-09-23 DIAGNOSIS — E66.01 MORBID OBESITY WITH BMI OF 50.0-59.9, ADULT (HCC): ICD-10-CM

## 2022-09-23 PROCEDURE — 95811 POLYSOM 6/>YRS CPAP 4/> PARM: CPT | Performed by: SPECIALIST

## 2022-09-24 NOTE — PROGRESS NOTES
217 [facility]. [place], [address]  Tel.  [phone]  Fax. 100 [facility] 60  [place], [address] [facility]  Tel.  [phone]  Fax. [phone] [address]  Tel.  [phone]  Fax. [phone]     Sleep Study Technical Notes        PRE-Test:  Benny García (: 1975) is here for a Split study. Order and chart reviewed by tech. Patient is a 50year old male with history of known ROBERTO, previous BIPAP user, snoring, and witnessed apnea. He was diagnosed with ROBERTO 10 years ago at [facility] sleep lab (no record in chart). He used BIPAP but has discontinued using it. Patient was greeted and screening questions asked. The patient was taken directly to his/her room. Vitals:  Temperature (9836)   BP (125/84)   SaO2 (97%)   Weight per patient (424 lbs)  Procedure explained to the patient and questions were answered. The patient expressed understanding of the procedure. Electrodes were applied without incident. The patient was placed in bed and the study was started. PAP mask acclimation for 3 min. Patient did tolerate mask. Sleep aid taken:  NA    Acquisition Notes:  Lights off: 10:02 PM    Respiratory events: RERAS, HYPOPNEAS, OBSTRUCTIVE APNEAS AND CENTRAL APNEAS  ECG:  NORMAL SINUS RHYTHM  Snoring:  MILD/MODERATE TO LOUD/VERY LOUD    PAP titration: CPAP 10. 0CM TO 12. 0CM  Desensitization Mask(s) Used: RESMED AIR FIT F20- MEDIUM   Other comments: PRE SPLIT OVERALL AHI = 28.6                                    PRE SPLIT AVERAGE SA02 94% AND LOW SA02 61%                                   OVERALL AHI AT FINAL PRESSURE = 2.0                                   AVERAGE SA02 96% AND LOW SA02 91%                                  LMI = 0.0  Patient had caregiver in attendance:  NO  Patient watched TV or on phone after lights out:  YES - 1 HOUR  Patient slept with positional therapy:  NO  Patient slept with head of bed elevated:  NO  Sleep stages:  1, 2, AND REM  Sleep positions:  SUPINE AND ON HIS LEFT SIDE  Patient wore an oral appliance:  NO  Patient to bathroom 0 times  Pediatric Patient:  NA  Parent accompanied patient: NA  Parent slept in bed with patient: NA       POST Test:  Patient was awakened. Electrodes were removed. The patient was discharged after answering the Post Questionnaire. Patient stated that he was alert and ok to drive. Equipment and room cleaned per infection control policy.

## 2022-09-28 ENCOUNTER — TELEPHONE (OUTPATIENT)
Dept: SLEEP MEDICINE | Age: 47
End: 2022-09-28

## 2022-09-28 DIAGNOSIS — G47.33 OSA (OBSTRUCTIVE SLEEP APNEA): Primary | ICD-10-CM

## 2022-09-30 ENCOUNTER — DOCUMENTATION ONLY (OUTPATIENT)
Dept: SLEEP MEDICINE | Age: 47
End: 2022-09-30

## 2022-09-30 NOTE — TELEPHONE ENCOUNTER
Sleep study performed for sleep disordered breathing. During initial portion of the study: 252.3 minutes recorded at which 183.5 minutes spent asleep with a sleep efficiency of 72.7%. Sleep onset at 56.4 minutes; REM onset at 74 minutes with total REM representing 22.1% of sleep time. All sleep stages observed. 88 respiratory events occurred of which 73 hypopnea and 15 apnea (9 central, 6 obstructive). Overall AHI 28.8/h.  REM related AHI 68.1/h. Events more prominently supine with a supine-related AHI of 91.9/h. Minimal SaO2 61%. CPAP employed during the second portion of the study. 2031.3 minutes recorded which 2020 minutes of sleep with a sleep efficiency of 95.1%. Sleep onset at 3.6 minutes; REM onset at 22.5 minutes with total REM representing 52.5% of sleep time (REM-rebound). 19 respiratory events occurred at which 8 hypopnea and 11 central apnea. Overall AHI 5.2/h, REM related AHI 3.6/h. CPAP initiated at 11 cm and increased to 12 cm. At 12 cm CPAP: 209.6 minutes recorded which 207.1 minutes sleep and 150.5 minutes and REM. AHI 2.3/h. ResMed AirFit F 20 (M)    Impression: Sleep disordered breathing, severe in rem sleep and when supine, responding to CPAP at 12 cm. Sleep technologist: Please review study results with the patient. Order has been entered for CPAP at 12 cm. Please schedule first compliance appointment.

## 2022-10-03 NOTE — TELEPHONE ENCOUNTER
Results have been sent to  \[facility]\"" & [facility]. Fax DME order & Schedule 1st adherence visit in 60 to 90 days.

## 2022-10-12 ENCOUNTER — DOCUMENTATION ONLY (OUTPATIENT)
Dept: SLEEP MEDICINE | Age: 47
End: 2022-10-12

## 2022-10-12 ENCOUNTER — TELEPHONE (OUTPATIENT)
Dept: SLEEP MEDICINE | Age: 47
End: 2022-10-12

## 2022-10-12 NOTE — TELEPHONE ENCOUNTER
Spoke with patient in regards to his CPAP order. Patient advised that optimal pressure was found on CPAP setting that physician ordered. He expressed understanding and willing to proceed with CPAP.

## 2022-11-08 RX ORDER — SEMAGLUTIDE 1.34 MG/ML
INJECTION, SOLUTION SUBCUTANEOUS
Qty: 4 EACH | Refills: 1 | Status: SHIPPED | OUTPATIENT
Start: 2022-11-08

## 2022-11-15 ENCOUNTER — TELEPHONE (OUTPATIENT)
Dept: SLEEP MEDICINE | Age: 47
End: 2022-11-15

## 2022-12-19 RX ORDER — METOPROLOL TARTRATE 25 MG/1
TABLET, FILM COATED ORAL
Qty: 180 TABLET | Refills: 0 | Status: SHIPPED | OUTPATIENT
Start: 2022-12-19

## 2023-01-18 DIAGNOSIS — D49.9 EXTRAGONADAL GERM CELL TUMOR: Primary | ICD-10-CM

## 2023-01-18 NOTE — PROGRESS NOTES
[address] [facility] at [address] [place] [address]  [phone]    Hematology / Oncology Established Visit    Reason for Visit:   Kia Zhang is a 50 y.o. male who is seen for follow up of Germ cell tumor. 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 Comment: The smears and cell block preparation demonstrate pleomorphic malignant   tumor cells in a background of lymphocytes. Only a scant amount of tumor   is present on the cell block preparation. Immunohistochemical stains   demonstrate diffuse positivity within the tumor cell population for PLAP,    (c-kit) and focal positivity for HCG. The tumor cells are negative   for cytokeratin cocktail, CAM 5.2, EMA, S100 and alpha-inhibin. These   results are consistent with a testicular germ cell tumor and the   combination of diffuse PLAP positive and  positivity is indicative of   a seminomatous differentation. Correlation with clinical and radiographic   findings is indicated     Prior Treatment:   BEP x 3 cycles, 8/30/21 - 10/15/21    Current Treatment: Surveillance     History of Present Illness:   Kia Zhang is a 50 y.o. male with severe Aortic stenosis, CAD, DM II, HTN coming in for evaluation of testicular germ cell tumor. He was hospitalized on 8/22/21 with R flank pain, found on CT imaging to have a large retroperitoneal mass. He denied fatigue, but did endorse a 40 lb weight loss over 2 months. No recent recent infections or fatigue. He is a nonsmoker. Has remote h/o EtOH abuse, but none recently. The patient is adopted and has no knowledge of family history. Of note, he considers himself , but never filed paperwork. Wife lives in [place].  Has a daughter from another woman. He may move in with his mother-in-law in town for extra support. Interval History:  Patient here for follow up of extranodal germ cell tumor. IVC filter and port removed - had to return to ER 3 times due to wound dehiscence in neck. CT planned later this week. Reports overall he feels well. Denies recurrent fevers, chills, infections. No abdominal pain or dysuria. Continues having dyspnea with minimal exertion. No CP. Completed PFT with pulmonology last week and has follow up with Dr. Keysha Soto next month. On advair. Bp elevated today - discontinued lisinopril due cough, waiting for cards recs on new BP medication. PMHx: Severe AS, CAD, DM, GERD, HTN, ROBERTO  PSurgHx: Tonsillectomy, Plantar fasciitis surgery on left  SHx: Never smoker. Remote h/o EtOH abuse, none currently. Legally . Has daughter in 25s from another woman. FHx: Unknown as pt was adopted. Review of Systems: A complete review of systems was obtained, negative except as described above. Physical Exam:   Blood pressure (!) 163/93, pulse 82, temperature 97.9 °F (36.6 °C), resp. rate 20, height 6' 2\" (1.88 m), weight (!) 452 lb 12.8 oz (205.4 kg), SpO2 94 %. ECOG PS: 0  General: Well developed, no acute distress, obese  Neck: Supple  Lymphatic: No cervical, supraclavicular LAD  Respiratory: CTAB, normal respiratory effort  CV: Normal rate, reg rhythm, audible click from mech valve; 1+ edema in BLE. GI: Soft, nontender, nondistended, no masses   Skin: No rashes, or petechiae. Normal temperature, turgor, and texture. Large surgical scar along sternum. Neuro/Psych: Alert, oriented. Moves all 4 extremities. Appropriate affect. Normal judgment/insight. Results:     Lab Results   Component Value Date/Time    WBC 6.5 01/24/2023 08:57 AM    HGB 12.6 01/24/2023 08:57 AM    HCT 40.2 01/24/2023 08:57 AM    PLATELET 467 (L) 26/15/9018 08:57 AM    MCV 85.5 01/24/2023 08:57 AM    ABS.  NEUTROPHILS 3.8 01/24/2023 08:57 AM     Lab Results Component Value Date/Time    Sodium 138 2023 08:57 AM    Potassium 4.0 2023 08:57 AM    Chloride 106 2023 08:57 AM    CO2 25 2023 08:57 AM    Glucose 242 (H) 2023 08:57 AM    BUN 17 2023 08:57 AM    Creatinine 1.18 2023 08:57 AM    GFR est AA >60 2022 01:18 PM    GFR est non-AA >60 2022 01:18 PM    Calcium 8.8 2023 08:57 AM    Glucose (POC) 129 (H) 2022 10:34 AM    Creatinine (POC) 0.90 2022 07:41 AM     Lab Results   Component Value Date/Time    Bilirubin, total 0.6 2023 08:57 AM    ALT (SGPT) 45 2023 08:57 AM    Alk. phosphatase 105 2023 08:57 AM    Protein, total 7.8 2023 08:57 AM    Albumin 3.5 2023 08:57 AM    Globulin 4.3 (H) 2023 08:57 AM     Lab Results   Component Value Date/Time    Iron 51 11/15/2021 10:34 AM    TIBC 270 11/15/2021 10:34 AM    Iron % saturation 19 (L) 11/15/2021 10:34 AM    Ferritin 218 11/15/2021 10:34 AM       No results found for: B12LT, FOL, RBCF  No results found for: TSH, TSH2, TSH3, TSHP, TSHEXT, TSHEXT    21: , AFP 1.4, b-hCG 476 mIU/mL  21: uric acid 4.2.  21: AFP 3.4, , b-hCG 2  10/11/21: , AFP 4.1, b-hCG < 1  11/15/21: , AFP 3.0, b-HCG <1  22: , AFP 3.1, b-hCG < 1  22: , AFP 3.2, b-hCG <1  23: , AFP pending, b-hCG < 1    Imagin/21/21 CT abd/p without contrast:  IMPRESSION  1. Right retroperitoneal brain mass measuring 7.5 x 5.2 x 5.8 cm as above, with  narrowing of the IVC and likely encasement of the bilateral renal veins, though  not well evaluated without the use of IV contrast. Findings are highly  suspicious for either lymphoma or brain metastasis. 2. Few borderline enlarged inguinal lymph nodes are indeterminant. 3. Hepatic steatosis. 21 CT ch/abd/p with contrast:  IMPRESSION  Chest:  1.  No evidence of primary malignancy or metastatic disease in the chest.  2. Severe aortic valvular calcifications. Correlate for aortic valvular stenosis. Abdomen/pelvis:   1. Redemonstrated right retroperitoneal mass measuring 7.4 x 4.9 x 6.7 cm as  above, including encasement and narrowing of the IVC and central aspects of the  bilateral renal veins. This favored to represent malignancy, with lymphoma  favored over brain metastasis. 2. No other pathologically enlarged lymph nodes within the abdomen or pelvis. Few prominent inguinal lymph nodes are favored to be reactive. 3. Hepatic steatosis. 8/24/21 Ultrasound scrotum/testicles:  IMPRESSION  1. Study limited by patient's increased body habitus   2. No testicular mass identified. Incidental right-sided testicular microlithiasis, a debatable risk factor for malignancy. Pursue and follow-up as  per clinical concern. 3.  Small left hydrocele. 10/30/21 CT ch/abd/pelv pending:   FINDINGS:   The Port-A-Cath terminates at the caval atrial junction. CHEST WALL: No mass or axillary lymphadenopathy. THYROID: No nodule. MEDIASTINUM: No mass or lymphadenopathy. ANDERS: No mass or lymphadenopathy. THORACIC AORTA: No dissection or aneurysm. MAIN PULMONARY ARTERY: Normal in caliber. TRACHEA/BRONCHI: Patent. ESOPHAGUS: No wall thickening or dilatation. HEART: Severe aortic valvular calcifications. Graylon Jabari PLEURA: No effusion or pneumothorax. LUNGS: No nodule, mass, or airspace disease. LIVER: No mass. BILIARY TREE: Gallbladder is within normal limits. CBD is not dilated. SPLEEN: within normal limits. PANCREAS: No mass or ductal dilatation. ADRENALS: Unremarkable. KIDNEYS: No mass, calculus, or hydronephrosis. STOMACH: Unremarkable. SMALL BOWEL: No dilatation or wall thickening. COLON: No dilatation or wall thickening. APPENDIX: Normal on axial image 99  PERITONEUM: No ascites or pneumoperitoneum. RETROPERITONEUM: Retroperitoneal 7.4 x 4.9 cm mass has decreased significantly  to approximately 2.7 x 3.2 cm (axial image 75).  This structure now lies in front  of the IVC and inferior to the right renal vein and no longer encases the left  renal vein. REPRODUCTIVE ORGANS: Small prostate  URINARY BLADDER: No mass or calculus. BONES: No destructive bone lesion. ABDOMINAL WALL: No mass or hernia. ADDITIONAL COMMENTS: Shotty bilateral inguinal nodes. Largest in the right  inguinal region measures 12 mm in short axis diameter, stable. IMPRESSION  Significant decrease in size of the retroperitoneal mass with less compression  on the renal veins and IVC as described above. No evidence for metastatic disease. Incidental aortic valve calcification. 2/28/22 CT abd/pelv with IV contrast (completed at [facility]): Impression:   1. Interval resolution of the soft tissue density in the precava location. 2. Slight Interval increase in the size of 2 small lymph nodes as described above, one in the right common iliac region and the other in the right external iliac region  3. A new soft tissue mass anterior to the left hepatic lobe lateral segment likely representing resolving hematoma due to prior cardiac surgery. Attention on follow up would be of benefit to rule out metastases although it is felt unlikely. 4. Mild splenomegaly. 7/19/22 CT abd/pelv w IV contrast:  FINDINGS:   LOWER THORAX: No significant abnormality in the incidentally imaged lower chest.  LIVER: No mass. BILIARY TREE: Gallbladder is within normal limits. CBD is not dilated. SPLEEN: within normal limits. PANCREAS: No mass or ductal dilatation. ADRENALS: Unremarkable. KIDNEYS: No mass, calculus, or hydronephrosis. STOMACH: Unremarkable. SMALL BOWEL: No dilatation or wall thickening. COLON: No dilatation or wall thickening. PERITONEUM: No ascites or pneumoperitoneum. RETROPERITONEUM: Retroperitoneal mass consistent with previously known primary  malignancy is no longer clearly identified. Unchanged right external iliac lymph  nodes, predominantly subcentimeter.  There is a fat-containing 1.3 cm right  external iliac/inguinal node (series 2 image 84), without significant interval  change. Infrarenal IVC filter in place. REPRODUCTIVE ORGANS: Nonenlarged prostate. URINARY BLADDER: No mass or calculus. BONES: No destructive bone lesion. ABDOMINAL WALL: No mass or hernia. ADDITIONAL COMMENTS: N/A  IMPRESSION  Interval resolution of retroperitoneal mass. No intra-abdominal lymphadenopathy. Procedures:     7/20/21 PFTs:  FVC 4.83, 80% of predicted. UHE98.74, 89% of predicted. FEV1% is 86%. TLC is 6.89, which is 86% of perdicted. DLCO is 70% of predicted and when considering alveolar volume is 83% of predicted. Airway resistance is 58% of predicted. Impression:  The patient's spirometry, lung volumes and airway resistance are all normal. The patient's diffuse capacity is mildly decreased, but corrects when considering alveolar volume. 9/21/21 PFTs:  Spirometry reveals an FEV1-TO-FVC ratio is normal at 85% predicted with normal FVC and FEV1. Diffusion capacity is moderately reduced at 55% predicted, but does correct when taking into consideration alveolar volume. 10/8/21 PFTs:  Spirometry reveals a normal FEV1-to-FVC ratio of 81% predicted. FVC and FEV1 are both within normal limits with an FVC of 4.82 L which is 85% predicted and an FEV1 of 3.89 L which is 95% predicted. Diffusion capacity is moderately decreased at 45% predicted. 9/3/22 CTA chest:  IMPRESSION  1. No acute findings. No pulmonary embolism, no acute airspace disease. 2. 1.8 cm subdermal nodule of the anterior right upper chest at the level of the  right clavicular head, favoring sebaceous cyst. Recommend clinical correlation. Assessment & Plan:   Juan Calero is a 50 y.o. male comes in for evaluation and management of germ cell tumor. 1. Testicular germ cell tumor:  Tumor markers show elevated  and b-.  Need to distinguish pure seminoma from non-seminomatous germ cell tumor (NSGCT) or mixed germ cell tumor. Usually, scrotal ultrasound is not a replacement for radical inguinal orchiectomy as surgery will provide accurate histologic diagnosis. However, no testicular mass seen on scrotal ultrasound. Right testicular microlithiasis seen on ultrasound, but Urology did not advise R orchiectomy. b-HCG is usually not elevated in pure seminoma, but can be elevated in up to 20% of seminomas. I am unsure of the significance of the prominent inguinal LNs. Risk stratification for advanced testicular germ cell tumors: Good risk NSGCT based on: Retroperitoneal primary tumor, no mets to other organs, serum AFP < 1000, b-hCG < 5000 rylee-international units/ml, LDH < 3 times upper limit of normal (Many experts would not intensify a patient's treatment from good risk to intermediate risk if the only adverse prognostic factor were an LDH between 1.5 and 3 times ULN - this is why 3 cycles of BEP rather than 4 were recommended.) Accurate staging requires orchiectomy and measurement of tumor markers AFTER surgery. Orchiectomy not advised at this time given lack of testicular mass. Pt was treated with BEP regimen x 3 cycles. Previously discussed cryopreservation of sperm - referred to [facility]. Prior auth form completed per pt request. Supportive care medications include: topical lidocaine, zofran, compazine, docusate. CT imaging 10/30/21 showed significant decrease in size of RP mass, (from 7.4 to now 3.2cm). No longer encasing vasculature. 2/28/22 CT at [facility] showed complete resolution of prior retroperitoneal mass anterior to IVC and inferior to R renal vein. At this time, Dr. Rupa Lamas does not recommend surgical resection by RPLND. 7/19/22 CT showed resolution of prior retroperitoneal mass and stable iliac lymph nodes, IVC filter. No hematoma or mass seen on liver. -- Repeat CT abd/pelv and CXR due 6 months. Scheduled 1/27/23 - call with results. -- Labs today in [place].    -- Continue surveillance and will plan to follow up with patient in 4 months with labs in [address], MD visit. NCCN guidelines for surveillance for clinical Stage II-III NSGCT after complete response to chemotherapy +/- RPLND:  Year 2: H&P with tumor markers every 3 mo, abd/pelvis CT every 6-12 mo, CXR every 6 mo  Year 3: H&P with tumor markers every 6 mo, abd/pelvis CT and CXR annually  Years 4-5: H&P with tumor markers every 6 mo, CXR annually and abd/pelvis CT as needed. [Nonseminoma surveillance once in CR:  CR, negative markers -surveillance (y 1 H&P and marker every 2 mo, abd pelvic CT every 6 mo, CXR q6mo; y2 H&P q3mo, abd/pelvic ct q6-12mo, cxr q6 mo, y 3 H&P q6mo, Ct and cxr annually, y 4 H&P q6mo, imaging annually, cxr annually) or nerve sparing bilateral RPLND in selected cases]    2. Mechanical aortic valve:  Valve replacement surgery by Dr. Dominga Patel 1/7/22 for severe aortic stenosis. On Warfain, managed by [facility] (Dr. Rosy Molina retired)     3. DM / HTN:   Continue home meds: Glipizide, Victoza (or Ozempic). Blood pressure elevated today due to discontinuing lisinopril due to cough. -- Advised follow up with cardiology to discuss alternative to lisinopril. 4. H/o RLE DVT and bilat PE: Occurred in the setting of malignancy. s/p thrombectomy. On aspirin and Warfarin for mechanical aortic valve. 5. Chronic NICHOLAS:  With minimal exertion. Likely multifactorial due to weight gain, activity intolerance. Established with Dr. Tacho Otero and recently completed PFTs, CTA chest negative for PE. On Advair. -- Follow up with Dr. Tacho Otero next month     6. Thrombocytopenia:  Mild. Will check additional labs at next visit if counts remain low. Monitor for bleeding. Emotional well being: Pt is coping well with his/her disease and has excellent support. I appreciate the opportunity to participate in Mr. Roberto flores.     I personally saw and evaluated the patient and performed the key components of medical decision making. The history, physical exam, and documentation were performed by Sarai Núñez NP. I reviewed and verified the above documentation and modified it as needed. Tumor markers are stable, blood counts are normal. Pt is due for surveillance imaging now.      Signed By: Silvia Sotomayor MD     January 24, 2023

## 2023-01-24 ENCOUNTER — OFFICE VISIT (OUTPATIENT)
Dept: ONCOLOGY | Age: 48
End: 2023-01-24
Payer: COMMERCIAL

## 2023-01-24 ENCOUNTER — HOSPITAL ENCOUNTER (OUTPATIENT)
Dept: INFUSION THERAPY | Age: 48
Discharge: HOME | End: 2023-01-24
Payer: COMMERCIAL

## 2023-01-24 VITALS
BODY MASS INDEX: 40.43 KG/M2 | RESPIRATION RATE: 20 BRPM | DIASTOLIC BLOOD PRESSURE: 93 MMHG | SYSTOLIC BLOOD PRESSURE: 163 MMHG | OXYGEN SATURATION: 94 % | WEIGHT: 315 LBS | HEIGHT: 74 IN | TEMPERATURE: 97.9 F | HEART RATE: 82 BPM

## 2023-01-24 VITALS
SYSTOLIC BLOOD PRESSURE: 163 MMHG | RESPIRATION RATE: 18 BRPM | OXYGEN SATURATION: 95 % | TEMPERATURE: 97.7 F | DIASTOLIC BLOOD PRESSURE: 89 MMHG | HEART RATE: 80 BPM

## 2023-01-24 DIAGNOSIS — D49.9 EXTRAGONADAL GERM CELL TUMOR: Primary | ICD-10-CM

## 2023-01-24 DIAGNOSIS — Z95.2 S/P HEART VALVE REPLACEMENT WITH MECHANICAL VALVE: ICD-10-CM

## 2023-01-24 DIAGNOSIS — R03.0 ELEVATED BLOOD PRESSURE READING: ICD-10-CM

## 2023-01-24 DIAGNOSIS — C62.90: ICD-10-CM

## 2023-01-24 DIAGNOSIS — C48.0 GERM CELL TUMOR OF RETROPERITONEUM (HCC): Primary | ICD-10-CM

## 2023-01-24 DIAGNOSIS — R06.09 DOE (DYSPNEA ON EXERTION): ICD-10-CM

## 2023-01-24 DIAGNOSIS — D69.6 THROMBOCYTOPENIA (HCC): ICD-10-CM

## 2023-01-24 LAB
ALBUMIN SERPL-MCNC: 3.5 G/DL (ref 3.5–5)
ALBUMIN/GLOB SERPL: 0.8 (ref 1.1–2.2)
ALP SERPL-CCNC: 105 U/L (ref 45–117)
ALT SERPL-CCNC: 45 U/L (ref 12–78)
ANION GAP SERPL CALC-SCNC: 7 MMOL/L (ref 5–15)
AST SERPL-CCNC: 24 U/L (ref 15–37)
BASOPHILS # BLD: 0.1 K/UL (ref 0–0.1)
BASOPHILS NFR BLD: 1 % (ref 0–1)
BILIRUB SERPL-MCNC: 0.6 MG/DL (ref 0.2–1)
BUN SERPL-MCNC: 17 MG/DL (ref 6–20)
BUN/CREAT SERPL: 14 (ref 12–20)
CALCIUM SERPL-MCNC: 8.8 MG/DL (ref 8.5–10.1)
CHLORIDE SERPL-SCNC: 106 MMOL/L (ref 97–108)
CO2 SERPL-SCNC: 25 MMOL/L (ref 21–32)
CREAT SERPL-MCNC: 1.18 MG/DL (ref 0.7–1.3)
DIFFERENTIAL METHOD BLD: ABNORMAL
EOSINOPHIL # BLD: 0.1 K/UL (ref 0–0.4)
EOSINOPHIL NFR BLD: 1 % (ref 0–7)
ERYTHROCYTE [DISTWIDTH] IN BLOOD BY AUTOMATED COUNT: 13.9 % (ref 11.5–14.5)
GLOBULIN SER CALC-MCNC: 4.3 G/DL (ref 2–4)
GLUCOSE SERPL-MCNC: 242 MG/DL (ref 65–100)
HCG SERPL-ACNC: <1 MIU/ML
HCT VFR BLD AUTO: 40.2 % (ref 36.6–50.3)
HGB BLD-MCNC: 12.6 G/DL (ref 12.1–17)
IMM GRANULOCYTES # BLD AUTO: 0 K/UL (ref 0–0.04)
IMM GRANULOCYTES NFR BLD AUTO: 0 % (ref 0–0.5)
LDH SERPL L TO P-CCNC: 273 U/L (ref 85–241)
LYMPHOCYTES # BLD: 1.9 K/UL (ref 0.8–3.5)
LYMPHOCYTES NFR BLD: 29 % (ref 12–49)
MCH RBC QN AUTO: 26.8 PG (ref 26–34)
MCHC RBC AUTO-ENTMCNC: 31.3 G/DL (ref 30–36.5)
MCV RBC AUTO: 85.5 FL (ref 80–99)
MONOCYTES # BLD: 0.7 K/UL (ref 0–1)
MONOCYTES NFR BLD: 11 % (ref 5–13)
NEUTS SEG # BLD: 3.8 K/UL (ref 1.8–8)
NEUTS SEG NFR BLD: 58 % (ref 32–75)
NRBC # BLD: 0 K/UL (ref 0–0.01)
NRBC BLD-RTO: 0 PER 100 WBC
PLATELET # BLD AUTO: 140 K/UL (ref 150–400)
PMV BLD AUTO: 12.9 FL (ref 8.9–12.9)
POTASSIUM SERPL-SCNC: 4 MMOL/L (ref 3.5–5.1)
PROT SERPL-MCNC: 7.8 G/DL (ref 6.4–8.2)
RBC # BLD AUTO: 4.7 M/UL (ref 4.1–5.7)
SODIUM SERPL-SCNC: 138 MMOL/L (ref 136–145)
WBC # BLD AUTO: 6.5 K/UL (ref 4.1–11.1)

## 2023-01-24 PROCEDURE — 84702 CHORIONIC GONADOTROPIN TEST: CPT

## 2023-01-24 PROCEDURE — 99214 OFFICE O/P EST MOD 30 MIN: CPT | Performed by: INTERNAL MEDICINE

## 2023-01-24 PROCEDURE — 85025 COMPLETE CBC W/AUTO DIFF WBC: CPT

## 2023-01-24 PROCEDURE — 3077F SYST BP >= 140 MM HG: CPT | Performed by: INTERNAL MEDICINE

## 2023-01-24 PROCEDURE — 83615 LACTATE (LD) (LDH) ENZYME: CPT

## 2023-01-24 PROCEDURE — 82105 ALPHA-FETOPROTEIN SERUM: CPT

## 2023-01-24 PROCEDURE — 80053 COMPREHEN METABOLIC PANEL: CPT

## 2023-01-24 PROCEDURE — 3079F DIAST BP 80-89 MM HG: CPT | Performed by: INTERNAL MEDICINE

## 2023-01-24 PROCEDURE — 36415 COLL VENOUS BLD VENIPUNCTURE: CPT

## 2023-01-24 NOTE — PROGRESS NOTES
[facility] Lab Visit:    553 Pt arrived ambulatory and in no distress, labs drawn 1 stick in right hand per KK . Departed [facility] ambulatory and in no distress. Visit Vitals  BP (!) 163/89   Pulse 80   Temp 97.7 °F (36.5 °C)   Resp 18   SpO2 95%       Labs available in CC once resulted.

## 2023-01-24 NOTE — PROGRESS NOTES
1. Have you been to the ER, urgent care clinic since your last visit? Hospitalized since your last visit? No    2. Have you seen or consulted any other health care providers outside of the [address] [place] [address] since your last visit? Include any pap smears or colon screening.  No        Visit Vitals  BP (!) 163/93 (BP 1 Location: Left upper arm, BP Patient Position: Sitting, BP Cuff Size: Large adult)   Pulse 82   Temp 97.9 °F (36.6 °C)   Resp 20   Ht 6' 2\" (1.88 m)   Wt (!) 452 lb 12.8 oz (205.4 kg)   SpO2 94%   BMI 58.14 kg/m²            Chief Complaint   Patient presents with    Follow-up     Germ cell tumor

## 2023-01-25 LAB — AFP-TM SERPL-MCNC: <1.8 NG/ML (ref 0–6.9)

## 2023-02-07 ENCOUNTER — HOSPITAL ENCOUNTER (OUTPATIENT)
Dept: CT IMAGING | Age: 48
Discharge: HOME | End: 2023-02-07
Attending: NURSE PRACTITIONER

## 2023-02-08 ENCOUNTER — TELEPHONE (OUTPATIENT)
Dept: ONCOLOGY | Age: 48
End: 2023-02-08

## 2023-02-08 ENCOUNTER — HOSPITAL ENCOUNTER (OUTPATIENT)
Dept: CT IMAGING | Age: 48
Discharge: HOME | End: 2023-02-08
Attending: NURSE PRACTITIONER
Payer: COMMERCIAL

## 2023-02-08 ENCOUNTER — HOSPITAL ENCOUNTER (OUTPATIENT)
Dept: GENERAL RADIOLOGY | Age: 48
Discharge: HOME | End: 2023-02-08
Attending: NURSE PRACTITIONER
Payer: COMMERCIAL

## 2023-02-08 ENCOUNTER — DOCUMENTATION ONLY (OUTPATIENT)
Dept: SLEEP MEDICINE | Age: 48
End: 2023-02-08

## 2023-02-08 ENCOUNTER — OFFICE VISIT (OUTPATIENT)
Dept: SLEEP MEDICINE | Age: 48
End: 2023-02-08
Payer: COMMERCIAL

## 2023-02-08 VITALS
BODY MASS INDEX: 40.43 KG/M2 | WEIGHT: 315 LBS | HEIGHT: 74 IN | DIASTOLIC BLOOD PRESSURE: 98 MMHG | SYSTOLIC BLOOD PRESSURE: 144 MMHG | OXYGEN SATURATION: 95 % | HEART RATE: 91 BPM | TEMPERATURE: 98.2 F

## 2023-02-08 DIAGNOSIS — D49.9 EXTRAGONADAL GERM CELL TUMOR: ICD-10-CM

## 2023-02-08 DIAGNOSIS — G47.33 OSA (OBSTRUCTIVE SLEEP APNEA): Primary | ICD-10-CM

## 2023-02-08 PROCEDURE — 3080F DIAST BP >= 90 MM HG: CPT | Performed by: NURSE PRACTITIONER

## 2023-02-08 PROCEDURE — 71046 X-RAY EXAM CHEST 2 VIEWS: CPT

## 2023-02-08 PROCEDURE — 99213 OFFICE O/P EST LOW 20 MIN: CPT | Performed by: NURSE PRACTITIONER

## 2023-02-08 PROCEDURE — 3077F SYST BP >= 140 MM HG: CPT | Performed by: NURSE PRACTITIONER

## 2023-02-08 PROCEDURE — 74011000636 HC RX REV CODE- 636: Performed by: NURSE PRACTITIONER

## 2023-02-08 PROCEDURE — 74177 CT ABD & PELVIS W/CONTRAST: CPT

## 2023-02-08 RX ADMIN — IOPAMIDOL 100 ML: 755 INJECTION, SOLUTION INTRAVENOUS at 13:45

## 2023-02-08 NOTE — PROGRESS NOTES
[address] [facility] [place]. [place], [address]   Tel.  [phone]   Fax. 100 [facility] 60   [place], [address] [facility]   Tel.  [phone]   Fax. [phone] [address]   Tel.  [phone]   Fax. 334 [facility] (: 1975) is a 50 y.o. male, established patient, seen for positive airway pressure follow-up and evaluation. He was last seen by Dr. Jeronimo Lara on 2022, prior notes and sleep tests reviewed in detail. Initial sleep apnea diagnosis about 7 years ago, treated with BiPAP but stopped approx 7 years ago. In lab split sleep test 2022 showed AHI of 28.8/hr with a lowest SaO2 of 60%, duration of SaO2 < 88% 29.2 min, titration showed adequate treatment at a pressure of 12 cmH2O. Weight at time of sleep testing 424 pounds. CPAP device set up - [facility] due to insurance out of network. ASSESSMENT/PLAN:    ICD-10-CM ICD-9-CM    1. ROBERTO (obstructive sleep apnea)  G47.33 327.23 AMB SUPPLY ORDER      2. Adult BMI 50.0-59.9 kg/sq m (HCC)  Z68.43 V85.43           AHI = 29.2(2022). Patient has a history and examination consistent with the diagnosis of sleep apnea. Follow-up and Dispositions    Return in about 3 months (around 2023) for first adherence on new device. Sleep Apnea - He would like to proceed with PAP therapy. Patient will be seen in follow-up in 6-8 weeks after PAP setup to gauge treatment response and adherence to therapy. Orders Placed This Encounter    AMB SUPPLY ORDER     Diagnosis: (G47.33) ROBERTO (obstructive sleep apnea)  (primary encounter diagnosis)      Positive Airway Pressure Therapy: Duration of need: 99 months.   ResMed APAP Device: Minimum Pressure: 10 cmH2O, Maximum Pressure: 12 cmH2O. Ramp Time: 20 Minutes.   EPR: 2.   Heated Humidifier  Jerry Modem Access  Length of Need: 99 months       Nasal Cushion (Replace) 2 per month.   Nasal Interface Mask 1 every 3 months.  Pos Airway pressure chin strap   Headgear 1 every 6 months.  Tubing with heating element 1 every 3 months.  Filter(s) Disposable 2 per month.  Filter(s) Non-Disposable 1 every 6 months. .   [address] for Humidifier (Replace) 1 every 6 months. Beryle [email or web address]; NPI: [id number]    Electronically signed. Date:- 02/08/23     * All of his questions were addressed. 2. Encouraged continued weight management program through appropriate diet and exercise regimen as significant weight reduction has been shown to reduce severity of obstructive sleep apnea. SUBJECTIVE/OBJECTIVE:    He has a prior sleep apnea diagnosis. New device was ordered and set up by [facility] who now states patient's insurance is not in network and he needs to return device. He has not yet used the device and will return. He reports he has experienced excessive daytime sleepiness for  a number of year (s) and it has worsened. The sleepiness is described as being severe, he has not been taking naps during the day. He notes that he experiences fatigue when riding as a passenger, seated and inactive, reading, in conversation, at work, at [employer]. The severity of the day time fatigue has impacted the ability to function normally during the day. He reports he has been snoring for a number of years. Genesis Singer has noted problems with concentration and memory and he will experience frequent awakening from sleep. In general he is able to return to sleep after awakening, he tends to awaken spontaneously. Marshall Sleepiness Score: 17 and Modified F.O.S.Q. Score Total / 2: 17 which reflects Significant daytime sleepiness     CO2 25 mmol/L on labs 1/24/23      Sleep Review of Systems: notable for Positive difficulty falling asleep;  Positive awakenings at night; Negative early morning headaches; Negative memory problems; Negative concentration issues; Negative chest pain; Negative shortness of breath; Negative significant joint pain at night; Negative significant muscle pain at night; Negative rashes or itching; Negative heartburn; Negative significant mood issues; 0 afternoon naps per week      Visit Vitals  BP (!) 144/98 (BP 1 Location: Left upper arm, BP Patient Position: Sitting, BP Cuff Size: Large adult long)   Pulse 91   Temp 98.2 °F (36.8 °C) (Temporal)   Ht 6' 2\" (1.88 m)   Wt (!) 447 lb 6.4 oz (202.9 kg)   SpO2 95%   BMI 57.44 kg/m²          General:   Alert, oriented, not in acute distress   Eyes:  Anicteric Sclerae; no obvious strabismus   Nose:  No obvious nasal septum deviation    Neck:   Midline trachea   Chest/Lungs:  Symmetrical lung expansion, clear lung fields on auscultation    CVS:  Normal rate, regular rhythm,  no JVD   Extremities:  No obvious rashes, no edema    Neuro:  No focal deficits; No obvious tremor    Psych:  Normal affect,  normal countenance     Patient's phone number [phone] (cell) was reviewed and confirmed for accuracy. He gives permission for messages regarding results and appointments to be left at that number. On this date 02/08/2023 I have spent 20 minutes reviewing previous notes, test results and face to face with the patient discussing the diagnosis and importance of compliance with the treatment plan as well as documenting on the day of the visit. An electronic signature was used to authenticate this note.     -- Ruben Morgan NP, [facility]  02/08/23

## 2023-02-08 NOTE — PATIENT INSTRUCTIONS
[address] [facility] [place]. [place], [address]  Tel.  [phone]  Fax. 100 [facility] 60  [place], [address] [facility]  Tel.  [phone]  Fax. [phone] [address]  Tel.  [phone]  Fax. [phone]     Sleep Apnea: After Your Visit  Your Care Instructions  Sleep apnea occurs when you frequently stop breathing for 10 seconds or longer during sleep. It can be mild to severe, based on the number of times per hour that you stop breathing or have slowed breathing. Blocked or narrowed airways in your nose, mouth, or throat can cause sleep apnea. Your airway can become blocked when your throat muscles and tongue relax during sleep. Sleep apnea is common, occurring in 1 out of 20 individuals. Individuals having any of the following characteristics should be evaluated and treated right away due to high risk and detrimental consequences from untreated sleep apnea:  Obesity  Congestive Heart failure  Atrial Fibrillation  Uncontrolled Hypertension  Type II Diabetes  Night-time Arrhythmias  Stroke  Pulmonary Hypertension  High-risk Driving Populations (pilots, truck drivers, etc.)  Patients Considering Weight-loss Surgery    How do you know you have sleep apnea? You probably have sleep apnea if you answer 'yes' to 3 or more of the following questions:  S - Have you been told that you Snore? T - Are you often Tired during the day? O - Has anyone Observed you stop breathing while sleeping? P- Do you have (or are being treated for) high blood Pressure? B - Are you obese (Body Mass Index > 35)? A - Is your Age 48years old or older? N - Is your Neck size greater than 16 inches? G - Are you male Gender? A sleep physician can prescribe a breathing device that prevents tissues in the throat from blocking your airway. Or your doctor may recommend using a dental device (oral breathing device) to help keep your airway open.  In some cases, surgery may be needed to remove enlarged tissues in the throat. Follow-up care is a key part of your treatment and safety. Be sure to make and go to all appointments, and call your doctor if you are having problems. It's also a good idea to know your test results and keep a list of the medicines you take. How can you care for yourself at home? Lose weight, if needed. It may reduce the number of times you stop breathing or have slowed breathing. Go to bed at the same time every night. Sleep on your side. It may stop mild apnea. If you tend to roll onto your back, sew a pocket in the back of your paFlimper top. Put a tennis ball into the pocket, and stitch the pocket shut. This will help keep you from sleeping on your back. Avoid alcohol and medicines such as sleeping pills and sedatives before bed. Do not smoke. Smoking can make sleep apnea worse. If you need help quitting, talk to your doctor about stop-smoking programs and medicines. These can increase your chances of quitting for good. Prop up the head of your bed 4 to 6 inches by putting bricks under the legs of the bed. Treat breathing problems, such as a stuffy nose, caused by a cold or allergies. Use a continuous positive airway pressure (CPAP) breathing machine if lifestyle changes do not help your apnea and your doctor recommends it. The machine keeps your airway from closing when you sleep. If CPAP does not help you, ask your doctor whether you should try other breathing machines. A bilevel positive airway pressure machine has two types of air pressureâone for breathing in and one for breathing out. Another device raises or lowers air pressure as needed while you breathe. If your nose feels dry or bleeds when using one of these machines, talk with your doctor about increasing moisture in the air. A humidifier may help.   If your nose is runny or stuffy from using a breathing machine, talk with your doctor about using decongestants or a corticosteroid nasal spray.  When should you call for help? Watch closely for changes in your health, and be sure to contact your doctor if:  You still have sleep apnea even though you have made lifestyle changes. You are thinking of trying a device such as CPAP. You are having problems using a CPAP or similar machine. Where can you learn more? Go to Snabboteket. Enter W839 in the search box to learn more about \"Sleep Apnea: After Your Visit. \"   © 0805-0854 Healthwise, Incorporated. Care instructions adapted under license by [facility] (which disclaims liability or warranty for this information). This care instruction is for use with your licensed healthcare professional. If you have questions about a medical condition or this instruction, always ask your healthcare professional. Ane Stammer any warranty or liability for your use of this information. PROPER SLEEP HYGIENE    What to avoid  Do not have drinks with caffeine, such as coffee or black tea, for 8 hours before bed. Do not smoke or use other types of tobacco near bedtime. Nicotine is a stimulant and can keep you awake. Avoid drinking alcohol late in the evening, because it can cause you to wake in the middle of the night. Do not eat a big meal close to bedtime. If you are hungry, eat a light snack. Do not drink a lot of water close to bedtime, because the need to urinate may wake you up during the night. Do not read or watch TV in bed. Use the bed only for sleeping and sexual activity. What to try  Go to bed at the same time every night, and wake up at the same time every morning. Do not take naps during the day. Keep your bedroom quiet, dark, and cool. Get regular exercise, but not within 3 to 4 hours of your bedtime. .  Sleep on a comfortable pillow and mattress. If watching the clock makes you anxious, turn it facing away from you so you cannot see the time.   If you worry when you lie down, start a worry book. Well before bedtime, write down your worries, and then set the book and your concerns aside. Try meditation or other relaxation techniques before you go to bed. If you cannot fall asleep, get up and go to another room until you feel sleepy. Do something relaxing. Repeat your bedtime routine before you go to bed again. Make your house quiet and calm about an hour before bedtime. Turn down the lights, turn off the TV, log off the computer, and turn down the volume on music. This can help you relax after a busy day. Drowsy Driving  The [address] [place] [address] Road Traffic Safety Administration cites drowsiness as a causing factor in more than 825,929 police reported crashes annually, resulting in 76,000 injuries and 1,500 deaths. Other surveys suggest 55% of people polled have driven while drowsy in the past year, 23% had fallen asleep but not crashed, 3% crashed, and 2% had and accident due to drowsy driving. Who is at risk? Young Drivers: One study of drowsy driving accidents states that 55% of the drivers were under 25 years. Of those, 75% were male. Shift Workers and Travelers: People who work overnight or travel across time zones frequently are at higher risk of experiencing Circadian Rhythm Disorders. They are trying to work and function when their body is programed to sleep. Sleep Deprived: Lack of sleep has a serious impact on your ability to pay attention or focus on a task. Consistently getting less than the average of 8 hours your body needs creates partial or cumulative sleep deprivation. Untreated Sleep Disorders: Sleep Apnea, Narcolepsy, R.L.S., and other sleep disorders (untreated) prevent a person from getting enough restful sleep. This leads to excessive daytime sleepiness and increases the risk for drowsy driving accidents by up to 7 times. Medications / Alcohol: Even over the counter medications can cause drowsiness.  Medications that impair a drivers attention should have a warning label. Alcohol naturally makes you sleepy and on its own can cause accidents. Combined with excessive drowsiness its effects are amplified. Signs of Drowsy Driving:   * You don't remember driving the last few miles   * You may drift out of your sergio   * You are unable to focus and your thoughts wander   * You may yawn more often than normal   * You have difficulty keeping your eyes open / nodding off   * Missing traffic signs, speeding, or tailgating  Prevention-   Good sleep hygiene, lifestyle and behavioral choices have the most impact on drowsy driving. There is no substitute for sleep and the average person requires 8 hours nightly. If you find yourself driving drowsy, stop and sleep. Consider the sleep hygiene tips provided during your visit as well. Medication Refill Policy: Refills for all medications require 1 week advance notice. Please have your pharmacy fax a refill request. We are unable to fax, or call in \"controled substance\" medications and you will need to pick these prescriptions up from our office. Picturelife Activation    Thank you for requesting access to Picturelife. Please follow the instructions below to securely access and download your online medical record. Picturelife allows you to send messages to your doctor, view your test results, renew your prescriptions, schedule appointments, and more. How Do I Sign Up? In your internet browser, go to [email or web address]. Click on the First Time User? Click Here link in the Sign In box. You will see the New Member Sign Up page. Enter your Picturelife Access Code exactly as it appears below. You will not need to use this code after youve completed the sign-up process. If you do not sign up before the expiration date, you must request a new code. Picturelife Access Code:  Activation code not generated  Current Picturelife Status: Active (This is the date your Picturelife access code will )    Enter the last four digits of your Social Security Number (xxxx) and Date of Birth (mm/dd/yyyy) as indicated and click Submit. You will be taken to the next sign-up page. Create a bSafe ID. This will be your bSafe login ID and cannot be changed, so think of one that is secure and easy to remember. Create a bSafe password. You can change your password at any time. Enter your Password Reset Question and Answer. This can be used at a later time if you forget your password. Enter your e-mail address. You will receive e-mail notification when new information is available in [address] Click Sign Up. You can now view and download portions of your medical record. Click the Deep Glint link to download a portable copy of your medical information. Additional Information    If you have questions, please call [phone]. Remember, bSafe is NOT to be used for urgent needs. For medical emergencies, dial 911.

## 2023-02-08 NOTE — TELEPHONE ENCOUNTER
02/08/23 2:40 PM Called pt. Advised CXR in normally. Spoke with radiology and they will addend xray to state no port a cath present.  He verbalized understanding

## 2023-03-09 ENCOUNTER — VIRTUAL VISIT (OUTPATIENT)
Dept: PRIMARY CARE CLINIC | Age: 48
End: 2023-03-09
Payer: COMMERCIAL

## 2023-03-09 DIAGNOSIS — E66.01 MORBID OBESITY (HCC): ICD-10-CM

## 2023-03-09 DIAGNOSIS — I10 ESSENTIAL HYPERTENSION: Primary | ICD-10-CM

## 2023-03-09 DIAGNOSIS — G47.33 OSA (OBSTRUCTIVE SLEEP APNEA): ICD-10-CM

## 2023-03-09 DIAGNOSIS — Z12.11 ENCOUNTER FOR COLORECTAL CANCER SCREENING: ICD-10-CM

## 2023-03-09 DIAGNOSIS — E78.2 MIXED HYPERLIPIDEMIA: ICD-10-CM

## 2023-03-09 DIAGNOSIS — Z95.2 HISTORY OF AORTIC VALVE REPLACEMENT: ICD-10-CM

## 2023-03-09 DIAGNOSIS — K21.9 GASTROESOPHAGEAL REFLUX DISEASE, UNSPECIFIED WHETHER ESOPHAGITIS PRESENT: ICD-10-CM

## 2023-03-09 DIAGNOSIS — Z12.12 ENCOUNTER FOR COLORECTAL CANCER SCREENING: ICD-10-CM

## 2023-03-09 DIAGNOSIS — E11.65 TYPE 2 DIABETES MELLITUS WITH HYPERGLYCEMIA, WITHOUT LONG-TERM CURRENT USE OF INSULIN (HCC): ICD-10-CM

## 2023-03-09 PROCEDURE — 99214 OFFICE O/P EST MOD 30 MIN: CPT | Performed by: FAMILY MEDICINE

## 2023-03-09 RX ORDER — SEMAGLUTIDE 1.34 MG/ML
1 INJECTION, SOLUTION SUBCUTANEOUS
Qty: 4 EACH | Refills: 1 | Status: SHIPPED | OUTPATIENT
Start: 2023-03-09

## 2023-03-09 RX ORDER — HYDROGEN PEROXIDE 3 %
20 SOLUTION, NON-ORAL MISCELLANEOUS
Qty: 90 CAPSULE | Refills: 1 | Status: SHIPPED | OUTPATIENT
Start: 2023-03-09

## 2023-03-09 RX ORDER — ASPIRIN 81 MG/1
81 TABLET ORAL DAILY
Qty: 90 TABLET | Refills: 1 | Status: SHIPPED | OUTPATIENT
Start: 2023-03-09

## 2023-03-09 RX ORDER — GLIPIZIDE 2.5 MG/1
2.5 TABLET, EXTENDED RELEASE ORAL DAILY
Qty: 90 TABLET | Refills: 1 | Status: SHIPPED | OUTPATIENT
Start: 2023-03-09

## 2023-03-09 NOTE — PROGRESS NOTES
Liana Fabry (: 1975) is a 50 y.o. male, established patient, here for evaluation of the following chief complaint(s):   Follow Up Chronic Condition       ASSESSMENT/PLAN:  Below is the assessment and plan developed based on review of pertinent history, labs, studies, and medications. 1. Essential hypertension  Chronic  -     LIPID PANEL; Future  -     aspirin delayed-release 81 mg tablet; Take 1 Tablet by mouth daily. , Normal, Disp-90 Tablet, R-1  Control unknown, home checks normal, continue metoprolol 25 mg twice daily. DASH diet. Dietary salt restriction. Moderate to vigorous aerobic activity 30 minutes daily. 2. Type 2 diabetes mellitus with hyperglycemia, without long-term current use of insulin (HCC)  Chronic  -     HEMOGLOBIN A1C WITH EAG; Future  -     LIPID PANEL; Future  -     semaglutide (Ozempic) 1 mg/dose (4 mg/3 mL) pnij; 1 mg by SubCUTAneous route every seven (7) days. , Normal, Disp-4 Each, R-1  -     glipiZIDE SR (GLUCOTROL XL) 2.5 mg CR tablet; Take 1 Tablet by mouth daily. , Normal, Disp-90 Tablet, R-1  Uncontrolled per last A1c. Home blood glucose readings better, anticipate improved A1c. Reminded to schedule retinopathy screening.  (-) microalbuminuria. 3. Mixed hyperlipidemia  Chronic  -     LIPID PANEL; Future  Controlled, on statin. 4. Gastroesophageal reflux disease, unspecified whether esophagitis present  Chronic  -     esomeprazole (NEXIUM) 20 mg capsule; Take 1 Capsule by mouth Daily (before breakfast). , Normal, Disp-90 Capsule, R-1  Stable. Will complete PA for medication if needed. Failed other PPI treatments. 5. ROBERTO (obstructive sleep apnea)  Chronic  Compliant with CPAP and sleeping better. 6. History of aortic valve replacement  7. Encounter for colorectal cancer screening  -     REFERRAL TO GASTROENTEROLOGY  8.  Morbid obesity ([facility] [address]  Chronic    Return in about 4 months (around 2023) for chronic care follow up.    SUBJECTIVE/OBJECTIVE:  HPI    64-year-old male past medical history hypertension, type 2 diabetes mellitus, hyperlipidemia, GERD, ROBERTO, AVR, morbid obesity seen via telemedicine for chronic care follow-up. Patient voices no acute complaints. Would like PA completed for ease omeprazole which she has paid for out-of-pocket. Fasting blood glucose range 100-130 mg/dL, bedtime blood glucose range 130-140. mg/dL. Started using CPAP 1 week ago and as a consequence sleeping better. Oncology follow-up in January, germ cell cancer in remission. Cardiology managing Coumadin. On 10 mg warfarin daily. Had to stop lisinopril due to cough. Taking metoprolol 25 mg twice daily with blood pressure staying in range of 021-349 mmHg systolic. Review of Systems   All other systems reviewed and are negative.      No data recorded     Physical Exam    [INSTRUCTIONS:  \"[x]\" Indicates a positive item  \"[]\" Indicates a negative item  -- DELETE ALL ITEMS NOT EXAMINED]    Constitutional: [x] Appears well-developed and well-nourished [x] No apparent distress      [] Abnormal -     Mental status: [x] Alert and awake  [x] Oriented to person/place/time [] Able to follow commands    [] Abnormal -     Eyes:   EOM    [x]  Normal    [] Abnormal -   Sclera  [x]  Normal    [] Abnormal -          Discharge [x]  None visible   [] Abnormal -     HENT: [x] Normocephalic, atraumatic  [] Abnormal -   [x] Mouth/Throat: Mucous membranes are moist    External Ears [x] Normal  [] Abnormal -    Neck: [x] No visualized mass [] Abnormal -     Pulmonary/Chest: [x] Respiratory effort normal   [x] No visualized signs of difficulty breathing or respiratory distress        [] Abnormal -      Musculoskeletal:   [] Normal gait with no signs of ataxia         [x] Normal range of motion of neck        [] Abnormal -     Neurological:        [x] No Facial Asymmetry (Cranial nerve 7 motor function) (limited exam due to video visit)          [] No gaze palsy        [] Abnormal -          Skin:        [x] No significant exanthematous lesions or discoloration noted on facial skin         [] Abnormal -            Psychiatric:       [x] Normal Affect [] Abnormal -        [] No Hallucinations    Other pertinent observable physical exam findings:-    On this date 03/09/2023 I have spent 30 minutes reviewing previous notes, test results and face to face (virtual) with the patient discussing the diagnosis and importance of compliance with the treatment plan as well as documenting on the day of the visit. Kimberly Robles, was evaluated through a synchronous (real-time) audio-video encounter. The patient (or guardian if applicable) is aware that this is a billable service, which includes applicable co-pays. This Virtual Visit was conducted with patient's (and/or legal guardian's) consent. The visit was conducted pursuant to the emergency declaration under the [address] [place] [address], [address] [place] [address] authority and the [employer] and [employer] General Act. Patient identification was verified, and a caregiver was present when appropriate. The patient was located at: Home: [address] [place] [address]  The provider was located at: [facility] [place] [address]       An electronic signature was used to authenticate this note.   -- Ruby Churchill MD

## 2023-05-05 ENCOUNTER — TELEPHONE (OUTPATIENT)
Age: 48
End: 2023-05-05

## 2023-05-10 DIAGNOSIS — C48.0 GERM CELL TUMOR OF RETROPERITONEUM (HCC): ICD-10-CM

## 2023-05-10 DIAGNOSIS — C62.90: Primary | ICD-10-CM

## 2023-05-23 ENCOUNTER — APPOINTMENT (OUTPATIENT)
Dept: INFUSION THERAPY | Age: 48
End: 2023-05-23

## 2023-06-19 RX ORDER — ATORVASTATIN CALCIUM 40 MG/1
TABLET, FILM COATED ORAL
Qty: 30 TABLET | Refills: 1 | Status: SHIPPED | OUTPATIENT
Start: 2023-06-19

## 2023-06-20 RX ORDER — SEMAGLUTIDE 1.34 MG/ML
INJECTION, SOLUTION SUBCUTANEOUS
Qty: 3 ML | Refills: 0 | Status: SHIPPED | OUTPATIENT
Start: 2023-06-20

## 2023-06-28 ENCOUNTER — TELEPHONE (OUTPATIENT)
Age: 48
End: 2023-06-28

## 2023-06-28 ENCOUNTER — HOSPITAL ENCOUNTER (EMERGENCY)
Facility: HOSPITAL | Age: 48
Discharge: HOME | End: 2023-06-28
Attending: EMERGENCY MEDICINE
Payer: COMMERCIAL

## 2023-06-28 ENCOUNTER — APPOINTMENT (OUTPATIENT)
Facility: HOSPITAL | Age: 48
End: 2023-06-28
Payer: COMMERCIAL

## 2023-06-28 VITALS
WEIGHT: 315 LBS | HEART RATE: 88 BPM | SYSTOLIC BLOOD PRESSURE: 156 MMHG | DIASTOLIC BLOOD PRESSURE: 74 MMHG | HEIGHT: 75 IN | OXYGEN SATURATION: 98 % | RESPIRATION RATE: 18 BRPM | TEMPERATURE: 98 F | BODY MASS INDEX: 39.17 KG/M2

## 2023-06-28 DIAGNOSIS — R10.9 FLANK PAIN: Primary | ICD-10-CM

## 2023-06-28 DIAGNOSIS — N39.0 URINARY TRACT INFECTION WITH HEMATURIA, SITE UNSPECIFIED: ICD-10-CM

## 2023-06-28 DIAGNOSIS — R31.9 URINARY TRACT INFECTION WITH HEMATURIA, SITE UNSPECIFIED: ICD-10-CM

## 2023-06-28 LAB
ALBUMIN SERPL-MCNC: 3.4 G/DL (ref 3.5–5)
ALBUMIN/GLOB SERPL: 0.7 (ref 1.1–2.2)
ALP SERPL-CCNC: 107 U/L (ref 45–117)
ALT SERPL-CCNC: 54 U/L (ref 12–78)
ANION GAP SERPL CALC-SCNC: 4 MMOL/L (ref 5–15)
APPEARANCE UR: ABNORMAL
AST SERPL-CCNC: 37 U/L (ref 15–37)
BACTERIA URNS QL MICRO: NEGATIVE /HPF
BASOPHILS # BLD: 0 K/UL (ref 0–0.1)
BASOPHILS NFR BLD: 1 % (ref 0–1)
BILIRUB SERPL-MCNC: 0.6 MG/DL (ref 0.2–1)
BILIRUB UR QL CFM: ABNORMAL
BUN SERPL-MCNC: 12 MG/DL (ref 6–20)
BUN/CREAT SERPL: 10 (ref 12–20)
CALCIUM SERPL-MCNC: 9 MG/DL (ref 8.5–10.1)
CHLORIDE SERPL-SCNC: 108 MMOL/L (ref 97–108)
CO2 SERPL-SCNC: 25 MMOL/L (ref 21–32)
COLOR UR: ABNORMAL
COMMENT:: NORMAL
COMMENT:: NORMAL
CREAT SERPL-MCNC: 1.15 MG/DL (ref 0.7–1.3)
DIFFERENTIAL METHOD BLD: NORMAL
EOSINOPHIL # BLD: 0.1 K/UL (ref 0–0.4)
EOSINOPHIL NFR BLD: 1 % (ref 0–7)
EPITH CASTS URNS QL MICRO: ABNORMAL /LPF
ERYTHROCYTE [DISTWIDTH] IN BLOOD BY AUTOMATED COUNT: 14.3 % (ref 11.5–14.5)
GLOBULIN SER CALC-MCNC: 4.6 G/DL (ref 2–4)
GLUCOSE SERPL-MCNC: 207 MG/DL (ref 65–100)
GLUCOSE UR STRIP.AUTO-MCNC: 500 MG/DL
HCT VFR BLD AUTO: 40.4 % (ref 36.6–50.3)
HGB BLD-MCNC: 12.5 G/DL (ref 12.1–17)
HGB UR QL STRIP: ABNORMAL
IMM GRANULOCYTES # BLD AUTO: 0 K/UL (ref 0–0.04)
IMM GRANULOCYTES NFR BLD AUTO: 0 % (ref 0–0.5)
KETONES UR QL STRIP.AUTO: NEGATIVE MG/DL
LEUKOCYTE ESTERASE UR QL STRIP.AUTO: ABNORMAL
LIPASE SERPL-CCNC: 84 U/L (ref 73–393)
LYMPHOCYTES # BLD: 1.9 K/UL (ref 0.8–3.5)
LYMPHOCYTES NFR BLD: 24 % (ref 12–49)
MCH RBC QN AUTO: 26.4 PG (ref 26–34)
MCHC RBC AUTO-ENTMCNC: 30.9 G/DL (ref 30–36.5)
MCV RBC AUTO: 85.4 FL (ref 80–99)
MONOCYTES # BLD: 0.8 K/UL (ref 0–1)
MONOCYTES NFR BLD: 9 % (ref 5–13)
NEUTS SEG # BLD: 5.2 K/UL (ref 1.8–8)
NEUTS SEG NFR BLD: 65 % (ref 32–75)
NITRITE UR QL STRIP.AUTO: POSITIVE
NRBC # BLD: 0 K/UL (ref 0–0.01)
NRBC BLD-RTO: 0 PER 100 WBC
PH UR STRIP: 5 (ref 5–8)
PLATELET # BLD AUTO: 154 K/UL (ref 150–400)
POTASSIUM SERPL-SCNC: 4.4 MMOL/L (ref 3.5–5.1)
PROT SERPL-MCNC: 8 G/DL (ref 6.4–8.2)
PROT UR STRIP-MCNC: 100 MG/DL
RBC # BLD AUTO: 4.73 M/UL (ref 4.1–5.7)
RBC #/AREA URNS HPF: >100 /HPF (ref 0–5)
SODIUM SERPL-SCNC: 137 MMOL/L (ref 136–145)
SP GR UR REFRACTOMETRY: 1.02 (ref 1–1.03)
SPECIMEN HOLD: NORMAL
SPECIMEN HOLD: NORMAL
UROBILINOGEN UR QL STRIP.AUTO: 0.2 EU/DL (ref 0.2–1)
WBC # BLD AUTO: 8.1 K/UL (ref 4.1–11.1)
WBC URNS QL MICRO: ABNORMAL /HPF (ref 0–4)

## 2023-06-28 PROCEDURE — 81001 URINALYSIS AUTO W/SCOPE: CPT

## 2023-06-28 PROCEDURE — 74176 CT ABD & PELVIS W/O CONTRAST: CPT

## 2023-06-28 PROCEDURE — 80053 COMPREHEN METABOLIC PANEL: CPT

## 2023-06-28 PROCEDURE — 6360000002 HC RX W HCPCS: Performed by: EMERGENCY MEDICINE

## 2023-06-28 PROCEDURE — 83690 ASSAY OF LIPASE: CPT

## 2023-06-28 PROCEDURE — 2580000003 HC RX 258: Performed by: EMERGENCY MEDICINE

## 2023-06-28 PROCEDURE — 85025 COMPLETE CBC W/AUTO DIFF WBC: CPT

## 2023-06-28 PROCEDURE — 96361 HYDRATE IV INFUSION ADD-ON: CPT

## 2023-06-28 PROCEDURE — 96375 TX/PRO/DX INJ NEW DRUG ADDON: CPT

## 2023-06-28 PROCEDURE — 96374 THER/PROPH/DIAG INJ IV PUSH: CPT

## 2023-06-28 PROCEDURE — 99284 EMERGENCY DEPT VISIT MOD MDM: CPT

## 2023-06-28 RX ORDER — 0.9 % SODIUM CHLORIDE 0.9 %
1000 INTRAVENOUS SOLUTION INTRAVENOUS
Status: COMPLETED | OUTPATIENT
Start: 2023-06-28 | End: 2023-06-28

## 2023-06-28 RX ORDER — CEFDINIR 300 MG/1
300 CAPSULE ORAL 2 TIMES DAILY
Qty: 20 CAPSULE | Refills: 0 | Status: SHIPPED | OUTPATIENT
Start: 2023-06-28 | End: 2023-07-08

## 2023-06-28 RX ORDER — KETOROLAC TROMETHAMINE 30 MG/ML
15 INJECTION, SOLUTION INTRAMUSCULAR; INTRAVENOUS
Status: COMPLETED | OUTPATIENT
Start: 2023-06-28 | End: 2023-06-28

## 2023-06-28 RX ADMIN — SODIUM CHLORIDE 1000 ML: 9 INJECTION, SOLUTION INTRAVENOUS at 16:23

## 2023-06-28 RX ADMIN — WATER 1000 MG: 1 INJECTION INTRAMUSCULAR; INTRAVENOUS; SUBCUTANEOUS at 20:04

## 2023-06-28 RX ADMIN — KETOROLAC TROMETHAMINE 15 MG: 30 INJECTION, SOLUTION INTRAMUSCULAR; INTRAVENOUS at 16:23

## 2023-06-28 ASSESSMENT — PAIN DESCRIPTION - LOCATION: LOCATION: FLANK

## 2023-06-28 ASSESSMENT — PAIN SCALES - GENERAL
PAINLEVEL_OUTOF10: 2
PAINLEVEL_OUTOF10: 7
PAINLEVEL_OUTOF10: 7

## 2023-06-28 ASSESSMENT — PAIN - FUNCTIONAL ASSESSMENT: PAIN_FUNCTIONAL_ASSESSMENT: 0-10

## 2023-07-19 RX ORDER — SEMAGLUTIDE 1.34 MG/ML
INJECTION, SOLUTION SUBCUTANEOUS
Refills: 1 | OUTPATIENT
Start: 2023-07-19

## 2023-08-07 ENCOUNTER — TELEPHONE (OUTPATIENT)
Dept: PRIMARY CARE CLINIC | Facility: CLINIC | Age: 48
End: 2023-08-07

## 2023-08-07 NOTE — TELEPHONE ENCOUNTER
Ary Camargo (Key: [id number])  Ozempic (1 MG/DOSE) 4MG/3ML pen-injectors     Form  [facility] Electronic PA Form (9461 NCPDP)  Done CMM

## 2023-08-16 RX ORDER — ATORVASTATIN CALCIUM 40 MG/1
TABLET, FILM COATED ORAL
Qty: 30 TABLET | Refills: 1 | OUTPATIENT
Start: 2023-08-16

## 2023-08-21 RX ORDER — SEMAGLUTIDE 1.34 MG/ML
INJECTION, SOLUTION SUBCUTANEOUS
Refills: 0 | OUTPATIENT
Start: 2023-08-21

## 2023-08-24 RX ORDER — ATORVASTATIN CALCIUM 40 MG/1
TABLET, FILM COATED ORAL
Qty: 30 TABLET | Refills: 1 | OUTPATIENT
Start: 2023-08-24

## 2023-08-30 NOTE — Clinical Note
Patient Class[de-identified] OBSERVATION [104]   Type of Bed: Surgical [18]   Cardiac Monitoring Required?: No   Reason for Observation: Сергей Mass   Admitting Diagnosis: Mass in the abdomen [481567]   Admitting Physician: Selma Marks [[id number]]   Attending Physician: Selma Marks [[id number]] Thank you for coming to the pediatric neurology clinic!     If you have any questions, you can contact me through CloudWalk or contact the call center at [phone].     You can use the number above to schedule a follow up appointment if not completed today.     The recommendations we discussed today included:   We will get some lab levels to test for antibodies to strep. I will contact you with the results  We discussed evaluating for ADHD In the future  We will follow up to see if he improves in 2 months

## 2023-09-01 ENCOUNTER — APPOINTMENT (OUTPATIENT)
Facility: HOSPITAL | Age: 48
DRG: 720 | End: 2023-09-01
Payer: COMMERCIAL

## 2023-09-01 ENCOUNTER — HOSPITAL ENCOUNTER (INPATIENT)
Facility: HOSPITAL | Age: 48
LOS: 1 days | Discharge: HOME | DRG: 720 | End: 2023-09-03
Attending: STUDENT IN AN ORGANIZED HEALTH CARE EDUCATION/TRAINING PROGRAM | Admitting: HOSPITALIST
Payer: COMMERCIAL

## 2023-09-01 DIAGNOSIS — K62.5 RECTAL BLEEDING: Primary | ICD-10-CM

## 2023-09-01 DIAGNOSIS — R65.10 SIRS (SYSTEMIC INFLAMMATORY RESPONSE SYNDROME) (HCC): ICD-10-CM

## 2023-09-01 DIAGNOSIS — R79.1 SUPRATHERAPEUTIC INR: ICD-10-CM

## 2023-09-01 DIAGNOSIS — L03.115 CELLULITIS OF RIGHT LEG: ICD-10-CM

## 2023-09-01 DIAGNOSIS — Z79.01 LONG TERM (CURRENT) USE OF ANTICOAGULANTS: ICD-10-CM

## 2023-09-01 DIAGNOSIS — R50.9 FEVER, UNSPECIFIED FEVER CAUSE: ICD-10-CM

## 2023-09-01 LAB
ALBUMIN SERPL-MCNC: 3.7 G/DL (ref 3.5–5)
ALBUMIN/GLOB SERPL: 0.8 (ref 1.1–2.2)
ALP SERPL-CCNC: 118 U/L (ref 45–117)
ALT SERPL-CCNC: 68 U/L (ref 12–78)
ANION GAP SERPL CALC-SCNC: 5 MMOL/L (ref 5–15)
AST SERPL-CCNC: 37 U/L (ref 15–37)
BASOPHILS # BLD: 0 K/UL (ref 0–0.1)
BASOPHILS NFR BLD: 0 % (ref 0–1)
BILIRUB SERPL-MCNC: 0.5 MG/DL (ref 0.2–1)
BUN SERPL-MCNC: 17 MG/DL (ref 6–20)
BUN/CREAT SERPL: 13 (ref 12–20)
CALCIUM SERPL-MCNC: 9.3 MG/DL (ref 8.5–10.1)
CHLORIDE SERPL-SCNC: 106 MMOL/L (ref 97–108)
CO2 SERPL-SCNC: 26 MMOL/L (ref 21–32)
COMMENT:: NORMAL
CREAT SERPL-MCNC: 1.32 MG/DL (ref 0.7–1.3)
DIFFERENTIAL METHOD BLD: ABNORMAL
EOSINOPHIL # BLD: 0 K/UL (ref 0–0.4)
EOSINOPHIL NFR BLD: 0 % (ref 0–7)
ERYTHROCYTE [DISTWIDTH] IN BLOOD BY AUTOMATED COUNT: 14.4 % (ref 11.5–14.5)
GLOBULIN SER CALC-MCNC: 4.4 G/DL (ref 2–4)
GLUCOSE SERPL-MCNC: 264 MG/DL (ref 65–100)
HCT VFR BLD AUTO: 39.7 % (ref 36.6–50.3)
HGB BLD-MCNC: 12.7 G/DL (ref 12.1–17)
IMM GRANULOCYTES # BLD AUTO: 0 K/UL (ref 0–0.04)
IMM GRANULOCYTES NFR BLD AUTO: 0 % (ref 0–0.5)
LACTATE BLD-SCNC: 1.21 MMOL/L (ref 0.4–2)
LYMPHOCYTES # BLD: 1 K/UL (ref 0.8–3.5)
LYMPHOCYTES NFR BLD: 10 % (ref 12–49)
MCH RBC QN AUTO: 26.9 PG (ref 26–34)
MCHC RBC AUTO-ENTMCNC: 32 G/DL (ref 30–36.5)
MCV RBC AUTO: 84.1 FL (ref 80–99)
MONOCYTES # BLD: 0.7 K/UL (ref 0–1)
MONOCYTES NFR BLD: 8 % (ref 5–13)
NEUTS SEG # BLD: 7.4 K/UL (ref 1.8–8)
NEUTS SEG NFR BLD: 81 % (ref 32–75)
NRBC # BLD: 0 K/UL (ref 0–0.01)
NRBC BLD-RTO: 0 PER 100 WBC
PLATELET # BLD AUTO: 117 K/UL (ref 150–400)
POTASSIUM SERPL-SCNC: 4 MMOL/L (ref 3.5–5.1)
PROT SERPL-MCNC: 8.1 G/DL (ref 6.4–8.2)
RBC # BLD AUTO: 4.72 M/UL (ref 4.1–5.7)
SARS-COV-2 RDRP RESP QL NAA+PROBE: NOT DETECTED
SODIUM SERPL-SCNC: 137 MMOL/L (ref 136–145)
SOURCE: NORMAL
SPECIMEN HOLD: NORMAL
WBC # BLD AUTO: 9.2 K/UL (ref 4.1–11.1)

## 2023-09-01 PROCEDURE — 71045 X-RAY EXAM CHEST 1 VIEW: CPT

## 2023-09-01 PROCEDURE — 85025 COMPLETE CBC W/AUTO DIFF WBC: CPT

## 2023-09-01 PROCEDURE — 80053 COMPREHEN METABOLIC PANEL: CPT

## 2023-09-01 PROCEDURE — 36415 COLL VENOUS BLD VENIPUNCTURE: CPT

## 2023-09-01 PROCEDURE — 87635 SARS-COV-2 COVID-19 AMP PRB: CPT

## 2023-09-01 PROCEDURE — 87040 BLOOD CULTURE FOR BACTERIA: CPT

## 2023-09-01 PROCEDURE — 83605 ASSAY OF LACTIC ACID: CPT

## 2023-09-01 PROCEDURE — 85610 PROTHROMBIN TIME: CPT

## 2023-09-01 PROCEDURE — 99285 EMERGENCY DEPT VISIT HI MDM: CPT

## 2023-09-01 RX ORDER — ACETAMINOPHEN 500 MG
1000 TABLET ORAL
Status: COMPLETED | OUTPATIENT
Start: 2023-09-01 | End: 2023-09-02

## 2023-09-01 ASSESSMENT — PAIN DESCRIPTION - LOCATION: LOCATION: ABDOMEN

## 2023-09-01 ASSESSMENT — PAIN SCALES - GENERAL: PAINLEVEL_OUTOF10: 3

## 2023-09-01 ASSESSMENT — PAIN - FUNCTIONAL ASSESSMENT: PAIN_FUNCTIONAL_ASSESSMENT: 0-10

## 2023-09-01 ASSESSMENT — PAIN DESCRIPTION - DESCRIPTORS: DESCRIPTORS: DULL

## 2023-09-01 ASSESSMENT — PAIN DESCRIPTION - ORIENTATION: ORIENTATION: MID

## 2023-09-02 ENCOUNTER — APPOINTMENT (OUTPATIENT)
Facility: HOSPITAL | Age: 48
DRG: 720 | End: 2023-09-02
Payer: COMMERCIAL

## 2023-09-02 PROBLEM — L03.116 CELLULITIS OF LEFT LOWER LEG: Status: ACTIVE | Noted: 2023-09-02

## 2023-09-02 LAB
ABO + RH BLD: NORMAL
APPEARANCE UR: CLEAR
BACTERIA URNS QL MICRO: NEGATIVE /HPF
BILIRUB UR QL: NEGATIVE
BLOOD GROUP ANTIBODIES SERPL: NORMAL
COLOR UR: ABNORMAL
EPITH CASTS URNS QL MICRO: ABNORMAL /LPF
FLUAV AG NPH QL IA: NEGATIVE
FLUBV AG NOSE QL IA: NEGATIVE
GLUCOSE BLD STRIP.AUTO-MCNC: 141 MG/DL (ref 65–117)
GLUCOSE BLD STRIP.AUTO-MCNC: 146 MG/DL (ref 65–117)
GLUCOSE BLD STRIP.AUTO-MCNC: 150 MG/DL (ref 65–117)
GLUCOSE BLD STRIP.AUTO-MCNC: 171 MG/DL (ref 65–117)
GLUCOSE BLD STRIP.AUTO-MCNC: 171 MG/DL (ref 65–117)
GLUCOSE UR STRIP.AUTO-MCNC: >1000 MG/DL
HGB UR QL STRIP: NEGATIVE
HYALINE CASTS URNS QL MICRO: ABNORMAL /LPF (ref 0–2)
INR PPP: 3.8 (ref 0.9–1.1)
KETONES UR QL STRIP.AUTO: NEGATIVE MG/DL
LEUKOCYTE ESTERASE UR QL STRIP.AUTO: NEGATIVE
NITRITE UR QL STRIP.AUTO: NEGATIVE
PH UR STRIP: 5.5 (ref 5–8)
PROT UR STRIP-MCNC: NEGATIVE MG/DL
PROTHROMBIN TIME: 36.6 SEC (ref 9–11.1)
RBC #/AREA URNS HPF: ABNORMAL /HPF (ref 0–5)
SERVICE CMNT-IMP: ABNORMAL
SP GR UR REFRACTOMETRY: 1.03 (ref 1–1.03)
SPECIMEN EXP DATE BLD: NORMAL
SPECIMEN HOLD: NORMAL
UROBILINOGEN UR QL STRIP.AUTO: 0.2 EU/DL (ref 0.2–1)
WBC URNS QL MICRO: ABNORMAL /HPF (ref 0–4)

## 2023-09-02 PROCEDURE — 6360000002 HC RX W HCPCS: Performed by: HOSPITALIST

## 2023-09-02 PROCEDURE — 6360000004 HC RX CONTRAST MEDICATION: Performed by: RADIOLOGY

## 2023-09-02 PROCEDURE — 6370000000 HC RX 637 (ALT 250 FOR IP): Performed by: STUDENT IN AN ORGANIZED HEALTH CARE EDUCATION/TRAINING PROGRAM

## 2023-09-02 PROCEDURE — 2580000003 HC RX 258: Performed by: HOSPITALIST

## 2023-09-02 PROCEDURE — 87804 INFLUENZA ASSAY W/OPTIC: CPT

## 2023-09-02 PROCEDURE — 94761 N-INVAS EAR/PLS OXIMETRY MLT: CPT

## 2023-09-02 PROCEDURE — 86901 BLOOD TYPING SEROLOGIC RH(D): CPT

## 2023-09-02 PROCEDURE — 74177 CT ABD & PELVIS W/CONTRAST: CPT

## 2023-09-02 PROCEDURE — 81001 URINALYSIS AUTO W/SCOPE: CPT

## 2023-09-02 PROCEDURE — 86900 BLOOD TYPING SEROLOGIC ABO: CPT

## 2023-09-02 PROCEDURE — 6370000000 HC RX 637 (ALT 250 FOR IP): Performed by: HOSPITALIST

## 2023-09-02 PROCEDURE — 2500000003 HC RX 250 WO HCPCS: Performed by: HOSPITALIST

## 2023-09-02 PROCEDURE — 6360000002 HC RX W HCPCS: Performed by: STUDENT IN AN ORGANIZED HEALTH CARE EDUCATION/TRAINING PROGRAM

## 2023-09-02 PROCEDURE — 1100000000 HC RM PRIVATE

## 2023-09-02 PROCEDURE — 36415 COLL VENOUS BLD VENIPUNCTURE: CPT

## 2023-09-02 PROCEDURE — 2580000003 HC RX 258: Performed by: STUDENT IN AN ORGANIZED HEALTH CARE EDUCATION/TRAINING PROGRAM

## 2023-09-02 PROCEDURE — 86850 RBC ANTIBODY SCREEN: CPT

## 2023-09-02 PROCEDURE — 82962 GLUCOSE BLOOD TEST: CPT

## 2023-09-02 RX ORDER — INSULIN LISPRO 100 [IU]/ML
0-16 INJECTION, SOLUTION INTRAVENOUS; SUBCUTANEOUS
Status: DISCONTINUED | OUTPATIENT
Start: 2023-09-02 | End: 2023-09-03 | Stop reason: HOSPADM

## 2023-09-02 RX ORDER — ALUMINUM ZIRCONIUM OCTACHLOROHYDREX GLY 16 G/100G
1 GEL TOPICAL DAILY
Status: DISCONTINUED | OUTPATIENT
Start: 2023-09-03 | End: 2023-09-03 | Stop reason: HOSPADM

## 2023-09-02 RX ORDER — ACETAMINOPHEN 325 MG/1
650 TABLET ORAL EVERY 6 HOURS PRN
Status: DISCONTINUED | OUTPATIENT
Start: 2023-09-02 | End: 2023-09-03 | Stop reason: HOSPADM

## 2023-09-02 RX ORDER — ATORVASTATIN CALCIUM 20 MG/1
40 TABLET, FILM COATED ORAL DAILY
Status: DISCONTINUED | OUTPATIENT
Start: 2023-09-02 | End: 2023-09-03 | Stop reason: HOSPADM

## 2023-09-02 RX ORDER — ASPIRIN 81 MG/1
81 TABLET ORAL DAILY
Status: DISCONTINUED | OUTPATIENT
Start: 2023-09-02 | End: 2023-09-03 | Stop reason: HOSPADM

## 2023-09-02 RX ORDER — SODIUM CHLORIDE 9 MG/ML
INJECTION, SOLUTION INTRAVENOUS PRN
Status: DISCONTINUED | OUTPATIENT
Start: 2023-09-02 | End: 2023-09-03 | Stop reason: HOSPADM

## 2023-09-02 RX ORDER — SODIUM CHLORIDE 0.9 % (FLUSH) 0.9 %
5-40 SYRINGE (ML) INJECTION EVERY 12 HOURS SCHEDULED
Status: DISCONTINUED | OUTPATIENT
Start: 2023-09-02 | End: 2023-09-03 | Stop reason: HOSPADM

## 2023-09-02 RX ORDER — SODIUM CHLORIDE 0.9 % (FLUSH) 0.9 %
5-40 SYRINGE (ML) INJECTION PRN
Status: DISCONTINUED | OUTPATIENT
Start: 2023-09-02 | End: 2023-09-03 | Stop reason: HOSPADM

## 2023-09-02 RX ORDER — DEXTROSE MONOHYDRATE 100 MG/ML
INJECTION, SOLUTION INTRAVENOUS CONTINUOUS PRN
Status: DISCONTINUED | OUTPATIENT
Start: 2023-09-02 | End: 2023-09-03 | Stop reason: HOSPADM

## 2023-09-02 RX ORDER — INSULIN LISPRO 100 [IU]/ML
0-4 INJECTION, SOLUTION INTRAVENOUS; SUBCUTANEOUS NIGHTLY
Status: DISCONTINUED | OUTPATIENT
Start: 2023-09-02 | End: 2023-09-03 | Stop reason: HOSPADM

## 2023-09-02 RX ORDER — ONDANSETRON 2 MG/ML
4 INJECTION INTRAMUSCULAR; INTRAVENOUS EVERY 6 HOURS PRN
Status: DISCONTINUED | OUTPATIENT
Start: 2023-09-02 | End: 2023-09-03 | Stop reason: HOSPADM

## 2023-09-02 RX ORDER — POLYETHYLENE GLYCOL 3350 17 G/17G
17 POWDER, FOR SOLUTION ORAL DAILY PRN
Status: DISCONTINUED | OUTPATIENT
Start: 2023-09-02 | End: 2023-09-03 | Stop reason: HOSPADM

## 2023-09-02 RX ORDER — ACETAMINOPHEN 650 MG/1
650 SUPPOSITORY RECTAL EVERY 6 HOURS PRN
Status: DISCONTINUED | OUTPATIENT
Start: 2023-09-02 | End: 2023-09-03 | Stop reason: HOSPADM

## 2023-09-02 RX ORDER — HYDROCORTISONE ACETATE 25 MG/1
25 SUPPOSITORY RECTAL 2 TIMES DAILY
Status: DISCONTINUED | OUTPATIENT
Start: 2023-09-02 | End: 2023-09-03 | Stop reason: HOSPADM

## 2023-09-02 RX ORDER — SODIUM CHLORIDE 9 MG/ML
INJECTION, SOLUTION INTRAVENOUS CONTINUOUS
Status: DISCONTINUED | OUTPATIENT
Start: 2023-09-02 | End: 2023-09-03 | Stop reason: HOSPADM

## 2023-09-02 RX ORDER — ONDANSETRON 4 MG/1
4 TABLET, ORALLY DISINTEGRATING ORAL EVERY 8 HOURS PRN
Status: DISCONTINUED | OUTPATIENT
Start: 2023-09-02 | End: 2023-09-03 | Stop reason: HOSPADM

## 2023-09-02 RX ORDER — PANTOPRAZOLE SODIUM 40 MG/1
40 TABLET, DELAYED RELEASE ORAL
Status: DISCONTINUED | OUTPATIENT
Start: 2023-09-02 | End: 2023-09-02

## 2023-09-02 RX ORDER — GLIPIZIDE 5 MG/1
2.5 TABLET ORAL
Status: DISCONTINUED | OUTPATIENT
Start: 2023-09-02 | End: 2023-09-03 | Stop reason: HOSPADM

## 2023-09-02 RX ORDER — 0.9 % SODIUM CHLORIDE 0.9 %
1000 INTRAVENOUS SOLUTION INTRAVENOUS ONCE
Status: COMPLETED | OUTPATIENT
Start: 2023-09-02 | End: 2023-09-02

## 2023-09-02 RX ADMIN — METOPROLOL TARTRATE 25 MG: 25 TABLET, FILM COATED ORAL at 21:12

## 2023-09-02 RX ADMIN — SODIUM CHLORIDE, PRESERVATIVE FREE 10 ML: 5 INJECTION INTRAVENOUS at 09:54

## 2023-09-02 RX ADMIN — SODIUM CHLORIDE 1000 ML: 9 INJECTION, SOLUTION INTRAVENOUS at 04:37

## 2023-09-02 RX ADMIN — METOPROLOL TARTRATE 25 MG: 25 TABLET, FILM COATED ORAL at 09:54

## 2023-09-02 RX ADMIN — ATORVASTATIN CALCIUM 40 MG: 20 TABLET, FILM COATED ORAL at 09:54

## 2023-09-02 RX ADMIN — VANCOMYCIN HYDROCHLORIDE 1000 MG: 1 INJECTION, POWDER, LYOPHILIZED, FOR SOLUTION INTRAVENOUS at 17:15

## 2023-09-02 RX ADMIN — MICONAZOLE NITRATE: 2 POWDER TOPICAL at 21:30

## 2023-09-02 RX ADMIN — Medication 2500 MG: at 04:43

## 2023-09-02 RX ADMIN — HYDROCORTISONE ACETATE 25 MG: 25 SUPPOSITORY RECTAL at 12:41

## 2023-09-02 RX ADMIN — ACETAMINOPHEN 650 MG: 325 TABLET ORAL at 06:15

## 2023-09-02 RX ADMIN — IOPAMIDOL 100 ML: 755 INJECTION, SOLUTION INTRAVENOUS at 00:27

## 2023-09-02 RX ADMIN — PANTOPRAZOLE SODIUM 40 MG: 40 TABLET, DELAYED RELEASE ORAL at 06:16

## 2023-09-02 RX ADMIN — GLIPIZIDE 2.5 MG: 5 TABLET ORAL at 06:17

## 2023-09-02 RX ADMIN — CEFEPIME 2000 MG: 2 INJECTION, POWDER, FOR SOLUTION INTRAVENOUS at 12:32

## 2023-09-02 RX ADMIN — SODIUM CHLORIDE, PRESERVATIVE FREE 10 ML: 5 INJECTION INTRAVENOUS at 21:13

## 2023-09-02 RX ADMIN — ACETAMINOPHEN 1000 MG: 500 TABLET ORAL at 00:43

## 2023-09-02 RX ADMIN — CEFEPIME 2000 MG: 2 INJECTION, POWDER, FOR SOLUTION INTRAVENOUS at 20:30

## 2023-09-02 RX ADMIN — ASPIRIN 81 MG: 81 TABLET, COATED ORAL at 09:54

## 2023-09-02 RX ADMIN — HYDROCORTISONE ACETATE 25 MG: 25 SUPPOSITORY RECTAL at 21:13

## 2023-09-02 RX ADMIN — WATER 2000 MG: 1 INJECTION INTRAMUSCULAR; INTRAVENOUS; SUBCUTANEOUS at 04:08

## 2023-09-02 RX ADMIN — SODIUM CHLORIDE: 9 INJECTION, SOLUTION INTRAVENOUS at 06:15

## 2023-09-02 ASSESSMENT — ENCOUNTER SYMPTOMS
BLOOD IN STOOL: 1
COUGH: 1
ABDOMINAL PAIN: 1

## 2023-09-02 ASSESSMENT — PAIN SCALES - GENERAL
PAINLEVEL_OUTOF10: 3
PAINLEVEL_OUTOF10: 0

## 2023-09-02 NOTE — PROGRESS NOTES
[facility] Dosing Services: 9/2/2023   Consult for Warfarin Dosing by Pharmacy by Dr. Lane Horne provided for this 46 y.o. M for indication of mechanical AVR  Day of Therapy - Continued from PTA  Dose to achieve an INR goal of 2-3    D/w MD- Hold warfarin tonight d/t rectal bleeding and INR of 3.8 on 9/1. No INR done today. Daily INR added.  CBC is ordered for tomorrow AM.    Significant drug interactions: Vanc / Cefepime  PT/INR Lab Results   Component Value Date/Time    INR 3.8 09/01/2023 10:41 PM      Platelets Lab Results   Component Value Date/Time     09/01/2023 10:41 PM      H/H Lab Results   Component Value Date/Time    HGB 12.7 09/01/2023 10:41 PM        Thanks,   Janki Verduzco, PharmD

## 2023-09-02 NOTE — H&P
analyte. Protime-INR    Collection Time: 09/01/23 10:41 PM   Result Value Ref Range    INR 3.8 (H) 0.9 - 1.1      Protime 36.6 (H) 9.0 - 11.1 sec   COVID-19, Rapid    Collection Time: 09/01/23 10:46 PM    Specimen: Nasopharyngeal   Result Value Ref Range    Source Nasopharyngeal      SARS-CoV-2, Rapid Not detected NOTD     Urinalysis with Microscopic    Collection Time: 09/02/23 12:17 AM   Result Value Ref Range    Color, UA YELLOW/STRAW      Appearance CLEAR CLEAR      Specific Gravity, UA 1.026 1.003 - 1.030      pH, Urine 5.5 5.0 - 8.0      Protein, UA Negative NEG mg/dL    Glucose, UA >1000 (A) NEG mg/dL    Ketones, Urine Negative NEG mg/dL    Bilirubin Urine Negative NEG      Blood, Urine Negative NEG      Urobilinogen, Urine 0.2 0.2 - 1.0 EU/dL    Nitrite, Urine Negative NEG      Leukocyte Esterase, Urine Negative NEG      WBC, UA 0-4 0 - 4 /hpf    RBC, UA 0-5 0 - 5 /hpf    Epithelial Cells UA FEW FEW /lpf    BACTERIA, URINE Negative NEG /hpf    Hyaline Casts, UA 0-2 0 - 2 /lpf   Urine Culture Hold Sample    Collection Time: 09/02/23 12:17 AM    Specimen: Urine   Result Value Ref Range    Specimen HOld        Urine on hold in Microbiology dept for 2 days. If unpreserved urine is submitted, it cannot be used for addtional testing after 24 hours, recollection will be required. TYPE AND SCREEN    Collection Time: 09/02/23 12:46 AM   Result Value Ref Range    Crossmatch expiration date 09/05/2023,2359     ABO/Rh A POSITIVE     Antibody Screen NEG    Rapid influenza A/B antigens    Collection Time: 09/02/23  4:07 AM    Specimen: Nasopharyngeal   Result Value Ref Range    Influenza A Ag Negative NEG      Influenza B Ag Negative NEG           CT Result (most recent):  CT ABDOMEN PELVIS W IV CONTRAST 09/02/2023    Narrative  CLINICAL HISTORY: gi bleed  INDICATION: gi bleed  COMPARISON: 6/20/2023.   CONTRAST: 100  ml Isovue 370  TECHNIQUE:  Multislice helical CT was performed of the abdomen and pelvis

## 2023-09-02 NOTE — ED NOTES
Report called to Marta Mason RN on 4th floor for continuation of care. Report included SBAR, MAR, ED summary, and recent results. Opportunity to answer questions given.       Karan Dodd RN  09/02/23 0172

## 2023-09-02 NOTE — ED PROVIDER NOTES
[facility] EMERGENCY DEPT  EMERGENCY DEPARTMENT ENCOUNTER      Pt Name: Adele Chaney  MRN: [id number]  [address] [facility] [place] 1975  Date of evaluation: 9/1/2023  Provider: Osiris Yusuf       Chief Complaint   Patient presents with    Rectal Bleeding         HISTORY OF PRESENT ILLNESS   (Location/Symptom, Timing/Onset, Context/Setting, Quality, Duration, Modifying Factors, Severity)  Note limiting factors. Patient is a 57-year-old male history of coronary disease, diabetes, GERD, germ cell cancer in remission x2 years, PE as well as aortic valve replacement on Coumadin here today secondary to rectal bleeding and fever. Patient had 1 episode of large-volume bright red blood earlier today. Has only had the 1 episode. No clots in it. Denies any rectal pain associated with that but reports he has some abdominal discomfort. Also reports that since then he has developed shaking chills and fever of 102 Fahrenheit. Nothing taken for the fever prior to arrival.  Has had a slight dry cough but really cannot think of any other reasons why he would have a fever. Denies vomiting. Denies shortness of breath, congestion. Review of External Medical Records:     Nursing Notes were reviewed. REVIEW OF SYSTEMS    (2-9 systems for level 4, 10 or more for level 5)     Review of Systems   Constitutional:  Positive for chills, fatigue and fever. Respiratory:  Positive for cough. Gastrointestinal:  Positive for abdominal pain and blood in stool. Hematological:  Bruises/bleeds easily. Except as noted above the remainder of the review of systems was reviewed and negative.        PAST MEDICAL HISTORY     Past Medical History:   Diagnosis Date    Aortic stenosis, severe     Arrhythmia     heart murmur    CAD (coronary artery disease)     Diabetes ([address])     GERD (gastroesophageal reflux disease)     Germ cell cancer ([address])     stage 3     Hypertension     Pulmonary embolism ([address])     both

## 2023-09-02 NOTE — ED TRIAGE NOTES
Patient ambulatory with c/o bloody bowel movement, nausea, vomiting. Patient c/o abdominal pain as well. Patient reports that he has only had one episode of bloody diarrhea. Patient has a hx of pulmonary embolism and stage III germ cell cancer.

## 2023-09-02 NOTE — PROGRESS NOTES
Antimicrobial Stewardship Review:  Zosyn, Meropenem, Cefepime  Name of Antibiotic:  cefepime  Indication:SSTi  Recommendation: BMI>40, CrCl>60, dose will be adjusted to 2 gm q8h with extended infusion per [facility] policy.

## 2023-09-03 VITALS
HEIGHT: 75 IN | TEMPERATURE: 98.3 F | BODY MASS INDEX: 39.17 KG/M2 | WEIGHT: 315 LBS | SYSTOLIC BLOOD PRESSURE: 134 MMHG | HEART RATE: 79 BPM | DIASTOLIC BLOOD PRESSURE: 76 MMHG | OXYGEN SATURATION: 94 % | RESPIRATION RATE: 18 BRPM

## 2023-09-03 LAB
ANION GAP SERPL CALC-SCNC: 5 MMOL/L (ref 5–15)
BASOPHILS # BLD: 0 K/UL (ref 0–0.1)
BASOPHILS NFR BLD: 1 % (ref 0–1)
BUN SERPL-MCNC: 14 MG/DL (ref 6–20)
BUN/CREAT SERPL: 13 (ref 12–20)
CALCIUM SERPL-MCNC: 8.3 MG/DL (ref 8.5–10.1)
CHLORIDE SERPL-SCNC: 106 MMOL/L (ref 97–108)
CO2 SERPL-SCNC: 24 MMOL/L (ref 21–32)
CREAT SERPL-MCNC: 1.06 MG/DL (ref 0.7–1.3)
CRP SERPL-MCNC: 8.27 MG/DL (ref 0–0.6)
DIFFERENTIAL METHOD BLD: ABNORMAL
EOSINOPHIL # BLD: 0 K/UL (ref 0–0.4)
EOSINOPHIL NFR BLD: 0 % (ref 0–7)
ERYTHROCYTE [DISTWIDTH] IN BLOOD BY AUTOMATED COUNT: 14.6 % (ref 11.5–14.5)
EST. AVERAGE GLUCOSE BLD GHB EST-MCNC: 163 MG/DL
FERRITIN SERPL-MCNC: 140 NG/ML (ref 26–388)
GLUCOSE BLD STRIP.AUTO-MCNC: 158 MG/DL (ref 65–117)
GLUCOSE BLD STRIP.AUTO-MCNC: 159 MG/DL (ref 65–117)
GLUCOSE BLD STRIP.AUTO-MCNC: 182 MG/DL (ref 65–117)
GLUCOSE SERPL-MCNC: 163 MG/DL (ref 65–100)
HBA1C MFR BLD: 7.3 % (ref 4–5.6)
HCT VFR BLD AUTO: 36.7 % (ref 36.6–50.3)
HGB BLD-MCNC: 11.8 G/DL (ref 12.1–17)
IMM GRANULOCYTES # BLD AUTO: 0 K/UL (ref 0–0.04)
IMM GRANULOCYTES NFR BLD AUTO: 0 % (ref 0–0.5)
INR PPP: 2 (ref 0.9–1.1)
IRON SATN MFR SERPL: 18 % (ref 20–50)
IRON SERPL-MCNC: 47 UG/DL (ref 35–150)
LYMPHOCYTES # BLD: 1.2 K/UL (ref 0.8–3.5)
LYMPHOCYTES NFR BLD: 28 % (ref 12–49)
MAGNESIUM SERPL-MCNC: 1.7 MG/DL (ref 1.6–2.4)
MCH RBC QN AUTO: 27.6 PG (ref 26–34)
MCHC RBC AUTO-ENTMCNC: 32.2 G/DL (ref 30–36.5)
MCV RBC AUTO: 85.7 FL (ref 80–99)
MONOCYTES # BLD: 0.6 K/UL (ref 0–1)
MONOCYTES NFR BLD: 14 % (ref 5–13)
NEUTS SEG # BLD: 2.5 K/UL (ref 1.8–8)
NEUTS SEG NFR BLD: 57 % (ref 32–75)
NRBC # BLD: 0 K/UL (ref 0–0.01)
NRBC BLD-RTO: 0 PER 100 WBC
PHOSPHATE SERPL-MCNC: 2.8 MG/DL (ref 2.6–4.7)
PLATELET # BLD AUTO: 102 K/UL (ref 150–400)
POTASSIUM SERPL-SCNC: 3.7 MMOL/L (ref 3.5–5.1)
PROCALCITONIN SERPL-MCNC: 0.39 NG/ML
PROTHROMBIN TIME: 20.1 SEC (ref 9–11.1)
RBC # BLD AUTO: 4.28 M/UL (ref 4.1–5.7)
SERVICE CMNT-IMP: ABNORMAL
SODIUM SERPL-SCNC: 135 MMOL/L (ref 136–145)
TIBC SERPL-MCNC: 258 UG/DL (ref 250–450)
VANCOMYCIN SERPL-MCNC: 7.4 UG/ML
WBC # BLD AUTO: 4.4 K/UL (ref 4.1–11.1)

## 2023-09-03 PROCEDURE — 83036 HEMOGLOBIN GLYCOSYLATED A1C: CPT

## 2023-09-03 PROCEDURE — 6360000002 HC RX W HCPCS: Performed by: HOSPITALIST

## 2023-09-03 PROCEDURE — 6370000000 HC RX 637 (ALT 250 FOR IP): Performed by: HOSPITALIST

## 2023-09-03 PROCEDURE — 82962 GLUCOSE BLOOD TEST: CPT

## 2023-09-03 PROCEDURE — 80202 ASSAY OF VANCOMYCIN: CPT

## 2023-09-03 PROCEDURE — 2500000003 HC RX 250 WO HCPCS: Performed by: HOSPITALIST

## 2023-09-03 PROCEDURE — 36415 COLL VENOUS BLD VENIPUNCTURE: CPT

## 2023-09-03 PROCEDURE — 83540 ASSAY OF IRON: CPT

## 2023-09-03 PROCEDURE — 2580000003 HC RX 258: Performed by: HOSPITALIST

## 2023-09-03 PROCEDURE — A4216 STERILE WATER/SALINE, 10 ML: HCPCS | Performed by: STUDENT IN AN ORGANIZED HEALTH CARE EDUCATION/TRAINING PROGRAM

## 2023-09-03 PROCEDURE — 84100 ASSAY OF PHOSPHORUS: CPT

## 2023-09-03 PROCEDURE — 94761 N-INVAS EAR/PLS OXIMETRY MLT: CPT

## 2023-09-03 PROCEDURE — 86140 C-REACTIVE PROTEIN: CPT

## 2023-09-03 PROCEDURE — 83735 ASSAY OF MAGNESIUM: CPT

## 2023-09-03 PROCEDURE — 84145 PROCALCITONIN (PCT): CPT

## 2023-09-03 PROCEDURE — 85025 COMPLETE CBC W/AUTO DIFF WBC: CPT

## 2023-09-03 PROCEDURE — 83550 IRON BINDING TEST: CPT

## 2023-09-03 PROCEDURE — 80048 BASIC METABOLIC PNL TOTAL CA: CPT

## 2023-09-03 PROCEDURE — 6360000002 HC RX W HCPCS: Performed by: STUDENT IN AN ORGANIZED HEALTH CARE EDUCATION/TRAINING PROGRAM

## 2023-09-03 PROCEDURE — 85610 PROTHROMBIN TIME: CPT

## 2023-09-03 PROCEDURE — C9113 INJ PANTOPRAZOLE SODIUM, VIA: HCPCS | Performed by: STUDENT IN AN ORGANIZED HEALTH CARE EDUCATION/TRAINING PROGRAM

## 2023-09-03 PROCEDURE — 6370000000 HC RX 637 (ALT 250 FOR IP): Performed by: INTERNAL MEDICINE

## 2023-09-03 PROCEDURE — 2580000003 HC RX 258: Performed by: STUDENT IN AN ORGANIZED HEALTH CARE EDUCATION/TRAINING PROGRAM

## 2023-09-03 PROCEDURE — 82728 ASSAY OF FERRITIN: CPT

## 2023-09-03 RX ORDER — HYDROCORTISONE ACETATE 25 MG/1
25 SUPPOSITORY RECTAL 2 TIMES DAILY PRN
Qty: 12 SUPPOSITORY | Refills: 0 | Status: SHIPPED | OUTPATIENT
Start: 2023-09-03

## 2023-09-03 RX ORDER — ALUMINUM ZIRCONIUM OCTACHLOROHYDREX GLY 16 G/100G
1 GEL TOPICAL DAILY
Qty: 30 CAPSULE | Refills: 0 | Status: SHIPPED | OUTPATIENT
Start: 2023-09-04 | End: 2023-10-04

## 2023-09-03 RX ORDER — CEFDINIR 300 MG/1
300 CAPSULE ORAL 2 TIMES DAILY
Qty: 14 CAPSULE | Refills: 0 | Status: SHIPPED | OUTPATIENT
Start: 2023-09-03 | End: 2023-09-10

## 2023-09-03 RX ORDER — DOXYCYCLINE HYCLATE 100 MG
100 TABLET ORAL 2 TIMES DAILY
Qty: 14 TABLET | Refills: 0 | Status: SHIPPED | OUTPATIENT
Start: 2023-09-03 | End: 2023-09-10

## 2023-09-03 RX ADMIN — ATORVASTATIN CALCIUM 40 MG: 20 TABLET, FILM COATED ORAL at 07:36

## 2023-09-03 RX ADMIN — SODIUM CHLORIDE: 9 INJECTION, SOLUTION INTRAVENOUS at 04:40

## 2023-09-03 RX ADMIN — MICONAZOLE NITRATE: 2 POWDER TOPICAL at 07:37

## 2023-09-03 RX ADMIN — SODIUM CHLORIDE 40 MG: 9 INJECTION INTRAMUSCULAR; INTRAVENOUS; SUBCUTANEOUS at 07:36

## 2023-09-03 RX ADMIN — SODIUM CHLORIDE, PRESERVATIVE FREE 10 ML: 5 INJECTION INTRAVENOUS at 07:37

## 2023-09-03 RX ADMIN — CEFEPIME 2000 MG: 2 INJECTION, POWDER, FOR SOLUTION INTRAVENOUS at 04:00

## 2023-09-03 RX ADMIN — Medication 1 CAPSULE: at 07:36

## 2023-09-03 RX ADMIN — VANCOMYCIN HYDROCHLORIDE 1000 MG: 1 INJECTION, POWDER, LYOPHILIZED, FOR SOLUTION INTRAVENOUS at 05:30

## 2023-09-03 RX ADMIN — METOPROLOL TARTRATE 25 MG: 25 TABLET, FILM COATED ORAL at 07:36

## 2023-09-03 RX ADMIN — HYDROCORTISONE ACETATE 25 MG: 25 SUPPOSITORY RECTAL at 07:36

## 2023-09-03 ASSESSMENT — PAIN SCALES - GENERAL: PAINLEVEL_OUTOF10: 0

## 2023-09-03 NOTE — PLAN OF CARE
Problem: Discharge Planning  Goal: Discharge to home or other facility with appropriate resources  9/2/2023 2131 by Chitra Villa RN  Outcome: Progressing  9/2/2023 1314 by Lion Cobb RN  Outcome: Progressing     Problem: Safety - Adult  Goal: Free from fall injury  9/2/2023 2131 by Chitra Villa RN  Outcome: Progressing  9/2/2023 1314 by Lion Cobb RN  Outcome: Progressing     Problem: ABCDS Injury Assessment  Goal: Absence of physical injury  9/2/2023 2131 by Chitra Villa RN  Outcome: Progressing  9/2/2023 1314 by Lion Cobb RN  Outcome: Progressing

## 2023-09-03 NOTE — PLAN OF CARE
Problem: Discharge Planning  Goal: Discharge to home or other facility with appropriate resources  9/3/2023 0939 by Pari Matias RN  Outcome: Progressing  9/2/2023 2131 by Radha Pelayo RN  Outcome: Progressing     Problem: Safety - Adult  Goal: Free from fall injury  9/3/2023 0939 by Pari Matias RN  Outcome: Progressing  9/2/2023 2131 by Radha Pelayo RN  Outcome: Progressing     Problem: ABCDS Injury Assessment  Goal: Absence of physical injury  9/3/2023 0939 by Pari Matias RN  Outcome: Progressing  9/2/2023 2131 by Radha Pelayo RN  Outcome: Progressing

## 2023-09-04 LAB
BACTERIA SPEC CULT: NORMAL
BACTERIA SPEC CULT: NORMAL
SERVICE CMNT-IMP: NORMAL

## 2023-09-07 LAB
BACTERIA SPEC CULT: NORMAL
BACTERIA SPEC CULT: NORMAL
SERVICE CMNT-IMP: NORMAL
SERVICE CMNT-IMP: NORMAL

## 2023-09-25 RX ORDER — GLIPIZIDE 2.5 MG/1
2.5 TABLET, EXTENDED RELEASE ORAL DAILY
Qty: 90 TABLET | Refills: 1 | OUTPATIENT
Start: 2023-09-25

## 2023-10-04 ENCOUNTER — OFFICE VISIT (OUTPATIENT)
Age: 48
End: 2023-10-04
Payer: COMMERCIAL

## 2023-10-04 ENCOUNTER — CLINICAL DOCUMENTATION (OUTPATIENT)
Age: 48
End: 2023-10-04

## 2023-10-04 VITALS
BODY MASS INDEX: 39.17 KG/M2 | SYSTOLIC BLOOD PRESSURE: 120 MMHG | DIASTOLIC BLOOD PRESSURE: 73 MMHG | HEART RATE: 82 BPM | HEIGHT: 75 IN | TEMPERATURE: 97.8 F | WEIGHT: 315 LBS | OXYGEN SATURATION: 97 %

## 2023-10-04 DIAGNOSIS — G47.33 OBSTRUCTIVE SLEEP APNEA (ADULT) (PEDIATRIC): Primary | ICD-10-CM

## 2023-10-04 PROCEDURE — 99213 OFFICE O/P EST LOW 20 MIN: CPT | Performed by: NURSE PRACTITIONER

## 2023-10-04 PROCEDURE — 3078F DIAST BP <80 MM HG: CPT | Performed by: NURSE PRACTITIONER

## 2023-10-04 PROCEDURE — 3074F SYST BP LT 130 MM HG: CPT | Performed by: NURSE PRACTITIONER

## 2023-10-04 ASSESSMENT — SLEEP AND FATIGUE QUESTIONNAIRES
HOW LIKELY ARE YOU TO NOD OFF OR FALL ASLEEP WHILE SITTING INACTIVE IN A PUBLIC PLACE: 1
DO YOU HAVE DIFFICULTY CONCENTRATING ON THE THINGS YOU DO BECAUSE YOU ARE SLEEPY OR TIRED: NO
HOW LIKELY ARE YOU TO NOD OFF OR FALL ASLEEP WHILE SITTING QUIETLY AFTER LUNCH WITHOUT ALCOHOL: SLIGHT CHANCE OF DOZING
HOW LIKELY ARE YOU TO NOD OFF OR FALL ASLEEP IN A CAR, WHILE STOPPED FOR A FEW MINUTES IN TRAFFIC: 0
FOSQ SCORE: 20
HOW LIKELY ARE YOU TO NOD OFF OR FALL ASLEEP WHILE SITTING AND TALKING TO SOMEONE: WOULD NEVER DOZE
HOW LIKELY ARE YOU TO NOD OFF OR FALL ASLEEP WHILE SITTING AND READING: 1
HAS YOUR RELATIONSHIP WITH FAMILY, FRIENDS OR WORK COLLEAGUES BEEN AFFECTED BECAUSE YOU ARE SLEEPY OR TIRED: NO
ESS TOTAL SCORE: 9
HOW LIKELY ARE YOU TO NOD OFF OR FALL ASLEEP WHILE WATCHING TV: SLIGHT CHANCE OF DOZING
HOW LIKELY ARE YOU TO NOD OFF OR FALL ASLEEP WHILE WATCHING TV: 1
HAS YOUR MOOD BEEN AFFECTED BECAUSE YOU ARE SLEEPY OR TIRED: NO
HOW LIKELY ARE YOU TO NOD OFF OR FALL ASLEEP WHILE LYING DOWN TO REST IN THE AFTERNOON WHEN CIRCUMSTANCES PERMIT: 3
DO YOU HAVE DIFFICULTY WATCHING A MOVIE OR VIDEO BECAUSE YOU BECOME SLEEPY OR TIRED: NO
HOW LIKELY ARE YOU TO NOD OFF OR FALL ASLEEP WHEN YOU ARE A PASSENGER IN A CAR FOR AN HOUR WITHOUT A BREAK: 2
DO YOU HAVE DIFFICULTY VISITING YOUR FAMILY OR FRIENDS IN THEIR HOME BECAUSE YOU BECOME SLEEPY OR TIRED: NO
HOW LIKELY ARE YOU TO NOD OFF OR FALL ASLEEP WHILE SITTING AND READING: SLIGHT CHANCE OF DOZING
HOW LIKELY ARE YOU TO NOD OFF OR FALL ASLEEP WHILE LYING DOWN TO REST IN THE AFTERNOON WHEN CIRCUMSTANCES PERMIT: HIGH CHANCE OF DOZING
HOW LIKELY ARE YOU TO NOD OFF OR FALL ASLEEP IN A CAR, WHILE STOPPED FOR A FEW MINUTES IN TRAFFIC: WOULD NEVER DOZE
DO YOU HAVE DIFFICULTY BEING AS ACTIVE AS YOU WANT TO BE IN THE EVENING BECAUSE YOU ARE SLEEPY OR TIRED: NO
HOW LIKELY ARE YOU TO NOD OFF OR FALL ASLEEP WHILE SITTING AND TALKING TO SOMEONE: 0
DO YOU GENERALLY HAVE DIFFICULTY REMEMBERING THINGS BECAUSE YOU ARE SLEEPY OR TIRED: NO
HOW LIKELY ARE YOU TO NOD OFF OR FALL ASLEEP WHILE SITTING INACTIVE IN A PUBLIC PLACE: SLIGHT CHANCE OF DOZING
DO YOU HAVE DIFFICULTY OPERATING A MOTOR VEHICLE FOR LONG DISTANCES (GREATER THAN 100 MILES) BECAUSE YOU BECOME SLEEPY: NO
HOW LIKELY ARE YOU TO NOD OFF OR FALL ASLEEP WHILE SITTING QUIETLY AFTER LUNCH WITHOUT ALCOHOL: 1
DO YOU HAVE DIFFICULTY BEING AS ACTIVE AS YOU WANT TO BE IN THE MORNING BECAUSE YOU ARE SLEEPY OR TIRED: NO
HOW LIKELY ARE YOU TO NOD OFF OR FALL ASLEEP WHEN YOU ARE A PASSENGER IN A CAR FOR AN HOUR WITHOUT A BREAK: MODERATE CHANCE OF DOZING
DO YOU HAVE DIFFICULTY OPERATING A MOTOR VEHICLE FOR SHORT DISTANCES (LESS THAN 100 MILES) BECAUSE YOU BECOME SLEEPY: NO

## 2023-10-04 NOTE — PROGRESS NOTES
Non-Disposable 1 every 6 months. .   [address]  for Humidifier (Replace) 1 every 6 months. Jaelyn KimbroughEMILIANOHUMBERTO-BC; NPI: [id number]    Electronically signed. Date:- 10/04/23       * Counseling was provided regarding the importance of regular PAP use with emphasis on ensuring sufficient total sleep time, proper sleep hygiene, and safe driving. * Re-enforced proper and regular cleaning for the device. We discussed the risk associated with use of cleaning devices and he will continue with use of dish soap and water as the appropriate cleaning method. * He was asked to contact our office for any problems regarding PAP therapy. 2. Encouraged continued weight management program through appropriate diet and exercise regimen as significant weight reduction has been shown to reduce severity of obstructive sleep apnea. SUBJECTIVE/OBJECTIVE:    He  is seen today for follow up on PAP device and reports no problems using the device. The following concerns identified:    Drowsiness no Problems exhaling no   Snoring no Forget to put on no   Mask Comfortable yes Can't fall asleep no   Dry Mouth no Mask falls off no   Air Leaking no Frequent awakenings no       He reports that his sleep has improved on PAP therapy using full face mask and heated tubing. He is doing well with the device and has been getting supplies without issue. Review of device download indicated:  Auto pressure: 10-12 cmH2O; Max Pressure: 11.9 cmH2O;  95th percentile Pressure: 11.8 cmH2O   95th Percentile Leak: 15.5 L/Min     CMS Compliance % Used Days >= 4 hours: 90. Average daily use of 5:21 hours. Therapy Apnea Index averaged over PAP use: 4.7 /hr which reflects improved sleep breathing condition. [place] Sleepiness Score: 9 and Modified F.O.S.Q. Score Total / 2: 20  which reflects improved sleep quality over therapy time.     CO2 24 mmol/L (range 21-32) on labs 9/3/2023    Sleep Review of Systems: notable for

## 2023-10-04 NOTE — PATIENT INSTRUCTIONS
nasal spray. If these things do not help, you might try a different type of machine. Some machines have air pressure that adjusts on its own. Others have air pressures that are different when you breathe in than when you breathe out. This may reduce discomfort caused by too much pressure in your nose. Where can you learn more? Go to MyNewPlace.be  Enter Vashti Srivastava in the search box to learn more about \"Learning About CPAP for Sleep Apnea. \"   Buffy Le 8861-5122 Healthwise, Inc[employer]. Care instructions adapted under license by [employer] (which disclaims liability or warranty for this information). This care instruction is for use with your licensed healthcare professional. If you have questions about a medical condition or this instruction, always ask your healthcare professional. [address] any warranty or liability for your use of this information. Content Version: 5.1.74888; Last Revised: January 11, 2010  PROPER SLEEP HYGIENE    What to avoid  Do not have drinks with caffeine, such as coffee or black tea, for 8 hours before bed. Do not smoke or use other types of tobacco near bedtime. Nicotine is a stimulant and can keep you awake. Avoid drinking alcohol late in the evening, because it can cause you to wake in the middle of the night. Do not eat a big meal close to bedtime. If you are hungry, eat a light snack. Do not drink a lot of water close to bedtime, because the need to urinate may wake you up during the night. Do not read or watch TV in bed. Use the bed only for sleeping and sexual activity. What to try  Go to bed at the same time every night, and wake up at the same time every morning. Do not take naps during the day. Keep your bedroom quiet, dark, and cool. Get regular exercise, but not within 3 to 4 hours of your bedtime. .  Sleep on a comfortable pillow and mattress.   If watching the clock makes you anxious, turn it facing away from you so

## 2023-10-09 RX ORDER — ATORVASTATIN CALCIUM 40 MG/1
TABLET, FILM COATED ORAL
Qty: 30 TABLET | Refills: 1 | OUTPATIENT
Start: 2023-10-09

## 2023-10-09 RX ORDER — GLIPIZIDE 2.5 MG/1
2.5 TABLET, EXTENDED RELEASE ORAL DAILY
Qty: 90 TABLET | Refills: 1 | OUTPATIENT
Start: 2023-10-09

## 2024-02-13 ENCOUNTER — OFFICE VISIT (OUTPATIENT)
Facility: CLINIC | Age: 49
End: 2024-02-13
Payer: COMMERCIAL

## 2024-02-13 ENCOUNTER — TELEPHONE (OUTPATIENT)
Facility: CLINIC | Age: 49
End: 2024-02-13

## 2024-02-13 VITALS
WEIGHT: 315 LBS | DIASTOLIC BLOOD PRESSURE: 70 MMHG | BODY MASS INDEX: 39.17 KG/M2 | TEMPERATURE: 97.5 F | OXYGEN SATURATION: 97 % | HEIGHT: 75 IN | SYSTOLIC BLOOD PRESSURE: 124 MMHG | HEART RATE: 70 BPM

## 2024-02-13 DIAGNOSIS — Z12.11 SCREENING FOR MALIGNANT NEOPLASM OF COLON: ICD-10-CM

## 2024-02-13 DIAGNOSIS — K21.9 GASTROESOPHAGEAL REFLUX DISEASE, UNSPECIFIED WHETHER ESOPHAGITIS PRESENT: ICD-10-CM

## 2024-02-13 DIAGNOSIS — E11.65 TYPE 2 DIABETES MELLITUS WITH HYPERGLYCEMIA, WITHOUT LONG-TERM CURRENT USE OF INSULIN (HCC): ICD-10-CM

## 2024-02-13 DIAGNOSIS — Z79.01 CHRONIC ANTICOAGULATION: ICD-10-CM

## 2024-02-13 DIAGNOSIS — E78.2 MIXED HYPERLIPIDEMIA: ICD-10-CM

## 2024-02-13 DIAGNOSIS — I10 ESSENTIAL HYPERTENSION: ICD-10-CM

## 2024-02-13 DIAGNOSIS — Z00.00 ENCOUNTER FOR MEDICAL EXAMINATION TO ESTABLISH CARE: Primary | ICD-10-CM

## 2024-02-13 LAB
POC INR: NORMAL
PROTHROMBIN TIME, POC: 3.2

## 2024-02-13 PROCEDURE — 85610 PROTHROMBIN TIME: CPT | Performed by: FAMILY MEDICINE

## 2024-02-13 PROCEDURE — 99205 OFFICE O/P NEW HI 60 MIN: CPT | Performed by: FAMILY MEDICINE

## 2024-02-13 PROCEDURE — 3074F SYST BP LT 130 MM HG: CPT | Performed by: FAMILY MEDICINE

## 2024-02-13 PROCEDURE — 3078F DIAST BP <80 MM HG: CPT | Performed by: FAMILY MEDICINE

## 2024-02-13 RX ORDER — METFORMIN HYDROCHLORIDE 500 MG/1
500 TABLET, EXTENDED RELEASE ORAL
Qty: 90 TABLET | Refills: 1 | Status: SHIPPED | OUTPATIENT
Start: 2024-02-13

## 2024-02-13 RX ORDER — SEMAGLUTIDE 2.68 MG/ML
2 INJECTION, SOLUTION SUBCUTANEOUS
Qty: 12 ML | Refills: 1 | Status: SHIPPED | OUTPATIENT
Start: 2024-02-13

## 2024-02-13 RX ORDER — ATORVASTATIN CALCIUM 40 MG/1
TABLET, FILM COATED ORAL
Qty: 30 TABLET | Refills: 1 | Status: SHIPPED | OUTPATIENT
Start: 2024-02-13

## 2024-02-13 NOTE — PROGRESS NOTES
Chief Complaint   Patient presents with    Follow-up     Establish care here previous Dr. Alicea pt/medication refills      /70 (Site: Left Upper Arm, Position: Sitting, Cuff Size: Thigh)   Pulse 70   Temp 97.5 °F (36.4 °C) (Temporal)   Ht 1.905 m (6' 3\")   Wt (!) 201.1 kg (443 lb 6.4 oz)   SpO2 97%   BMI 55.42 kg/m²      \"Have you been to the ER, urgent care clinic since your last visit?  Hospitalized since your last visit?\"    Yes. 9/1/23. [facility]. Rectal Bleeding.   “Have you seen or consulted any other health care providers outside of [facility] since your last visit?”    NO    “Have you had a colorectal cancer screening such as a colonoscopy/FIT/Cologuard?    NO     PHQ-9 Total Score: 0 (2/13/2024  7:09 AM)     
PERRL  Ears: TM's pearly with good light reflex b/l  Nose: Normal nasal mucosa  Throat: MMM, normal lips, tongue, teeth and gums  Neck: Supple, no LAD, no thyromegaly or carotid bruits  CVS: Normal S1, S2, no m/r/g  Resp: CTAB, no wheezes or rales  Extrem: Atraumatic, no cyanosis. Trace edema to ankles b/l.  Pulses: 2+   Skin: Warm, dry  Neuro: Alert, oriented, appropriate      A/P:     ICD-10-CM    1. Encounter for medical examination to establish care  Z00.00       2. Chronic anticoagulation  Z79.01 AMB POC PT/INR      3. Type 2 diabetes mellitus with hyperglycemia, without long-term current use of insulin (HCC)  E11.65 semaglutide, 2 MG/DOSE, (OZEMPIC, 2 MG/DOSE,) 8 MG/3ML SOPN sc injection     metFORMIN (GLUCOPHAGE-XR) 500 MG extended release tablet     Comprehensive Metabolic Panel     Hemoglobin A1C     Microalbumin / Creatinine Urine Ratio     CBC with Auto Differential      4. Gastroesophageal reflux disease, unspecified whether esophagitis present  K21.9 esomeprazole (NEXIUM) 20 MG delayed release capsule      5. Mixed hyperlipidemia  E78.2 atorvastatin (LIPITOR) 40 MG tablet     Lipid Panel      6. Essential hypertension  I10       7. Screening for malignant neoplasm of colon  Z12.11 ROLANDO - Og Amador MD, Gastroenterology, [place]         1. Encounter for medical examination to establish care    2. Chronic anticoagulation: INR 3.2 today with unclear goal. Reviewed prior Cardiology records but the goal is not mentioned. Per search of the literature it looks like his goal could be 1.5-2.0 with this particular On-X valve, however he has also had multiple PE so it would need to be at least 2.0-3.0. Will call Cards office [facility] to clarify. For now, continue current dose of 10mg warfarin daily.  - AMB POC PT/INR    3. Type 2 diabetes mellitus with hyperglycemia, without long-term current use of insulin (HCC): Will plan to increase Ozempic to 2mg and add back low dose of metformin as opposed to

## 2024-02-14 LAB
ALBUMIN SERPL-MCNC: 4 G/DL (ref 4.1–5.1)
ALBUMIN/GLOB SERPL: 1.3 {RATIO} (ref 1.2–2.2)
ALP SERPL-CCNC: 108 IU/L (ref 44–121)
ALT SERPL-CCNC: 71 IU/L (ref 0–44)
AST SERPL-CCNC: 44 IU/L (ref 0–40)
BASOPHILS # BLD AUTO: 0 X10E3/UL (ref 0–0.2)
BASOPHILS NFR BLD AUTO: 0 %
BILIRUB SERPL-MCNC: 0.5 MG/DL (ref 0–1.2)
BUN SERPL-MCNC: 15 MG/DL (ref 6–24)
BUN/CREAT SERPL: 14 (ref 9–20)
CALCIUM SERPL-MCNC: 9.4 MG/DL (ref 8.7–10.2)
CHLORIDE SERPL-SCNC: 104 MMOL/L (ref 96–106)
CHOLEST SERPL-MCNC: 177 MG/DL (ref 100–199)
CO2 SERPL-SCNC: 21 MMOL/L (ref 20–29)
CREAT SERPL-MCNC: 1.06 MG/DL (ref 0.76–1.27)
EGFRCR SERPLBLD CKD-EPI 2021: 85 ML/MIN/1.73
EOSINOPHIL # BLD AUTO: 0.1 X10E3/UL (ref 0–0.4)
EOSINOPHIL NFR BLD AUTO: 1 %
ERYTHROCYTE [DISTWIDTH] IN BLOOD BY AUTOMATED COUNT: 14.9 % (ref 11.6–15.4)
GLOBULIN SER CALC-MCNC: 3.1 G/DL (ref 1.5–4.5)
GLUCOSE SERPL-MCNC: 288 MG/DL (ref 70–99)
HBA1C MFR BLD: 9.5 % (ref 4.8–5.6)
HCT VFR BLD AUTO: 43.4 % (ref 37.5–51)
HDLC SERPL-MCNC: 34 MG/DL
HGB BLD-MCNC: 13.3 G/DL (ref 13–17.7)
IMM GRANULOCYTES # BLD AUTO: 0 X10E3/UL (ref 0–0.1)
IMM GRANULOCYTES NFR BLD AUTO: 0 %
LDLC SERPL CALC-MCNC: 120 MG/DL (ref 0–99)
LYMPHOCYTES # BLD AUTO: 2.1 X10E3/UL (ref 0.7–3.1)
LYMPHOCYTES NFR BLD AUTO: 32 %
MCH RBC QN AUTO: 26.6 PG (ref 26.6–33)
MCHC RBC AUTO-ENTMCNC: 30.6 G/DL (ref 31.5–35.7)
MCV RBC AUTO: 87 FL (ref 79–97)
MONOCYTES # BLD AUTO: 0.6 X10E3/UL (ref 0.1–0.9)
MONOCYTES NFR BLD AUTO: 9 %
NEUTROPHILS # BLD AUTO: 3.8 X10E3/UL (ref 1.4–7)
NEUTROPHILS NFR BLD AUTO: 58 %
PLATELET # BLD AUTO: 146 X10E3/UL (ref 150–450)
POTASSIUM SERPL-SCNC: 4.4 MMOL/L (ref 3.5–5.2)
PROT SERPL-MCNC: 7.1 G/DL (ref 6–8.5)
RBC # BLD AUTO: 5 X10E6/UL (ref 4.14–5.8)
SODIUM SERPL-SCNC: 139 MMOL/L (ref 134–144)
TRIGL SERPL-MCNC: 128 MG/DL (ref 0–149)
VLDLC SERPL CALC-MCNC: 23 MG/DL (ref 5–40)
WBC # BLD AUTO: 6.6 X10E3/UL (ref 3.4–10.8)

## 2024-02-15 LAB
ALBUMIN/CREAT UR: 6 MG/G CREAT (ref 0–29)
CREAT UR-MCNC: 139.8 MG/DL
MICROALBUMIN UR-MCNC: 9 UG/ML

## 2024-02-16 NOTE — TELEPHONE ENCOUNTER
Ok good, we had just wanted to know his goal range. If they are saying 2.5-3.5 then he was at goal this week. Thanks!

## 2024-02-16 NOTE — TELEPHONE ENCOUNTER
Spoke with Adenike at [facility] and she stated that his most recent INR was done on 8/24/22 and it was 2.3.   Warfarin taking daily -10mg   Except on Wed -12.5mg  INR Goal 2.5-3.5

## 2024-02-27 ENCOUNTER — NURSE ONLY (OUTPATIENT)
Facility: CLINIC | Age: 49
End: 2024-02-27
Payer: COMMERCIAL

## 2024-02-27 VITALS — TEMPERATURE: 97.1 F | BODY MASS INDEX: 55.25 KG/M2 | OXYGEN SATURATION: 97 % | WEIGHT: 315 LBS | HEART RATE: 93 BPM

## 2024-02-27 DIAGNOSIS — Z79.01 CHRONIC ANTICOAGULATION: Primary | ICD-10-CM

## 2024-02-27 LAB
POC INR: 4
PROTHROMBIN TIME, POC: 48

## 2024-02-27 PROCEDURE — 93793 ANTICOAG MGMT PT WARFARIN: CPT | Performed by: FAMILY MEDICINE

## 2024-02-27 PROCEDURE — 85610 PROTHROMBIN TIME: CPT | Performed by: FAMILY MEDICINE

## 2024-03-11 ENCOUNTER — NURSE ONLY (OUTPATIENT)
Facility: CLINIC | Age: 49
End: 2024-03-11
Payer: COMMERCIAL

## 2024-03-11 VITALS — OXYGEN SATURATION: 97 % | WEIGHT: 315 LBS | HEART RATE: 83 BPM | TEMPERATURE: 97.3 F | BODY MASS INDEX: 54.87 KG/M2

## 2024-03-11 DIAGNOSIS — Z79.01 CHRONIC ANTICOAGULATION: Primary | ICD-10-CM

## 2024-03-11 LAB
POC INR: 3.6
PROTHROMBIN TIME, POC: 43.4

## 2024-03-11 PROCEDURE — 85610 PROTHROMBIN TIME: CPT | Performed by: FAMILY MEDICINE

## 2024-03-11 PROCEDURE — 93793 ANTICOAG MGMT PT WARFARIN: CPT | Performed by: FAMILY MEDICINE

## 2024-03-25 ENCOUNTER — NURSE ONLY (OUTPATIENT)
Facility: CLINIC | Age: 49
End: 2024-03-25
Payer: COMMERCIAL

## 2024-03-25 VITALS
HEIGHT: 75 IN | WEIGHT: 315 LBS | HEART RATE: 67 BPM | TEMPERATURE: 97.1 F | BODY MASS INDEX: 39.17 KG/M2 | OXYGEN SATURATION: 98 %

## 2024-03-25 DIAGNOSIS — Z86.711 HISTORY OF PULMONARY EMBOLUS (PE): Primary | ICD-10-CM

## 2024-03-25 LAB
POC INR: 3.5
PROTHROMBIN TIME, POC: 41.9

## 2024-03-25 PROCEDURE — 93793 ANTICOAG MGMT PT WARFARIN: CPT | Performed by: FAMILY MEDICINE

## 2024-03-25 PROCEDURE — 85610 PROTHROMBIN TIME: CPT | Performed by: FAMILY MEDICINE

## 2024-03-25 NOTE — PROGRESS NOTES
INR 3.5, has come down from 4.0, then 3.6, all on same dose. Will have him continue current dose for another 2 weeks. If INR >3.5 at next check will decrease dose.

## 2024-04-08 ENCOUNTER — TELEPHONE (OUTPATIENT)
Facility: CLINIC | Age: 49
End: 2024-04-08

## 2024-04-08 ENCOUNTER — NURSE ONLY (OUTPATIENT)
Facility: CLINIC | Age: 49
End: 2024-04-08
Payer: COMMERCIAL

## 2024-04-08 VITALS
TEMPERATURE: 97.8 F | WEIGHT: 315 LBS | DIASTOLIC BLOOD PRESSURE: 86 MMHG | BODY MASS INDEX: 54.12 KG/M2 | SYSTOLIC BLOOD PRESSURE: 140 MMHG

## 2024-04-08 DIAGNOSIS — Z86.711 HISTORY OF PULMONARY EMBOLUS (PE): Primary | ICD-10-CM

## 2024-04-08 DIAGNOSIS — Z79.01 CHRONIC ANTICOAGULATION: ICD-10-CM

## 2024-04-08 LAB
POC INR: 3.6
PROTHROMBIN TIME, POC: 43.6

## 2024-04-08 PROCEDURE — 93793 ANTICOAG MGMT PT WARFARIN: CPT | Performed by: FAMILY MEDICINE

## 2024-04-08 PROCEDURE — 85610 PROTHROMBIN TIME: CPT | Performed by: FAMILY MEDICINE

## 2024-04-08 RX ORDER — WARFARIN SODIUM 5 MG/1
TABLET ORAL
Qty: 30 TABLET | Refills: 1 | Status: SHIPPED | OUTPATIENT
Start: 2024-04-08

## 2024-04-08 NOTE — PROGRESS NOTES
INR 3.6. Pt has been taking 10mg daily for several months and has been at the ULN or slightly supratherapeutic. Will decrease dose today. Pt to take 10mg every day except 5mg on Wednesdays. Recheck in 2 weeks.

## 2024-04-08 NOTE — TELEPHONE ENCOUNTER
Order placed for home INR test kit and will send to [facility] to get it out to him.

## 2024-04-08 NOTE — TELEPHONE ENCOUNTER
As patient was checking out from CSS appointment, patient wanted to know if an at home pt/INR machine can be prescribed as it would work better for patients schedule.

## 2024-04-12 DIAGNOSIS — E78.2 MIXED HYPERLIPIDEMIA: ICD-10-CM

## 2024-04-12 RX ORDER — ATORVASTATIN CALCIUM 40 MG/1
TABLET, FILM COATED ORAL
Qty: 90 TABLET | Refills: 1 | Status: SHIPPED | OUTPATIENT
Start: 2024-04-12

## 2024-04-16 ENCOUNTER — TELEPHONE (OUTPATIENT)
Facility: CLINIC | Age: 49
End: 2024-04-16

## 2024-04-16 LAB — LEFT VENTRICULAR EJECTION FRACTION, EXTERNAL: NORMAL

## 2024-04-16 NOTE — TELEPHONE ENCOUNTER
Patient called in stating that the pharmacy told him that they do not have an INR machine and they are not sure where they would get one from. He did reach out to his insurance company [employer] and they said that they would need a CPT Code to see if they would even cover it for him.

## 2024-04-23 ENCOUNTER — NURSE ONLY (OUTPATIENT)
Facility: CLINIC | Age: 49
End: 2024-04-23
Payer: COMMERCIAL

## 2024-04-23 VITALS
TEMPERATURE: 97.9 F | SYSTOLIC BLOOD PRESSURE: 132 MMHG | HEART RATE: 101 BPM | WEIGHT: 315 LBS | OXYGEN SATURATION: 98 % | HEIGHT: 75 IN | DIASTOLIC BLOOD PRESSURE: 84 MMHG | BODY MASS INDEX: 39.17 KG/M2

## 2024-04-23 DIAGNOSIS — Z86.711 HISTORY OF PULMONARY EMBOLUS (PE): Primary | ICD-10-CM

## 2024-04-23 LAB
POC INR: 3.2
PROTHROMBIN TIME, POC: 38.9

## 2024-04-23 PROCEDURE — 85610 PROTHROMBIN TIME: CPT | Performed by: FAMILY MEDICINE

## 2024-05-21 DIAGNOSIS — Z86.718 HISTORY OF DVT (DEEP VEIN THROMBOSIS): Primary | ICD-10-CM

## 2024-05-21 RX ORDER — WARFARIN SODIUM 10 MG/1
10 TABLET ORAL DAILY
Qty: 30 TABLET | Refills: 1 | Status: SHIPPED | OUTPATIENT
Start: 2024-05-21

## 2024-05-21 NOTE — TELEPHONE ENCOUNTER
He did not come for his last INR check and doesn't have another one scheduled. Can we please get him a nurse appt for this? Thanks!

## 2024-05-21 NOTE — TELEPHONE ENCOUNTER
Patient called needing a refill for Warfarin 10 mg.    Last appt: 2.13.24 with MWE  Next appt: 8.13.24 with MWE    [facility] [place] - [address] - [phone]

## 2024-05-23 ENCOUNTER — NURSE ONLY (OUTPATIENT)
Facility: CLINIC | Age: 49
End: 2024-05-23
Payer: COMMERCIAL

## 2024-05-23 VITALS
HEART RATE: 90 BPM | OXYGEN SATURATION: 98 % | WEIGHT: 315 LBS | TEMPERATURE: 97.8 F | HEIGHT: 75 IN | RESPIRATION RATE: 20 BRPM | SYSTOLIC BLOOD PRESSURE: 132 MMHG | BODY MASS INDEX: 39.17 KG/M2 | DIASTOLIC BLOOD PRESSURE: 84 MMHG

## 2024-05-23 DIAGNOSIS — Z86.711 HISTORY OF PULMONARY EMBOLUS (PE): ICD-10-CM

## 2024-05-23 LAB
POC INR: 2.5
PROTHROMBIN TIME, POC: 31.4

## 2024-05-23 PROCEDURE — 85610 PROTHROMBIN TIME: CPT | Performed by: FAMILY MEDICINE

## 2024-05-23 RX ORDER — WARFARIN SODIUM 5 MG/1
TABLET ORAL
Qty: 30 TABLET | Refills: 1 | Status: SHIPPED | OUTPATIENT
Start: 2024-05-23

## 2024-05-23 ASSESSMENT — PATIENT HEALTH QUESTIONNAIRE - PHQ9
SUM OF ALL RESPONSES TO PHQ QUESTIONS 1-9: 0
1. LITTLE INTEREST OR PLEASURE IN DOING THINGS: NOT AT ALL
SUM OF ALL RESPONSES TO PHQ QUESTIONS 1-9: 0
2. FEELING DOWN, DEPRESSED OR HOPELESS: NOT AT ALL
SUM OF ALL RESPONSES TO PHQ9 QUESTIONS 1 & 2: 0

## 2024-07-28 DIAGNOSIS — Z86.718 HISTORY OF DVT (DEEP VEIN THROMBOSIS): ICD-10-CM

## 2024-07-29 ENCOUNTER — TELEPHONE (OUTPATIENT)
Facility: CLINIC | Age: 49
End: 2024-07-29

## 2024-07-29 ENCOUNTER — NURSE ONLY (OUTPATIENT)
Facility: CLINIC | Age: 49
End: 2024-07-29
Payer: COMMERCIAL

## 2024-07-29 DIAGNOSIS — Z86.718 HISTORY OF DVT (DEEP VEIN THROMBOSIS): Primary | ICD-10-CM

## 2024-07-29 LAB
POC INR: 4.8
PROTHROMBIN TIME, POC: 57.1

## 2024-07-29 PROCEDURE — 85610 PROTHROMBIN TIME: CPT | Performed by: FAMILY MEDICINE

## 2024-07-29 RX ORDER — WARFARIN SODIUM 10 MG/1
10 TABLET ORAL DAILY
Qty: 30 TABLET | Refills: 0 | Status: SHIPPED | OUTPATIENT
Start: 2024-07-29 | End: 2024-09-03

## 2024-07-30 NOTE — TELEPHONE ENCOUNTER
Agree with holding it this morning and then resume regular dosing. Could we get him to come back on Friday and have this rechecked? Thanks!  
Called and spoke with patient. Held today dose and will continue with current regimen. Patient is unable to come to office on 08/02/2024 due to out of town. Will call back tomorrow to inform staff when he is able to come to office to repeat PT/INR.   
Patient came in for INR. Patient was 4.8. Reached out to Dr. Brown and she stated to hold dose for today but He takes his in the morning . He is going to hold tomorrow morning and informed hopefully we will reach out tomorrow.  
3C/on unit

## 2024-07-31 ENCOUNTER — TELEPHONE (OUTPATIENT)
Facility: CLINIC | Age: 49
End: 2024-07-31

## 2024-07-31 NOTE — TELEPHONE ENCOUNTER
----- Message from Corneliusreinierflip Alfreda Mojicaon sent at 7/31/2024 11:47 AM EDT -----  Regarding: [facility] Appointment Request  [facility] Appointment Request    Patient needs appointment for [facility] Appointment Type: Existing Condition Follow Up.    Patient Requested Dates(s):August 5, 2024  Patient Requested Time: Earliest time available in morning  Provider Name:  Yola Benitez MD        Reason for Appointment Request: Established Patient - Available appointments did not meet patient need  --------------------------------------------------------------------------------------------------------------------------    Relationship to Patient: Self     Call Back Information: OK to leave message on voicemail  Preferred Call Back Number: Phone [phone]

## 2024-08-03 DIAGNOSIS — E78.2 MIXED HYPERLIPIDEMIA: ICD-10-CM

## 2024-08-03 DIAGNOSIS — E11.65 TYPE 2 DIABETES MELLITUS WITH HYPERGLYCEMIA, WITHOUT LONG-TERM CURRENT USE OF INSULIN (HCC): ICD-10-CM

## 2024-08-05 ENCOUNTER — NURSE ONLY (OUTPATIENT)
Facility: CLINIC | Age: 49
End: 2024-08-05
Payer: COMMERCIAL

## 2024-08-05 VITALS — WEIGHT: 315 LBS | OXYGEN SATURATION: 98 % | BODY MASS INDEX: 53.5 KG/M2 | TEMPERATURE: 97.5 F | HEART RATE: 82 BPM

## 2024-08-05 DIAGNOSIS — Z79.01 CHRONIC ANTICOAGULATION: Primary | ICD-10-CM

## 2024-08-05 LAB
POC INR: 3.5
PROTHROMBIN TIME, POC: 41.5

## 2024-08-05 PROCEDURE — 93793 ANTICOAG MGMT PT WARFARIN: CPT | Performed by: FAMILY MEDICINE

## 2024-08-05 PROCEDURE — 85610 PROTHROMBIN TIME: CPT | Performed by: FAMILY MEDICINE

## 2024-08-05 RX ORDER — ATORVASTATIN CALCIUM 40 MG/1
TABLET, FILM COATED ORAL
Qty: 90 TABLET | Refills: 1 | Status: SHIPPED | OUTPATIENT
Start: 2024-08-05

## 2024-08-05 RX ORDER — METFORMIN HYDROCHLORIDE 500 MG/1
TABLET, EXTENDED RELEASE ORAL
Qty: 90 TABLET | Refills: 1 | Status: SHIPPED | OUTPATIENT
Start: 2024-08-05

## 2024-08-05 NOTE — PROGRESS NOTES
Pt back in office. INR now 3.5 on regular dose however he had held one day. He had been therapeutic on previous dose but had missed checks for about 2 months. Will continue his previous/regular dose for one week and recheck. If it is elevated at that time will need decreased dose going forward.    n/a

## 2024-08-12 ENCOUNTER — NURSE ONLY (OUTPATIENT)
Facility: CLINIC | Age: 49
End: 2024-08-12
Payer: COMMERCIAL

## 2024-08-12 DIAGNOSIS — Z86.718 HISTORY OF DVT (DEEP VEIN THROMBOSIS): ICD-10-CM

## 2024-08-12 DIAGNOSIS — Z79.01 CHRONIC ANTICOAGULATION: Primary | ICD-10-CM

## 2024-08-12 LAB
POC INR: 3.4
PROTHROMBIN TIME, POC: 40.7

## 2024-08-12 PROCEDURE — 85610 PROTHROMBIN TIME: CPT | Performed by: FAMILY MEDICINE

## 2024-08-12 PROCEDURE — 93793 ANTICOAG MGMT PT WARFARIN: CPT | Performed by: FAMILY MEDICINE

## 2024-08-23 DIAGNOSIS — K21.9 GASTROESOPHAGEAL REFLUX DISEASE, UNSPECIFIED WHETHER ESOPHAGITIS PRESENT: ICD-10-CM

## 2024-08-23 NOTE — TELEPHONE ENCOUNTER
2/17/2025  7:00 AM PHYSICAL [facility] Yola Benitez MD    Appointment Notes:   *Needs A1c*, routine follow up and well adult in 6 months

## 2024-08-26 ENCOUNTER — NURSE ONLY (OUTPATIENT)
Facility: CLINIC | Age: 49
End: 2024-08-26

## 2024-08-26 VITALS
WEIGHT: 315 LBS | HEIGHT: 75 IN | TEMPERATURE: 97.3 F | BODY MASS INDEX: 39.17 KG/M2 | OXYGEN SATURATION: 97 % | HEART RATE: 64 BPM | DIASTOLIC BLOOD PRESSURE: 76 MMHG | SYSTOLIC BLOOD PRESSURE: 124 MMHG

## 2024-08-26 DIAGNOSIS — Z86.718 HISTORY OF DVT (DEEP VEIN THROMBOSIS): Primary | ICD-10-CM

## 2024-08-26 LAB
POC INR: 3.2
PROTHROMBIN TIME, POC: 38.4

## 2024-09-03 DIAGNOSIS — Z86.718 HISTORY OF DVT (DEEP VEIN THROMBOSIS): ICD-10-CM

## 2024-09-03 RX ORDER — WARFARIN SODIUM 10 MG/1
10 TABLET ORAL DAILY
Qty: 90 TABLET | Refills: 1 | Status: SHIPPED | OUTPATIENT
Start: 2024-09-03

## 2024-09-16 ENCOUNTER — TELEPHONE (OUTPATIENT)
Facility: CLINIC | Age: 49
End: 2024-09-16

## 2024-10-07 ENCOUNTER — TELEMEDICINE (OUTPATIENT)
Age: 49
End: 2024-10-07
Payer: COMMERCIAL

## 2024-10-07 ENCOUNTER — CLINICAL DOCUMENTATION (OUTPATIENT)
Age: 49
End: 2024-10-07

## 2024-10-07 DIAGNOSIS — G47.33 OSA (OBSTRUCTIVE SLEEP APNEA): Primary | ICD-10-CM

## 2024-10-07 PROCEDURE — 99213 OFFICE O/P EST LOW 20 MIN: CPT | Performed by: SPECIALIST

## 2024-10-07 RX ORDER — POTASSIUM CHLORIDE 1500 MG/1
TABLET, EXTENDED RELEASE ORAL
COMMUNITY
Start: 2022-01-28

## 2024-10-07 RX ORDER — TRAMADOL HYDROCHLORIDE 50 MG/1
TABLET ORAL
COMMUNITY

## 2024-10-07 RX ORDER — LOSARTAN POTASSIUM 25 MG/1
1 TABLET ORAL
COMMUNITY

## 2024-10-07 RX ORDER — OMEPRAZOLE 40 MG/1
1 CAPSULE, DELAYED RELEASE ORAL
COMMUNITY
Start: 2024-03-19

## 2024-10-07 RX ORDER — FLUTICASONE PROPIONATE AND SALMETEROL 250; 50 UG/1; UG/1
POWDER RESPIRATORY (INHALATION)
COMMUNITY
Start: 2022-11-22

## 2024-10-07 RX ORDER — ALBUTEROL SULFATE 90 UG/1
INHALANT RESPIRATORY (INHALATION)
COMMUNITY
Start: 2023-02-07

## 2024-10-07 RX ORDER — ASPIRIN 81 MG/1
1 TABLET ORAL
COMMUNITY

## 2024-10-07 RX ORDER — FUROSEMIDE 40 MG
1 TABLET ORAL
COMMUNITY

## 2024-10-07 RX ORDER — ACETAMINOPHEN 325 MG/1
TABLET ORAL
COMMUNITY

## 2024-10-07 ASSESSMENT — SLEEP AND FATIGUE QUESTIONNAIRES
DO YOU HAVE DIFFICULTY BEING AS ACTIVE AS YOU WANT TO BE IN THE MORNING BECAUSE YOU ARE SLEEPY OR TIRED: NO
DO YOU HAVE DIFFICULTY BEING AS ACTIVE AS YOU WANT TO BE IN THE EVENING BECAUSE YOU ARE SLEEPY OR TIRED: NO
DO YOU HAVE DIFFICULTY VISITING YOUR FAMILY OR FRIENDS IN THEIR HOME BECAUSE YOU BECOME SLEEPY OR TIRED: NO
HOW LIKELY ARE YOU TO NOD OFF OR FALL ASLEEP WHILE SITTING AND READING: MODERATE CHANCE OF DOZING
HOW LIKELY ARE YOU TO NOD OFF OR FALL ASLEEP WHILE SITTING QUIETLY AFTER LUNCH WITHOUT ALCOHOL: SLIGHT CHANCE OF DOZING
HAS YOUR MOOD BEEN AFFECTED BECAUSE YOU ARE SLEEPY OR TIRED: NO
HOW LIKELY ARE YOU TO NOD OFF OR FALL ASLEEP WHILE WATCHING TV: MODERATE CHANCE OF DOZING
HOW LIKELY ARE YOU TO NOD OFF OR FALL ASLEEP WHEN YOU ARE A PASSENGER IN A CAR FOR AN HOUR WITHOUT A BREAK: HIGH CHANCE OF DOZING
HOW LIKELY ARE YOU TO NOD OFF OR FALL ASLEEP WHEN YOU ARE A PASSENGER IN A CAR FOR AN HOUR WITHOUT A BREAK: HIGH CHANCE OF DOZING
DO YOU HAVE DIFFICULTY OPERATING A MOTOR VEHICLE FOR LONG DISTANCES (GREATER THAN 100 MILES) BECAUSE YOU BECOME SLEEPY: NO
HOW LIKELY ARE YOU TO NOD OFF OR FALL ASLEEP WHILE LYING DOWN TO REST IN THE AFTERNOON WHEN CIRCUMSTANCES PERMIT: SLIGHT CHANCE OF DOZING
HOW LIKELY ARE YOU TO NOD OFF OR FALL ASLEEP WHILE LYING DOWN TO REST IN THE AFTERNOON WHEN CIRCUMSTANCES PERMIT: SLIGHT CHANCE OF DOZING
DO YOU GENERALLY HAVE DIFFICULTY REMEMBERING THINGS BECAUSE YOU ARE SLEEPY OR TIRED: NO
HOW LIKELY ARE YOU TO NOD OFF OR FALL ASLEEP WHILE SITTING AND TALKING TO SOMEONE: SLIGHT CHANCE OF DOZING
ESS TOTAL SCORE: 11
HOW LIKELY ARE YOU TO NOD OFF OR FALL ASLEEP IN A CAR, WHILE STOPPED FOR A FEW MINUTES IN TRAFFIC: WOULD NEVER DOZE
HOW LIKELY ARE YOU TO NOD OFF OR FALL ASLEEP WHILE SITTING INACTIVE IN A PUBLIC PLACE: SLIGHT CHANCE OF DOZING
FOSQ SCORE: 20
DO YOU HAVE DIFFICULTY CONCENTRATING ON THE THINGS YOU DO BECAUSE YOU ARE SLEEPY OR TIRED: NO
HOW LIKELY ARE YOU TO NOD OFF OR FALL ASLEEP WHILE SITTING QUIETLY AFTER LUNCH WITHOUT ALCOHOL: SLIGHT CHANCE OF DOZING
HOW LIKELY ARE YOU TO NOD OFF OR FALL ASLEEP IN A CAR, WHILE STOPPED FOR A FEW MINUTES IN TRAFFIC: WOULD NEVER DOZE
DO YOU HAVE DIFFICULTY OPERATING A MOTOR VEHICLE FOR SHORT DISTANCES (LESS THAN 100 MILES) BECAUSE YOU BECOME SLEEPY: NO
HOW LIKELY ARE YOU TO NOD OFF OR FALL ASLEEP WHILE SITTING AND TALKING TO SOMEONE: SLIGHT CHANCE OF DOZING
HOW LIKELY ARE YOU TO NOD OFF OR FALL ASLEEP WHILE WATCHING TV: MODERATE CHANCE OF DOZING
HOW LIKELY ARE YOU TO NOD OFF OR FALL ASLEEP WHILE SITTING AND READING: MODERATE CHANCE OF DOZING
HAS YOUR RELATIONSHIP WITH FAMILY, FRIENDS OR WORK COLLEAGUES BEEN AFFECTED BECAUSE YOU ARE SLEEPY OR TIRED: NO
DO YOU HAVE DIFFICULTY WATCHING A MOVIE OR VIDEO BECAUSE YOU BECOME SLEEPY OR TIRED: NO
HOW LIKELY ARE YOU TO NOD OFF OR FALL ASLEEP WHILE SITTING INACTIVE IN A PUBLIC PLACE: SLIGHT CHANCE OF DOZING

## 2024-10-13 DIAGNOSIS — E11.65 TYPE 2 DIABETES MELLITUS WITH HYPERGLYCEMIA, WITHOUT LONG-TERM CURRENT USE OF INSULIN (HCC): ICD-10-CM

## 2024-10-14 RX ORDER — SEMAGLUTIDE 2.68 MG/ML
INJECTION, SOLUTION SUBCUTANEOUS
Qty: 12 ML | Refills: 1 | Status: SHIPPED | OUTPATIENT
Start: 2024-10-14

## 2024-11-15 ENCOUNTER — OFFICE VISIT (OUTPATIENT)
Facility: CLINIC | Age: 49
End: 2024-11-15

## 2024-11-15 VITALS
RESPIRATION RATE: 16 BRPM | WEIGHT: 315 LBS | OXYGEN SATURATION: 97 % | TEMPERATURE: 97.5 F | HEART RATE: 91 BPM | SYSTOLIC BLOOD PRESSURE: 138 MMHG | DIASTOLIC BLOOD PRESSURE: 88 MMHG | BODY MASS INDEX: 53.87 KG/M2

## 2024-11-15 DIAGNOSIS — Z86.718 HISTORY OF DVT (DEEP VEIN THROMBOSIS): Primary | ICD-10-CM

## 2024-11-15 LAB
POC INR: 2.6
PROTHROMBIN TIME, POC: NORMAL

## 2024-12-28 ENCOUNTER — APPOINTMENT (OUTPATIENT)
Facility: HOSPITAL | Age: 49
End: 2024-12-28
Payer: COMMERCIAL

## 2024-12-28 ENCOUNTER — HOSPITAL ENCOUNTER (EMERGENCY)
Facility: HOSPITAL | Age: 49
Discharge: HOME | End: 2024-12-28
Attending: EMERGENCY MEDICINE
Payer: COMMERCIAL

## 2024-12-28 VITALS
HEIGHT: 75 IN | DIASTOLIC BLOOD PRESSURE: 85 MMHG | WEIGHT: 315 LBS | RESPIRATION RATE: 18 BRPM | TEMPERATURE: 98.4 F | OXYGEN SATURATION: 96 % | BODY MASS INDEX: 39.17 KG/M2 | HEART RATE: 84 BPM | SYSTOLIC BLOOD PRESSURE: 147 MMHG

## 2024-12-28 DIAGNOSIS — J40 BRONCHITIS: Primary | ICD-10-CM

## 2024-12-28 LAB
FLUAV RNA SPEC QL NAA+PROBE: NOT DETECTED
FLUBV RNA SPEC QL NAA+PROBE: NOT DETECTED
SARS-COV-2 RNA RESP QL NAA+PROBE: NOT DETECTED
SOURCE: NORMAL

## 2024-12-28 PROCEDURE — 71046 X-RAY EXAM CHEST 2 VIEWS: CPT

## 2024-12-28 PROCEDURE — 99284 EMERGENCY DEPT VISIT MOD MDM: CPT

## 2024-12-28 PROCEDURE — 87636 SARSCOV2 & INF A&B AMP PRB: CPT

## 2024-12-28 RX ORDER — PREDNISONE 50 MG/1
50 TABLET ORAL DAILY
Qty: 5 TABLET | Refills: 0 | Status: SHIPPED | OUTPATIENT
Start: 2024-12-28 | End: 2025-01-02

## 2024-12-28 RX ORDER — AZITHROMYCIN 250 MG/1
TABLET, FILM COATED ORAL
Qty: 6 TABLET | Refills: 0 | Status: SHIPPED | OUTPATIENT
Start: 2024-12-28 | End: 2025-01-07

## 2024-12-28 ASSESSMENT — ENCOUNTER SYMPTOMS
COUGH: 0
RHINORRHEA: 0
SORE THROAT: 0
ABDOMINAL PAIN: 0
EYE PAIN: 0
BACK PAIN: 0
COLOR CHANGE: 0
DIARRHEA: 0
NAUSEA: 0
SHORTNESS OF BREATH: 0
VOMITING: 0

## 2024-12-28 NOTE — ED TRIAGE NOTES
Pt arrives to ER POV dry cough that makes his chest sore that started 3 days ago and a sore throat that started the Friday before Chritsmas, patient reports he is prone to getting bronchitis and has not been able to sleep for 3 days due to his cough.

## 2024-12-28 NOTE — ED PROVIDER NOTES
[facility] [place]  EMERGENCY DEPARTMENT ENCOUNTER      Pt Name: Kevin Gusman  MRN: [id number]  Birthdate 1975  Date of evaluation: 12/28/2024  Provider: Neal Maldonado MD    CHIEF COMPLAINT       Chief Complaint   Patient presents with    Cold Symptoms         HISTORY OF PRESENT ILLNESS   (Location/Symptom, Timing/Onset, Context/Setting, Quality, Duration, Modifying Factors, Severity)  Note limiting factors.   49-year-old male presents today with cough congestion and sore throat for several days.  No nausea or vomiting.  No shortness of breath at this time.  Patient states cough keeps him awake.  Has been trying to use his inhaler with no help.    The history is provided by the patient.   Cold Symptoms  Presenting symptoms: no congestion, no cough, no ear pain, no facial pain, no fatigue, no fever, no rhinorrhea and no sore throat    Severity:  Mild  Onset quality:  Gradual  Timing:  Constant  Progression:  Worsening  Chronicity:  New  Relieved by:  Nothing  Worsened by:  Nothing  Ineffective treatments:  None tried  Associated symptoms: no arthralgias          Review of External Medical Records:     Nursing Notes were reviewed.    REVIEW OF SYSTEMS    (2-9 systems for level 4, 10 or more for level 5)     Review of Systems   Constitutional:  Negative for fatigue and fever.   HENT:  Negative for congestion, ear pain, rhinorrhea and sore throat.    Eyes:  Negative for pain and visual disturbance.   Respiratory:  Negative for cough and shortness of breath.    Cardiovascular:  Negative for chest pain.   Gastrointestinal:  Negative for abdominal pain, diarrhea, nausea and vomiting.   Genitourinary:  Negative for dysuria.   Musculoskeletal:  Negative for arthralgias, back pain and joint swelling.   Skin:  Negative for color change and rash.   Neurological:  Negative for dizziness, syncope and numbness.   Psychiatric/Behavioral:  Negative for agitation, behavioral problems, confusion and decreased

## 2025-02-20 DIAGNOSIS — E11.65 TYPE 2 DIABETES MELLITUS WITH HYPERGLYCEMIA, WITHOUT LONG-TERM CURRENT USE OF INSULIN (HCC): ICD-10-CM

## 2025-02-20 NOTE — TELEPHONE ENCOUNTER
Patient called in requesting a refill on his Metformin 500 mg. He would like for it to be sent to the [facility] in [place] on [address]

## 2025-02-21 RX ORDER — METFORMIN HYDROCHLORIDE 500 MG/1
500 TABLET, EXTENDED RELEASE ORAL
Qty: 90 TABLET | Refills: 1 | Status: SHIPPED | OUTPATIENT
Start: 2025-02-21

## 2025-02-24 ENCOUNTER — TELEPHONE (OUTPATIENT)
Facility: CLINIC | Age: 50
End: 2025-02-24

## 2025-02-24 ENCOUNTER — OFFICE VISIT (OUTPATIENT)
Facility: CLINIC | Age: 50
End: 2025-02-24

## 2025-02-24 VITALS
DIASTOLIC BLOOD PRESSURE: 92 MMHG | HEART RATE: 84 BPM | HEIGHT: 75 IN | OXYGEN SATURATION: 97 % | SYSTOLIC BLOOD PRESSURE: 130 MMHG | BODY MASS INDEX: 39.17 KG/M2 | RESPIRATION RATE: 16 BRPM | WEIGHT: 315 LBS

## 2025-02-24 DIAGNOSIS — Z12.11 SCREENING FOR COLON CANCER: ICD-10-CM

## 2025-02-24 DIAGNOSIS — K21.9 GASTROESOPHAGEAL REFLUX DISEASE WITHOUT ESOPHAGITIS: ICD-10-CM

## 2025-02-24 DIAGNOSIS — B35.1 MYCOTIC TOENAILS: ICD-10-CM

## 2025-02-24 DIAGNOSIS — E78.2 MIXED HYPERLIPIDEMIA: ICD-10-CM

## 2025-02-24 DIAGNOSIS — Z00.00 ENCOUNTER FOR ANNUAL PHYSICAL EXAM: Primary | ICD-10-CM

## 2025-02-24 DIAGNOSIS — Z79.01 CHRONIC ANTICOAGULATION: ICD-10-CM

## 2025-02-24 DIAGNOSIS — E66.01 MORBID OBESITY WITH BODY MASS INDEX (BMI) OF 50.0 TO 59.9 IN ADULT: ICD-10-CM

## 2025-02-24 DIAGNOSIS — Z12.5 SCREENING FOR PROSTATE CANCER: ICD-10-CM

## 2025-02-24 DIAGNOSIS — Z23 ENCOUNTER FOR IMMUNIZATION: ICD-10-CM

## 2025-02-24 DIAGNOSIS — E11.65 TYPE 2 DIABETES MELLITUS WITH HYPERGLYCEMIA, WITHOUT LONG-TERM CURRENT USE OF INSULIN (HCC): ICD-10-CM

## 2025-02-24 DIAGNOSIS — I10 ESSENTIAL HYPERTENSION: ICD-10-CM

## 2025-02-24 PROBLEM — Z86.718 HISTORY OF DVT (DEEP VEIN THROMBOSIS): Status: RESOLVED | Noted: 2022-08-11 | Resolved: 2025-02-24

## 2025-02-24 PROBLEM — C48.0: Status: RESOLVED | Noted: 2021-08-25 | Resolved: 2025-02-24

## 2025-02-24 PROBLEM — Z86.711 HISTORY OF PULMONARY EMBOLUS (PE): Status: RESOLVED | Noted: 2022-08-11 | Resolved: 2025-02-24

## 2025-02-24 PROBLEM — C62.90: Status: RESOLVED | Noted: 2021-08-25 | Resolved: 2025-02-24

## 2025-02-24 PROBLEM — L03.116 CELLULITIS OF LEFT LOWER LEG: Status: RESOLVED | Noted: 2023-09-02 | Resolved: 2025-02-24

## 2025-02-24 LAB
POC INR: 2.2
PROTHROMBIN TIME, POC: NORMAL

## 2025-02-24 RX ORDER — OMEPRAZOLE 40 MG/1
40 CAPSULE, DELAYED RELEASE ORAL DAILY
Qty: 90 CAPSULE | Refills: 1 | Status: SHIPPED | OUTPATIENT
Start: 2025-02-24

## 2025-02-24 RX ORDER — METOPROLOL TARTRATE 25 MG/1
25 TABLET, FILM COATED ORAL 2 TIMES DAILY
Qty: 60 TABLET | Refills: 5 | Status: SHIPPED | OUTPATIENT
Start: 2025-02-24

## 2025-02-24 RX ORDER — WARFARIN SODIUM 10 MG/1
10 TABLET ORAL DAILY
Qty: 90 TABLET | Refills: 1 | Status: SHIPPED | OUTPATIENT
Start: 2025-02-24

## 2025-02-24 RX ORDER — SEMAGLUTIDE 2.68 MG/ML
2 INJECTION, SOLUTION SUBCUTANEOUS
Qty: 12 ML | Refills: 5 | Status: SHIPPED | OUTPATIENT
Start: 2025-02-24

## 2025-02-24 RX ORDER — CICLOPIROX 80 MG/ML
SOLUTION TOPICAL
Qty: 6 ML | Refills: 5 | Status: SHIPPED | OUTPATIENT
Start: 2025-02-24

## 2025-02-24 RX ORDER — WARFARIN SODIUM 5 MG/1
TABLET ORAL
Qty: 30 TABLET | Refills: 1 | Status: SHIPPED | OUTPATIENT
Start: 2025-02-24

## 2025-02-24 RX ORDER — ATORVASTATIN CALCIUM 40 MG/1
TABLET, FILM COATED ORAL
Qty: 90 TABLET | Refills: 1 | Status: SHIPPED | OUTPATIENT
Start: 2025-02-24

## 2025-02-24 SDOH — ECONOMIC STABILITY: FOOD INSECURITY: WITHIN THE PAST 12 MONTHS, YOU WORRIED THAT YOUR FOOD WOULD RUN OUT BEFORE YOU GOT MONEY TO BUY MORE.: NEVER TRUE

## 2025-02-24 SDOH — ECONOMIC STABILITY: FOOD INSECURITY: WITHIN THE PAST 12 MONTHS, THE FOOD YOU BOUGHT JUST DIDN'T LAST AND YOU DIDN'T HAVE MONEY TO GET MORE.: NEVER TRUE

## 2025-02-24 ASSESSMENT — PATIENT HEALTH QUESTIONNAIRE - PHQ9
SUM OF ALL RESPONSES TO PHQ QUESTIONS 1-9: 0
2. FEELING DOWN, DEPRESSED OR HOPELESS: NOT AT ALL
SUM OF ALL RESPONSES TO PHQ9 QUESTIONS 1 & 2: 0
SUM OF ALL RESPONSES TO PHQ QUESTIONS 1-9: 0
1. LITTLE INTEREST OR PLEASURE IN DOING THINGS: NOT AT ALL

## 2025-02-24 ASSESSMENT — ENCOUNTER SYMPTOMS
ABDOMINAL PAIN: 0
CHEST TIGHTNESS: 0
SHORTNESS OF BREATH: 0

## 2025-02-24 NOTE — TELEPHONE ENCOUNTER
Patient is calling to inform that his pharmacy states that his medication: esomeprazole (NEXIUM) 20 MG delayed release capsule requires a PA and they did not provide him any information to obtain the PA.

## 2025-02-24 NOTE — ASSESSMENT & PLAN NOTE
Chronic, not at goal (unstable), continue current treatment plan. We will get patient set up with home monitoring.

## 2025-02-24 NOTE — PROGRESS NOTES
Chief Complaint   Patient presents with    Anticoagulation     Wants to try inr at home    Annual Exam     PHQ-9 Total Score: 0 (2/24/2025  7:08 AM)    \"Have you been to the ER, urgent care clinic since your last visit?  Hospitalized since your last visit?\"    YES - When: approximately 2 months ago.  Where and Why: bronchitis.    “Have you seen or consulted any other health care providers outside our system since your last visit?”    NO    “Have you had a colorectal cancer screening such as a colonoscopy/FIT/Cologuard?    NO    No colonoscopy on file  No cologuard on file  No FIT/FOBT on file   No flexible sigmoidoscopy on file     “Have you had a diabetic eye exam?”    YES - Where: [facility] Nurse/CMA to request most recent records if not in the chart     Date of last diabetic eye exam: 7/21/2023       Click Here for Release of Records Request     Resp 16   Ht 1.905 m (6' 3\")   Wt (!) 193.2 kg (426 lb)   BMI 53.25 kg/m²       7/22/2022     1:29 PM   Amb Fall Risk Assessment and TUG Test   Total Score 1

## 2025-02-24 NOTE — PROGRESS NOTES
Annual Physical/Chronic Condition Follow Up    SUBJECTIVE/OBJECTIVE:    Kevin Gusman is on the schedule today for annual wellness exam and to follow up on chronic conditions.    1. Encounter for annual physical exam  For the wellness:  Flu vaccine- Refused. Discussed benefits of vaccination and risks of refusal with patient.   COVID vaccine- Had one Addy and Addy. Refused booster. Discussed benefits of vaccination and risks of refusal with patient.   Tetanus- Tdap overdue, will update today.  Shingrix- Patient has not had, open to getting Shingrix, provided information in AVS and encouraged him to get this at his pharmacy.   Prevnar 20 OR PCV15 then Pneumovax 23 1 yr later- We will administer today.   HIV/HCV screening- Has never had  PSA- We will check today  Colon cancer screening- Patient has never had colonoscopy. Discussed options, he is open to getting the colonoscopy.   Smoking status-   Tobacco Use      Smoking status: Never      Smokeless tobacco: Never     ASCVD- The 10-year ASCVD risk score (Stevan DK, et al., 2019) is: 10.8%   Exercise-  Increasing daily steps  Diet- Sometimes his girlfriend will cook dinner, sometimes he eats out.   Dentist- Overdue, encouraged to go  Eye Exam- Saw last year when getting his eye injections  Skin Check- Denies changes he is concerned about.   Podiatrist- Has never seen.  Hearing Exam- Denies changes  PHQ2/9: 0, No depressive symptoms      Specialists:  Cardiology- Dr. Fall, goes yearly  [facility] [place], will be calling to schedule  Oncology- Dr. Weinstein  Sleep Medicine- Nichole CHIRINOS    Chronic Conditions    2. Type 2 diabetes mellitus with hyperglycemia, without long-term current use of insulin (HCC)   Management of T2DM-  Current meds: Metformin 500mg daily and 2mg Ozempic weekly  Compliant with medication: Yes  Last A1c: 9.5 a year ago, at the time he was having supply issues with Ozempic  Home glucose monitoring range:

## 2025-02-24 NOTE — ASSESSMENT & PLAN NOTE
-Discussed how to divide the plate, 50% to be non-starchy vegetable, 25% lean non-fried proteins like chicken or fish, and 25% whole carbohydrates like brown rice.

## 2025-02-24 NOTE — ASSESSMENT & PLAN NOTE
-Advised patient to check his blood pressures 3-4 times weekly and contact the office in two weeks if he is still getting readings >130/90.  -Reviewed lifestyle modifications for HTN.

## 2025-02-25 DIAGNOSIS — E11.65 TYPE 2 DIABETES MELLITUS WITH HYPERGLYCEMIA, WITHOUT LONG-TERM CURRENT USE OF INSULIN (HCC): ICD-10-CM

## 2025-02-25 LAB
25(OH)D3+25(OH)D2 SERPL-MCNC: 33.4 NG/ML (ref 30–100)
ALBUMIN SERPL-MCNC: 4.2 G/DL (ref 3.8–4.9)
ALP SERPL-CCNC: 109 IU/L (ref 44–121)
ALT SERPL-CCNC: 33 IU/L (ref 0–44)
AST SERPL-CCNC: 26 IU/L (ref 0–40)
BASOPHILS # BLD AUTO: 0 X10E3/UL (ref 0–0.2)
BASOPHILS NFR BLD AUTO: 1 %
BILIRUB SERPL-MCNC: 0.9 MG/DL (ref 0–1.2)
BUN SERPL-MCNC: 14 MG/DL (ref 6–24)
BUN/CREAT SERPL: 14 (ref 9–20)
CALCIUM SERPL-MCNC: 9.4 MG/DL (ref 8.7–10.2)
CHLORIDE SERPL-SCNC: 106 MMOL/L (ref 96–106)
CHOLEST SERPL-MCNC: 126 MG/DL (ref 100–199)
CO2 SERPL-SCNC: 21 MMOL/L (ref 20–29)
CREAT SERPL-MCNC: 1.01 MG/DL (ref 0.76–1.27)
EGFRCR SERPLBLD CKD-EPI 2021: 90 ML/MIN/1.73
EOSINOPHIL # BLD AUTO: 0.1 X10E3/UL (ref 0–0.4)
EOSINOPHIL NFR BLD AUTO: 1 %
ERYTHROCYTE [DISTWIDTH] IN BLOOD BY AUTOMATED COUNT: 14.5 % (ref 11.6–15.4)
GLOBULIN SER CALC-MCNC: 2.9 G/DL (ref 1.5–4.5)
GLUCOSE SERPL-MCNC: 133 MG/DL (ref 70–99)
HBA1C MFR BLD: 8.6 % (ref 4.8–5.6)
HCT VFR BLD AUTO: 42.3 % (ref 37.5–51)
HDLC SERPL-MCNC: 39 MG/DL
HGB BLD-MCNC: 13.7 G/DL (ref 13–17.7)
IMM GRANULOCYTES # BLD AUTO: 0 X10E3/UL (ref 0–0.1)
IMM GRANULOCYTES NFR BLD AUTO: 0 %
LDLC SERPL CALC-MCNC: 71 MG/DL (ref 0–99)
LYMPHOCYTES # BLD AUTO: 2.1 X10E3/UL (ref 0.7–3.1)
LYMPHOCYTES NFR BLD AUTO: 34 %
MAGNESIUM SERPL-MCNC: 2 MG/DL (ref 1.6–2.3)
MCH RBC QN AUTO: 27.3 PG (ref 26.6–33)
MCHC RBC AUTO-ENTMCNC: 32.4 G/DL (ref 31.5–35.7)
MCV RBC AUTO: 84 FL (ref 79–97)
MONOCYTES # BLD AUTO: 0.7 X10E3/UL (ref 0.1–0.9)
MONOCYTES NFR BLD AUTO: 11 %
NEUTROPHILS # BLD AUTO: 3.3 X10E3/UL (ref 1.4–7)
NEUTROPHILS NFR BLD AUTO: 53 %
PLATELET # BLD AUTO: 137 X10E3/UL (ref 150–450)
POTASSIUM SERPL-SCNC: 4.2 MMOL/L (ref 3.5–5.2)
PROT SERPL-MCNC: 7.1 G/DL (ref 6–8.5)
PSA SERPL-MCNC: 0.2 NG/ML (ref 0–4)
RBC # BLD AUTO: 5.01 X10E6/UL (ref 4.14–5.8)
REFLEX CRITERIA: NORMAL
SODIUM SERPL-SCNC: 143 MMOL/L (ref 134–144)
TRIGL SERPL-MCNC: 83 MG/DL (ref 0–149)
TSH SERPL DL<=0.005 MIU/L-ACNC: 2.34 UIU/ML (ref 0.45–4.5)
VIT B12 SERPL-MCNC: 923 PG/ML (ref 232–1245)
VLDLC SERPL CALC-MCNC: 16 MG/DL (ref 5–40)
WBC # BLD AUTO: 6.2 X10E3/UL (ref 3.4–10.8)

## 2025-02-25 RX ORDER — METFORMIN HYDROCHLORIDE 500 MG/1
1000 TABLET, EXTENDED RELEASE ORAL
Qty: 180 TABLET | Refills: 1 | Status: SHIPPED | OUTPATIENT
Start: 2025-02-25

## 2025-02-26 LAB
ALBUMIN/CREAT UR: 7 MG/G CREAT (ref 0–29)
CREAT UR-MCNC: 144.1 MG/DL
MICROALBUMIN UR-MCNC: 10.4 UG/ML

## 2025-02-26 NOTE — TELEPHONE ENCOUNTER
Spoke with insurance company-- because he is on the esmoeprazole and the omeprazole the esomeprazole needs a PA. Patient has never taken the omperazole before wanted to know if he's suppose to start taking that now.    Patient is also suppose to be taking 100mg of metoprolol not 25mg please call in updated dosage.

## 2025-03-03 ENCOUNTER — TELEPHONE (OUTPATIENT)
Facility: CLINIC | Age: 50
End: 2025-03-03

## 2025-03-03 NOTE — TELEPHONE ENCOUNTER
Nikolai Lau NP with [facility] is calling to inform she did a retina view eye exam and it came back with a little bit of diabetic retinopathy in the right eye--NP ANIL does not need to do anything---has already contacted the patient and he is scheduled for an upcoming appt with the eye doctor Dr. Rollins---she just wanted to ensure NP ANIL was aware

## 2025-03-04 ENCOUNTER — TELEPHONE (OUTPATIENT)
Facility: CLINIC | Age: 50
End: 2025-03-04

## 2025-03-04 NOTE — TELEPHONE ENCOUNTER
Rolan from [facility] came to the office and wants to inform that the patient's Coumadin for his MD INR has been ordered 2x since last April and Feb 2025 and has been denied due to his [email or web address] insurance---please contact the patient and they will be cancelling the order unless the patient has another insurance otherwise the patient will be self pay---Mr. Gongora has contacted the patient and he denied self pay. Mr. Gongora can be reached at [phone].

## 2025-03-06 NOTE — TELEPHONE ENCOUNTER
From EW: Can you double check this with patient? What exactly is his metoprolol dose? 100mg BID? Was Cardiology previously prescribing this? And think there is a medication error here, he does not need to be on both esomeprazole and omeprazole, one or the other.

## 2025-03-06 NOTE — TELEPHONE ENCOUNTER
Patient taking Metoprolol once a day 25mg. No 100mg on profile. Patient stated the Esomeprazole works best I took omeprazole off profile.

## 2025-05-22 NOTE — PROGRESS NOTES
[facility]  [facility] - [facility]  [address]  [place] [address]  Dept: [phone]              [address] [facility]  [place] [address]  Tel.  [phone]  Fax. [phone] [address] [facility]. [address] [place] [address]  Tel.  [phone]  Fax. [phone] [address] [place] [address]  Tel.  [phone]  Fax. [phone]     Kevin Gusman, was evaluated through a synchronous (real-time) audio-video encounter. The patient (and/or guardian if applicable) is aware that this is a billable service, which includes applicable co-pays. This virtual visit was conducted with patient's (and/or legal guardian's) consent. Patient identification was verified, and a caregiver was present when appropriate.  The patient was located in [place]  The provider was located at Facility (Login Dept): [facility] [place] - [facility]  [address]  [place] [address] [phone]  Yes, I confirm.    Total time spent on this encounter: Greater than 20 minutes spent: In direct video patient care, chart review and planning    --Emil Mcelroy MD on 10/7/2024 at 10:38 AM    An electronic signature was used to authenticate this note.     Chief Complaint       Chief Complaint   Patient presents with    Sleep Problem     Yearly follow up. Consent to [place] in [place]. Send link to [phone]         [facility]        Kevin Gusman is a 49 y.o. male seen for follow-up.  He had initial sleep evaluation approximately in 2012; on BiPAP which she stopped using.  Reevaluation September 2022 demonstrating an overall AHI of 28.8/h associated with minimal SaO2 of 60%.  29.2 minutes spent in an SaO2 range less than 88%.  CPAP increased to 12 cm.     Currently APAP 10-12 cm.  Device set up date: 2/20/2023.  Using [facility].       Compliance data downloaded and reviewed in detail with the patient today. During the past 30 days, APAP used during 38.3  CB

## 2025-08-12 DIAGNOSIS — Z79.01 CHRONIC ANTICOAGULATION: ICD-10-CM

## 2025-08-12 RX ORDER — WARFARIN SODIUM 10 MG/1
10 TABLET ORAL DAILY
Qty: 90 TABLET | Refills: 1 | Status: SHIPPED | OUTPATIENT
Start: 2025-08-12

## 2025-09-04 DIAGNOSIS — E78.2 MIXED HYPERLIPIDEMIA: ICD-10-CM

## 2025-09-04 RX ORDER — ATORVASTATIN CALCIUM 40 MG/1
TABLET, FILM COATED ORAL
Qty: 90 TABLET | Refills: 1 | Status: SHIPPED | OUTPATIENT
Start: 2025-09-04

## (undated) DEVICE — JELLY,LUBE,STERILE,FLIP TOP,TUBE,4-OZ: Brand: MEDLINE

## (undated) DEVICE — STERILE POLYISOPRENE POWDER-FREE SURGICAL GLOVES: Brand: PROTEXIS

## (undated) DEVICE — Device

## (undated) DEVICE — OPEN-END URETERAL CATHETER: Brand: COOK

## (undated) DEVICE — KENDALL SCD EXPRESS SLEEVES, KNEE LENGTH, LARGE: Brand: KENDALL SCD

## (undated) DEVICE — SKIN MARKER,REGULAR TIP WITH RULER AND LABELS: Brand: DEVON

## (undated) DEVICE — SOLUTION IRRIG 3000ML 0.9% SOD CHL FLX CONT 0797208] ICU MEDICAL INC]

## (undated) DEVICE — GOWN,SIRUS,FABRNF,XL,20/CS: Brand: MEDLINE

## (undated) DEVICE — SOLUTION IRRIGATION H2O 0797305] ICU MEDICAL INC]

## (undated) DEVICE — DRIP REDUCTION MANIFOLD

## (undated) DEVICE — (D)SYR 10ML 1/5ML GRAD NSAF -- PKGING CHANGE USE ITEM 338027

## (undated) DEVICE — BAG COLLECTION FLD OR-TBL NS --

## (undated) DEVICE — GUIDEWIRE ENDOSCP L150CM DIA0.035IN TIP L15CM BENT PTFE

## (undated) DEVICE — MEDI-VAC NON-CONDUCTIVE SUCTION TUBING: Brand: CARDINAL HEALTH

## (undated) DEVICE — INFECTION CONTROL KIT SYS

## (undated) DEVICE — Y-TYPE TUR IRRIGATION SET

## (undated) DEVICE — CYSTOSCOPY PACK: Brand: CONVERTORS

## (undated) DEVICE — DEVON™ KNEE AND BODY STRAP 60" X 3" (1.5 M X 7.6 CM): Brand: DEVON

## (undated) DEVICE — NITINOL STONE RETRIEVAL BASKET: Brand: ZERO TIP

## (undated) DEVICE — Z DISCONTINUEDSOLUTION PREP 2OZ 10% POVIDONE IOD SCR CAP BTL